# Patient Record
Sex: MALE | Race: WHITE | NOT HISPANIC OR LATINO | Employment: OTHER | URBAN - NONMETROPOLITAN AREA
[De-identification: names, ages, dates, MRNs, and addresses within clinical notes are randomized per-mention and may not be internally consistent; named-entity substitution may affect disease eponyms.]

---

## 2017-01-06 ENCOUNTER — TELEPHONE (OUTPATIENT)
Dept: UROLOGY | Facility: CLINIC | Age: 62
End: 2017-01-06

## 2017-01-06 NOTE — TELEPHONE ENCOUNTER
----- Message from Magdalene Sharma sent at 1/6/2017 11:40 AM CST -----  Contact: [phone]  PT WANTS RESULTS FROM PSA.    THANKS

## 2017-02-09 ENCOUNTER — OFFICE VISIT (OUTPATIENT)
Dept: OTOLARYNGOLOGY | Facility: CLINIC | Age: 62
End: 2017-02-09

## 2017-02-09 VITALS
BODY MASS INDEX: 21.47 KG/M2 | WEIGHT: 162 LBS | HEART RATE: 99 BPM | TEMPERATURE: 98.2 F | HEIGHT: 73 IN | DIASTOLIC BLOOD PRESSURE: 96 MMHG | SYSTOLIC BLOOD PRESSURE: 123 MMHG

## 2017-02-09 DIAGNOSIS — K21.9 LARYNGOPHARYNGEAL REFLUX: Primary | ICD-10-CM

## 2017-02-09 DIAGNOSIS — K11.7 XEROSTOMIA: ICD-10-CM

## 2017-02-09 DIAGNOSIS — C01 SQUAMOUS CELL CARCINOMA OF BASE OF TONGUE (HCC): ICD-10-CM

## 2017-02-09 PROCEDURE — 99213 OFFICE O/P EST LOW 20 MIN: CPT | Performed by: NURSE PRACTITIONER

## 2017-02-09 PROCEDURE — 31575 DIAGNOSTIC LARYNGOSCOPY: CPT | Performed by: NURSE PRACTITIONER

## 2017-02-09 RX ORDER — TADALAFIL 20 MG
TABLET ORAL
Refills: 2 | COMMUNITY
Start: 2016-12-05 | End: 2019-03-26

## 2017-02-09 RX ORDER — ALPRAZOLAM 2 MG/1
2 TABLET ORAL 3 TIMES DAILY
COMMUNITY
Start: 2017-02-08

## 2017-02-09 RX ORDER — SIMVASTATIN 20 MG
20 TABLET ORAL NIGHTLY
Refills: 11 | COMMUNITY
Start: 2017-01-09

## 2017-02-09 NOTE — PROGRESS NOTES
OPERATIVE NOTE:    PROCEDURE:   Flexible Fiberoptic Laryngoscopy    ANESTHESIA:  None    REASON FOR PROCEDURE:  Procedure was recommend for suspicious clinical behavior unresponsive to medical management, concern for return of malignancy.  Risks, benefits and alternatives were discussed.      DETAILS of OPERATION:  The patient was seated in the exam chair.  A flexible fiberoptic laryngoscopy was performed through the oral cavity.  The scope was introduced into the oral cavity and directed to the level of the glottis, examining the structures of the oropharynx, base of tongue, vallecula, supraglottic larynx, glottic larynx, and hypopharynx.      FINDINGS:  Mucosal surfaces:   The mucosal surfaces demonstrated normal mucosa surfaces without inflammation.    Base of tongue:  The base of tongue was found to have radiation changes no mass or lesion    Epiglottis:  The epiglottis was found to have no mass or lesion.    Aryepligottic fold:  The AE folds were found to have no mass or lesion.    False Vocal Fold:  The false cords were found to have no mass or lesion.    True Vocal Cord:  The true vocal cords were found to have no mass or lesion.    Arytenoid:   The arytenoids were found to have erythema    Hypopharynx:  The hypopharynx was found to have no mass or lesion.    The patient tolerated procedure well.

## 2017-02-09 NOTE — PROGRESS NOTES
YOB: 1955  Location: [facility]  Location Address: [address] [place] [address], [facility], [address] [place] [address]  Location Phone: [phone]    Chief Complaint   Patient presents with   • Mouth Lesions       History of Present Illness  Saul Alcantara Sr. is a 61 y.o. male. Saul Alcantara Sr. is here for follow up of ENT complaints. 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 Patient has had dry throat and mouth. The symptoms are not localized to a particular location.  The symptoms have been present for the last several months . He has been in speech therapy and is improving. On Nystatin at this time.               Study Result      RF SWALLOWING STUDY CINE OR VIDEO- 2016 9-31 AM CDT     REASON FOR EXAM- Dysphasia     COMPARISON- NONE       FLUOROSCOPY TIME- 1 minute     TECHNIQUE- In conjunction with speech pathology services, video  fluoroscopic swallow examination was performed with oral ingestion of  barium containing substances of various viscosities.       FINDINGS- Medium bolus sizes are well controlled with no spillage into  the oropharynx. No pooling is demonstrated. There is soft palate  elevation and coverage of the posterior nasopharynx with swallowing. No  stasis is noted within the valleculae or piriform sinuses. Penetration  is noted with thin consistency.       IMPRESSION-    1. Normal swallowing mechanism.    2. Penetration with thin consistency. No evidence of aspiration.       Please see speech pathologist's report for further details and  recommendations.                       Past Medical History   Diagnosis Date   • Allergic rhinitis    • Chronic rhinitis    • Chronic sinusitis    • Depression    • Deviated nasal septum    • GERD (gastroesophageal reflux disease)    • Hypertension    • Hypothyroidism    • Laryngopharyngeal reflux    • Lymphoma      Non-Hodgkins   • Sicca syndrome    • Sinusitis, acute    • Squamous  cell carcinoma      Base of Tongue   • Tobacco use disorder    • Tongue irritation    • Xerostomia        Past Surgical History   Procedure Laterality Date   • Cataract extraction     • Squamous cell carcinoma excision  09/10/2014     Tongue   • Hand surgery     • Knee surgery     • Other surgical history  09/10/2014     Microlaryngoscopy with Biopsy - Base of Tongue   • Tonsillectomy           Current Outpatient Prescriptions:   •  ALPRAZolam (XANAX) 2 MG tablet, , Disp: , Rfl:   •  CIALIS 20 MG tablet, TK 1 T PO EVERY OTHER DAY PRN., Disp: , Rfl: 2  •  cyclobenzaprine (FLEXERIL) 10 MG tablet, TK 1 T PO TID, Disp: , Rfl: 2  •  escitalopram (LEXAPRO) 20 MG tablet, Take 20 mg by mouth daily Indications: Depression , Disp: , Rfl:   •  glycopyrrolate (ROBINUL) 1 MG tablet, Take 1 mg by mouth 3 times daily, Disp: , Rfl:   •  HYDROcodone-acetaminophen (NORCO) 7.5-325 MG per tablet, TK 1 T PO QD PRN P, Disp: , Rfl: 0  •  levothyroxine (SYNTHROID, LEVOTHROID) 125 MCG tablet, Take 0.125 mg by mouth daily Indications: Underactive Thyroid , Disp: , Rfl:   •  nystatin (MYCOSTATIN) 324887 UNIT/ML suspension, TK 10 ML PO TID SIWSH AND SWALLOW FOR 7 DAYS, Disp: , Rfl: 2  •  ondansetron (ZOFRAN) 8 MG tablet, every 8 (eight) hours., Disp: , Rfl:   •  pantoprazole (PROTONIX) 40 MG EC tablet, Delayed Release (DR/EC) Take 1 tablet (40 mg) by oral route 2 times per day, Take 30 minutes prior to breakfast and evening meal, Disp: , Rfl:   •  senna-docusate (PERICOLACE) 8.6-50 MG per tablet, Take 1 tablet by mouth 2 times daily, Disp: , Rfl:   •  simvastatin (ZOCOR) 20 MG tablet, TK 1 T PO QD, Disp: , Rfl: 11    Review of patient's allergies indicates no known allergies.    Family History   Problem Relation Age of Onset   • Diabetes Sister    • Cancer Sister        Social History     Social History   • Marital status:      Spouse name: N/A   • Number of children: N/A   • Years of education: N/A     Occupational History   • Not on  file.     Social History Main Topics   • Smoking status: Former Smoker   • Smokeless tobacco: Not on file   • Alcohol use No   • Drug use: No   • Sexual activity: Not on file     Other Topics Concern   • Not on file     Social History Narrative       Review of Systems   Constitutional: Negative.    HENT:        SEE HPI   Eyes: Negative.    Respiratory: Negative.    Cardiovascular: Negative.    Gastrointestinal: Negative.    Endocrine: Negative.    Genitourinary: Negative.    Musculoskeletal: Negative.    Skin: Negative.    Allergic/Immunologic: Negative.    Neurological: Negative.    Hematological: Negative.    Psychiatric/Behavioral: Negative.    All other systems reviewed and are negative.      Vitals:    02/09/17 1417   BP: 123/96   Pulse: 99   Temp: 98.2 °F (36.8 °C)       Objective     Physical Exam  CONSTITUTIONAL: thin though well nourished, alert, oriented, in no acute distress     COMMUNICATION AND VOICE: able to communicate normally, normal voice quality    HEAD: normocephalic, no lesions, atraumatic, no tenderness, no masses     FACE: appearance normal, no lesions, no tenderness, no deformities, facial motion symmetric    SALIVARY GLANDS: parotid glands with no tenderness, no swelling, no masses, submandibular glands with normal size, nontender    EYES: ocular motility normal, eyelids normal, orbits normal, no proptosis, conjunctiva normal , pupils equal, round     EARS:  Hearing: response to conversational voice normal bilaterally   External Ears: auricles without lesions  Otoscopic: tympanic membrane appearance normal, no lesions, no perforation, normal mobility, no fluid    NOSE:  External Nose: structure normal, no tenderness on palpation, no nasal discharge, no lesions, no evidence of trauma, nostrils patent   Intranasal Exam: nasal mucosa normal, vestibule within normal limits, inferior turbinate normal, nasal septum midline     ORAL:  Lips: upper and lower lips without lesion   Teeth:  edentulous  Gums: gingivae healthy   Oral Mucosa: oral mucosa dry    Floor of Mouth: WNL  Tongue: dryness with mild yellow coating without obvious lesions or masses to visualization or palpation  Palate: soft and hard palates with normal mucosa and structure  Oropharynx: oropharyngeal mucosa normal dry    LARYNGEAL EXAM:  See SCOPE    NECK: neck appearance normal, no mass,  noted without erythema or tenderness    THYROID: no overt thyromegaly, no tenderness, nodules or mass present on palpation, position midline     LYMPH NODES: no lymphadenopathy    CHEST/RESPIRATORY: respiratory effort normal    CARDIOVASCULAR:  extremities without cyanosis or edema      NEUROLOGIC/PSYCHIATRIC: oriented to time, place and person, mood normal, affect appropriate, CN II-XII intact grossly    Assessment/Plan   Saul was seen today for mouth lesions.    Diagnoses and all orders for this visit:    Laryngopharyngeal reflux    Squamous cell carcinoma of base of tongue T1N0M0    Xerostomia      * Surgery not found *  No orders of the defined types were placed in this encounter.    Return in about 8 weeks (around 4/6/2017).       Patient Instructions   Call for problems or worsening symptoms    No evidence of recurrence of disease

## 2017-03-02 ENCOUNTER — OFFICE VISIT (OUTPATIENT)
Dept: OTOLARYNGOLOGY | Facility: CLINIC | Age: 62
End: 2017-03-02

## 2017-03-02 VITALS
TEMPERATURE: 98.3 F | HEIGHT: 73 IN | SYSTOLIC BLOOD PRESSURE: 140 MMHG | HEART RATE: 62 BPM | WEIGHT: 162 LBS | DIASTOLIC BLOOD PRESSURE: 82 MMHG | BODY MASS INDEX: 21.47 KG/M2

## 2017-03-02 DIAGNOSIS — J32.0 CHRONIC MAXILLARY SINUSITIS: ICD-10-CM

## 2017-03-02 DIAGNOSIS — K11.7 XEROSTOMIA: ICD-10-CM

## 2017-03-02 DIAGNOSIS — E05.90 HYPERTHYROIDISM: ICD-10-CM

## 2017-03-02 DIAGNOSIS — C02.9 PRIMARY SQUAMOUS CELL CARCINOMA OF TONGUE (HCC): ICD-10-CM

## 2017-03-02 DIAGNOSIS — Z92.3 H/O HEAD AND NECK RADIATION: ICD-10-CM

## 2017-03-02 DIAGNOSIS — R79.89 ABNORMAL THYROID STIMULATING HORMONE LEVEL: Primary | ICD-10-CM

## 2017-03-02 DIAGNOSIS — K14.9 TONGUE IRRITATION: ICD-10-CM

## 2017-03-02 DIAGNOSIS — M35.00 SICCA SYNDROME (HCC): ICD-10-CM

## 2017-03-02 DIAGNOSIS — K21.9 LARYNGOPHARYNGEAL REFLUX: ICD-10-CM

## 2017-03-02 PROBLEM — J32.9 CHRONIC SINUSITIS: Status: ACTIVE | Noted: 2017-03-02

## 2017-03-02 PROCEDURE — 99214 OFFICE O/P EST MOD 30 MIN: CPT | Performed by: PHYSICIAN ASSISTANT

## 2017-03-02 PROCEDURE — 31575 DIAGNOSTIC LARYNGOSCOPY: CPT | Performed by: PHYSICIAN ASSISTANT

## 2017-03-02 RX ORDER — PILOCARPINE HYDROCHLORIDE 5 MG/1
5 TABLET, FILM COATED ORAL 3 TIMES DAILY
Qty: 30 TABLET | Refills: 0 | Status: SHIPPED | OUTPATIENT
Start: 2017-03-02 | End: 2017-04-11

## 2017-03-02 RX ORDER — LEVOTHYROXINE SODIUM 0.07 MG/1
88 TABLET ORAL DAILY
COMMUNITY
End: 2017-12-19

## 2017-03-02 RX ORDER — ZOLPIDEM TARTRATE 10 MG/1
10 TABLET ORAL DAILY
COMMUNITY
Start: 2015-01-12 | End: 2018-02-15

## 2017-03-02 NOTE — PROGRESS NOTES
Patient Care Team:  Joe Stubbs MD as PCP - General (Internal Medicine)  Wayne Richmond MD as Consulting Physician (Otolaryngology)  Oscar Bazan MD as Consulting Physician (Urology)    Chief Complaint   Patient presents with   • Follow-up     been bothering him and throat is sore        Subjective     Saul Alcantara Sr. is a 61 y.o. male who presents for evaluation.  HPI Comments: Patient presents for follow-up evaluation of throat problems. The patient has a history of radiation therapy due to a base of tongue cancer on the left side. He reports that over the last several weeks his tongue has been irritated, his throat is sore, and his upper front jaw is numb and sore. He also reports maxillary tenderness. He is concerned that a cancer is coming back. He also reports he has never been able to wear his dentures due to the jaw surgery he had to remove his teeth. He has also had a hard time getting his thyroid regulated and has had multiple medication changes and was recently changed from 100mcg synthroid to 75mcg.       Review of Systems  Review of Systems   Constitutional: Positive for unexpected weight change. Negative for activity change, appetite change, chills, diaphoresis, fatigue and fever.   HENT: Positive for dental problem, mouth sores, sore throat, trouble swallowing and voice change. Negative for congestion, ear discharge, ear pain, facial swelling, hearing loss, nosebleeds, postnasal drip, rhinorrhea, sinus pressure, sneezing and tinnitus.    Eyes: Negative for pain, discharge, redness, itching and visual disturbance.   Respiratory: Negative for apnea, cough, choking, chest tightness, shortness of breath, wheezing and stridor.    Gastrointestinal: Negative for nausea and vomiting.   Endocrine: Negative for cold intolerance and heat intolerance.   Musculoskeletal: Negative for arthralgias, back pain, gait problem, neck pain and neck stiffness.   Skin: Negative for rash.    Allergic/Immunologic: Negative for environmental allergies and food allergies.   Neurological: Negative for dizziness, tremors, seizures, syncope, facial asymmetry, speech difficulty, weakness, light-headedness, numbness and headaches.   Hematological: Negative for adenopathy. Does not bruise/bleed easily.   Psychiatric/Behavioral: Negative for behavioral problems, sleep disturbance and suicidal ideas. The patient is not nervous/anxious and is not hyperactive.        History  Past Medical History   Diagnosis Date   • Allergic rhinitis    • Chronic rhinitis    • Chronic sinusitis    • Depression    • Deviated nasal septum    • GERD (gastroesophageal reflux disease)    • Hypertension    • Hypothyroidism    • Laryngopharyngeal reflux    • Lymphoma      Non-Hodgkins   • Sicca syndrome    • Sinusitis, acute    • Squamous cell carcinoma      Base of Tongue   • Tobacco use disorder    • Tongue irritation    • Xerostomia      Past Surgical History   Procedure Laterality Date   • Cataract extraction     • Squamous cell carcinoma excision  09/10/2014     Tongue   • Hand surgery     • Knee surgery     • Other surgical history  09/10/2014     Microlaryngoscopy with Biopsy - Base of Tongue   • Tonsillectomy       Family History   Problem Relation Age of Onset   • Diabetes Sister    • Cancer Sister      Social History   Substance Use Topics   • Smoking status: Former Smoker   • Smokeless tobacco: None   • Alcohol use No       Current Outpatient Prescriptions:   •  ALPRAZolam (XANAX) 2 MG tablet, , Disp: , Rfl:   •  CIALIS 20 MG tablet, TK 1 T PO EVERY OTHER DAY PRN., Disp: , Rfl: 2  •  cyclobenzaprine (FLEXERIL) 10 MG tablet, TK 1 T PO TID, Disp: , Rfl: 2  •  escitalopram (LEXAPRO) 20 MG tablet, Take 20 mg by mouth daily Indications: Depression , Disp: , Rfl:   •  HYDROcodone-acetaminophen (NORCO) 7.5-325 MG per tablet, TK 1 T PO QD PRN P, Disp: , Rfl: 0  •  levothyroxine (SYNTHROID, LEVOTHROID) 75 MCG tablet, Take 75 mcg by  mouth Daily., Disp: , Rfl:   •  nystatin (MYCOSTATIN) 961235 UNIT/ML suspension, TK 10 ML PO TID SIWSH AND SWALLOW FOR 7 DAYS, Disp: , Rfl: 2  •  ondansetron (ZOFRAN) 8 MG tablet, every 8 (eight) hours., Disp: , Rfl:   •  pantoprazole (PROTONIX) 40 MG EC tablet, Delayed Release (DR/EC) Take 1 tablet (40 mg) by oral route 2 times per day, Take 30 minutes prior to breakfast and evening meal, Disp: , Rfl:   •  senna-docusate (PERICOLACE) 8.6-50 MG per tablet, Take 1 tablet by mouth 2 times daily, Disp: , Rfl:   •  simvastatin (ZOCOR) 20 MG tablet, TK 1 T PO QD, Disp: , Rfl: 11  •  zolpidem (AMBIEN) 10 MG tablet, Take 10 mg by mouth Daily., Disp: , Rfl:   •  Miracle mouthwash oral suspension, Swish and spit 15 mL 3 (Three) Times a Day for 10 days. 1 tsp swish and spit three times a day, Disp: 473 mL, Rfl: 1  •  pilocarpine (SALAGEN) 5 MG tablet, Take 1 tablet by mouth 3 (Three) Times a Day., Disp: 30 tablet, Rfl: 0  Allergies:  Review of patient's allergies indicates no known allergies.    Objective     Vital Signs  Temp:  [98.3 °F (36.8 °C)] 98.3 °F (36.8 °C)  Heart Rate:  [62] 62  BP: (140)/(82) 140/82    Physical Exam:  Physical Exam   Constitutional: He is oriented to person, place, and time. He appears well-developed and well-nourished. No distress.   HENT:   Head: Normocephalic and atraumatic.   Right Ear: Hearing, tympanic membrane, external ear and ear canal normal. No lacerations. No drainage, swelling or tenderness. No foreign bodies. No mastoid tenderness. Tympanic membrane is not injected, not scarred, not perforated, not erythematous, not retracted and not bulging. Tympanic membrane mobility is normal. No middle ear effusion. No hemotympanum. No decreased hearing is noted.   Left Ear: Hearing, tympanic membrane, external ear and ear canal normal. No lacerations. No drainage, swelling or tenderness. No foreign bodies. No mastoid tenderness. Tympanic membrane is not injected, not scarred, not perforated, not  erythematous, not retracted and not bulging. Tympanic membrane mobility is normal.  No middle ear effusion. No hemotympanum. No decreased hearing is noted.   Nose: Nose normal. No mucosal edema, rhinorrhea or septal deviation.   Mouth/Throat: Uvula is midline and oropharynx is clear and moist. Mucous membranes are not pale, dry and not cyanotic. He does not have dentures. No oral lesions. No trismus in the jaw. Normal dentition. No uvula swelling. No oropharyngeal exudate, posterior oropharyngeal edema, posterior oropharyngeal erythema or tonsillar abscesses. Tonsils are 0 on the right. Tonsils are 0 on the left. No tonsillar exudate.       Eyes: Conjunctivae, EOM and lids are normal. Pupils are equal, round, and reactive to light. No scleral icterus. Right eye exhibits normal extraocular motion and no nystagmus. Left eye exhibits normal extraocular motion and no nystagmus. Right pupil is round and reactive. Left pupil is round and reactive. Pupils are equal.   Neck: Trachea normal, full passive range of motion without pain and phonation normal. No tracheal deviation present. No thyroid mass and no thyromegaly present.   Cardiovascular: Normal rate.    Pulmonary/Chest: Effort normal. No stridor.   Musculoskeletal: He exhibits no edema.   Lymphadenopathy:        Head (right side): No submental, no submandibular, no tonsillar, no preauricular and no posterior auricular adenopathy present.        Head (left side): No submental, no submandibular, no tonsillar, no preauricular and no posterior auricular adenopathy present.     He has no cervical adenopathy.        Right cervical: No superficial cervical and no deep cervical adenopathy present.       Left cervical: No superficial cervical and no deep cervical adenopathy present.   Neurological: He is alert and oriented to person, place, and time. No cranial nerve deficit. Gait normal.   Skin: Skin is warm and dry. No lesion and no rash noted. He is not diaphoretic. No  erythema. No pallor.   Psychiatric: He has a normal mood and affect. His behavior is normal. Judgment and thought content normal.   Vitals reviewed.    Procedure Note    Pre-operative Diagnosis: History of base of tongue carcinoma    Post-operative Diagnosis: same    Anesthesia: none    Endoscopy Type: Flexible Laryngoscopy    Indications for Procedure: Patient unable to cooperate - preventing mirror examination    Procedure Details:    The patient was placed in the sitting position.  After topical anesthesia and decongestion, the 4 mm laryngoscope was passed.  The nasal cavities, nasopharynx, oropharynx, hypopharynx, and larynx were all examined.  Vocal cords were examined during respiration and phonation.  The following findings were noted:    Findings:   Mucosal Surfaces:  The mucosal surfaces demonstrated inflammation, thickened mucus, and edema.   Nasopharynx:  The nasopharynx was found to have no lesion or mass.   Base of Tongue:  The base of tongue was found to have no mass or lesion.   Epiglottis:  The epiglottis was found to have no mass or lesion.   Aryepiglottic Folds:  The AE folds were found to have no mass or lesion.   False Vocal Cords:  The false vocal cords were found to have no mass or lesion.   True Vocal Cords:  The true vocal cords were found to have no mass or lesion.   Arytenoids:  The arytenoids were found to have no mass or lesion.   Hypopharynx:  The hypopharynx was found to have no mass or lesion.   Condition:  Stable.  Patient tolerated procedure well.    Complications:  None    Results Review:   I reviewed the patient's new clinical results.      Sinus CT looked clear with surgical changed noted on the upper jaw.    Assessment/Plan     Problems Addressed this Visit        Respiratory    Laryngopharyngeal reflux    Chronic sinusitis    Relevant Orders    CT Sinus Without Contrast       Other    Primary squamous cell carcinoma of tongue    Xerostomia    Relevant Medications    pilocarpine  (SALAGEN) 5 MG tablet    Miracle mouthwash oral suspension    Tongue irritation    Relevant Medications    pilocarpine (SALAGEN) 5 MG tablet    Miracle mouthwash oral suspension    Sicca syndrome    Relevant Medications    pilocarpine (SALAGEN) 5 MG tablet    Miracle mouthwash oral suspension    Abnormal thyroid stimulating hormone level - Primary    Relevant Medications    levothyroxine (SYNTHROID, LEVOTHROID) 75 MCG tablet    Other Relevant Orders    US Thyroid    Ambulatory Referral to Endocrinology    H/O head and neck radiation      Other Visit Diagnoses     Hyperthyroidism        Relevant Medications    levothyroxine (SYNTHROID, LEVOTHROID) 75 MCG tablet          My findings and recommendations were discussed and questions were answered. Continue care as directed, will try miracle mouthwash for mouth soreness. Will consult with Endocrinology due to difficulty keeping TSH level and weight loss. Will try salagen for mouth dryness.    Return for Keep existing follow-up with Dr. Richmond.    KRISTA Lino  03/03/17  8:59 AM

## 2017-03-03 PROBLEM — Z92.3 H/O HEAD AND NECK RADIATION: Status: ACTIVE | Noted: 2017-03-03

## 2017-03-15 ENCOUNTER — HOSPITAL ENCOUNTER (EMERGENCY)
Age: 62
Discharge: HOME | End: 2017-03-15
Payer: COMMERCIAL

## 2017-03-15 VITALS
BODY MASS INDEX: 21.47 KG/M2 | RESPIRATION RATE: 20 BRPM | HEIGHT: 73 IN | TEMPERATURE: 98.4 F | HEART RATE: 87 BPM | WEIGHT: 162 LBS | OXYGEN SATURATION: 98 % | DIASTOLIC BLOOD PRESSURE: 72 MMHG | SYSTOLIC BLOOD PRESSURE: 132 MMHG

## 2017-03-15 DIAGNOSIS — K05.219 GINGIVAL ABSCESS: Primary | ICD-10-CM

## 2017-03-15 PROCEDURE — 99282 EMERGENCY DEPT VISIT SF MDM: CPT

## 2017-03-15 PROCEDURE — 99282 EMERGENCY DEPT VISIT SF MDM: CPT | Performed by: NURSE PRACTITIONER

## 2017-03-15 RX ORDER — PENICILLIN V POTASSIUM 500 MG/1
500 TABLET ORAL 4 TIMES DAILY
Qty: 40 TABLET | Refills: 0 | Status: SHIPPED | OUTPATIENT
Start: 2017-03-15 | End: 2017-03-25

## 2017-03-15 ASSESSMENT — PAIN SCALES - GENERAL: PAINLEVEL_OUTOF10: 7

## 2017-03-15 ASSESSMENT — PAIN DESCRIPTION - DESCRIPTORS: DESCRIPTORS: THROBBING

## 2017-03-15 ASSESSMENT — PAIN DESCRIPTION - LOCATION: LOCATION: MOUTH

## 2017-03-17 ENCOUNTER — TELEPHONE (OUTPATIENT)
Dept: OTOLARYNGOLOGY | Facility: CLINIC | Age: 62
End: 2017-03-17

## 2017-03-23 ENCOUNTER — TELEPHONE (OUTPATIENT)
Dept: OTOLARYNGOLOGY | Facility: CLINIC | Age: 62
End: 2017-03-23

## 2017-03-23 NOTE — TELEPHONE ENCOUNTER
Pt has a smelly flegm as he coughs, so Sulaiman wants him to take an abx.  They have PCN on hand that is fairly new so they will start it. Ok per Sulaiman

## 2017-04-11 ENCOUNTER — OFFICE VISIT (OUTPATIENT)
Dept: OTOLARYNGOLOGY | Facility: CLINIC | Age: 62
End: 2017-04-11

## 2017-04-11 VITALS
DIASTOLIC BLOOD PRESSURE: 82 MMHG | HEART RATE: 80 BPM | HEIGHT: 73 IN | SYSTOLIC BLOOD PRESSURE: 120 MMHG | TEMPERATURE: 97.9 F | WEIGHT: 169 LBS | BODY MASS INDEX: 22.4 KG/M2

## 2017-04-11 DIAGNOSIS — K11.7 XEROSTOMIA: ICD-10-CM

## 2017-04-11 DIAGNOSIS — C02.9 PRIMARY SQUAMOUS CELL CARCINOMA OF TONGUE (HCC): ICD-10-CM

## 2017-04-11 DIAGNOSIS — M35.00 SICCA SYNDROME (HCC): ICD-10-CM

## 2017-04-11 DIAGNOSIS — Z92.3 H/O HEAD AND NECK RADIATION: ICD-10-CM

## 2017-04-11 DIAGNOSIS — K14.9 TONGUE IRRITATION: ICD-10-CM

## 2017-04-11 DIAGNOSIS — J32.9 CHRONIC SINUSITIS, UNSPECIFIED LOCATION: Primary | ICD-10-CM

## 2017-04-11 PROCEDURE — 99213 OFFICE O/P EST LOW 20 MIN: CPT | Performed by: OTOLARYNGOLOGY

## 2017-04-11 RX ORDER — CEVIMELINE HYDROCHLORIDE 30 MG/1
30 CAPSULE ORAL 3 TIMES DAILY
Qty: 90 CAPSULE | Refills: 3 | Status: SHIPPED | OUTPATIENT
Start: 2017-04-11 | End: 2018-02-15

## 2017-04-11 NOTE — PATIENT INSTRUCTIONS
Be aware of the signs and symptoms of recurrent head and neck cancer including neck mass, persistent or recurrent sore throat, ear pain, hemoptysis, weight loss and hoarseness. If any of these symptoms recur and or persist, call for an evaluation.     D/W patient increasing caloric intake and discussed cruciferous vegetables and protein shakes etc to maintain optimal nutrition.  The necessity of abstaining from tobacco products was also discussed particularly with respect to not smoking.    Continue F/U and/or treatment with Jaydon     Try Evoxac....

## 2017-04-11 NOTE — PROGRESS NOTES
YOB: 1955  Location: [facility]  Location Address: [address] [place] [address], [facility], [address] [place] [address]  Location Phone: [phone]    Chief Complaint   Patient presents with   • Follow-up     SCC tongue       History of Present Illness  Saul Alcantara Sr. is a 61 y.o. male who presents for follow-up evaluation of throat problems and oral problems. The patient has a history of radiation therapy due to a base of tongue cancer on the left side. He reports that over the last several weeks his tongue has been irritated, his throat is sore, and his upper front jaw is numb and sore. Today, he states that has improved but does have numbness of his gums. He also reports maxillary tenderness. He also reports he has never been able to wear his dentures due to the jaw surgery he had to remove his teeth. He has seen Dr. Donnelly who has placed him on Synthroid 88 mcg.      He complains of dryness.      CT exam of the sinuses is reviewed demonstrating left serge bullosa deformity but otherwise relatively normal                   Past Medical History:   Diagnosis Date   • Allergic rhinitis    • Chronic rhinitis    • Chronic sinusitis    • Depression    • Deviated nasal septum    • GERD (gastroesophageal reflux disease)    • H/O head and neck radiation 3/3/2017   • Hypertension    • Hypothyroidism    • Laryngopharyngeal reflux    • Lymphoma     Non-Hodgkins   • Primary squamous cell carcinoma of tongue 2016    T1N0M0 SCC S/P XRT/Chemo 2014   • Primary squamous cell carcinoma of tongue 2016    T1N0M0 SCC S/P XRT/Chemo 2014   • Sicca syndrome    • Sinusitis, acute    • Squamous cell carcinoma     Base of Tongue   • Tobacco use disorder    • Tongue irritation    • Xerostomia        Past Surgical History:   Procedure Laterality Date   • CATARACT EXTRACTION     • HAND SURGERY     • KNEE SURGERY     • OTHER SURGICAL HISTORY  09/10/2014    Microlaryngoscopy with Biopsy - Base of Tongue   • SQUAMOUS  CELL CARCINOMA EXCISION  09/10/2014    Tongue   • TONSILLECTOMY           Current Outpatient Prescriptions:   •  ALPRAZolam (XANAX) 2 MG tablet, , Disp: , Rfl:   •  CIALIS 20 MG tablet, TK 1 T PO EVERY OTHER DAY PRN., Disp: , Rfl: 2  •  cyclobenzaprine (FLEXERIL) 10 MG tablet, TK 1 T PO TID, Disp: , Rfl: 2  •  escitalopram (LEXAPRO) 20 MG tablet, Take 20 mg by mouth daily Indications: Depression , Disp: , Rfl:   •  HYDROcodone-acetaminophen (NORCO) 7.5-325 MG per tablet, TK 1 T PO QD PRN P, Disp: , Rfl: 0  •  levothyroxine (SYNTHROID, LEVOTHROID) 75 MCG tablet, Take 88 mcg by mouth Daily., Disp: , Rfl:   •  nystatin (MYCOSTATIN) 556212 UNIT/ML suspension, TK 10 ML PO TID SIWSH AND SWALLOW FOR 7 DAYS, Disp: , Rfl: 2  •  ondansetron (ZOFRAN) 8 MG tablet, every 8 (eight) hours., Disp: , Rfl:   •  pantoprazole (PROTONIX) 40 MG EC tablet, Delayed Release (DR/EC) Take 1 tablet (40 mg) by oral route 2 times per day, Take 30 minutes prior to breakfast and evening meal, Disp: , Rfl:   •  senna-docusate (PERICOLACE) 8.6-50 MG per tablet, Take 1 tablet by mouth 2 times daily, Disp: , Rfl:   •  simvastatin (ZOCOR) 20 MG tablet, TK 1 T PO QD, Disp: , Rfl: 11  •  zolpidem (AMBIEN) 10 MG tablet, Take 10 mg by mouth Daily., Disp: , Rfl:   •  cevimeline (EVOXAC) 30 MG capsule, Take 1 capsule by mouth 3 (Three) Times a Day., Disp: 90 capsule, Rfl: 3    Review of patient's allergies indicates no known allergies.    Family History   Problem Relation Age of Onset   • Diabetes Sister    • Cancer Sister        Social History     Social History   • Marital status:      Spouse name: N/A   • Number of children: N/A   • Years of education: N/A     Occupational History   • Not on file.     Social History Main Topics   • Smoking status: Former Smoker   • Smokeless tobacco: Not on file   • Alcohol use No   • Drug use: No   • Sexual activity: Not on file     Other Topics Concern   • Not on file     Social History Narrative       Review of  Systems   Constitutional: Negative.    HENT:        Numbness of gums, left maxillary tenderness   Eyes: Negative.    Respiratory: Negative.    Cardiovascular: Negative.    Gastrointestinal: Negative.    Endocrine: Negative.    Genitourinary: Negative.    Musculoskeletal: Negative.    Skin: Negative.    Allergic/Immunologic: Negative.    Neurological: Negative.    Hematological: Negative.    Psychiatric/Behavioral: Negative.        Vitals:    04/11/17 1317   BP: 120/82   Pulse: 80   Temp: 97.9 °F (36.6 °C)       Objective     Physical Exam  CONSTITUTIONAL: well nourished, alert, oriented, in no acute distress     COMMUNICATION AND VOICE: able to communicate normally, normal voice quality    HEAD: normocephalic, no lesions, atraumatic, no tenderness, no masses     FACE: appearance normal, no lesions, no tenderness, no deformities, facial motion symmetric    SALIVARY GLANDS: parotid glands with no tenderness, no swelling, no masses, submandibular glands with normal size, nontender    EYES: ocular motility normal, eyelids normal, orbits normal, no proptosis, conjunctiva normal , pupils equal, round     EARS:  Hearing: response to conversational voice normal bilaterally   External Ears: auricles without lesions  Otoscopic: tympanic membrane appearance normal, no lesions, no perforation, normal mobility, no fluid    NOSE:  External Nose: structure normal, no tenderness on palpation, no nasal discharge, no lesions, no evidence of trauma, nostrils patent   Intranasal Exam: nasal mucosa normal, vestibule within normal limits, inferior turbinate normal, nasal septum midline   Nasopharynx:     ORAL:  Lips: upper and lower lips without lesion   Teeth: Edentulous  Gums: gingivae healthy -normal in appearance  Oral Mucosa: oral mucosa normal, no mucosal lesions   Floor of Mouth: Warthin’s duct patent, mucosa normal  Tongue: lingual mucosa normal without lesions, normal tongue mobility   Palate: soft and hard palates with normal  mucosa and structure  Oropharynx: oropharyngeal mucosa normal  No Masses by visualization or palpation    HYPOPHARYNX:   LARYNX:     NECK: neck appearance normal, no mass,  noted without erythema or tenderness appreciated by palpation    THYROID: no overt thyromegaly, no tenderness, nodules or mass present on palpation, position midline     LYMPH NODES: no lymphadenopathy    CHEST/RESPIRATORY: respiratory effort normal, normal breath sounds     CARDIOVASCULAR: rate and rhythm normal, extremities without cyanosis or edema      NEUROLOGIC/PSYCHIATRIC: oriented to time, place and person, mood normal, affect appropriate, CN II-XII intact grossly    Assessment/Plan   Saul was seen today for follow-up.    Diagnoses and all orders for this visit:    Chronic sinusitis, unspecified location    H/O head and neck radiation    Sicca syndrome    Tongue irritation    Xerostomia    Primary squamous cell carcinoma of tongue    Other orders  -     cevimeline (EVOXAC) 30 MG capsule; Take 1 capsule by mouth 3 (Three) Times a Day.      * Surgery not found *  No orders of the defined types were placed in this encounter.    No Follow-up on file.       Patient Instructions   Be aware of the signs and symptoms of recurrent head and neck cancer including neck mass, persistent or recurrent sore throat, ear pain, hemoptysis, weight loss and hoarseness. If any of these symptoms recur and or persist, call for an evaluation.     D/W patient increasing caloric intake and discussed cruciferous vegetables and protein shakes etc to maintain optimal nutrition.  The necessity of abstaining from tobacco products was also discussed particularly with respect to not smoking.    Continue F/U and/or treatment with Jaydon     Try Evoxac....

## 2017-06-20 ENCOUNTER — OFFICE VISIT (OUTPATIENT)
Dept: OTOLARYNGOLOGY | Facility: CLINIC | Age: 62
End: 2017-06-20

## 2017-06-20 VITALS
TEMPERATURE: 97.9 F | BODY MASS INDEX: 22.66 KG/M2 | WEIGHT: 171 LBS | HEIGHT: 73 IN | DIASTOLIC BLOOD PRESSURE: 87 MMHG | HEART RATE: 90 BPM | SYSTOLIC BLOOD PRESSURE: 125 MMHG

## 2017-06-20 DIAGNOSIS — K11.7 XEROSTOMIA: ICD-10-CM

## 2017-06-20 DIAGNOSIS — Z92.3 H/O HEAD AND NECK RADIATION: ICD-10-CM

## 2017-06-20 DIAGNOSIS — M35.00 SICCA SYNDROME (HCC): ICD-10-CM

## 2017-06-20 DIAGNOSIS — C02.9 PRIMARY SQUAMOUS CELL CARCINOMA OF TONGUE (HCC): Primary | ICD-10-CM

## 2017-06-20 PROCEDURE — 99214 OFFICE O/P EST MOD 30 MIN: CPT | Performed by: PHYSICIAN ASSISTANT

## 2017-06-20 NOTE — PROGRESS NOTES
YOB: 1955  Location: [facility]  Location Address: [address] [place] [address], [facility], [address] [place] [address]  Location Phone: [phone]    Chief Complaint   Patient presents with   • Follow-up     SCC tongue       History of Present Illness  Saul Alcantara Sr. is a 61 y.o. male who presents for follow-up evaluation of throat problems and oral problems. The patient has a history of radiation therapy due to a base of tongue cancer on the left side. He reports he is doing well. He does complain of morning neck stiffness and numbness of the gums. Other wise he is doing well.   He complains of dryness.      Recent PET scan was normal.                   Past Medical History:   Diagnosis Date   • Allergic rhinitis    • Chronic rhinitis    • Chronic sinusitis    • Depression    • Deviated nasal septum    • GERD (gastroesophageal reflux disease)    • H/O head and neck radiation 3/3/2017   • Hypertension    • Hypothyroidism    • Laryngopharyngeal reflux    • Lymphoma     Non-Hodgkins   • Primary squamous cell carcinoma of tongue 2016    T1N0M0 SCC S/P XRT/Chemo 2014   • Primary squamous cell carcinoma of tongue 2016    T1N0M0 SCC S/P XRT/Chemo 2014   • Sicca syndrome    • Sinusitis, acute    • Squamous cell carcinoma     Base of Tongue   • Tobacco use disorder    • Tongue irritation    • Xerostomia        Past Surgical History:   Procedure Laterality Date   • CATARACT EXTRACTION     • HAND SURGERY     • KNEE SURGERY     • OTHER SURGICAL HISTORY  09/10/2014    Microlaryngoscopy with Biopsy - Base of Tongue   • SQUAMOUS CELL CARCINOMA EXCISION  09/10/2014    Tongue   • TONSILLECTOMY           Current Outpatient Prescriptions:   •  ALPRAZolam (XANAX) 2 MG tablet, , Disp: , Rfl:   •  cevimeline (EVOXAC) 30 MG capsule, Take 1 capsule by mouth 3 (Three) Times a Day., Disp: 90 capsule, Rfl: 3  •  CIALIS 20 MG tablet, TK 1 T PO EVERY OTHER DAY PRN., Disp: , Rfl: 2  •  cyclobenzaprine (FLEXERIL) 10 MG  tablet, TK 1 T PO TID, Disp: , Rfl: 2  •  escitalopram (LEXAPRO) 20 MG tablet, Take 20 mg by mouth daily Indications: Depression , Disp: , Rfl:   •  HYDROcodone-acetaminophen (NORCO) 7.5-325 MG per tablet, TK 1 T PO QD PRN P, Disp: , Rfl: 0  •  levothyroxine (SYNTHROID, LEVOTHROID) 75 MCG tablet, Take 88 mcg by mouth Daily., Disp: , Rfl:   •  nystatin (MYCOSTATIN) 657221 UNIT/ML suspension, TK 10 ML PO TID SIWSH AND SWALLOW FOR 7 DAYS, Disp: , Rfl: 2  •  ondansetron (ZOFRAN) 8 MG tablet, every 8 (eight) hours., Disp: , Rfl:   •  pantoprazole (PROTONIX) 40 MG EC tablet, Delayed Release (DR/EC) Take 1 tablet (40 mg) by oral route 2 times per day, Take 30 minutes prior to breakfast and evening meal, Disp: , Rfl:   •  senna-docusate (PERICOLACE) 8.6-50 MG per tablet, Take 1 tablet by mouth 2 times daily, Disp: , Rfl:   •  simvastatin (ZOCOR) 20 MG tablet, TK 1 T PO QD, Disp: , Rfl: 11  •  zolpidem (AMBIEN) 10 MG tablet, Take 10 mg by mouth Daily., Disp: , Rfl:     Review of patient's allergies indicates no known allergies.    Family History   Problem Relation Age of Onset   • Diabetes Sister    • Cancer Sister        Social History     Social History   • Marital status:      Spouse name: N/A   • Number of children: N/A   • Years of education: N/A     Occupational History   • Not on file.     Social History Main Topics   • Smoking status: Former Smoker   • Smokeless tobacco: Not on file   • Alcohol use No   • Drug use: No   • Sexual activity: Not on file     Other Topics Concern   • Not on file     Social History Narrative       Review of Systems   Constitutional: Negative.    HENT:        Numbness of gums, left maxillary tenderness   Eyes: Negative.    Respiratory: Negative.    Cardiovascular: Negative.    Gastrointestinal: Negative.    Endocrine: Negative.    Genitourinary: Negative.    Musculoskeletal: Negative.    Skin: Negative.    Allergic/Immunologic: Negative.    Neurological: Negative.    Hematological:  Negative.    Psychiatric/Behavioral: Negative.        Vitals:    06/20/17 1401   BP: 125/87   Pulse: 90   Temp: 97.9 °F (36.6 °C)       Objective     Physical Exam  CONSTITUTIONAL: well nourished, alert, oriented, in no acute distress     COMMUNICATION AND VOICE: able to communicate normally, normal voice quality    HEAD: normocephalic, no lesions, atraumatic, no tenderness, no masses     FACE: appearance normal, no lesions, no tenderness, no deformities, facial motion symmetric    SALIVARY GLANDS: parotid glands with no tenderness, no swelling, no masses, submandibular glands with normal size, nontender    EYES: ocular motility normal, eyelids normal, orbits normal, no proptosis, conjunctiva normal , pupils equal, round     EARS:  Hearing: response to conversational voice normal bilaterally   External Ears: auricles without lesions  Otoscopic: tympanic membrane appearance normal, no lesions, no perforation, normal mobility, no fluid    NOSE:  External Nose: structure normal, no tenderness on palpation, no nasal discharge, no lesions, no evidence of trauma, nostrils patent   Intranasal Exam: nasal mucosa normal, vestibule within normal limits, inferior turbinate normal, nasal septum midline   Nasopharynx:     ORAL:  Lips: upper and lower lips without lesion   Teeth: Edentulous  Gums: gingivae healthy -normal in appearance  Oral Mucosa: oral mucosa normal, no mucosal lesions   Floor of Mouth: Warthin’s duct patent, mucosa normal  Tongue: lingual mucosa normal without lesions, normal tongue mobility   Palate: soft and hard palates with normal mucosa and structure  Oropharynx: oropharyngeal mucosa normal  No Masses by visualization or palpation    HYPOPHARYNX:   LARYNX:  Mirror Exam:    Mucosal Surfaces:  The mucosal surfaces demonstrated inflammation, thickened mucus, and edema.   Nasopharynx:  The nasopharynx was found to have no lesion or mass.   Base of Tongue:  The base of tongue was found to have no mass or  lesion.   Epiglottis:  The epiglottis was found to have no mass or lesion.   Aryepiglottic Folds:  The AE folds were found to have no mass or lesion.   False Vocal Cords:  The false vocal cords were found to have no mass or lesion.   True Vocal Cords:  The true vocal cords were found to have no mass or lesion.   Arytenoids:  The arytenoids were found to have findings of erythema.   Hypopharynx:  The hypopharynx was found to have no mass or lesion.       NECK: neck appearance normal, no mass,  noted without erythema or tenderness appreciated by palpation    THYROID: no overt thyromegaly, no tenderness, nodules or mass present on palpation, position midline     LYMPH NODES: no lymphadenopathy    CHEST/RESPIRATORY: respiratory effort normal, normal breath sounds     CARDIOVASCULAR: rate and rhythm normal, extremities without cyanosis or edema      NEUROLOGIC/PSYCHIATRIC: oriented to time, place and person, mood normal, affect appropriate, CN II-XII intact grossly    Assessment/Plan   Saul was seen today for follow-up.    Diagnoses and all orders for this visit:    Primary squamous cell carcinoma of tongue    Xerostomia    H/O head and neck radiation    Sicca syndrome      * Surgery not found *  No orders of the defined types were placed in this encounter.    Return in about 6 months (around 12/20/2017) for Recheck throat.       There are no Patient Instructions on file for this visit.

## 2017-06-21 LAB — CORTISOL - AM: 14.5 UG/DL (ref 6.2–19.4)

## 2017-06-27 ENCOUNTER — RESULTS ENCOUNTER (OUTPATIENT)
Dept: UROLOGY | Facility: CLINIC | Age: 62
End: 2017-06-27

## 2017-06-27 DIAGNOSIS — R93.49 ABNORMAL URINARY TRACT, RADIOLOGICAL: ICD-10-CM

## 2017-06-29 ENCOUNTER — OFFICE VISIT (OUTPATIENT)
Dept: UROLOGY | Facility: CLINIC | Age: 62
End: 2017-06-29

## 2017-06-29 VITALS
TEMPERATURE: 97.1 F | HEIGHT: 73 IN | DIASTOLIC BLOOD PRESSURE: 92 MMHG | SYSTOLIC BLOOD PRESSURE: 138 MMHG | BODY MASS INDEX: 22.26 KG/M2 | WEIGHT: 168 LBS

## 2017-06-29 DIAGNOSIS — R93.49 ABNORMAL URINARY TRACT, RADIOLOGICAL: ICD-10-CM

## 2017-06-29 DIAGNOSIS — N48.6 PEYRONIE DISEASE: Primary | ICD-10-CM

## 2017-06-29 LAB
BILIRUB BLD-MCNC: NEGATIVE MG/DL
CLARITY, POC: CLEAR
COLOR UR: YELLOW
GLUCOSE UR STRIP-MCNC: NEGATIVE MG/DL
KETONES UR QL: NEGATIVE
LEUKOCYTE EST, POC: NEGATIVE
NITRITE UR-MCNC: NEGATIVE MG/ML
PH UR: 7 [PH] (ref 5–8)
PROT UR STRIP-MCNC: NEGATIVE MG/DL
RBC # UR STRIP: NEGATIVE /UL
SP GR UR: 1.01 (ref 1–1.03)
UROBILINOGEN UR QL: NORMAL

## 2017-06-29 PROCEDURE — 81003 URINALYSIS AUTO W/O SCOPE: CPT | Performed by: UROLOGY

## 2017-06-29 PROCEDURE — 99213 OFFICE O/P EST LOW 20 MIN: CPT | Performed by: UROLOGY

## 2017-06-29 NOTE — PROGRESS NOTES
Mr. Alcantara is 62 y.o. male    CHIEF COMPLAINT: I am here for peyronies disease. MY PSA is 1.5    HPI  Abnormality of male genitalia  He complains of an abnormality of the penis. This has been present for 2 year(s). This is a new problem to me. This was identified by penile curvature during erection. The course is described as increasing in deformity. Associated symptoms include decreasing quality of erections but it improves with Cialis.    He also has an abnormal radiographic finding of the prostate gland with a PET scan that showed increased activity in the prostate.    Lab Results   Component Value Date    PSA 2.060 12/29/2016         The following portions of the patient's history were reviewed and updated as appropriate: allergies, current medications, past family history, past medical history, past social history, past surgical history and problem list.    Review of Systems   Constitutional: Negative for chills and fever.   Gastrointestinal: Negative for abdominal pain, anal bleeding and blood in stool.   Genitourinary: Negative for flank pain and hematuria.         Current Outpatient Prescriptions:   •  ALPRAZolam (XANAX) 2 MG tablet, , Disp: , Rfl:   •  cevimeline (EVOXAC) 30 MG capsule, Take 1 capsule by mouth 3 (Three) Times a Day., Disp: 90 capsule, Rfl: 3  •  CIALIS 20 MG tablet, TK 1 T PO EVERY OTHER DAY PRN., Disp: , Rfl: 2  •  cyclobenzaprine (FLEXERIL) 10 MG tablet, TK 1 T PO TID, Disp: , Rfl: 2  •  escitalopram (LEXAPRO) 20 MG tablet, Take 20 mg by mouth daily Indications: Depression , Disp: , Rfl:   •  HYDROcodone-acetaminophen (NORCO) 7.5-325 MG per tablet, TK 1 T PO QD PRN P, Disp: , Rfl: 0  •  levothyroxine (SYNTHROID, LEVOTHROID) 75 MCG tablet, Take 88 mcg by mouth Daily., Disp: , Rfl:   •  nystatin (MYCOSTATIN) 570551 UNIT/ML suspension, TK 10 ML PO TID SIWSH AND SWALLOW FOR 7 DAYS, Disp: , Rfl: 2  •  ondansetron (ZOFRAN) 8 MG tablet, every 8 (eight) hours., Disp: , Rfl:   •  pantoprazole  "(PROTONIX) 40 MG EC tablet, Delayed Release (DR/EC) Take 1 tablet (40 mg) by oral route 2 times per day, Take 30 minutes prior to breakfast and evening meal, Disp: , Rfl:   •  senna-docusate (PERICOLACE) 8.6-50 MG per tablet, Take 1 tablet by mouth 2 times daily, Disp: , Rfl:   •  simvastatin (ZOCOR) 20 MG tablet, TK 1 T PO QD, Disp: , Rfl: 11  •  zolpidem (AMBIEN) 10 MG tablet, Take 10 mg by mouth Daily., Disp: , Rfl:     Past Medical History:   Diagnosis Date   • Allergic rhinitis    • Chronic rhinitis    • Chronic sinusitis    • Depression    • Deviated nasal septum    • GERD (gastroesophageal reflux disease)    • H/O head and neck radiation 3/3/2017   • Hypertension    • Hypothyroidism    • Laryngopharyngeal reflux    • Lymphoma     Non-Hodgkins   • Primary squamous cell carcinoma of tongue 9/27/2016    T1N0M0 SCC S/P XRT/Chemo 12/2014   • Primary squamous cell carcinoma of tongue 9/27/2016    T1N0M0 SCC S/P XRT/Chemo 12/2014   • Sicca syndrome    • Sinusitis, acute    • Squamous cell carcinoma     Base of Tongue   • Tobacco use disorder    • Tongue irritation    • Xerostomia        Past Surgical History:   Procedure Laterality Date   • CATARACT EXTRACTION     • HAND SURGERY     • KNEE SURGERY     • OTHER SURGICAL HISTORY  09/10/2014    Microlaryngoscopy with Biopsy - Base of Tongue   • SQUAMOUS CELL CARCINOMA EXCISION  09/10/2014    Tongue   • TONSILLECTOMY         Social History     Social History   • Marital status:      Spouse name: N/A   • Number of children: N/A   • Years of education: N/A     Social History Main Topics   • Smoking status: Former Smoker   • Smokeless tobacco: None   • Alcohol use No   • Drug use: No   • Sexual activity: Not Asked     Other Topics Concern   • None     Social History Narrative       Family History   Problem Relation Age of Onset   • Diabetes Sister    • Cancer Sister        /92  Temp 97.1 °F (36.2 °C)  Ht 73\" (185.4 cm)  Wt 168 lb (76.2 kg)  BMI 22.16 " kg/m2    Physical Exam  Constitutional: He is oriented to person, place, and time. He appears well-developed and well-nourished.   Pulmonary/Chest: Effort normal.   Abdominal: Soft. He exhibits no distension and no mass. There is no tenderness. There is no rebound and no guarding. No hernia.   Genitourinary: Penis normal. Rectal exam shows no mass, no tenderness and anal tone normal. Enlarged: for the age of the patient. Right testis shows no mass, no swelling and no tenderness. Left testis shows no mass, no swelling and no tenderness. No hypospadias. No discharge found.   Genitourinary Comments:  The urethral meatus normal in position without evidence of stricture. Epididymis without mass or tenderness. Vas Deferens is palpably normal.Anus and perineum without mass or tenderness. The prostate is approximately 25 ml. It is Symmetric, with a firm consistency. There are no nodules present. . The seminal vesicles are Palpably normal in size and without mass.   Neurological: He is alert and oriented to person, place, and time.   Vitals reviewed.        Results for orders placed or performed in visit on 06/29/17   POC Urinalysis Dipstick, Automated   Result Value Ref Range    Color Yellow Yellow, Straw, Dark Yellow, Barbie    Clarity, UA Clear Clear    Glucose, UA Negative Negative, 1000 mg/dL (3+) mg/dL    Bilirubin Negative Negative    Ketones, UA Negative Negative    Specific Gravity  1.010 1.005 - 1.030    Blood, UA Negative Negative    pH, Urine 7.0 5.0 - 8.0    Protein, POC Negative Negative mg/dL    Urobilinogen, UA Normal Normal    Leukocytes Negative Negative    Nitrite, UA Negative Negative       Imaging Results (last 7 days)     ** No results found for the last 168 hours. **          Assessment and Plan  Diagnoses and all orders for this visit:    Peyronie disease  -     POC Urinalysis Dipstick, Automated    Abnormal urinary tract, radiological    Other orders  -     SCANNED - LABS  #1. His Peyronie's disease  is stable. Still able to get erections on cialis.   #2. Abnormal Pet Scan of prostate is not considered significant with normal PSA and rectal exam  F/u prn          Oscar Bazan MD  06/29/17  8:16 AM      Cc:

## 2017-09-07 ENCOUNTER — TELEPHONE (OUTPATIENT)
Dept: OTOLARYNGOLOGY | Facility: CLINIC | Age: 62
End: 2017-09-07

## 2017-09-07 NOTE — TELEPHONE ENCOUNTER
Pt is having a severe burning in mouth. Is on Nystatin and has tried Micracle Mouthwash as well and none works.  Per Sulaiman to stop the mouth rinses and send in Lidocaine Viscous and for the patient to also try liquid Benedryl (swish and spit).  He does not have teeth and does not brush his tongue.

## 2017-12-19 ENCOUNTER — OFFICE VISIT (OUTPATIENT)
Dept: OTOLARYNGOLOGY | Facility: CLINIC | Age: 62
End: 2017-12-19

## 2017-12-19 VITALS
TEMPERATURE: 98.6 F | HEART RATE: 80 BPM | WEIGHT: 174.6 LBS | SYSTOLIC BLOOD PRESSURE: 140 MMHG | BODY MASS INDEX: 23.14 KG/M2 | HEIGHT: 73 IN | DIASTOLIC BLOOD PRESSURE: 80 MMHG

## 2017-12-19 DIAGNOSIS — Z92.3 H/O HEAD AND NECK RADIATION: Primary | ICD-10-CM

## 2017-12-19 DIAGNOSIS — C02.9 PRIMARY SQUAMOUS CELL CARCINOMA OF TONGUE (HCC): ICD-10-CM

## 2017-12-19 PROCEDURE — 99214 OFFICE O/P EST MOD 30 MIN: CPT | Performed by: OTOLARYNGOLOGY

## 2017-12-19 PROCEDURE — 31575 DIAGNOSTIC LARYNGOSCOPY: CPT | Performed by: OTOLARYNGOLOGY

## 2017-12-19 RX ORDER — LEVOTHYROXINE SODIUM 0.1 MG/1
100 TABLET ORAL DAILY
COMMUNITY
Start: 2017-12-18 | End: 2020-08-17

## 2017-12-19 NOTE — PROGRESS NOTES
YOB: 1955  Location: [facility]  Location Address: [address] [place] [address], [facility], [address] [place] [address]  Location Phone: [phone]    Chief Complaint   Patient presents with   • Follow-up     throat       History of Present Illness  Saul Alcantara Sr. is a 61 y.o. male who presents for follow-up evaluation of throat problems and oral problems. The patient has a history of radiation therapy due to a base of tongue cancer on the left side. He reports he has burning of the roof of mouth and and tongue, dry mouth, productive cough, hoarseness and throat and tongue pain. He is also having severe depression. Patient had DL and biopsy of base of tongue on 9/10/14 with positive pathology for SCCa.   Recent PET scan was normal.    He complains of difficulty swallowing but this is stable and he is keeping his weight up.  There has been no significant change.  He is concerned about drainage but is just completed a 1 month supply of loratadine given by Dr. Stubbs which I told her next things worse.                       Recent PET scan was normal.                                                      Past Medical History:   Diagnosis Date   • Allergic rhinitis    • Chronic rhinitis    • Chronic sinusitis    • Depression    • Deviated nasal septum    • GERD (gastroesophageal reflux disease)    • H/O head and neck radiation 3/3/2017   • Hypertension    • Hypothyroidism    • Laryngopharyngeal reflux    • Lymphoma     Non-Hodgkins   • Primary squamous cell carcinoma of tongue 2016    T1N0M0 SCC S/P XRT/Chemo 2014   • Primary squamous cell carcinoma of tongue 2016    T1N0M0 SCC S/P XRT/Chemo 2014   • Sicca syndrome    • Sinusitis, acute    • Squamous cell carcinoma     Base of Tongue   • Tobacco use disorder    • Tongue irritation    • Xerostomia        Past Surgical History:   Procedure Laterality Date   • CATARACT EXTRACTION     • HAND SURGERY     • KNEE SURGERY     • OTHER SURGICAL HISTORY   09/10/2014    Microlaryngoscopy with Biopsy - Base of Tongue   • SQUAMOUS CELL CARCINOMA EXCISION  09/10/2014    Tongue   • TONSILLECTOMY           Current Outpatient Prescriptions:   •  ALPRAZolam (XANAX) 2 MG tablet, , Disp: , Rfl:   •  cevimeline (EVOXAC) 30 MG capsule, Take 1 capsule by mouth 3 (Three) Times a Day., Disp: 90 capsule, Rfl: 3  •  CIALIS 20 MG tablet, TK 1 T PO EVERY OTHER DAY PRN., Disp: , Rfl: 2  •  cyclobenzaprine (FLEXERIL) 10 MG tablet, TK 1 T PO TID, Disp: , Rfl: 2  •  escitalopram (LEXAPRO) 20 MG tablet, Take 10 mg by mouth daily Indications: Depression, Disp: , Rfl:   •  HYDROcodone-acetaminophen (NORCO) 7.5-325 MG per tablet, TK 1 T PO QD PRN P, Disp: , Rfl: 0  •  levothyroxine (SYNTHROID, LEVOTHROID) 100 MCG tablet, , Disp: , Rfl:   •  nystatin (MYCOSTATIN) 536156 UNIT/ML suspension, TK 10 ML PO TID SIWSH AND SWALLOW FOR 7 DAYS, Disp: , Rfl: 2  •  ondansetron (ZOFRAN) 8 MG tablet, every 8 (eight) hours., Disp: , Rfl:   •  pantoprazole (PROTONIX) 40 MG EC tablet, Delayed Release (DR/EC) Take 1 tablet (40 mg) by oral route 2 times per day, Take 30 minutes prior to breakfast and evening meal, Disp: , Rfl:   •  senna-docusate (PERICOLACE) 8.6-50 MG per tablet, Take 1 tablet by mouth 2 times daily, Disp: , Rfl:   •  simvastatin (ZOCOR) 20 MG tablet, TK 1 T PO QD, Disp: , Rfl: 11  •  zolpidem (AMBIEN) 10 MG tablet, Take 10 mg by mouth Daily., Disp: , Rfl:     Review of patient's allergies indicates no known allergies.    Family History   Problem Relation Age of Onset   • Diabetes Sister    • Cancer Sister        Social History     Social History   • Marital status:      Spouse name: N/A   • Number of children: N/A   • Years of education: N/A     Occupational History   • Not on file.     Social History Main Topics   • Smoking status: Former Smoker   • Smokeless tobacco: Never Used   • Alcohol use No   • Drug use: No   • Sexual activity: Not on file     Other Topics Concern   • Not on file      Social History Narrative       Review of Systems   Constitutional: Negative.    HENT: Positive for mouth sores, sore throat, trouble swallowing and voice change.    Eyes: Negative.    Respiratory: Positive for cough.    Cardiovascular: Negative.    Gastrointestinal: Negative.    Endocrine: Negative.    Genitourinary: Negative.    Musculoskeletal: Negative.    Skin: Negative.    Allergic/Immunologic: Negative.    Neurological: Negative.    Hematological: Negative.    Psychiatric/Behavioral: Positive for behavioral problems and dysphoric mood.       Vitals:    12/19/17 1319   BP: 140/80   Pulse: 80   Temp: 98.6 °F (37 °C)       Objective     Physical Exam  CONSTITUTIONAL: well nourished, alert, oriented, in no acute distress     COMMUNICATION AND VOICE: able to communicate normally, normal voice quality    HEAD: normocephalic, no lesions, atraumatic, no tenderness, no masses     FACE: appearance normal, no lesions, no tenderness, no deformities, facial motion symmetric    SALIVARY GLANDS: parotid glands with no tenderness, no swelling, no masses, submandibular glands with normal size, nontender    EYES: ocular motility normal, eyelids normal, orbits normal, no proptosis, conjunctiva normal , pupils equal, round     EARS:  Hearing: response to conversational voice normal bilaterally   External Ears: auricles without lesions  Otoscopic: tympanic membrane appearance normal, no lesions, no perforation, normal mobility, no fluid    NOSE:  External Nose: structure normal, no tenderness on palpation, no nasal discharge, no lesions, no evidence of trauma, nostrils patent   Intranasal Exam: nasal mucosa normal, vestibule within normal limits, inferior turbinate normal, nasal septum midline   Nasopharynx:     ORAL:  Lips: upper and lower lips without lesion   Teeth: dentition within normal limits for age   Gums: gingivae healthy   Oral Mucosa: oral mucosa normal, no mucosal lesions   Floor of Mouth: Warthin’s duct patent,  mucosa normal  Tongue: lingual mucosa normal without lesions, normal tongue mobility   Palate: soft and hard palates with normal mucosa and structure  Oropharynx: oropharyngeal mucosa normal    HYPOPHARYNX:   LARYNX: See endo    NECK: neck appearance normal, no mass,  noted without erythema or tenderness    THYROID: no overt thyromegaly, no tenderness, nodules or mass present on palpation, position midline     LYMPH NODES: no lymphadenopathy    CHEST/RESPIRATORY: respiratory effort normal, normal breath sounds     CARDIOVASCULAR: rate and rhythm normal, extremities without cyanosis or edema      NEUROLOGIC/PSYCHIATRIC: oriented to time, place and person, mood normal, affect appropriate, CN II-XII intact grossly    Assessment/Plan     * Surgery not found *  No orders of the defined types were placed in this encounter.    Return in about 4 years (around 12/19/2021).       Patient Instructions   Recommendation for patient not to take any oral antihistamines was made    The discussion regarding optimal way to swallow    A degree of coming to place of acceptance regarding his present condition and situation along with the advantages of having been in all probability cured of his cancer was discussed    Follow-up in 4 months

## 2017-12-19 NOTE — PROGRESS NOTES
OPERATIVE NOTE:  Saul Alcantara Sr.    DATE OF PROCEDURE: 12/19/2017    PROCEDURE:   Flexible Fiberoptic Laryngoscopy    ANESTHESIA:  None    REASON FOR PROCEDURE:  Procedure was recommend for re-evaluation of a lesion previously documented  Risks, benefits and alternatives were discussed.      DETAILS of OPERATION:  The patient was seated in the exam chair.  A flexible fiberoptic laryngoscopy was performed through the oral cavity.  The scope was introduced into the oral cavity and directed to the level of the glottis, examining the structures of the oropharynx, base of tongue, vallecula, supraglottic larynx, glottic larynx, and hypopharynx.      FINDINGS:  Mucosal surfaces:   The mucosal surfaces demonstrated normal mucosa surfaces with mild inflammation    Base of tongue:  The base of tongue was found to have no mass or lesion.    Epiglottis:  The epiglottis was found to have no mass or lesion.    Aryepligottic fold:  The AE folds were found to have no mass or lesion.    False Vocal Fold:  The false cords were found to have no mass or lesion.    True Vocal Cord:  The true vocal cords were found to have no mass or lesion.  Both true vocal folds adduct and abduct normally.    Arytenoid:   The arytenoids were found to have no mass or lesion.    Hypopharynx:  The hypopharynx was found to have no mass or lesion.    The patient tolerated procedure well.        Masses were noted as well by visualization or palpation, only dry mucosa throughout was slightly thickened secretions

## 2018-01-11 ENCOUNTER — OFFICE VISIT (OUTPATIENT)
Dept: UROLOGY | Facility: CLINIC | Age: 63
End: 2018-01-11

## 2018-01-11 VITALS — HEIGHT: 73 IN | TEMPERATURE: 98.2 F | BODY MASS INDEX: 22 KG/M2 | WEIGHT: 166 LBS

## 2018-01-11 DIAGNOSIS — R39.12 POOR URINARY STREAM: Primary | ICD-10-CM

## 2018-01-11 PROCEDURE — 81003 URINALYSIS AUTO W/O SCOPE: CPT | Performed by: UROLOGY

## 2018-01-11 PROCEDURE — 51798 US URINE CAPACITY MEASURE: CPT | Performed by: UROLOGY

## 2018-01-11 PROCEDURE — 99214 OFFICE O/P EST MOD 30 MIN: CPT | Performed by: UROLOGY

## 2018-01-11 RX ORDER — TAMSULOSIN HYDROCHLORIDE 0.4 MG/1
2 CAPSULE ORAL NIGHTLY
Qty: 60 CAPSULE | Refills: 11 | Status: SHIPPED | OUTPATIENT
Start: 2018-01-11 | End: 2019-08-18 | Stop reason: SDUPTHER

## 2018-01-11 NOTE — PROGRESS NOTES
Mr. Alcantara is 62 y.o. male    CHIEF COMPLAINT: I am having trouble urinating.     HPI  Lower Urinary tract symptoms  Patient is here for lower urinary tract symptoms. The patient is bothered by slow stream, hesitancy, intermittency, but is without dysuria, hematuria.  Onset has been sudden.  The patient perceives the voided amount is Symptoms have been present for 4 months.  Severity is described as Moderate.  Course has been worsening. The patient perceives the amount voided is less than expected for the urge. Previous  history includes no significant issues.  Previous treatment includes none.      The following portions of the patient's history were reviewed and updated as appropriate: allergies, current medications, past family history, past medical history, past social history, past surgical history and problem list.    Review of Systems   Constitutional: Negative for chills and fever.   Gastrointestinal: Negative for abdominal pain, anal bleeding and blood in stool.   Genitourinary: Negative for flank pain and hematuria.         Current Outpatient Prescriptions:   •  ALPRAZolam (XANAX) 2 MG tablet, , Disp: , Rfl:   •  cevimeline (EVOXAC) 30 MG capsule, Take 1 capsule by mouth 3 (Three) Times a Day., Disp: 90 capsule, Rfl: 3  •  CIALIS 20 MG tablet, TK 1 T PO EVERY OTHER DAY PRN., Disp: , Rfl: 2  •  cyclobenzaprine (FLEXERIL) 10 MG tablet, TK 1 T PO TID, Disp: , Rfl: 2  •  escitalopram (LEXAPRO) 20 MG tablet, Take 10 mg by mouth daily Indications: Depression, Disp: , Rfl:   •  HYDROcodone-acetaminophen (NORCO) 7.5-325 MG per tablet, TK 1 T PO QD PRN P, Disp: , Rfl: 0  •  levothyroxine (SYNTHROID, LEVOTHROID) 100 MCG tablet, , Disp: , Rfl:   •  nystatin (MYCOSTATIN) 530732 UNIT/ML suspension, TK 10 ML PO TID SIWSH AND SWALLOW FOR 7 DAYS, Disp: , Rfl: 2  •  ondansetron (ZOFRAN) 8 MG tablet, every 8 (eight) hours., Disp: , Rfl:   •  pantoprazole (PROTONIX) 40 MG EC tablet, Delayed Release (DR/EC) Take 1  tablet (40 mg) by oral route 2 times per day, Take 30 minutes prior to breakfast and evening meal, Disp: , Rfl:   •  senna-docusate (PERICOLACE) 8.6-50 MG per tablet, Take 1 tablet by mouth 2 times daily, Disp: , Rfl:   •  simvastatin (ZOCOR) 20 MG tablet, TK 1 T PO QD, Disp: , Rfl: 11  •  tamsulosin (FLOMAX) 0.4 MG capsule 24 hr capsule, Take 2 capsules by mouth Every Night for 30 days., Disp: 60 capsule, Rfl: 11  •  zolpidem (AMBIEN) 10 MG tablet, Take 10 mg by mouth Daily., Disp: , Rfl:     Past Medical History:   Diagnosis Date   • Allergic rhinitis    • Chronic rhinitis    • Chronic sinusitis    • Depression    • Deviated nasal septum    • GERD (gastroesophageal reflux disease)    • H/O head and neck radiation 3/3/2017   • Hypertension    • Hypothyroidism    • Laryngopharyngeal reflux    • Lymphoma     Non-Hodgkins   • Primary squamous cell carcinoma of tongue 9/27/2016    T1N0M0 SCC S/P XRT/Chemo 12/2014   • Primary squamous cell carcinoma of tongue 9/27/2016    T1N0M0 SCC S/P XRT/Chemo 12/2014   • Sicca syndrome    • Sinusitis, acute    • Squamous cell carcinoma     Base of Tongue   • Tobacco use disorder    • Tongue irritation    • Xerostomia        Past Surgical History:   Procedure Laterality Date   • CATARACT EXTRACTION     • HAND SURGERY     • KNEE SURGERY     • OTHER SURGICAL HISTORY  09/10/2014    Microlaryngoscopy with Biopsy - Base of Tongue   • SQUAMOUS CELL CARCINOMA EXCISION  09/10/2014    Tongue   • TONSILLECTOMY         Social History     Social History   • Marital status:      Spouse name: N/A   • Number of children: N/A   • Years of education: N/A     Social History Main Topics   • Smoking status: Current Some Day Smoker   • Smokeless tobacco: Never Used   • Alcohol use No   • Drug use: No   • Sexual activity: Not Asked     Other Topics Concern   • None     Social History Narrative       Family History   Problem Relation Age of Onset   • Diabetes Sister    • Cancer Sister        Temp  "98.2 °F (36.8 °C)  Ht 185.4 cm (73\")  Wt 75.3 kg (166 lb)  BMI 21.9 kg/m2    Physical Exam  Alert and oriented ×3  Not agitated or distressed  No obvious deformities  No respiratory distress  Skin without pallor or diaphoresis  JACQUELIN: Benign feeling prostate without nodule approximately 30 mL in size.   Penis and testicles are normal       Results for orders placed or performed in visit on 01/11/18   POC Urinalysis Dipstick, Automated   Result Value Ref Range    Color Yellow Yellow, Straw, Dark Yellow, Barbie    Clarity, UA Clear Clear    Glucose, UA Negative Negative, 1000 mg/dL (3+) mg/dL    Bilirubin Negative Negative    Ketones, UA Negative Negative    Specific Gravity  1.015 1.005 - 1.030    Blood, UA Negative Negative    pH, Urine 7.0 5.0 - 8.0    Protein, POC Negative Negative mg/dL    Urobilinogen, UA Normal Normal    Leukocytes Negative Negative    Nitrite, UA Negative Negative       Imaging Results (last 7 days)     ** No results found for the last 168 hours. **        Bladder Scan interpretation  Estimation of residual urine via abdominal ultrasound  Residual Urine: 20 ml  Indication: hesitancy   Position: Supine  Examination: Incremental scanning of the suprapubic area using 3 MHz transducer using copious amounts of acoustic gel.   Findings: An anechoic area was demonstrated which represented the bladder, with measurement of residual urine as noted. I inspected this myself. In that the residual urine was stable or insignificant, no treatment will be necessary at this time.       Assessment and Plan  Diagnoses and all orders for this visit:    Poor urinary stream  -     POC Urinalysis Dipstick, Automated  -     tamsulosin (FLOMAX) 0.4 MG capsule 24 hr capsule; Take 2 capsules by mouth Every Night for 30 days.    He and I discussed BPH today including the pathophysiology, diagnosis, and management. The role of PSA in management of PSA is discussed.  The patient understands the purpose of a uroflow, post " void residual and the need for cystoscopy. We discussed the role of Alpha blockers, 5-Alpha redcuctase inhibitors, and anticholinergics. Minimally invasive techniques including TUNA, TUMT, Interstitial laser, and WIT are considered. TUIP, TURP, & Laser ablation are also explained as more invasive techniques. TURP is acknowledged to be the gold standard for surgical management. Behavioral, dietary, and herbal therapy are also discussed pointing out the limited available data for the latter. Based upon his symptomatology, we have elected to begin a trial of alpha blockade. The risks of orthostasis, central mediated dizziness, ejaculatory dysfunction, reflux, & rhinitis are discussed with the patient.       Oscar Bazan MD  01/11/18  2:34 PM      Cc: Joe Stubbs MD

## 2018-01-17 ENCOUNTER — TELEPHONE (OUTPATIENT)
Dept: UROLOGY | Facility: CLINIC | Age: 63
End: 2018-01-17

## 2018-01-17 NOTE — TELEPHONE ENCOUNTER
Spoke with Dr. Bazan and informed patient to take one tamsulosin a day. Patient voiced understanding.

## 2018-01-17 NOTE — TELEPHONE ENCOUNTER
----- Message from Leda Barth sent at 1/17/2018  9:24 AM CST -----  Patients wife called he was started on two flomax a day but they were told to take one a day by  and so they just want to double check. The chart says two but Id double  Check with him.  Also patient says his stream is weaker.

## 2018-01-26 ENCOUNTER — OFFICE VISIT (OUTPATIENT)
Dept: OTOLARYNGOLOGY | Facility: CLINIC | Age: 63
End: 2018-01-26

## 2018-01-26 VITALS
HEART RATE: 80 BPM | BODY MASS INDEX: 22.81 KG/M2 | TEMPERATURE: 98.3 F | WEIGHT: 172.13 LBS | DIASTOLIC BLOOD PRESSURE: 86 MMHG | SYSTOLIC BLOOD PRESSURE: 146 MMHG | HEIGHT: 73 IN

## 2018-01-26 DIAGNOSIS — C02.9 PRIMARY SQUAMOUS CELL CARCINOMA OF TONGUE (HCC): ICD-10-CM

## 2018-01-26 DIAGNOSIS — K11.7 XEROSTOMIA: Primary | ICD-10-CM

## 2018-01-26 DIAGNOSIS — Z92.3 H/O HEAD AND NECK RADIATION: ICD-10-CM

## 2018-01-26 DIAGNOSIS — K21.9 LARYNGOPHARYNGEAL REFLUX: ICD-10-CM

## 2018-01-26 DIAGNOSIS — M35.00 SICCA SYNDROME (HCC): ICD-10-CM

## 2018-01-26 DIAGNOSIS — K14.9 TONGUE IRRITATION: ICD-10-CM

## 2018-01-26 PROCEDURE — 31575 DIAGNOSTIC LARYNGOSCOPY: CPT | Performed by: NURSE PRACTITIONER

## 2018-01-26 PROCEDURE — 99214 OFFICE O/P EST MOD 30 MIN: CPT | Performed by: NURSE PRACTITIONER

## 2018-01-26 RX ORDER — QUETIAPINE FUMARATE 25 MG/1
TABLET, FILM COATED ORAL
Refills: 5 | COMMUNITY
Start: 2018-01-03 | End: 2018-02-15

## 2018-01-26 RX ORDER — TRIAMCINOLONE ACETONIDE 0.1 %
PASTE (GRAM) DENTAL
Qty: 5 G | Refills: 3 | Status: SHIPPED | OUTPATIENT
Start: 2018-01-26 | End: 2019-11-13

## 2018-01-26 RX ORDER — FLUTICASONE PROPIONATE 50 MCG
2 SPRAY, SUSPENSION (ML) NASAL DAILY
Qty: 16 G | Refills: 5 | Status: SHIPPED | OUTPATIENT
Start: 2018-01-26 | End: 2022-01-11

## 2018-01-26 RX ORDER — GLYCOPYRROLATE 1 MG/1
0.5 TABLET ORAL 2 TIMES DAILY
Refills: 3 | COMMUNITY
Start: 2018-01-15 | End: 2022-06-16

## 2018-01-26 NOTE — PROGRESS NOTES
OPERATIVE NOTE:    PROCEDURE:   Flexible Fiberoptic Laryngoscopy    ANESTHESIA:  None    REASON FOR PROCEDURE:  Procedure was recommend for suspicious clinical behavior unresponsive to medical management.  Risks, benefits and alternatives were discussed.      DETAILS of OPERATION:  The patient was seated in the exam chair.  A flexible fiberoptic laryngoscopy was performed through the oral cavity.  The scope was introduced into the oral cavity and directed to the level of the glottis, examining the structures of the oropharynx, base of tongue, vallecula, supraglottic larynx, glottic larynx, and hypopharynx.      FINDINGS:  Mucosal surfaces:   The mucosal surfaces demonstrated severe dryness and erythema s/p xrt    Base of tongue:  The base of tongue was found to have no mass or lesion. - soft to palpation    Epiglottis:  The epiglottis was found to have no mass or lesion.    Aryepligottic fold:  The AE folds were found to have no mass or lesion.    False Vocal Fold:  The false cords were found to have no mass or lesion.    True Vocal Cord:  The true vocal cords were found to have no mass or lesion.    Arytenoid:   The arytenoids were found to have no mass or lesions.    Hypopharynx:  The hypopharynx was found to have no mass or lesion.    The patient tolerated procedure well.

## 2018-01-26 NOTE — PROGRESS NOTES
YOB: 1955  Location: [facility]  Location Address: [address] [place] [address], [facility], [address] [place] [address]  Location Phone: [phone]    Chief Complaint   Patient presents with   • tongue lesion       History of Present Illness  Saul Alcantara Sr. is a 61 y.o. male who presents for follow-up evaluation of throat problems and oral problems. The patient has a history of radiation therapy due to a base of tongue cancer on the left side.  Reports severe tongue dryness, burning, pain to ventral tongue.  Patient had DL and biopsy of base of tongue on 9/10/14 with positive pathology for SCCa.  Recent PET scan was normal. Hehas some difficulty swallowing. He has been rx antibiotic for bronchitis - coughs up thick sputum.                       Recent PET scan was normal.                                                      Past Medical History:   Diagnosis Date   • Allergic rhinitis    • Chronic rhinitis    • Chronic sinusitis    • Depression    • Deviated nasal septum    • GERD (gastroesophageal reflux disease)    • H/O head and neck radiation 3/3/2017   • Hypertension    • Hypothyroidism    • Laryngopharyngeal reflux    • Lymphoma     Non-Hodgkins   • Primary squamous cell carcinoma of tongue 2016    T1N0M0 SCC S/P XRT/Chemo 2014   • Primary squamous cell carcinoma of tongue 2016    T1N0M0 SCC S/P XRT/Chemo 2014   • Sicca syndrome    • Sinusitis, acute    • Squamous cell carcinoma     Base of Tongue   • Tobacco use disorder    • Tongue irritation    • Xerostomia        Past Surgical History:   Procedure Laterality Date   • CATARACT EXTRACTION     • HAND SURGERY     • KNEE SURGERY     • OTHER SURGICAL HISTORY  09/10/2014    Microlaryngoscopy with Biopsy - Base of Tongue   • SQUAMOUS CELL CARCINOMA EXCISION  09/10/2014    Tongue   • TONSILLECTOMY           Current Outpatient Prescriptions:   •  ALPRAZolam (XANAX) 2 MG tablet, , Disp: , Rfl:   •  CIALIS 20 MG tablet, TK 1 T PO EVERY OTHER  DAY PRN., Disp: , Rfl: 2  •  glycopyrrolate (ROBINUL) 1 MG tablet, TK 1 T PO  TID, Disp: , Rfl: 3  •  HYDROcodone-acetaminophen (NORCO) 7.5-325 MG per tablet, TK 1 T PO QD PRN P, Disp: , Rfl: 0  •  levothyroxine (SYNTHROID, LEVOTHROID) 100 MCG tablet, , Disp: , Rfl:   •  nystatin (MYCOSTATIN) [id number] UNIT/ML suspension, TK 10 ML PO TID SIWSH AND SWALLOW FOR 7 DAYS, Disp: , Rfl: 2  •  ondansetron (ZOFRAN) 8 MG tablet, every 8 (eight) hours., Disp: , Rfl:   •  pantoprazole (PROTONIX) 40 MG EC tablet, Delayed Release (DR/EC) Take 1 tablet (40 mg) by oral route 2 times per day, Take 30 minutes prior to breakfast and evening meal, Disp: , Rfl:   •  QUEtiapine (SEROquel) 25 MG tablet, TK 1 T PO QHS. STOP LEXAPRO., Disp: , Rfl: 5  •  senna-docusate (PERICOLACE) 8.6-50 MG per tablet, Take 1 tablet by mouth 2 times daily, Disp: , Rfl:   •  simvastatin (ZOCOR) 20 MG tablet, TK 1 T PO QD, Disp: , Rfl: 11  •  tamsulosin (FLOMAX) 0.4 MG capsule 24 hr capsule, Take 2 capsules by mouth Every Night for 30 days., Disp: 60 capsule, Rfl: 11  •  cevimeline (EVOXAC) 30 MG capsule, Take 1 capsule by mouth 3 (Three) Times a Day., Disp: 90 capsule, Rfl: 3  •  cyclobenzaprine (FLEXERIL) 10 MG tablet, TK 1 T PO TID, Disp: , Rfl: 2  •  escitalopram (LEXAPRO) 20 MG tablet, Take 10 mg by mouth daily Indications: Depression, Disp: , Rfl:   •  fluticasone (FLONASE) 50 MCG/ACT nasal spray, 2 sprays into each nostril Daily., Disp: 16 g, Rfl: 5  •  triamcinolone (KENALOG) 0.1 % paste, Apply to tongue 1-2 times per day as needed for inflammation/pain, Disp: 5 g, Rfl: 3  •  zolpidem (AMBIEN) 10 MG tablet, Take 10 mg by mouth Daily., Disp: , Rfl:     Review of patient's allergies indicates no known allergies.    Family History   Problem Relation Age of Onset   • Diabetes Sister    • Cancer Sister        Social History     Social History   • Marital status:      Spouse name: N/A   • Number of children: N/A   • Years of education: N/A      Occupational History   • Not on file.     Social History Main Topics   • Smoking status: Current Some Day Smoker     Types: Cigarettes   • Smokeless tobacco: Never Used   • Alcohol use No   • Drug use: No   • Sexual activity: Not on file     Other Topics Concern   • Not on file     Social History Narrative       Review of Systems   Constitutional: Negative.    HENT: Positive for mouth sores, sore throat, trouble swallowing and voice change.    Eyes: Negative.    Respiratory: Positive for cough.    Cardiovascular: Negative.    Gastrointestinal: Negative.    Endocrine: Negative.    Genitourinary: Negative.    Musculoskeletal: Negative.    Skin: Negative.    Allergic/Immunologic: Negative.    Neurological: Negative.    Hematological: Negative.    Psychiatric/Behavioral: Positive for behavioral problems and dysphoric mood.       Vitals:    01/26/18 1055   BP: 146/86   Pulse: 80   Temp: 98.3 °F (36.8 °C)       Objective     Physical Exam  CONSTITUTIONAL: well nourished, alert, oriented, in no acute distress     COMMUNICATION AND VOICE: able to communicate normally, normal voice quality    HEAD: normocephalic, no lesions, atraumatic, no tenderness, no masses     FACE: appearance normal, no lesions, no tenderness, no deformities, facial motion symmetric    SALIVARY GLANDS: parotid glands with no tenderness, no swelling, no masses, submandibular glands with normal size, nontender    EYES: ocular motility normal, eyelids normal, orbits normal, no proptosis, conjunctiva normal , pupils equal, round     EARS:  Hearing: response to conversational voice normal bilaterally   External Ears: auricles without lesions  Otoscopic: tympanic membrane appearance normal, no lesions, no perforation, normal mobility, no fluid    NOSE:  External Nose: structure normal, no tenderness on palpation, no nasal discharge, no lesions, no evidence of trauma, nostrils patent   Intranasal Exam: nasal mucosa normal, vestibule within normal limits,  inferior turbinate normal, nasal septum midline       ORAL:  Lips: upper and lower lips without lesion   Teeth: dentition within normal limits for age   Gums: gingivae healthy   Oral Mucosa: oral mucosa normal, no mucosal lesions   Floor of Mouth: Warthin’s duct patent, mucosa normal  Tongue: severe tongue dryness, thickened with hairy tongue s/p xrt changes  Palate: soft and hard palates with normal mucosa and structure  Oropharynx: oropharyngeal mucosa normal    HYPOPHARYNX:   LARYNX: See scope    NECK: neck appearance normal, no mass,  noted without erythema or tenderness    THYROID: no overt thyromegaly, no tenderness, nodules or mass present on palpation, position midline     LYMPH NODES: no lymphadenopathy    CHEST/RESPIRATORY: respiratory effort normal, normal breath sounds     CARDIOVASCULAR: rate and rhythm normal, extremities without cyanosis or edema      NEUROLOGIC/PSYCHIATRIC: oriented to time, place and person, mood normal, affect appropriate, CN II-XII intact grossly    Assessment/Plan     * Surgery not found *  No orders of the defined types were placed in this encounter.    Return for Next scheduled follow up.       Patient Instructions   Call for problems or worsening symptoms    Oramoist tabs  Intranasal gel

## 2018-02-15 ENCOUNTER — OFFICE VISIT (OUTPATIENT)
Dept: OTOLARYNGOLOGY | Facility: CLINIC | Age: 63
End: 2018-02-15

## 2018-02-15 VITALS
SYSTOLIC BLOOD PRESSURE: 132 MMHG | DIASTOLIC BLOOD PRESSURE: 80 MMHG | TEMPERATURE: 98.7 F | BODY MASS INDEX: 22.53 KG/M2 | HEIGHT: 73 IN | WEIGHT: 170 LBS

## 2018-02-15 DIAGNOSIS — K21.9 LARYNGOPHARYNGEAL REFLUX: ICD-10-CM

## 2018-02-15 DIAGNOSIS — R79.89 ABNORMAL THYROID STIMULATING HORMONE LEVEL: ICD-10-CM

## 2018-02-15 DIAGNOSIS — K14.9 TONGUE IRRITATION: ICD-10-CM

## 2018-02-15 DIAGNOSIS — M35.00 SICCA SYNDROME (HCC): ICD-10-CM

## 2018-02-15 DIAGNOSIS — C02.9 PRIMARY SQUAMOUS CELL CARCINOMA OF TONGUE (HCC): Primary | ICD-10-CM

## 2018-02-15 DIAGNOSIS — Z92.3 H/O HEAD AND NECK RADIATION: ICD-10-CM

## 2018-02-15 DIAGNOSIS — K11.7 XEROSTOMIA: ICD-10-CM

## 2018-02-15 DIAGNOSIS — J32.9 CHRONIC SINUSITIS, UNSPECIFIED LOCATION: ICD-10-CM

## 2018-02-15 PROCEDURE — 99214 OFFICE O/P EST MOD 30 MIN: CPT | Performed by: PHYSICIAN ASSISTANT

## 2018-02-15 NOTE — PROGRESS NOTES
YOB: 1955  Location: [facility]  Location Address: [address] [place] [address], [facility], [address] [place] [address]  Location Phone: [phone]    Chief Complaint   Patient presents with   • Follow-up     SCC tongue       History of Present Illness  Saul Alcantara Sr. is a 61 y.o. male who presents for follow-up evaluation of throat problems and oral problems. The patient has a history of radiation therapy due to a base of tongue cancer on the left side that he finished in .  Reports severe tongue dryness, burning, pain to ventral tongue.  Patient had DL and biopsy of base of tongue on 9/10/14 with positive pathology for SCCa.  Recent PET scan was normal. He has some difficulty swallowing. He has been rx antibiotic for bronchitis - coughs up thick sputum.                       Recent PET scan was normal.                                                      Past Medical History:   Diagnosis Date   • Allergic rhinitis    • Chronic rhinitis    • Chronic sinusitis    • Depression    • Deviated nasal septum    • GERD (gastroesophageal reflux disease)    • H/O head and neck radiation 3/3/2017   • Hypertension    • Hypothyroidism    • Laryngopharyngeal reflux    • Lymphoma     Non-Hodgkins   • Primary squamous cell carcinoma of tongue 2016    T1N0M0 SCC S/P XRT/Chemo 2014   • Primary squamous cell carcinoma of tongue 2016    T1N0M0 SCC S/P XRT/Chemo 2014   • Sicca syndrome    • Sinusitis, acute    • Squamous cell carcinoma     Base of Tongue   • Tobacco use disorder    • Tongue irritation    • Xerostomia        Past Surgical History:   Procedure Laterality Date   • CATARACT EXTRACTION     • HAND SURGERY     • KNEE SURGERY     • OTHER SURGICAL HISTORY  09/10/2014    Microlaryngoscopy with Biopsy - Base of Tongue   • SQUAMOUS CELL CARCINOMA EXCISION  09/10/2014    Tongue   • TONSILLECTOMY           Current Outpatient Prescriptions:   •  ALPRAZolam (XANAX) 2 MG tablet, , Disp: , Rfl:   •  CIALIS  20 MG tablet, TK 1 T PO EVERY OTHER DAY PRN., Disp: , Rfl: 2  •  glycopyrrolate (ROBINUL) 1 MG tablet, TK 1 T PO  TID, Disp: , Rfl: 3  •  HYDROcodone-acetaminophen (NORCO) 7.5-325 MG per tablet, TK 1 T PO QD PRN P, Disp: , Rfl: 0  •  levothyroxine (SYNTHROID, LEVOTHROID) 100 MCG tablet, , Disp: , Rfl:   •  nystatin (MYCOSTATIN) [id number] UNIT/ML suspension, Take 10ml by mouth BID for 28 days, Disp: 480 mL, Rfl: 2  •  ondansetron (ZOFRAN) 8 MG tablet, every 8 (eight) hours., Disp: , Rfl:   •  pantoprazole (PROTONIX) 40 MG EC tablet, Delayed Release (DR/EC) Take 1 tablet (40 mg) by oral route 2 times per day, Take 30 minutes prior to breakfast and evening meal, Disp: , Rfl:   •  senna-docusate (PERICOLACE) 8.6-50 MG per tablet, Take 1 tablet by mouth 2 times daily, Disp: , Rfl:   •  simvastatin (ZOCOR) 20 MG tablet, TK 1 T PO QD, Disp: , Rfl: 11  •  triamcinolone (KENALOG) 0.1 % paste, Apply to tongue 1-2 times per day as needed for inflammation/pain, Disp: 5 g, Rfl: 3  •  Vortioxetine HBr (TRINTELLIX PO), Take  by mouth., Disp: , Rfl:   •  fluticasone (FLONASE) 50 MCG/ACT nasal spray, 2 sprays into each nostril Daily., Disp: 16 g, Rfl: 5    Review of patient's allergies indicates no known allergies.    Family History   Problem Relation Age of Onset   • Diabetes Sister    • Cancer Sister        Social History     Social History   • Marital status:      Spouse name: N/A   • Number of children: N/A   • Years of education: N/A     Occupational History   • Not on file.     Social History Main Topics   • Smoking status: Former Smoker     Types: Cigarettes   • Smokeless tobacco: Never Used   • Alcohol use No   • Drug use: No   • Sexual activity: Not on file     Other Topics Concern   • Not on file     Social History Narrative       Review of Systems   Constitutional: Negative.    HENT: Positive for mouth sores, sore throat, trouble swallowing and voice change.    Eyes: Negative.    Respiratory: Positive for cough.     Cardiovascular: Negative.    Gastrointestinal: Negative.    Endocrine: Negative.    Genitourinary: Negative.    Musculoskeletal: Negative.    Skin: Negative.    Allergic/Immunologic: Negative.    Neurological: Negative.    Hematological: Negative.    Psychiatric/Behavioral: Positive for behavioral problems and dysphoric mood.       Vitals:    02/15/18 1443   BP: 132/80   Temp: 98.7 °F (37.1 °C)       Objective     Physical Exam  CONSTITUTIONAL: well nourished, alert, oriented, in no acute distress     COMMUNICATION AND VOICE: able to communicate normally, normal voice quality    HEAD: normocephalic, no lesions, atraumatic, no tenderness, no masses     FACE: appearance normal, no lesions, no tenderness, no deformities, facial motion symmetric    SALIVARY GLANDS: parotid glands with no tenderness, no swelling, no masses, submandibular glands with normal size, nontender    EYES: ocular motility normal, eyelids normal, orbits normal, no proptosis, conjunctiva normal , pupils equal, round     EARS:  Hearing: response to conversational voice normal bilaterally   External Ears: auricles without lesions  Otoscopic: tympanic membrane appearance normal, no lesions, no perforation, normal mobility, no fluid    NOSE:  External Nose: structure normal, no tenderness on palpation, no nasal discharge, no lesions, no evidence of trauma, nostrils patent   Intranasal Exam: nasal mucosa normal, vestibule within normal limits, inferior turbinate normal, nasal septum midline     ORAL:  Lips: upper and lower lips without lesion   Teeth: dentition within normal limits for age   Gums: gingivae healthy   Oral Mucosa: oral mucosa dryness, no mucosal lesions   Floor of Mouth: Warthin’s duct patent, mucosa normal  Tongue: severe tongue dryness, thickened with hairy tongue s/p xrt changes  Palate: soft and hard palates with normal mucosa and structure  Oropharynx: oropharyngeal mucosa normal    NECK: neck appearance normal, no mass,  noted  without erythema or tenderness    THYROID: no overt thyromegaly, no tenderness, nodules or mass present on palpation, position midline     LYMPH NODES: no lymphadenopathy    CHEST/RESPIRATORY: respiratory effort normal, normal breath sounds     CARDIOVASCULAR: rate and rhythm normal, extremities without cyanosis or edema      NEUROLOGIC/PSYCHIATRIC: oriented to time, place and person, mood normal, affect appropriate, CN II-XII intact grossly    Assessment/Plan   Patient Active Problem List   Diagnosis   • Primary squamous cell carcinoma of tongue   • Laryngopharyngeal reflux   • Chronic sinusitis   • Xerostomia   • Tongue irritation   • Sicca syndrome   • Abnormal thyroid stimulating hormone level   • H/O head and neck radiation     * Surgery not found *  No orders of the defined types were placed in this encounter.    Return for keep existing follow-up.       Patient Instructions   Be aware of the signs and symptoms of head and neck cancer including neck mass, persistent sore throat, ear pain, hemoptysis, weight loss and hoarseness. If any of these symptoms occur, call for evaluation.

## 2018-02-26 DIAGNOSIS — R13.10 DYSPHAGIA, UNSPECIFIED TYPE: Primary | ICD-10-CM

## 2018-02-26 NOTE — PROGRESS NOTES
Patient is having increased dysphagia. Patient's last swallowing study was in 2016. We will proceed with swallowing study

## 2018-02-28 ENCOUNTER — APPOINTMENT (OUTPATIENT)
Dept: CT IMAGING | Age: 63
End: 2018-02-28
Payer: COMMERCIAL

## 2018-02-28 ENCOUNTER — HOSPITAL ENCOUNTER (EMERGENCY)
Age: 63
Discharge: HOME | End: 2018-02-28
Attending: EMERGENCY MEDICINE
Payer: COMMERCIAL

## 2018-02-28 ENCOUNTER — APPOINTMENT (OUTPATIENT)
Dept: GENERAL RADIOLOGY | Age: 63
End: 2018-02-28
Payer: COMMERCIAL

## 2018-02-28 VITALS
HEART RATE: 82 BPM | OXYGEN SATURATION: 97 % | RESPIRATION RATE: 18 BRPM | HEIGHT: 73 IN | WEIGHT: 162 LBS | TEMPERATURE: 97.9 F | DIASTOLIC BLOOD PRESSURE: 88 MMHG | BODY MASS INDEX: 21.47 KG/M2 | SYSTOLIC BLOOD PRESSURE: 144 MMHG

## 2018-02-28 DIAGNOSIS — R07.9 CHEST PAIN, UNSPECIFIED TYPE: Primary | ICD-10-CM

## 2018-02-28 DIAGNOSIS — T50.905A ADVERSE EFFECT OF DRUG, INITIAL ENCOUNTER: ICD-10-CM

## 2018-02-28 LAB
ALBUMIN SERPL-MCNC: 4.7 G/DL (ref 3.5–5.2)
ALP BLD-CCNC: 79 U/L (ref 40–130)
ALT SERPL-CCNC: 14 U/L (ref 5–41)
AMPHETAMINE SCREEN, URINE: NEGATIVE
ANION GAP SERPL CALCULATED.3IONS-SCNC: 12 MMOL/L (ref 7–19)
APTT: 29 SEC (ref 26–36.2)
AST SERPL-CCNC: 19 U/L (ref 5–40)
BARBITURATE SCREEN URINE: NEGATIVE
BASOPHILS ABSOLUTE: 0.1 K/UL (ref 0–0.2)
BASOPHILS RELATIVE PERCENT: 0.9 % (ref 0–1)
BENZODIAZEPINE SCREEN, URINE: POSITIVE
BILIRUB SERPL-MCNC: 0.8 MG/DL (ref 0.2–1.2)
BILIRUBIN URINE: NEGATIVE
BLOOD, URINE: NEGATIVE
BUN BLDV-MCNC: 13 MG/DL (ref 8–23)
CALCIUM SERPL-MCNC: 9.8 MG/DL (ref 8.8–10.2)
CANNABINOID SCREEN URINE: NEGATIVE
CHLORIDE BLD-SCNC: 100 MMOL/L (ref 98–111)
CLARITY: CLEAR
CO2: 30 MMOL/L (ref 22–29)
COCAINE METABOLITE SCREEN URINE: NEGATIVE
COLOR: YELLOW
CREAT SERPL-MCNC: 1.1 MG/DL (ref 0.5–1.2)
EKG P AXIS: 80 DEGREES
EKG P AXIS: 83 DEGREES
EKG P-R INTERVAL: 156 MS
EKG P-R INTERVAL: 164 MS
EKG Q-T INTERVAL: 358 MS
EKG Q-T INTERVAL: 406 MS
EKG QRS DURATION: 94 MS
EKG QRS DURATION: 98 MS
EKG QTC CALCULATION (BAZETT): 400 MS
EKG QTC CALCULATION (BAZETT): 418 MS
EKG T AXIS: 70 DEGREES
EKG T AXIS: 72 DEGREES
EOSINOPHILS ABSOLUTE: 0.5 K/UL (ref 0–0.6)
EOSINOPHILS RELATIVE PERCENT: 4.9 % (ref 0–5)
GFR NON-AFRICAN AMERICAN: >60
GLUCOSE BLD-MCNC: 99 MG/DL (ref 74–109)
GLUCOSE URINE: NEGATIVE MG/DL
HCT VFR BLD CALC: 41.6 % (ref 42–52)
HEMOGLOBIN: 13.9 G/DL (ref 14–18)
INR BLD: 1.07 (ref 0.88–1.18)
KETONES, URINE: NEGATIVE MG/DL
LEUKOCYTE ESTERASE, URINE: NEGATIVE
LIPASE: 23 U/L (ref 13–60)
LYMPHOCYTES ABSOLUTE: 3.3 K/UL (ref 1.1–4.5)
LYMPHOCYTES RELATIVE PERCENT: 35.1 % (ref 20–40)
Lab: ABNORMAL
MCH RBC QN AUTO: 31.9 PG (ref 27–31)
MCHC RBC AUTO-ENTMCNC: 33.4 G/DL (ref 33–37)
MCV RBC AUTO: 95.4 FL (ref 80–94)
MONOCYTES ABSOLUTE: 1 K/UL (ref 0–0.9)
MONOCYTES RELATIVE PERCENT: 10.8 % (ref 0–10)
NEUTROPHILS ABSOLUTE: 4.5 K/UL (ref 1.5–7.5)
NEUTROPHILS RELATIVE PERCENT: 48.1 % (ref 50–65)
NITRITE, URINE: NEGATIVE
OPIATE SCREEN URINE: NEGATIVE
PDW BLD-RTO: 12.6 % (ref 11.5–14.5)
PERFORMED ON: NORMAL
PH UA: 6
PLATELET # BLD: 274 K/UL (ref 130–400)
PMV BLD AUTO: 10 FL (ref 9.4–12.4)
POC TROPONIN I: 0 NG/ML (ref 0–0.08)
POC TROPONIN I: 0.01 NG/ML (ref 0–0.08)
POC TROPONIN I: 0.01 NG/ML (ref 0–0.08)
POTASSIUM SERPL-SCNC: 3.7 MMOL/L (ref 3.5–5)
PROTEIN UA: NEGATIVE MG/DL
PROTHROMBIN TIME: 13.8 SEC (ref 12–14.6)
RBC # BLD: 4.36 M/UL (ref 4.7–6.1)
SODIUM BLD-SCNC: 142 MMOL/L (ref 136–145)
SPECIFIC GRAVITY UA: 1
TOTAL PROTEIN: 7.2 G/DL (ref 6.6–8.7)
TSH SERPL DL<=0.05 MIU/L-ACNC: 4.58 UIU/ML (ref 0.27–4.2)
UROBILINOGEN, URINE: 0.2 E.U./DL
WBC # BLD: 9.4 K/UL (ref 4.8–10.8)

## 2018-02-28 PROCEDURE — 99284 EMERGENCY DEPT VISIT MOD MDM: CPT | Performed by: EMERGENCY MEDICINE

## 2018-02-28 PROCEDURE — 36415 COLL VENOUS BLD VENIPUNCTURE: CPT

## 2018-02-28 PROCEDURE — 84443 ASSAY THYROID STIM HORMONE: CPT

## 2018-02-28 PROCEDURE — 80053 COMPREHEN METABOLIC PANEL: CPT

## 2018-02-28 PROCEDURE — 84484 ASSAY OF TROPONIN QUANT: CPT

## 2018-02-28 PROCEDURE — 85610 PROTHROMBIN TIME: CPT

## 2018-02-28 PROCEDURE — 81003 URINALYSIS AUTO W/O SCOPE: CPT

## 2018-02-28 PROCEDURE — 6360000002 HC RX W HCPCS: Performed by: EMERGENCY MEDICINE

## 2018-02-28 PROCEDURE — 83690 ASSAY OF LIPASE: CPT

## 2018-02-28 PROCEDURE — 99285 EMERGENCY DEPT VISIT HI MDM: CPT

## 2018-02-28 PROCEDURE — 71045 X-RAY EXAM CHEST 1 VIEW: CPT

## 2018-02-28 PROCEDURE — 85730 THROMBOPLASTIN TIME PARTIAL: CPT

## 2018-02-28 PROCEDURE — 85025 COMPLETE CBC W/AUTO DIFF WBC: CPT

## 2018-02-28 PROCEDURE — 80307 DRUG TEST PRSMV CHEM ANLYZR: CPT

## 2018-02-28 PROCEDURE — 70450 CT HEAD/BRAIN W/O DYE: CPT

## 2018-02-28 PROCEDURE — 96374 THER/PROPH/DIAG INJ IV PUSH: CPT

## 2018-02-28 PROCEDURE — 93005 ELECTROCARDIOGRAM TRACING: CPT

## 2018-02-28 RX ORDER — ONDANSETRON 2 MG/ML
4 INJECTION INTRAMUSCULAR; INTRAVENOUS ONCE
Status: COMPLETED | OUTPATIENT
Start: 2018-02-28 | End: 2018-02-28

## 2018-02-28 RX ORDER — TAMSULOSIN HYDROCHLORIDE 0.4 MG/1
0.4 CAPSULE ORAL 2 TIMES DAILY
COMMUNITY
End: 2022-10-12 | Stop reason: ALTCHOICE

## 2018-02-28 RX ADMIN — ONDANSETRON 4 MG: 2 INJECTION, SOLUTION INTRAMUSCULAR; INTRAVENOUS at 07:09

## 2018-02-28 ASSESSMENT — PAIN DESCRIPTION - LOCATION: LOCATION: CHEST

## 2018-02-28 ASSESSMENT — PAIN DESCRIPTION - DESCRIPTORS: DESCRIPTORS: ACHING

## 2018-02-28 ASSESSMENT — PAIN SCALES - GENERAL: PAINLEVEL_OUTOF10: 6

## 2018-02-28 NOTE — ED NOTES
ASSESSMENT:    PT ALERT/ORIENTED X4. PUPILS EQUAL/REACTIVE    SKIN:  WARM/DRY PINK CAPILLARY REFILL < 2SECS    CARDIAC:  S1 S2 NOTED. PT C/O CHEST PAIN. PT STATES IT IS MIDSTERNAL AND ACHING PAIN. LUNGS: CLEAR UPPER AND LOWER LOBES, RESPIRATIONS EVEN/UNLABORED. PT C/O PRODUCTIVE COUGH WITH YELLOW SPUTUM. ABDOMEN: BOWEL SOUNDS NOTED UPPER AND LOWER QUADRANTS                     SOFT AND NONTENDER. PT C/O NAUSEA    EXTREMITIES:  BILATERAL DP AND PT AND NO EDEMA NOTED. PT HAS NOT BEEN TAKING SOME OF HIS MEDICATIONS THE PAST FEW DAYS AND PT'S WIFE IS CONCERNED THAT IS WHAT IS MAKING THE PT FEEL BAD. NO DISTRESS NOTED. SIDE RAILS UP AND CALL LIGHT IN REACH.      Bruce Patel RN  02/28/18 1982

## 2018-02-28 NOTE — ED PROVIDER NOTES
Weight:       Height:           MDM  Number of Diagnoses or Management Options  Adverse effect of drug, initial encounter:   Chest pain, unspecified type:   Diagnosis management comments: Pt with likely some minor hypertensive urgency associated with stopping his psych anxiety med. His wife gave him the med and his bp came back down after a couple hours. I did head ct neg and trop and ekgs. Nothing acute. I really think his symptoms are all associated with bp going up and some changes from stopping his trentellix cold turkey. His bp improved on his own. All symptoms gone. Stable for dc and discussed he needs to talk to his doctor about how to wean off that med. Amount and/or Complexity of Data Reviewed  Clinical lab tests: ordered and reviewed  Tests in the radiology section of CPT®: ordered and reviewed  Obtain history from someone other than the patient: yes  Independent visualization of images, tracings, or specimens: yes    Patient Progress  Patient progress: improved        CONSULTS:  None    PROCEDURES:  Unless otherwise noted below, none     Procedures    FINAL IMPRESSION      1. Chest pain, unspecified type    2.  Adverse effect of drug, initial encounter          DISPOSITION/PLAN   DISPOSITION Decision To Discharge 02/28/2018 10:37:15 AM      PATIENT REFERRED TO:  Sylvia Hassan, [address] [facility] [address] [facility] [phone]    Schedule an appointment as soon as possible for a visit         DISCHARGE MEDICATIONS:  Discharge Medication List as of 2/28/2018 10:39 AM             (Please note that portions of this note were completed with a voice recognition program.  Efforts were made to edit the dictations but occasionally words are mis-transcribed.)    Osbaldo Dutta MD (electronically signed)  Attending Emergency Physician         Osbaldo Dutta MD  03/03/18 [id number]

## 2018-03-03 ASSESSMENT — ENCOUNTER SYMPTOMS
SHORTNESS OF BREATH: 0
VOMITING: 0
COUGH: 0
ABDOMINAL PAIN: 0

## 2018-03-07 ENCOUNTER — HOSPITAL ENCOUNTER (OUTPATIENT)
Dept: GENERAL RADIOLOGY | Facility: HOSPITAL | Age: 63
Discharge: HOME | End: 2018-03-07
Admitting: PHYSICIAN ASSISTANT

## 2018-03-07 ENCOUNTER — TELEPHONE (OUTPATIENT)
Dept: OTOLARYNGOLOGY | Facility: CLINIC | Age: 63
End: 2018-03-07

## 2018-03-07 DIAGNOSIS — R13.14 PHARYNGOESOPHAGEAL DYSPHAGIA: Primary | ICD-10-CM

## 2018-03-07 DIAGNOSIS — R13.10 DYSPHAGIA, UNSPECIFIED TYPE: ICD-10-CM

## 2018-03-07 PROCEDURE — 92611 MOTION FLUOROSCOPY/SWALLOW: CPT | Performed by: SPEECH-LANGUAGE PATHOLOGIST

## 2018-03-07 PROCEDURE — G8996 SWALLOW CURRENT STATUS: HCPCS | Performed by: SPEECH-LANGUAGE PATHOLOGIST

## 2018-03-07 PROCEDURE — G8997 SWALLOW GOAL STATUS: HCPCS | Performed by: SPEECH-LANGUAGE PATHOLOGIST

## 2018-03-07 PROCEDURE — 74230 X-RAY XM SWLNG FUNCJ C+: CPT

## 2018-03-07 PROCEDURE — G8998 SWALLOW D/C STATUS: HCPCS | Performed by: SPEECH-LANGUAGE PATHOLOGIST

## 2018-03-07 RX ADMIN — BARIUM SULFATE 20 ML: 400 PASTE ORAL at 08:50

## 2018-03-07 RX ADMIN — BARIUM SULFATE 20 ML: 400 SUSPENSION ORAL at 08:50

## 2018-03-07 RX ADMIN — BARIUM SULFATE 20 ML: 0.81 POWDER, FOR SUSPENSION ORAL at 08:50

## 2018-03-07 NOTE — TELEPHONE ENCOUNTER
----- Message from KRISTA Lino sent at 3/7/2018  9:45 AM CST -----  Please call the patient regarding speech path report    3/8/18 Patient notified to avoid thin liquids.

## 2018-03-07 NOTE — MBS/VFSS/FEES
Outpatient Speech Language Pathology   Adult Swallow Dysphagia Study  [facility]     Patient Name: Saul Alcantara Sr.  : 1955  MRN: [id number]  Today's Date: 3/7/2018         Visit Date: 2018     SPEECH-LANGUAGE PATHOLOGY EVALUTION - VFSS  Subjective: The patient was seen on this date for a VFSS(Videofluoroscopic Swallowing Study).  Patient was alert and cooperative.    Significant history: History of head and neck cancer, base of tongue cancer with radiation treatment over 1 year ago. Had PEG tube during treatment. Speech post treatment. Now reports inability to swallow food. Eating pureed foods due to difficulty. Had aspiration pneumonia post radiation treatment.   Objective: Risks/benefits were reviewed with the patient and family, and consent was obtained. The study was completed with SLP and Radiologist present. The patient was seen in lateral view(s). Textures given included thin liquid, nectar thick liquid, honey thick liquid and puree consistency  Assessment: Difficulties were noted with thin liquid and puree consistency, characterized by flash penetration of thin liquids with deeper penetration to the vocal cords with chin tuck.  With trials via cup, deep penetration continued to occur.  Nectar thick liquids resulted in no penetration or aspiration.  Some moderate residue remained inconsistently in the pharyngeal area.  Poor bolus formation observed with honey thick with some premature spillage to the pyriform sinuses.  Pureed consistency resulted in residue in the valleculae which patient cleared back up in an attempt to re-swallow.  Liquids wash of nectar was given which helped clear out residue.  Poor laryngeal elevation and epiglottic inversion was observed with all consistencies.  This is most like a result of previous radiation treatment.  SLP Findings: Patient presents with moderate oropharyngeal dysphagia.   Comments: Results were discussed with patient and wife.  They were given  "information on nectar thick liquids.  Educated on how to thicken and foods that contain thin liquids.  Patient mostly drinks Ensure.  Encouraged to drink this cold as it will be thicker.  Educated in water protocol.  Discussed using gravy to help with pureed consistencies, otherwise, not much to do with thinning down purees.  I feel patient will benefit from OP Speech, more specifically Nerumomuscular Stimulation for swallow.  This is offered at [facility].  Patient states he received what I suspect to be VitalStim in the past, which is similar but targets different areas for swallow.  He seemed reluctant to do additional therapy, however agreed to think about it and discuss with referring MD.  Patient was also somewhat upset that he was not scheduled for esophagram.  He states he was to have a test that showed if he \"needed to be stretched\".  Explained to patient that a different test was required for esophageal stricture, and that his current complaints and what was seen on Modified Barium Swallow is consistent with radiation and I feel correct test was completed.  I do not feel any esophageal dilatation will help with current complaints.  Patient was given information packet on head and neck cancer and swallowing, including exercises to help with swallow.  Risk of aspiration was discussed.  Recommendations: Diet Textures: nectar thick liquid, puree consistency food. Medications should be taken crushed with puree. May have water and Ice between meals after oral care.  Recommended Strategies: Upright for PO, small bites and sips and double swallow with all. Oral care before breakfast, after all meals and PRN.  Other Recommended Evaluations: Referral to Outpatient Speech    Dysphagia therapy is recommended. Please have referring MD office call to set up OP services.  Clarice Meyer MS CCC-SLP 3/7/2018 1:46 PM      Patient Active Problem List   Diagnosis   • Primary squamous cell carcinoma of " tongue   • Laryngopharyngeal reflux   • Chronic sinusitis   • Xerostomia   • Tongue irritation   • Sicca syndrome   • Abnormal thyroid stimulating hormone level   • H/O head and neck radiation        Past Medical History:   Diagnosis Date   • Allergic rhinitis    • Chronic rhinitis    • Chronic sinusitis    • Depression    • Deviated nasal septum    • GERD (gastroesophageal reflux disease)    • H/O head and neck radiation 3/3/2017   • Hypertension    • Hypothyroidism    • Laryngopharyngeal reflux    • Lymphoma     Non-Hodgkins   • Primary squamous cell carcinoma of tongue 9/27/2016    T1N0M0 SCC S/P XRT/Chemo 12/2014   • Primary squamous cell carcinoma of tongue 9/27/2016    T1N0M0 SCC S/P XRT/Chemo 12/2014   • Sicca syndrome    • Sinusitis, acute    • Squamous cell carcinoma     Base of Tongue   • Tobacco use disorder    • Tongue irritation    • Xerostomia         Past Surgical History:   Procedure Laterality Date   • CATARACT EXTRACTION     • HAND SURGERY     • KNEE SURGERY     • OTHER SURGICAL HISTORY  09/10/2014    Microlaryngoscopy with Biopsy - Base of Tongue   • SQUAMOUS CELL CARCINOMA EXCISION  09/10/2014    Tongue   • TONSILLECTOMY           Visit Dx:     ICD-10-CM ICD-9-CM   1. Dysphagia, unspecified type R13.10 787.20                   Adult Dysphagia - 03/07/18 1300     Adult Dysphagia Background    Patient Description of Complaint History of head and neck cancer, base of tongue cancer with radiation treatment over 1 year ago.  Had PEG tube during treatment.  Speech post treatment.  Now reports inability to swallow food.  Eating pureed foods due to difficulty.  Had aspiration pneumonia post radiation treatment.    -KW    Symptoms Reported by Patient Difficulty swallowing solids;Food gets stuck  -KW    History Pertinent to Diagnosis see above  -KW    Current Diet (Solids) Puree  -KW    Diet Level (Liquids) Thin Liquid  -KW    Oral [facility]    Respiratory/Swallow Coordination Pattern WFL  -KW    Position  During Evaluation Upright  -KW    Anatomy/Physiology WNL Patient demonstrates anatomy and physiology that is WNL  -KW    Dentition Patient has own teeth  -KW    Oral Health Oral cavity is clean  -KW    Awareness/Control of Secretions Patient swallows saliva  -KW    Oral Phase Impaired Physiology Observed    Oral Phase --  -KW    VFSS Exam    Study Completed By SLP and Radiologist (comment)  -KW    Textures Presented Thin;Nectar;Honey;Pudding  -KW    Position During Study/Views Obtained Upright;Lateral View  -KW    Physiologic Impairments Noted on VFSS    Physiologic Impairments Noted on VFSS X  -KW    Oral Control/Manipulation Difficulty Decreased bolus formation with thin   -KW    Reduced Laryngeal Elevation all  -KW    Symptoms    Valleculae With these consistencies (comment)   all  -KW    Pyriform Sinuses With these consistencies (comment)   all  -KW    Compensatory Strategies X  -KW    Compensatory Strategies Used chin tuck was not successful  -KW    Results/Recs/Barriers/Education for Dysphagia    Factors Affecting Performance no difficulty participating in study  -KW    Learning Motivation moderate  -KW    Education/Learning Comments Discussed results of study and recommendations.  -KW    Clinical Impression: Swallowing Moderate:;oropharyngeal phase dysphagia  -KW    Recommendations for Diet/Nutirition full oral diet  -KW    Swallowing Precautions upright position for at least 30 minutes after meals;small sips and bites when eating;multiple swallows per bite or sip  -KW    Recommendations dysphagia therapy to address swallowing deficits  -KW      User Key  (r) = Recorded By, (t) = Taken By, (c) = Cosigned By    Initials Name Provider Type    CONNOR Meyer MS CCC-SLP Speech and Language Pathologist                            OP SLP Education       03/07/18 1323    Education    Barriers to Learning No barriers identified  -KW    Education Provided Described results of evaluation  -KW    Assessed Learning  needs;Learning motivation;Learning preferences;Learning readiness  -KW    Learning Motivation Moderate  -KW    Learning Method Explanation  -KW    Teaching Response Verbalized understanding  -KW    Education Comments see flowsheet data  -KW      User Key  (r) = Recorded By, (t) = Taken By, (c) = Cosigned By    Initials Name Effective Dates    CONNOR Clarice MARK MS SUSAN Meyer-SLP 08/02/16 -                     OP SLP Assessment/Plan - 03/07/18 1300     SLP Assessment    Clinical Impression: Swallowing Moderate:;oropharyngeal phase dysphagia  -      User Key  (r) = Recorded By, (t) = Taken By, (c) = Cosigned By    Initials Name Provider Type    CONNOR Clarice Meyer MS CCC-SLP Speech and Language Pathologist              SLP Outcome Measures (last 72 hours)      SLP Outcome Measures       03/07/18 1300          SLP Outcome Measures    Outcome Measure Used? Adult NOMS  -KW      FCM Scores    FCM Chosen Swallowing  -      Swallowing FCM Score 4  -KW        User Key  (r) = Recorded By, (t) = Taken By, (c) = Cosigned By    Initials Name Effective Dates    CONNOR Meyer MS CCC-TRAMAINE 08/02/16 -                Time Calculation:   SLP Start Time: 0815  SLP Stop Time: 1015  SLP Time Calculation (min): 120 min    Therapy Charges for Today     Code Description Service Date Service Provider Modifiers Qty    71043054199 HC ST SWALLOWING CURRENT STATUS 3/7/2018 Clarice Meyer MS CCC-SLP GN, CK 1    65844275467 HC ST SWALLOWING PROJECTED 3/7/2018 Clarice Meyer MS CCC-SLP GN, CK 1    26161299551 HC ST SWALLOWING DISCHARGE 3/7/2018 Clarice Meyer MS CCC-SLP GN, CK 1    48596479535 HC ST MOTION FLUORO EVAL SWALLOW 8 3/7/2018 Clarice Meyer MS CCC-SLP GN 1          SLP G-Codes  SLP NOMS Used?: Yes  Functional Limitations: Swallowing  Swallow Current Status (): At least 40 percent but less than 60 percent impaired, limited or restricted  Swallow Goal Status (): At least 40 percent but less than 60 percent impaired, limited or  restricted  Swallow Discharge Status (): At least 40 percent but less than 60 percent impaired, limited or restricted        Clarice Meyer MS CCC-SLP  3/7/2018

## 2018-06-26 NOTE — PROGRESS NOTES
Mr. Alcantara is 63 y.o. male    CHIEF COMPLAINT: I am here for poor urinary stream.     HPI  Here to f/u benign hyperplasia of prostate gland. First diagnosed about 3 years. Symptoms include mild nocturia and some decreased stream. He is better on tamsulosin.     Also has ED. Cialis helps. Cost is an issue.     Lastly he has Peyronie's disease. It is stable. Able to get erection with Cialis. Occasional pain.       The following portions of the patient's history were reviewed and updated as appropriate: allergies, current medications, past family history, past medical history, past social history, past surgical history and problem list.      Review of Systems   Constitutional: Negative for chills and fever.   Gastrointestinal: Negative for abdominal pain, anal bleeding and blood in stool.   Genitourinary: Negative for flank pain, frequency, hematuria and urgency.           Current Outpatient Prescriptions:   •  ALPRAZolam (XANAX) 2 MG tablet, , Disp: , Rfl:   •  CIALIS 20 MG tablet, TK 1 T PO EVERY OTHER DAY PRN., Disp: , Rfl: 2  •  levothyroxine (SYNTHROID, LEVOTHROID) 100 MCG tablet, , Disp: , Rfl:   •  nystatin (MYCOSTATIN) 438920 UNIT/ML suspension, Take 10ml by mouth BID for 28 days, Disp: 480 mL, Rfl: 2  •  ondansetron (ZOFRAN) 8 MG tablet, every 8 (eight) hours., Disp: , Rfl:   •  pantoprazole (PROTONIX) 40 MG EC tablet, Delayed Release (DR/EC) Take 1 tablet (40 mg) by oral route 2 times per day, Take 30 minutes prior to breakfast and evening meal, Disp: , Rfl:   •  senna-docusate (PERICOLACE) 8.6-50 MG per tablet, Take 1 tablet by mouth 2 times daily, Disp: , Rfl:   •  simvastatin (ZOCOR) 20 MG tablet, TK 1 T PO QD, Disp: , Rfl: 11  •  triamcinolone (KENALOG) 0.1 % paste, Apply to tongue 1-2 times per day as needed for inflammation/pain, Disp: 5 g, Rfl: 3  •  fluticasone (FLONASE) 50 MCG/ACT nasal spray, 2 sprays into each nostril Daily., Disp: 16 g, Rfl: 5  •  glycopyrrolate (ROBINUL) 1 MG tablet, TK 1 T  "PO  TID, Disp: , Rfl: 3  •  HYDROcodone-acetaminophen (NORCO) 7.5-325 MG per tablet, TK 1 T PO QD PRN P, Disp: , Rfl: 0  •  Vortioxetine HBr (TRINTELLIX PO), Take  by mouth., Disp: , Rfl:     Past Medical History:   Diagnosis Date   • Allergic rhinitis    • Chronic rhinitis    • Chronic sinusitis    • Depression    • Deviated nasal septum    • GERD (gastroesophageal reflux disease)    • H/O head and neck radiation 3/3/2017   • Hypertension    • Hypothyroidism    • Laryngopharyngeal reflux    • Lymphoma     Non-Hodgkins   • Primary squamous cell carcinoma of tongue 9/27/2016    T1N0M0 SCC S/P XRT/Chemo 12/2014   • Primary squamous cell carcinoma of tongue 9/27/2016    T1N0M0 SCC S/P XRT/Chemo 12/2014   • Sicca syndrome    • Sinusitis, acute    • Squamous cell carcinoma     Base of Tongue   • Tobacco use disorder    • Tongue irritation    • Xerostomia        Past Surgical History:   Procedure Laterality Date   • CATARACT EXTRACTION     • HAND SURGERY     • KNEE SURGERY     • OTHER SURGICAL HISTORY  09/10/2014    Microlaryngoscopy with Biopsy - Base of Tongue   • SQUAMOUS CELL CARCINOMA EXCISION  09/10/2014    Tongue   • TONSILLECTOMY         Social History     Social History   • Marital status:      Social History Main Topics   • Smoking status: Former Smoker     Types: Cigarettes   • Smokeless tobacco: Never Used   • Alcohol use No   • Drug use: No     Other Topics Concern   • Not on file       Family History   Problem Relation Age of Onset   • Diabetes Sister    • Cancer Sister          /64   Temp 98.1 °F (36.7 °C)   Ht 185.4 cm (73\")   Wt 69.9 kg (154 lb 3.2 oz)   BMI 20.34 kg/m²       Physical Exam  Alert and oriented ×3  Not agitated or distressed  No obvious deformities  No respiratory distress  Skin without pallor or diaphoresis      Data  Results for orders placed or performed in visit on 01/11/18   POC Urinalysis Dipstick, Automated   Result Value Ref Range    Color Yellow Yellow, Straw, Dark " Yellow, Barbie    Clarity, UA Clear Clear    Glucose, UA Negative Negative, 1000 mg/dL (3+) mg/dL    Bilirubin Negative Negative    Ketones, UA Negative Negative    Specific Gravity  1.015 1.005 - 1.030    Blood, UA Negative Negative    pH, Urine 7.0 5.0 - 8.0    Protein, POC Negative Negative mg/dL    Urobilinogen, UA Normal Normal    Leukocytes Negative Negative    Nitrite, UA Negative Negative         Imaging Results (last 7 days)     ** No results found for the last 168 hours. **        Bladder Scan interpretation  Estimation of residual urine via abdominal ultrasound  Residual Urine: 76 ml  Indication: poor urinary stream  Position: Supine  Examination: Incremental scanning of the suprapubic area using 3 MHz transducer using copious amounts of acoustic gel.   Findings: An anechoic area was demonstrated which represented the bladder, with measurement of residual urine as noted. I inspected this myself. In that the residual urine was stable or insignificant, no treatment will be necessary at this time.         Assessment and Plan  Diagnoses and all orders for this visit:    Poor urinary stream  -     Cancel: POC Urinalysis Dipstick, Multipro    Peyronie disease  -     Ambulatory Referral to Urology    Erectile dysfunction, unspecified erectile dysfunction type      Will continue tamsulosin for his BPH with obstruction  Peyronie's persists but no worse. Discussed Xiaflex and offered referral.   Continue Cialis for ED. Somewhat helpful but he has some venous leak due to the Peyronie's most likely.      Oscar Bazan MD  06/28/18  2:56 PM      Cc: Joe Stubbs MD

## 2018-06-28 ENCOUNTER — OFFICE VISIT (OUTPATIENT)
Dept: UROLOGY | Facility: CLINIC | Age: 63
End: 2018-06-28

## 2018-06-28 VITALS
BODY MASS INDEX: 20.44 KG/M2 | WEIGHT: 154.2 LBS | SYSTOLIC BLOOD PRESSURE: 112 MMHG | DIASTOLIC BLOOD PRESSURE: 64 MMHG | TEMPERATURE: 98.1 F | HEIGHT: 73 IN

## 2018-06-28 DIAGNOSIS — N48.6 PEYRONIE DISEASE: ICD-10-CM

## 2018-06-28 DIAGNOSIS — R39.12 POOR URINARY STREAM: Primary | ICD-10-CM

## 2018-06-28 DIAGNOSIS — N52.9 ERECTILE DYSFUNCTION, UNSPECIFIED ERECTILE DYSFUNCTION TYPE: ICD-10-CM

## 2018-06-28 PROCEDURE — 51798 US URINE CAPACITY MEASURE: CPT | Performed by: UROLOGY

## 2018-06-28 PROCEDURE — 99214 OFFICE O/P EST MOD 30 MIN: CPT | Performed by: UROLOGY

## 2018-06-28 NOTE — PATIENT INSTRUCTIONS

## 2018-08-16 ENCOUNTER — APPOINTMENT (OUTPATIENT)
Dept: CT IMAGING | Age: 63
End: 2018-08-16
Payer: COMMERCIAL

## 2018-08-16 ENCOUNTER — TELEPHONE (OUTPATIENT)
Dept: OTOLARYNGOLOGY | Facility: CLINIC | Age: 63
End: 2018-08-16

## 2018-08-16 ENCOUNTER — APPOINTMENT (OUTPATIENT)
Dept: GENERAL RADIOLOGY | Age: 63
End: 2018-08-16
Payer: COMMERCIAL

## 2018-08-16 ENCOUNTER — HOSPITAL ENCOUNTER (EMERGENCY)
Age: 63
Discharge: HOME | End: 2018-08-16
Attending: EMERGENCY MEDICINE
Payer: COMMERCIAL

## 2018-08-16 VITALS
TEMPERATURE: 98 F | DIASTOLIC BLOOD PRESSURE: 92 MMHG | BODY MASS INDEX: 19.75 KG/M2 | WEIGHT: 149 LBS | OXYGEN SATURATION: 94 % | SYSTOLIC BLOOD PRESSURE: 153 MMHG | RESPIRATION RATE: 18 BRPM | HEIGHT: 73 IN | HEART RATE: 90 BPM

## 2018-08-16 DIAGNOSIS — R10.12 ABDOMINAL PAIN, LEFT UPPER QUADRANT: ICD-10-CM

## 2018-08-16 DIAGNOSIS — R51.9 NONINTRACTABLE HEADACHE, UNSPECIFIED CHRONICITY PATTERN, UNSPECIFIED HEADACHE TYPE: ICD-10-CM

## 2018-08-16 DIAGNOSIS — R07.89 ATYPICAL CHEST PAIN: ICD-10-CM

## 2018-08-16 DIAGNOSIS — R03.0 ELEVATED BLOOD PRESSURE READING: Primary | ICD-10-CM

## 2018-08-16 DIAGNOSIS — R20.0 LEFT FACIAL NUMBNESS: ICD-10-CM

## 2018-08-16 DIAGNOSIS — J01.90 ACUTE SINUSITIS, RECURRENCE NOT SPECIFIED, UNSPECIFIED LOCATION: ICD-10-CM

## 2018-08-16 LAB
ALBUMIN SERPL-MCNC: 4.8 G/DL (ref 3.5–5.2)
ALP BLD-CCNC: 70 U/L (ref 40–130)
ALT SERPL-CCNC: 15 U/L (ref 5–41)
ANION GAP SERPL CALCULATED.3IONS-SCNC: 13 MMOL/L (ref 7–19)
AST SERPL-CCNC: 19 U/L (ref 5–40)
BILIRUB SERPL-MCNC: 0.8 MG/DL (ref 0.2–1.2)
BILIRUBIN URINE: NEGATIVE
BLOOD, URINE: NEGATIVE
BUN BLDV-MCNC: 16 MG/DL (ref 8–23)
CALCIUM SERPL-MCNC: 9.7 MG/DL (ref 8.8–10.2)
CHLORIDE BLD-SCNC: 96 MMOL/L (ref 98–111)
CLARITY: CLEAR
CO2: 29 MMOL/L (ref 22–29)
COLOR: YELLOW
CREAT SERPL-MCNC: 0.9 MG/DL (ref 0.5–1.2)
EKG P AXIS: 74 DEGREES
EKG P-R INTERVAL: 158 MS
EKG Q-T INTERVAL: 356 MS
EKG QRS DURATION: 88 MS
EKG QTC CALCULATION (BAZETT): 413 MS
EKG T AXIS: 59 DEGREES
GFR NON-AFRICAN AMERICAN: >60
GLUCOSE BLD-MCNC: 142 MG/DL (ref 74–109)
GLUCOSE URINE: NEGATIVE MG/DL
HCT VFR BLD CALC: 37.7 % (ref 42–52)
HEMOGLOBIN: 12.3 G/DL (ref 14–18)
KETONES, URINE: NEGATIVE MG/DL
LEUKOCYTE ESTERASE, URINE: NEGATIVE
LIPASE: 45 U/L (ref 13–60)
MCH RBC QN AUTO: 31.6 PG (ref 27–31)
MCHC RBC AUTO-ENTMCNC: 32.6 G/DL (ref 33–37)
MCV RBC AUTO: 96.9 FL (ref 80–94)
NITRITE, URINE: NEGATIVE
PDW BLD-RTO: 13.4 % (ref 11.5–14.5)
PH UA: 7
PLATELET # BLD: 347 K/UL (ref 130–400)
PMV BLD AUTO: 10.4 FL (ref 9.4–12.4)
POTASSIUM SERPL-SCNC: 3.9 MMOL/L (ref 3.5–5)
PROTEIN UA: NEGATIVE MG/DL
RBC # BLD: 3.89 M/UL (ref 4.7–6.1)
SODIUM BLD-SCNC: 138 MMOL/L (ref 136–145)
SPECIFIC GRAVITY UA: 1.01
TOTAL PROTEIN: 7.4 G/DL (ref 6.6–8.7)
TROPONIN: <0.01 NG/ML (ref 0–0.03)
URINE REFLEX TO CULTURE: NORMAL
UROBILINOGEN, URINE: 0.2 E.U./DL
WBC # BLD: 6.9 K/UL (ref 4.8–10.8)

## 2018-08-16 PROCEDURE — 80053 COMPREHEN METABOLIC PANEL: CPT

## 2018-08-16 PROCEDURE — 85027 COMPLETE CBC AUTOMATED: CPT

## 2018-08-16 PROCEDURE — 70450 CT HEAD/BRAIN W/O DYE: CPT

## 2018-08-16 PROCEDURE — 84484 ASSAY OF TROPONIN QUANT: CPT

## 2018-08-16 PROCEDURE — 99284 EMERGENCY DEPT VISIT MOD MDM: CPT | Performed by: EMERGENCY MEDICINE

## 2018-08-16 PROCEDURE — 71046 X-RAY EXAM CHEST 2 VIEWS: CPT

## 2018-08-16 PROCEDURE — 99284 EMERGENCY DEPT VISIT MOD MDM: CPT

## 2018-08-16 PROCEDURE — 81003 URINALYSIS AUTO W/O SCOPE: CPT

## 2018-08-16 PROCEDURE — 36415 COLL VENOUS BLD VENIPUNCTURE: CPT

## 2018-08-16 PROCEDURE — 83690 ASSAY OF LIPASE: CPT

## 2018-08-16 PROCEDURE — 93005 ELECTROCARDIOGRAM TRACING: CPT

## 2018-08-16 ASSESSMENT — ENCOUNTER SYMPTOMS
DIARRHEA: 0
EYE PAIN: 0
VOMITING: 0
SHORTNESS OF BREATH: 1
TROUBLE SWALLOWING: 0
ABDOMINAL PAIN: 1
COUGH: 1
SINUS PRESSURE: 1

## 2018-08-16 ASSESSMENT — PAIN SCALES - GENERAL: PAINLEVEL_OUTOF10: 8

## 2018-08-16 ASSESSMENT — PAIN DESCRIPTION - LOCATION: LOCATION: HEAD

## 2018-08-16 NOTE — ED PROVIDER NOTES
MEDICATIONS       Previous Medications    ALPRAZOLAM ER PO    Take 2 mg by mouth 3 times daily Indications: Depression with Anxiety     LEVOTHYROXINE SODIUM    Take 0.088 mg by mouth daily     ONDANSETRON (ZOFRAN) 8 MG TABLET    Take 8 mg by mouth every 8 hours as needed for Nausea or Vomiting    PANTOPRAZOLE SODIUM (PROTONIX PO)    Take 40 mg by mouth 2 times daily     SENNA-DOCUSATE (PERICOLACE) 8.6-50 MG PER TABLET    Take 1 tablet by mouth 2 times daily    SERTRALINE (ZOLOFT) 50 MG TABLET    Take 50 mg by mouth nightly    SIMVASTATIN PO    Take 20 mg by mouth daily     TAMSULOSIN (FLOMAX) 0.4 MG CAPSULE    Take 0.4 mg by mouth nightly        ALLERGIES     Patient has no known allergies. FAMILY HISTORY       Family History   Problem Relation Age of Onset    Cancer Sister     Stroke Sister     Stroke Brother           SOCIAL HISTORY       Social History     Social History    Marital status:      Spouse name: N/A    Number of children: N/A    Years of education: N/A     Social History Main Topics    Smoking status: Current Every Day Smoker     Packs/day: 0.50     Years: 38.00     Last attempt to quit: 11/24/2015    Smokeless tobacco: Never Used    Alcohol use No      Comment: quit last week    Drug use: No    Sexual activity: Not on file     Other Topics Concern    Not on file     Social History Narrative    No narrative on file       SCREENINGS             PHYSICAL EXAM    (up to 7 for level 4, 8 or more for level 5)     ED Triage Vitals [08/16/18 0526]   BP Temp Temp src Pulse Resp SpO2 Height Weight   (!) 158/97 98 °F (36.7 °C) -- 98 20 98 % 6' 1\" (1.854 m) 149 lb (67.6 kg)       Physical Exam   Constitutional: He is oriented to person, place, and time. He appears well-developed. No distress. HENT:   Head: Normocephalic and atraumatic. Right Ear: Hearing, external ear and ear canal normal. No drainage. No mastoid tenderness.  Tympanic membrane is not perforated, not erythematous and off and on for the past week. No abdominal pain at this time. Abdomen soft and nontender on exam so see no indication for CT at this time. Also had some fleeting chest discomfort. No chest discomfort today. Also said he had some numbness in left side of his face last night for a few minutes, but this resolved with Xanax. Had thorough discussion with patient and his wife. Offered admission for further evaluation and monitoring. I told him I cannot be certain that his chest discomfort was not cardiac in nature and cannot be certain that his facial numbness was not related to TIA. After thorough discussion about risks and benefits of admission versus discharge patient declined admission and wants to go home. He understands risk of adverse outcome related to going home rather than being admitted and is comfortable going home. Told patient to follow up with his PCP. Recommended sinus rinse. He has a history of squamous cell cancer of his tongue and said he cannot use sinus rinses per his ENT physicians instructions. I told him to contact his ENT, Dr. Sarahi Jaffe, to inquire about what medications he can take for the sinusitis. Told to the ER for any change or worsening symptoms or new concerns. CONSULTS:  None    PROCEDURES:  Unless otherwise noted below, none     Procedures    FINAL IMPRESSION      1. Elevated blood pressure reading    2. Nonintractable headache, unspecified chronicity pattern, unspecified headache type    3. Acute sinusitis, recurrence not specified, unspecified location    4. Resolved Atypical chest pain    5. Resolved Abdominal pain, left upper quadrant    6.  Left facial numbness          DISPOSITION/PLAN   DISPOSITION Decision To Discharge 08/16/2018 07:00:18 AM      PATIENT REFERRED TO:  [facility] EMERGENCY DEPT  Brett Cantu  [phone]    As needed, If symptoms worsen    Curtis Schneider MD  [address]  [phone]    Schedule an appointment as soon as possible for a visit         DISCHARGE MEDICATIONS:  New Prescriptions    No medications on file          (Please note that portions of this note were completed with a voice recognition program.  Efforts were made to edit the dictations but occasionally words are mis-transcribed.)    Zhen Green MD (electronically signed)  Attending Emergency Physician        Zhen Green MD  08/16/18 0558

## 2018-08-16 NOTE — TELEPHONE ENCOUNTER
Patient wife called with complaints of headache, facial pain, and elevated blood pressure.  No sinus drainage or fever.  Stated she took him to Located within [facility] this morning and they advised against antibiotics and steroids.  I let them know that was appropriate and advised them to use saline sinus rinses to help relive pressure.  Patient does not want to use antihistamines, and requested a steroid pack.

## 2018-08-23 ENCOUNTER — TELEPHONE (OUTPATIENT)
Dept: OTOLARYNGOLOGY | Facility: CLINIC | Age: 63
End: 2018-08-23

## 2018-08-23 RX ORDER — AMOXICILLIN AND CLAVULANATE POTASSIUM 875; 125 MG/1; MG/1
1 TABLET, FILM COATED ORAL EVERY 12 HOURS
Qty: 20 TABLET | Refills: 0 | Status: SHIPPED | OUTPATIENT
Start: 2018-08-23 | End: 2019-03-26

## 2018-08-23 NOTE — TELEPHONE ENCOUNTER
"Patient c/o sinus infection. States he has drainage, swelling of face, \"salty taste\" in mouth. Requesting antibiotic. States he has chronic dry mouth. Wants refill on Nystatin because he is afraid the antibiotic will irritate his mouth. Due to patient's history I advised that I would send the medication requested but that he should keep his follow up with Dr. Richmond on 09/06/2018  "

## 2018-09-05 NOTE — PROGRESS NOTES
YOB: 1955  Location: [facility]  Location Address: [address] [place] [address], [facility], [address] [place] [address]  Location Phone: [phone]    Chief Complaint   Patient presents with   • Follow-up     SCC of tongue, sinus problems       History of Present Illness  Saul Alcantara Sr. is a 61 y.o. male who presents for follow-up evaluation. Patient had DL and biopsy of base of tongue on 9/10/14 with positive pathology for SCCa. The patient has a history of radiation therapy due to a base of tongue cancer on the left side that he finished in . Patient has nasal congestion, nasal drainage, postnasal drainage, sore throat, cough, dysphagia, hoarseness, frontal, orbital and maxillary sinus pressure and sinus pressure. Patient also states his mouth is burning.   Despite having multiple complaints, the patient is doing well overall.  He denies significant increased dysphagia or dryness though he always complains these in general.                      Recent PET scan was normal.                                              Study Result     EXAMINATION: FL VIDEO SWALLOW W SPEECH- 3/7/2018 9:35 AM CST     HISTORY: Dysphagia; R13.10-Dysphagia, unspecified, radiation therapy.     REPORT:  The speech pathologist was present and supervised the  administration of multiple consistencies of barium to the patient  orally, during intermittent lateral fluoroscopy and digital video  capture. The patient is able to swallow all attempted consistencies  without evidence of aspiration, there is deep penetration with thin  barium with and without a chin tuck.     IMPRESSION:  No aspiration seen. There is deep penetration with thin  barium.. Please review the speech pathologist's report for more  information.     This report was finalized on 2018 09:36 by Dr. Kenyon Paige MD.     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  History of marginal zone lymphoma the spleen in 2004.  Relapse of lymphoma in 2012 treated to remission.  COMPARISON:  6/7/2017  TECHNIQUE: Positron emission tomography from the vertex to the upper thighs with unenhanced CT for attenuation correction and image fusion.  Pre-test glucose: 103 mg/dL.  F-18 FDG Dose: 11.8 mCi. Uptake duration: 61 minutes. DLP: 278. Reference liver average SUV: 2.3. Reported image numbers refer to the axial series.  SUV units in lean body mass.  PET-CT FINDINGS:  Head and Neck: No suspicious metabolic activity demonstrated.  Chest:  No suspicious metabolic activity.  Abdomen and Pelvis:  A single left inguinal node has enlarged measuring 11 mm with SUV max 3.8, image 284.  Skeletal Structures: No suspicious skeletal uptake.  FDG uptake in the soft tissues adjacent to the right greater trochanter is likely due to trochanteric bursitis.  ADDITIONAL CT FINDINGS:  Enlarged prostate gland.  Status post splenectomy.  Minimal calcified plaque in the aorta and pelvic arteries.  No aneurysm.  IMPRESSION:  1.  Interval enlargement and increased metabolic activity in a single left inguinal node.  This may be reactive and follow-up with physical exam, ultrasound or CT is recommended.    Electronically Signed By: Dr. Brendan Mcgee      Electronically Signed On: 6/7/2018 11:02:32 AM                      Past Medical History:   Diagnosis Date   • Allergic rhinitis    • Chronic rhinitis    • Chronic sinusitis    • Depression    • Deviated nasal septum    • GERD (gastroesophageal reflux disease)    • H/O head and neck radiation 3/3/2017   • Hypertension    • Hypothyroidism    • Laryngopharyngeal reflux    • Lymphoma (CMS/HCC)      Non-Hodgkins   • 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    T1N0M0 SCC S/P XRT/Chemo 12/2014   • 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    T1N0M0 SCC S/P XRT/Chemo 12/2014   • Sicca syndrome (CMS/HCC)    • Sinusitis, acute    • Squamous cell carcinoma     Base of Tongue   • Tobacco use disorder    • Tongue irritation    • Xerostomia        Past Surgical History:   Procedure Laterality Date   • CATARACT EXTRACTION     • HAND SURGERY     • KNEE SURGERY     • OTHER SURGICAL HISTORY  09/10/2014    Microlaryngoscopy with Biopsy - Base of Tongue   • SQUAMOUS CELL CARCINOMA EXCISION  09/10/2014    Tongue   • TONSILLECTOMY         Outpatient Prescriptions Marked as Taking for the 9/6/18 encounter (Office Visit) with Wayne Richmond MD   Medication Sig Dispense Refill   • ALPRAZolam (XANAX) 2 MG tablet      • CIALIS 20 MG tablet TK 1 T PO EVERY OTHER DAY PRN.  2   • fluticasone (FLONASE) 50 MCG/ACT nasal spray 2 sprays into each nostril Daily. 16 g 5   • glycopyrrolate (ROBINUL) 1 MG tablet TK 1 T PO  TID  3   • HYDROcodone-acetaminophen (NORCO) 7.5-325 MG per tablet TK 1 T PO QD PRN P  0   • levothyroxine (SYNTHROID, LEVOTHROID) 100 MCG tablet      • nystatin (MYCOSTATIN) 816196 UNIT/ML suspension Take 10ml by mouth BID for 28 days 480 mL 2   • ondansetron (ZOFRAN) 8 MG tablet every 8 (eight) hours.     • pantoprazole (PROTONIX) 40 MG EC tablet Delayed Release (DR/EC)  Take 1 tablet (40 mg) by oral route 2 times per day,  Take 30 minutes prior to breakfast and evening meal     • senna-docusate (PERICOLACE) 8.6-50 MG per tablet Take 1 tablet by mouth 2 times daily     • simvastatin (ZOCOR) 20 MG tablet TK 1 T PO QD  11   • triamcinolone (KENALOG) 0.1 % paste Apply to tongue 1-2 times per day as needed for inflammation/pain 5 g 3   • Vortioxetine HBr (TRINTELLIX PO) Take  by mouth.         Patient has no known allergies.    Family History   Problem Relation Age of Onset   •  Diabetes Sister    • Cancer Sister        Social History     Social History   • Marital status:      Spouse name: N/A   • Number of children: N/A   • Years of education: N/A     Occupational History   • Not on file.     Social History Main Topics   • Smoking status: Former Smoker     Types: Cigarettes   • Smokeless tobacco: Never Used   • Alcohol use No   • Drug use: No   • Sexual activity: Not on file     Other Topics Concern   • Not on file     Social History Narrative   • No narrative on file       Review of Systems   Constitutional: Negative.    HENT: Positive for congestion, postnasal drip, rhinorrhea, sinus pain, sinus pressure, sore throat, trouble swallowing and voice change.    Eyes: Negative.    Respiratory: Positive for cough.    Gastrointestinal: Negative.    Endocrine: Negative.    Genitourinary: Negative.    Musculoskeletal: Negative.    Skin: Negative.    Allergic/Immunologic: Negative.    Neurological: Negative.    Hematological: Negative.    Psychiatric/Behavioral: Negative.        Vitals:    09/06/18 1154   BP: 140/80   Pulse: 88   Temp: 97.2 °F (36.2 °C)       Body mass index is 21.24 kg/m².    Objective     Physical Exam  CONSTITUTIONAL: well nourished, alert, oriented, in no acute distress     COMMUNICATION AND VOICE: able to communicate normally, normal voice quality    HEAD: normocephalic, no lesions, atraumatic, no tenderness, no masses     FACE: appearance normal, no lesions, no tenderness, no deformities, facial motion symmetric    SALIVARY GLANDS: parotid glands with no tenderness, no swelling, no masses, submandibular glands with normal size, nontender    EYES: ocular motility normal, eyelids normal, orbits normal, no proptosis, conjunctiva normal , pupils equal, round     EARS:  Hearing: response to conversational voice normal bilaterally   External Ears: auricles without lesions  Otoscopic: tympanic membrane appearance normal, no lesions, no perforation, normal mobility, no  fluid    NOSE:  External Nose: structure normal, no tenderness on palpation, no nasal discharge, no lesions, no evidence of trauma, nostrils patent   Intranasal Exam: See scope   Nasopharynx:     ORAL:  Lips: upper and lower lips without lesion   Teeth: Upper dentures removed for exam  Gums: gingivae healthy   Oral Mucosa: oral mucosa normal, no mucosal lesions   Floor of Mouth: Warthin’s duct patent, mucosa normal  Tongue: lingual mucosa normal without lesions, normal tongue mobility without masses or lesions by visualization or palpation  Palate: soft and hard palates with normal mucosa and structure  Oropharynx: oropharyngeal mucosa normal except for dryness and again without masses or lesions by visualization or palpation    HYPOPHARYNX:   LARYNX:See scope     NECK: neck appearance normal, no mass,  noted without erythema or tenderness    THYROID: no overt thyromegaly, no tenderness, nodules or mass present on palpation, position midline     LYMPH NODES: no lymphadenopathy    CHEST/RESPIRATORY: respiratory effort normal, normal breath sounds     CARDIOVASCULAR: rate and rhythm normal, extremities without cyanosis or edema      NEUROLOGIC/PSYCHIATRIC: oriented to time, place and person, mood normal, affect appropriate, CN II-XII intact grossly    Assessment/Plan   Saul was seen today for follow-up.    Diagnoses and all orders for this visit:    Primary squamous cell carcinoma of tongue (CMS/HCC)    H/O head and neck radiation    Xerostomia      * Surgery not found *  No orders of the defined types were placed in this encounter.    Return in about 6 months (around 3/6/2019).       Patient Instructions     Be aware of the signs and symptoms of recurrent head and neck cancer including neck mass, persistent or recurrent sore throat, ear pain, hemoptysis, weight loss and hoarseness. If any of these symptoms recur and or persist, call for an evaluation.     D/W patient increasing caloric intake and discussed  cruciferous vegetables and protein shakes etc to maintain optimal nutrition.  The necessity of abstaining from tobacco products was also discussed particularly with respect to not smoking.    Continue F/U and/or treatment with Jaydon Cazares for nasal symptoms  CT of the head demonstrated only minimal ethmoid disease on the left.  His primary problem is nasal congestion at night  Continue reflux precautions and PPI  General reflux precautions were recommended        IF YOU SMOKE OR USE TOBACCO PLEASE READ THE FOLLOWING:    Why is smoking bad for me?  Smoking increases the risk of heart disease, lung disease, vascular disease, stroke, and cancer.     If you smoke, STOP!    If you would like more information on quitting smoking, please visit the [email or web address] website: [email or web address]   This link will provide additional resources including the QUIT line and the Beat the Pack support groups.     For more information:    Quit Now [place]  1-800-QUIT-NOW  https://[place].quitlogix.org/en-US/

## 2018-09-06 ENCOUNTER — OFFICE VISIT (OUTPATIENT)
Dept: OTOLARYNGOLOGY | Facility: CLINIC | Age: 63
End: 2018-09-06

## 2018-09-06 VITALS
HEIGHT: 73 IN | SYSTOLIC BLOOD PRESSURE: 140 MMHG | DIASTOLIC BLOOD PRESSURE: 80 MMHG | WEIGHT: 161 LBS | HEART RATE: 88 BPM | BODY MASS INDEX: 21.34 KG/M2 | TEMPERATURE: 97.2 F

## 2018-09-06 DIAGNOSIS — K11.7 XEROSTOMIA: ICD-10-CM

## 2018-09-06 DIAGNOSIS — Z92.3 H/O HEAD AND NECK RADIATION: ICD-10-CM

## 2018-09-06 DIAGNOSIS — C02.9 PRIMARY SQUAMOUS CELL CARCINOMA OF TONGUE (HCC): Primary | ICD-10-CM

## 2018-09-06 PROCEDURE — 99213 OFFICE O/P EST LOW 20 MIN: CPT | Performed by: OTOLARYNGOLOGY

## 2018-09-06 PROCEDURE — 31575 DIAGNOSTIC LARYNGOSCOPY: CPT | Performed by: OTOLARYNGOLOGY

## 2018-09-06 PROCEDURE — 31231 NASAL ENDOSCOPY DX: CPT | Performed by: OTOLARYNGOLOGY

## 2018-09-06 RX ORDER — AZELASTINE 1 MG/ML
2 SPRAY, METERED NASAL 2 TIMES DAILY
Qty: 30 ML | Refills: 1 | Status: SHIPPED | OUTPATIENT
Start: 2018-09-06 | End: 2018-10-06

## 2018-09-06 NOTE — PROGRESS NOTES
PROCEDURE NOTE    Saul Alcantara Sr.    DATE OF PROCEDURE: 09/06/2018    PROCEDURE:   Bilateral Diagnostic Rigid Nasal Endoscopy    PREPROCEDURE DIAGNOSIS:   Chronic nocturnal congestion    POSTPROCEDURE DIAGNOSIS:  SAME    ANESTHESIA:   None    PROCEDURE DESCRIPTION:    With the patient in the chair bilateral diagnostic rigid nasal endoscopy was performed with the Stortz 0° endoscope without difficulty.     An endoscope was passed along the left nasal floor to the nasopharynx.  It was then passed into the region of the middle meatus, middle turbinate, and the sphenoethmoid region. An identical procedure was performed on the right side.  The following findings were noted as stated below:    Findings: Mild inferior turbinate hypertrophy with pale appearing mucosa throughout.  No intranasal polyposis or purulence is appreciated.  The middle meati appear patent.  The choana was patent.    Condition:  Stable.  Patient tolerated procedure well.    Complications:  None  There was no significant bleeding.

## 2018-09-06 NOTE — PATIENT INSTRUCTIONS
Be aware of the signs and symptoms of recurrent head and neck cancer including neck mass, persistent or recurrent sore throat, ear pain, hemoptysis, weight loss and hoarseness. If any of these symptoms recur and or persist, call for an evaluation.     D/W patient increasing caloric intake and discussed cruciferous vegetables and protein shakes etc to maintain optimal nutrition.  The necessity of abstaining from tobacco products was also discussed particularly with respect to not smoking.    Continue F/U and/or treatment with Jaydon Cazares for nasal symptoms  CT of the head demonstrated only minimal ethmoid disease on the left.  His primary problem is nasal congestion at night  Continue reflux precautions and PPI  General reflux precautions were recommended        IF YOU SMOKE OR USE TOBACCO PLEASE READ THE FOLLOWING:    Why is smoking bad for me?  Smoking increases the risk of heart disease, lung disease, vascular disease, stroke, and cancer.     If you smoke, STOP!    If you would like more information on quitting smoking, please visit the [employer] website: [email or web address]   This link will provide additional resources including the QUIT line and the Beat the Pack support groups.     For more information:    Quit Now [place]  1-800-QUIT-NOW  https://[email or web address].[email or web address]/en-US/

## 2018-09-06 NOTE — PROGRESS NOTES
OPERATIVE NOTE:  Saul Alcantara Sr.    DATE OF PROCEDURE: 09/06/2018    PROCEDURE:   Flexible Fiberoptic Laryngoscopy    ANESTHESIA:  None    REASON FOR PROCEDURE:  Procedure was recommend for re-evaluation of a lesion previously documented  Risks, benefits and alternatives were discussed.      DETAILS of OPERATION:  The patient was seated in the exam chair.  A flexible fiberoptic laryngoscopy was performed through the oral cavity.  The scope was introduced into the oral cavity and directed to the level of the glottis, examining the structures of the oropharynx, base of tongue, vallecula, supraglottic larynx, glottic larynx, and hypopharynx.      FINDINGS:  Mucosal surfaces:   The mucosal surfaces demonstrated normal mucosa surfaces with mild inflammation    Base of tongue:  The base of tongue was found to have no mass or lesion.    Epiglottis:  The epiglottis was found to have no mass or lesion.    Aryepligottic fold:  The AE folds were found to have no mass or lesion.    False Vocal Fold:  The false cords were found to have no mass or lesion.    True Vocal Cord:  The true vocal cords were found to have no mass or lesion.  Both true vocal folds abducted and abducted normally    Arytenoid:   The arytenoids were found to have no mass or lesion.    Hypopharynx:  The hypopharynx was found to have no mass or lesion.    The patient tolerated procedure well.

## 2018-09-19 ENCOUNTER — TELEPHONE (OUTPATIENT)
Dept: UROLOGY | Facility: CLINIC | Age: 63
End: 2018-09-19

## 2018-09-19 NOTE — TELEPHONE ENCOUNTER
Called pt wife back and let her know that she would need to speak with dr simon that prescribed the pt the meds.

## 2018-09-19 NOTE — TELEPHONE ENCOUNTER
Mrs. Alcantara called because her  Saul is frequently going to the bathroom at night. Dr. Bazan prescribed him tamsulofin 0.4mg which she said helped him. He was referred to Dr. Stewart and he started him on meloxicam 15mg and L-arginine 50mg. She said that he is going to the bathroom even more now and she worries the medicine is counteracting with what Hortensia prescribed.

## 2019-03-25 NOTE — PROGRESS NOTES
YOB: 1955  Location: [facility]  Location Address: [address] [place] [address], [facility], [address] [place] [address]  Location Phone: [phone]    Chief Complaint   Patient presents with   • Follow-up     SCC tongue       History of Present Illness  Saul Alcantara Sr. is a 61 y.o. male who presents for follow-up evaluation. Patient had DL and biopsy of base of tongue on 9/10/14 with positive pathology for SCCa. The patient has a history of radiation therapy due to a base of tongue cancer on the left side that he finished in . Patient has nasal congestion, nasal drainage, postnasal drainage burning of tongue, cough, dysphagia, hoarseness, frontal, orbital and maxillary sinus pressure.   He has stopped the use of the nasal sprays we provided due to nasal dryness. He is interested in possible allergy testing and immunotherapy. Patient recently has had PET for inguinal lymphadenopathy. He has had FNA of the lymph nodes performed.    PET scan was reviewed but has no report attached                         Recent PET scan was normal.                                              Study Result      EXAMINATION: FL VIDEO SWALLOW W SPEECH- 3/7/2018 9:35 AM CST     HISTORY: Dysphagia; R13.10-Dysphagia, unspecified, radiation therapy.     REPORT:  The speech pathologist was present and supervised the  administration of multiple consistencies of barium to the patient  orally, during intermittent lateral fluoroscopy and digital video  capture. The patient is able to swallow all attempted consistencies  without evidence of aspiration, there is deep penetration with thin  barium with and without a chin tuck.     IMPRESSION:  No aspiration seen. There is deep penetration with thin  barium.. Please review the speech pathologist's report for more  information.     This report was finalized on 2018 09:36 by Dr. Kenyon Paige MD.      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  History of marginal zone lymphoma the spleen in 2004.  Relapse of lymphoma in 2012 treated to remission.  COMPARISON:  6/7/2017  TECHNIQUE: Positron emission tomography from the vertex to the upper thighs with unenhanced CT for attenuation correction and image fusion.  Pre-test glucose: 103 mg/dL.  F-18 FDG Dose: 11.8 mCi. Uptake duration: 61 minutes. DLP: 278. Reference liver average SUV: 2.3. Reported image numbers refer to the axial series.  SUV units in lean body mass.  PET-CT FINDINGS:  Head and Neck: No suspicious metabolic activity demonstrated.  Chest:  No suspicious metabolic activity.  Abdomen and Pelvis:  A single left inguinal node has enlarged measuring 11 mm with SUV max 3.8, image 284.  Skeletal Structures: No suspicious skeletal uptake.  FDG uptake in the soft tissues adjacent to the right greater trochanter is likely due to trochanteric bursitis.  ADDITIONAL CT FINDINGS:  Enlarged prostate gland.  Status post splenectomy.  Minimal calcified plaque in the aorta and pelvic arteries.  No aneurysm.  IMPRESSION:  1.  Interval enlargement and increased metabolic activity in a single left inguinal node.  This may be reactive and follow-up with physical exam, ultrasound or CT is recommended.     Electronically Signed By: Dr. Brendan Mcgee        Electronically Signed On: 6/7/2018 11:02:32 AM                             Past Medical History:   Diagnosis Date   • Allergic rhinitis    • Chronic rhinitis    • Chronic sinusitis    • Depression    • Deviated nasal septum    • GERD (gastroesophageal reflux disease)    • H/O head and neck radiation 3/3/2017   • Hypertension    •  Hypothyroidism    • Laryngopharyngeal reflux    • Lymphoma (CMS/HCC)     Non-Hodgkins   • 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    T1N0M0 SCC S/P XRT/Chemo 12/2014   • 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    T1N0M0 SCC S/P XRT/Chemo 12/2014   • Sicca syndrome (CMS/HCC)    • Sinusitis, acute    • Squamous cell carcinoma     Base of Tongue   • Tobacco use disorder    • Tongue irritation    • Xerostomia        Past Surgical History:   Procedure Laterality Date   • CATARACT EXTRACTION     • HAND SURGERY     • KNEE SURGERY     • OTHER SURGICAL HISTORY  09/10/2014    Microlaryngoscopy with Biopsy - Base of Tongue   • SQUAMOUS CELL CARCINOMA EXCISION  09/10/2014    Tongue   • TONSILLECTOMY         Outpatient Medications Marked as Taking for the 3/26/19 encounter (Office Visit) with Wayne Richmond MD   Medication Sig Dispense Refill   • ALPRAZolam (XANAX) 2 MG tablet      • amLODIPine (NORVASC) 10 MG tablet Take 10 mg by mouth Daily.     • cyclobenzaprine (FLEXERIL) 10 MG tablet TK 1 T PO TID as needed     • fluticasone (FLONASE) 50 MCG/ACT nasal spray 2 sprays into each nostril Daily. 16 g 5   • glycopyrrolate (ROBINUL) 1 MG tablet TK 1 T PO  TID  3   • HYDROcodone-acetaminophen (NORCO) 7.5-325 MG per tablet TK 1 T PO QD PRN P  0   • levothyroxine (SYNTHROID, LEVOTHROID) 100 MCG tablet      • meloxicam (MOBIC) 15 MG tablet Take 15 mg by mouth Daily.     • nystatin (MYCOSTATIN) 293475 UNIT/ML suspension Take 10ml by mouth BID for 28 days 480 mL 2   • ondansetron (ZOFRAN) 8 MG tablet every 8 (eight) hours.     • pantoprazole (PROTONIX) 40 MG EC tablet Delayed Release (DR/EC)  Take 1 tablet (40 mg) by oral route 2 times per day,  Take 30 minutes prior to breakfast and evening meal     • senna-docusate (PERICOLACE) 8.6-50 MG per tablet Take 1 tablet by mouth 2 times daily     • simvastatin (ZOCOR) 20 MG tablet TK 1 T PO QD  11   • tamsulosin (FLOMAX) 0.4 MG capsule 24 hr  capsule Take 0.4 mg by mouth Daily.     • triamcinolone (KENALOG) 0.1 % paste Apply to tongue 1-2 times per day as needed for inflammation/pain 5 g 3   • zolpidem (AMBIEN) 10 MG tablet Take 10 mg by mouth.         Morphine    Family History   Problem Relation Age of Onset   • Diabetes Sister    • Cancer Sister        Social History     Socioeconomic History   • Marital status:      Spouse name: Not on file   • Number of children: Not on file   • Years of education: Not on file   • Highest education level: Not on file   Tobacco Use   • Smoking status: Former Smoker     Types: Cigarettes   • Smokeless tobacco: Never Used   Substance and Sexual Activity   • Alcohol use: No   • Drug use: No       Review of Systems   Constitutional: Negative.    HENT: Positive for congestion, postnasal drip, rhinorrhea and sinus pressure.         Dry mouth, burning tongue   Eyes: Negative.    Respiratory: Positive for cough.    Cardiovascular: Negative.    Gastrointestinal: Negative.    Endocrine: Negative.    Genitourinary: Negative.    Musculoskeletal: Negative.    Skin: Negative.    Allergic/Immunologic: Negative.    Neurological: Negative.    Hematological: Negative.    Psychiatric/Behavioral: Negative.        Vitals:    03/26/19 1318   BP: 128/80   Temp: 98.3 °F (36.8 °C)       Body mass index is 22.69 kg/m².    Objective     Physical Exam  CONSTITUTIONAL: well nourished, alert, oriented, in no acute distress     COMMUNICATION AND VOICE: able to communicate normally, normal voice quality    HEAD: normocephalic, no lesions, atraumatic, no tenderness, no masses     FACE: appearance normal, no lesions, no tenderness, no deformities, facial motion symmetric    SALIVARY GLANDS: parotid glands with no tenderness, no swelling, no masses, submandibular glands with normal size, nontender    EYES: ocular motility normal, eyelids normal, orbits normal, no proptosis, conjunctiva normal , pupils equal, round     EARS:  Hearing: response  to conversational voice normal bilaterally   External Ears: auricles without lesions  Otoscopic: tympanic membrane appearance normal, no lesions, no perforation, normal mobility, no fluid    NOSE:  External Nose: structure normal, no tenderness on palpation, no nasal discharge, no lesions, no evidence of trauma, nostrils patent   Intranasal Exam: nasal mucosa mild inflammation, vestibule within normal limits, inferior turbinate moderate hypertrophy, nasal septum midline   Nasopharynx:     ORAL:  Lips: upper and lower lips without lesion   Teeth: Edentulous  Gums: gingivae healthy   Oral Mucosa: oral mucosa normal, no mucosal lesions   Floor of Mouth: Warthin’s duct patent, mucosa normal  Tongue: lingual mucosa normal without lesions, normal tongue mobility by bimanual palpation  Palate: soft and hard palates with normal mucosa and structure  Oropharynx: oropharyngeal mucosa normal    HYPOPHARYNX:   LARYNX: epiglottis and arytenoid cartilage within normal limits, vocal cord mucosa normal with normal mobility -no masses or lesions appreciated an interarytenoid bar associated with minimal erythema was noted    NECK: neck appearance normal, no mass,  noted without erythema or tenderness by palpation    THYROID: no overt thyromegaly, no tenderness, nodules or mass present on palpation, position midline     LYMPH NODES: no lymphadenopathy    CHEST/RESPIRATORY: respiratory effort normal, normal breath sounds     CARDIOVASCULAR: rate and rhythm normal, extremities without cyanosis or edema      NEUROLOGIC/PSYCHIATRIC: oriented to time, place and person, mood normal, affect appropriate, CN II-XII intact grossly    Assessment/Plan   Saul was seen today for follow-up.    Diagnoses and all orders for this visit:    Chronic maxillary sinusitis    Primary squamous cell carcinoma of tongue (CMS/HCC)    Xerostomia    Other orders  -     azelastine (ASTELIN) 0.1 % nasal spray; 2 sprays into the nostril(s) as directed by provider 2  (Two) Times a Day for 30 days. Use in each nostril as directed  -     fluticasone (FLONASE) 50 MCG/ACT nasal spray; 2 sprays into the nostril(s) as directed by provider Daily for 30 days. Administer 2 sprays in each nostril for each dose.      * Surgery not found *  No orders of the defined types were placed in this encounter.    Return in about 3 months (around 6/26/2019).       Patient Instructions   No evidence of disease  Call or return for problems  Intranasal steroid sprays  Increase nutrition terms of supplementation  Avoid dairy    Follow-up in 3 months  Consider septoplasty and conscious resection if no improvement in sinus symptoms are noted

## 2019-03-26 ENCOUNTER — OFFICE VISIT (OUTPATIENT)
Dept: OTOLARYNGOLOGY | Facility: CLINIC | Age: 64
End: 2019-03-26

## 2019-03-26 VITALS
WEIGHT: 172 LBS | BODY MASS INDEX: 22.8 KG/M2 | DIASTOLIC BLOOD PRESSURE: 80 MMHG | SYSTOLIC BLOOD PRESSURE: 128 MMHG | TEMPERATURE: 98.3 F | HEIGHT: 73 IN

## 2019-03-26 DIAGNOSIS — K11.7 XEROSTOMIA: ICD-10-CM

## 2019-03-26 DIAGNOSIS — J32.0 CHRONIC MAXILLARY SINUSITIS: Primary | ICD-10-CM

## 2019-03-26 DIAGNOSIS — C02.9 PRIMARY SQUAMOUS CELL CARCINOMA OF TONGUE (HCC): ICD-10-CM

## 2019-03-26 PROCEDURE — 99213 OFFICE O/P EST LOW 20 MIN: CPT | Performed by: OTOLARYNGOLOGY

## 2019-03-26 RX ORDER — AZELASTINE 1 MG/ML
2 SPRAY, METERED NASAL 2 TIMES DAILY
Qty: 30 ML | Refills: 6 | Status: SHIPPED | OUTPATIENT
Start: 2019-03-26 | End: 2019-04-25

## 2019-03-26 RX ORDER — TAMSULOSIN HYDROCHLORIDE 0.4 MG/1
0.4 CAPSULE ORAL DAILY
COMMUNITY

## 2019-03-26 RX ORDER — CYCLOBENZAPRINE HCL 10 MG
TABLET ORAL
COMMUNITY
Start: 2018-02-07 | End: 2021-01-20

## 2019-03-26 RX ORDER — ZOLPIDEM TARTRATE 10 MG/1
10 TABLET ORAL NIGHTLY PRN
Status: ON HOLD | COMMUNITY
End: 2022-06-24

## 2019-03-26 RX ORDER — MELOXICAM 15 MG/1
15 TABLET ORAL DAILY
COMMUNITY

## 2019-03-26 RX ORDER — FLUTICASONE PROPIONATE 50 MCG
2 SPRAY, SUSPENSION (ML) NASAL DAILY
Qty: 1 BOTTLE | Refills: 6 | Status: SHIPPED | OUTPATIENT
Start: 2019-03-26 | End: 2021-10-08

## 2019-03-26 RX ORDER — AMLODIPINE BESYLATE 10 MG/1
10 TABLET ORAL DAILY
COMMUNITY

## 2019-03-26 NOTE — PATIENT INSTRUCTIONS
No evidence of disease  Call or return for problems  Intranasal steroid sprays  Increase nutrition terms of supplementation  Avoid dairy    Follow-up in 3 months  Consider septoplasty and conscious resection if no improvement in sinus symptoms are noted

## 2019-04-17 ENCOUNTER — TELEPHONE (OUTPATIENT)
Dept: OTOLARYNGOLOGY | Facility: CLINIC | Age: 64
End: 2019-04-17

## 2019-04-17 NOTE — TELEPHONE ENCOUNTER
Patient calls and has salty taste in mouth. I have told patient azelastine can cause bitter or salty taste. He is having a lot of postnasal drainage. He will see if he can tolerate medication.

## 2019-05-02 ENCOUNTER — TELEPHONE (OUTPATIENT)
Dept: OTOLARYNGOLOGY | Facility: CLINIC | Age: 64
End: 2019-05-02

## 2019-05-02 DIAGNOSIS — R43.2 DYSGEUSIA: Primary | ICD-10-CM

## 2019-05-02 NOTE — TELEPHONE ENCOUNTER
Patient has called and continues to have salty taste in mouth. He has been off of fluticasone for 2 weeks. I have spoken with Dr. Richmond and we will proceed with MRI brain.

## 2019-05-04 ENCOUNTER — NURSE TRIAGE (OUTPATIENT)
Dept: CALL CENTER | Facility: HOSPITAL | Age: 64
End: 2019-05-04

## 2019-05-05 NOTE — TELEPHONE ENCOUNTER
"    Reason for Disposition  • Caller has URGENT medication question about med that PCP prescribed and triager unable to answer question    Additional Information  • Negative: Drug overdose and nurse unable to answer question  • Negative: Caller requesting information not related to medicine  • Negative: Caller requesting a prescription for Strep throat and has a positive culture result  • Negative: Rash while taking a medication or within 3 days of stopping it  • Negative: Immunization reaction suspected  • Negative: [1] Asthma and [2] having symptoms of asthma (cough, wheezing, etc)  • Negative: MORE THAN A DOUBLE DOSE of a prescription or over-the-counter (OTC) drug  • Negative: [1] DOUBLE DOSE (an extra dose or lesser amount) of over-the-counter (OTC) drug AND [2] any symptoms (e.g., dizziness, nausea, pain, sleepiness)  • Negative: [1] DOUBLE DOSE (an extra dose or lesser amount) of prescription drug AND [2] any symptoms (e.g., dizziness, nausea, pain, sleepiness)  • Negative: Took another person's prescription drug  • Negative: [1] DOUBLE DOSE (an extra dose or lesser amount) of prescription drug AND [2] NO symptoms (Exception: a double dose of antibiotics)  • Negative: Diabetes drug error or overdose (e.g., insulin or extra dose)  • Negative: [1] Request for URGENT new prescription or refill of \"essential\" medication (i.e., likelihood of harm to patient if not taken) AND [2] triager unable to fill per unit policy  • Negative: [1] Prescription not at pharmacy AND [2] was prescribed today by PCP  • Negative: Pharmacy calling with prescription questions and triager unable to answer question    Answer Assessment - Initial Assessment Questions  1. SYMPTOMS: \"Do you have any symptoms?\"      No   2. SEVERITY: If symptoms are present, ask \"Are they mild, moderate or severe?\"      His xanax script is due to be filled today. She had called the office and it was sent to the pharmacy and was put on hold until 5/13. He is " out of this and the pharmacy told them it is due to be filled today. They need MD or on call provider to call and release. Call to Dr. Stubbs who will take care of this. Patient was notified that Dr. Stubbs will take care of this.    Protocols used: MEDICATION QUESTION CALL-ADULT-

## 2019-05-16 ENCOUNTER — HOSPITAL ENCOUNTER (OUTPATIENT)
Dept: MRI IMAGING | Facility: HOSPITAL | Age: 64
Discharge: HOME | End: 2019-05-16
Admitting: OTOLARYNGOLOGY

## 2019-05-16 DIAGNOSIS — R43.2 DYSGEUSIA: ICD-10-CM

## 2019-05-16 LAB — CREAT BLDA-MCNC: 1.3 MG/DL (ref 0.6–1.3)

## 2019-05-16 PROCEDURE — A9577 INJ MULTIHANCE: HCPCS | Performed by: OTOLARYNGOLOGY

## 2019-05-16 PROCEDURE — 0 GADOBENATE DIMEGLUMINE 529 MG/ML SOLUTION: Performed by: OTOLARYNGOLOGY

## 2019-05-16 PROCEDURE — 82565 ASSAY OF CREATININE: CPT

## 2019-05-16 PROCEDURE — 70553 MRI BRAIN STEM W/O & W/DYE: CPT

## 2019-05-16 RX ADMIN — GADOBENATE DIMEGLUMINE 20 ML: 529 INJECTION, SOLUTION INTRAVENOUS at 12:02

## 2019-05-20 NOTE — PROGRESS NOTES
YOB: 1955  Location: [facility]  Location Address: [address] [place] [address], [facility], [address] [place] [address]  Location Phone: [phone]    Chief Complaint   Patient presents with   • Results     MRI       History of Present Illness   Saul Alcantara Sr. is a 61 y.o. male who presents for follow-up evaluation. Patient had DL and biopsy of base of tongue on 9/10/14 with positive pathology for SCCa. The patient has a history of radiation therapy due to a base of tongue cancer on the left side that he finished in . Patient has nasal congestion, nasal drainage, postnasal drainage burning of tongue, cough, dysphagia, hoarseness, frontal, orbital and maxillary sinus pressure.  Patient continues to have a salty taste, MRI was normal. Nothing seems to make this better or worse.                          Recent PET scan was normal.                                              Study Result      EXAMINATION: FL VIDEO SWALLOW W SPEECH- 3/7/2018 9:35 AM CST     HISTORY: Dysphagia; R13.10-Dysphagia, unspecified, radiation therapy.     REPORT:  The speech pathologist was present and supervised the  administration of multiple consistencies of barium to the patient  orally, during intermittent lateral fluoroscopy and digital video  capture. The patient is able to swallow all attempted consistencies  without evidence of aspiration, there is deep penetration with thin  barium with and without a chin tuck.     IMPRESSION:  No aspiration seen. There is deep penetration with thin  barium.. Please review the speech pathologist's report for more  information.     This report was finalized on 2018 09:36 by Dr. Kenyon Paige MD.      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  History of marginal zone lymphoma the spleen in 2004.  Relapse of lymphoma in 2012 treated to remission.  COMPARISON:  6/7/2017  TECHNIQUE: Positron emission tomography from the vertex to the upper thighs with unenhanced CT for attenuation correction and image fusion.  Pre-test glucose: 103 mg/dL.  F-18 FDG Dose: 11.8 mCi. Uptake duration: 61 minutes. DLP: 278. Reference liver average SUV: 2.3. Reported image numbers refer to the axial series.  SUV units in lean body mass.  PET-CT FINDINGS:  Head and Neck: No suspicious metabolic activity demonstrated.  Chest:  No suspicious metabolic activity.  Abdomen and Pelvis:  A single left inguinal node has enlarged measuring 11 mm with SUV max 3.8, image 284.  Skeletal Structures: No suspicious skeletal uptake.  FDG uptake in the soft tissues adjacent to the right greater trochanter is likely due to trochanteric bursitis.  ADDITIONAL CT FINDINGS:  Enlarged prostate gland.  Status post splenectomy.  Minimal calcified plaque in the aorta and pelvic arteries.  No aneurysm.  IMPRESSION:  1.  Interval enlargement and increased metabolic activity in a single left inguinal node.  This may be reactive and follow-up with physical exam, ultrasound or CT is recommended.     Electronically Signed By: Dr. Brendan Mcgee        Electronically Signed On: 6/7/2018 11:02:32 AM      Study Result     EXAM: MR BRAIN WITHOUT AND WITH IV CONTRAST 05/16/2019     COMPARISON: Change of taste.      HISTORY: 63 years-old Male. change of taste; R43.2-Parageusia     TECHNIQUE:   Routine pulse sequences were obtained of the brain before and after the  administration of IV contrast.      REPORT:   Mild generalized cerebral volume loss. Mild chronic microvascular  changes. There is no evidence of mass-effect or midline shift. No  reduced diffusivity is demonstrated. No  abnormally enhancing lesions are  identified.The brainstem, sella, pituitary, cerebellum are unremarkable.  The basal cisterns are preserved.      No acute osseous lesion. Intraorbital structures demonstrate prior  cataract surgery.     The flow voids are preserved. Dural sinuses are patent.       The visualized portions of the paranasal sinuses and mastoid air cells  are unremarkable.      IMPRESSION:  1.  No acute intracranial abnormalities.  2.  Mild chronic microvascular changes.  This report was finalized on 05/16/2019 15:09 by Dr. Zoey Velazquez MD.         Past Medical History:   Diagnosis Date   • Allergic rhinitis    • Chronic rhinitis    • Chronic sinusitis    • Depression    • Deviated nasal septum    • GERD (gastroesophageal reflux disease)    • H/O head and neck radiation 3/3/2017   • Hypertension    • Hypothyroidism    • Laryngopharyngeal reflux    • Lymphoma (CMS/HCC)     Non-Hodgkins   • 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    T1N0M0 SCC S/P XRT/Chemo 12/2014   • 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    T1N0M0 SCC S/P XRT/Chemo 12/2014   • Sicca syndrome (CMS/HCC)    • Sinusitis, acute    • Squamous cell carcinoma     Base of Tongue   • Tobacco use disorder    • Tongue irritation    • Xerostomia        Past Surgical History:   Procedure Laterality Date   • CATARACT EXTRACTION     • HAND SURGERY     • KNEE SURGERY     • OTHER SURGICAL HISTORY  09/10/2014    Microlaryngoscopy with Biopsy - Base of Tongue   • SQUAMOUS CELL CARCINOMA EXCISION  09/10/2014    Tongue   • TONSILLECTOMY         Outpatient Medications Marked as Taking for the 5/21/19 encounter (Office Visit) with Wayne Richmond MD   Medication Sig Dispense Refill   • ALPRAZolam (XANAX) 2 MG tablet      • amLODIPine (NORVASC) 10 MG tablet Take 10 mg by mouth Daily.     • cyclobenzaprine (FLEXERIL) 10 MG tablet TK 1 T PO TID as needed     • fluticasone (FLONASE) 50 MCG/ACT nasal spray 2  sprays into each nostril Daily. 16 g 5   • glycopyrrolate (ROBINUL) 1 MG tablet TK 1 T PO  TID  3   • HYDROcodone-acetaminophen (NORCO) 7.5-325 MG per tablet TK 1 T PO QD PRN P  0   • levothyroxine (SYNTHROID, LEVOTHROID) 100 MCG tablet      • meloxicam (MOBIC) 15 MG tablet Take 15 mg by mouth Daily.     • nystatin (MYCOSTATIN) 051595 UNIT/ML suspension Take 10ml by mouth BID for 28 days 480 mL 2   • ondansetron (ZOFRAN) 8 MG tablet every 8 (eight) hours.     • pantoprazole (PROTONIX) 40 MG EC tablet Delayed Release (DR/EC)  Take 1 tablet (40 mg) by oral route 2 times per day,  Take 30 minutes prior to breakfast and evening meal     • senna-docusate (PERICOLACE) 8.6-50 MG per tablet Take 1 tablet by mouth 2 times daily     • simvastatin (ZOCOR) 20 MG tablet TK 1 T PO QD  11   • tamsulosin (FLOMAX) 0.4 MG capsule 24 hr capsule Take 0.4 mg by mouth Daily.     • triamcinolone (KENALOG) 0.1 % paste Apply to tongue 1-2 times per day as needed for inflammation/pain 5 g 3   • Vortioxetine HBr (TRINTELLIX PO) Take  by mouth.     • zolpidem (AMBIEN) 10 MG tablet Take 10 mg by mouth.         Morphine    Family History   Problem Relation Age of Onset   • Diabetes Sister    • Cancer Sister        Social History     Socioeconomic History   • Marital status:      Spouse name: Not on file   • Number of children: Not on file   • Years of education: Not on file   • Highest education level: Not on file   Tobacco Use   • Smoking status: Former Smoker     Types: Cigarettes   • Smokeless tobacco: Never Used   Substance and Sexual Activity   • Alcohol use: No   • Drug use: No       Review of Systems   Constitutional: Negative for activity change, appetite change, chills, diaphoresis, fatigue, fever and unexpected weight change.   HENT: Negative for congestion, ear discharge, ear pain, facial swelling, hearing loss, mouth sores, nosebleeds, postnasal drip, rhinorrhea, sinus pressure, sneezing, sore throat, tinnitus, trouble  swallowing and voice change.         Abnormal taste  History of SCC of tongue   Eyes: Negative for pain, discharge, redness, itching and visual disturbance.   Respiratory: Negative for apnea, cough, choking, chest tightness, shortness of breath, wheezing and stridor.    Gastrointestinal: Negative for nausea and vomiting.   Endocrine: Negative for cold intolerance and heat intolerance.   Musculoskeletal: Negative for arthralgias, back pain, gait problem, neck pain and neck stiffness.   Skin: Negative for rash.   Allergic/Immunologic: Negative for environmental allergies and food allergies.   Neurological: Negative for dizziness, tremors, seizures, syncope, facial asymmetry, speech difficulty, weakness, light-headedness, numbness and headaches.   Hematological: Negative for adenopathy. Does not bruise/bleed easily.   Psychiatric/Behavioral: Negative for behavioral problems, sleep disturbance and suicidal ideas. The patient is not nervous/anxious and is not hyperactive.        Vitals:    05/21/19 1409   BP: 115/70   Pulse: 87   Temp: 99 °F (37.2 °C)   SpO2: 98%       Body mass index is 22.75 kg/m².    Objective     Physical Exam  CONSTITUTIONAL: well nourished, alert, oriented, in no acute distress     COMMUNICATION AND VOICE: able to communicate normally, normal voice quality    HEAD: normocephalic, no lesions, atraumatic, no tenderness, no masses     FACE: appearance normal, no lesions, no tenderness, no deformities, facial motion symmetric    SALIVARY GLANDS: parotid glands with no tenderness, no swelling, no masses, submandibular glands with normal size, nontender    EYES: ocular motility normal, eyelids normal, orbits normal, no proptosis, conjunctiva normal , pupils equal, round     EARS:  Hearing: response to conversational voice normal bilaterally   External Ears: auricles without lesions  Otoscopic: tympanic membrane appearance normal, no lesions, no perforation, normal mobility, no fluid    NOSE:  External  Nose: structure normal, no tenderness on palpation, no nasal discharge, no lesions, no evidence of trauma, nostrils patent   Intranasal Exam: nasal mucosa erythema and edema, vestibule within normal limits, inferior turbinate normal, nasal septum midline   Nasopharynx:     ORAL:  Lips: upper and lower lips without lesion   Teeth: dentition within normal limits for age   Gums: gingivae healthy   Oral Mucosa: oral mucosa normal, no mucosal lesions   Floor of Mouth: Warthin’s duct patent, mucosa normal  Tongue: lingual mucosa with erythema and dryness, normal tongue mobility   Palate: soft and hard palates with normal mucosa and structure  Oropharynx: oropharyngeal mucosa normal    NECK: neck appearance normal, no mass,  noted without erythema or tenderness    THYROID: no overt thyromegaly, no tenderness, nodules or mass present on palpation, position midline     LYMPH NODES: no lymphadenopathy    CHEST/RESPIRATORY: respiratory effort normal, normal breath sounds     CARDIOVASCULAR: rate and rhythm normal, extremities without cyanosis or edema      NEUROLOGIC/PSYCHIATRIC: oriented to time, place and person, mood normal, affect appropriate, CN II-XII intact grossly    Assessment/Plan   Saul was seen today for results.    Diagnoses and all orders for this visit:    Chronic maxillary sinusitis    Laryngopharyngeal reflux    Abnormal thyroid stimulating hormone level    H/O head and neck radiation    Primary squamous cell carcinoma of tongue (CMS/HCC)    Sicca syndrome (CMS/HCC)    Tongue irritation    Xerostomia    Other orders  -     olopatadine (PATANASE) 0.6 % solution nasal solution; 2 sprays by Each Nare route 2 (Two) Times a Day.      * Surgery not found *  No orders of the defined types were placed in this encounter.    Return in about 6 months (around 11/21/2019) for Recheck tongue.       Patient Instructions   Will recheck in 6 months. MRI was normal today. Feel that salty taste is a combination of tongue  irritation, postnasal drainage, and hx of radiation. Overall nothing to be concerned about.     Will try patanase and advised to stop scrubbing tongue to see if that helps.

## 2019-05-21 ENCOUNTER — OFFICE VISIT (OUTPATIENT)
Dept: OTOLARYNGOLOGY | Facility: CLINIC | Age: 64
End: 2019-05-21

## 2019-05-21 VITALS
TEMPERATURE: 99 F | HEIGHT: 73 IN | HEART RATE: 87 BPM | WEIGHT: 172.4 LBS | BODY MASS INDEX: 22.85 KG/M2 | DIASTOLIC BLOOD PRESSURE: 70 MMHG | OXYGEN SATURATION: 98 % | SYSTOLIC BLOOD PRESSURE: 115 MMHG

## 2019-05-21 DIAGNOSIS — J32.0 CHRONIC MAXILLARY SINUSITIS: Primary | ICD-10-CM

## 2019-05-21 DIAGNOSIS — K14.9 TONGUE IRRITATION: ICD-10-CM

## 2019-05-21 DIAGNOSIS — R79.89 ABNORMAL THYROID STIMULATING HORMONE LEVEL: ICD-10-CM

## 2019-05-21 DIAGNOSIS — K21.9 LARYNGOPHARYNGEAL REFLUX: ICD-10-CM

## 2019-05-21 DIAGNOSIS — C02.9 PRIMARY SQUAMOUS CELL CARCINOMA OF TONGUE (HCC): ICD-10-CM

## 2019-05-21 DIAGNOSIS — Z92.3 H/O HEAD AND NECK RADIATION: ICD-10-CM

## 2019-05-21 DIAGNOSIS — M35.00 SICCA SYNDROME (HCC): ICD-10-CM

## 2019-05-21 DIAGNOSIS — K11.7 XEROSTOMIA: ICD-10-CM

## 2019-05-21 PROCEDURE — 99214 OFFICE O/P EST MOD 30 MIN: CPT | Performed by: PHYSICIAN ASSISTANT

## 2019-05-21 RX ORDER — OLOPATADINE HYDROCHLORIDE 665 UG/1
2 SPRAY NASAL 2 TIMES DAILY
Qty: 1 BOTTLE | Refills: 6 | Status: SHIPPED | OUTPATIENT
Start: 2019-05-21 | End: 2019-11-13

## 2019-05-21 NOTE — PATIENT INSTRUCTIONS
Will recheck in 6 months. MRI was normal today. Feel that salty taste is a combination of tongue irritation, postnasal drainage, and hx of radiation. Overall nothing to be concerned about.     Will try patanase and advised to stop scrubbing tongue to see if that helps.

## 2019-07-11 ENCOUNTER — TELEPHONE (OUTPATIENT)
Dept: OTOLARYNGOLOGY | Facility: CLINIC | Age: 64
End: 2019-07-11

## 2019-07-11 DIAGNOSIS — C02.9 PRIMARY SQUAMOUS CELL CARCINOMA OF TONGUE (HCC): Primary | ICD-10-CM

## 2019-07-11 DIAGNOSIS — R49.0 HOARSENESS: ICD-10-CM

## 2019-07-11 DIAGNOSIS — J02.9 SORE THROAT: ICD-10-CM

## 2019-07-11 DIAGNOSIS — K14.9 TONGUE IRRITATION: ICD-10-CM

## 2019-07-11 NOTE — TELEPHONE ENCOUNTER
Patient calls with increasing symptoms of sore throat. I have spoken with Dr. Richmond. We will order CT soft tissue neck since he has not had one since 2014 to make sure no reoccurrence of disease.

## 2019-07-23 NOTE — PROGRESS NOTES
Mr. Alcantara is 64 y.o. male    CHIEF COMPLAINT: I am here for my yearly follow up for BPH and ED.    HPI  Here to f/u three chronic urologic issues that can be defined as:    benign hyperplasia of prostate gland. First diagnosed about 3 years. His prostate symptom score is 14 indicating moderate severity. Quality of life assessment is rated as 4=mostly dissatisfied. He is most bothered by Frequency, Nocturia and Straining on urination but is without hematuria or dysuria.      Erectile dysfunction is also problematic. Cialis helps him.      Lastly he has Peyronie's disease. He saw constantino Cruz Considering surgery but at this point doesn't want to have surgery.     The following portions of the patient's history were reviewed and updated as appropriate: allergies, current medications, past family history, past medical history, past social history, past surgical history and problem list.      Review of Systems   Constitutional: Negative for chills and fever.   Gastrointestinal: Negative for abdominal pain, anal bleeding and blood in stool.   Genitourinary: Negative for dysuria and hematuria.         Current Outpatient Medications:   •  ALPRAZolam (XANAX) 2 MG tablet, , Disp: , Rfl:   •  amLODIPine (NORVASC) 10 MG tablet, Take 10 mg by mouth Daily., Disp: , Rfl:   •  cyclobenzaprine (FLEXERIL) 10 MG tablet, TK 1 T PO TID as needed, Disp: , Rfl:   •  fluticasone (FLONASE) 50 MCG/ACT nasal spray, 2 sprays into each nostril Daily., Disp: 16 g, Rfl: 5  •  glycopyrrolate (ROBINUL) 1 MG tablet, TK 1 T PO  TID, Disp: , Rfl: 3  •  HYDROcodone-acetaminophen (NORCO) 7.5-325 MG per tablet, TK 1 T PO QD PRN P, Disp: , Rfl: 0  •  levothyroxine (SYNTHROID, LEVOTHROID) 100 MCG tablet, , Disp: , Rfl:   •  meloxicam (MOBIC) 15 MG tablet, Take 15 mg by mouth Daily., Disp: , Rfl:   •  nystatin (MYCOSTATIN) 817227 UNIT/ML suspension, Take 10ml by mouth BID for 28 days, Disp: 480 mL, Rfl: 2  •  olopatadine (PATANASE) 0.6 % solution nasal  solution, 2 sprays by Each Nare route 2 (Two) Times a Day., Disp: 1 bottle, Rfl: 6  •  ondansetron (ZOFRAN) 8 MG tablet, every 8 (eight) hours., Disp: , Rfl:   •  pantoprazole (PROTONIX) 40 MG EC tablet, Delayed Release (DR/EC) Take 1 tablet (40 mg) by oral route 2 times per day, Take 30 minutes prior to breakfast and evening meal, Disp: , Rfl:   •  senna-docusate (PERICOLACE) 8.6-50 MG per tablet, Take 1 tablet by mouth 2 times daily, Disp: , Rfl:   •  simvastatin (ZOCOR) 20 MG tablet, TK 1 T PO QD, Disp: , Rfl: 11  •  tamsulosin (FLOMAX) 0.4 MG capsule 24 hr capsule, Take 0.4 mg by mouth Daily., Disp: , Rfl:   •  triamcinolone (KENALOG) 0.1 % paste, Apply to tongue 1-2 times per day as needed for inflammation/pain, Disp: 5 g, Rfl: 3  •  Vortioxetine HBr (TRINTELLIX PO), Take  by mouth., Disp: , Rfl:   •  zolpidem (AMBIEN) 10 MG tablet, Take 10 mg by mouth., Disp: , Rfl:     Past Medical History:   Diagnosis Date   • Allergic rhinitis    • Chronic rhinitis    • Chronic sinusitis    • Depression    • Deviated nasal septum    • GERD (gastroesophageal reflux disease)    • H/O head and neck radiation 3/3/2017   • Hypertension    • Hypothyroidism    • Laryngopharyngeal reflux    • Lymphoma (CMS/HCC)     Non-Hodgkins   • 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    T1N0M0 SCC S/P XRT/Chemo 12/2014   • 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    T1N0M0 SCC S/P XRT/Chemo 12/2014   • Sicca syndrome (CMS/HCC)    • Sinusitis, acute    • Squamous cell carcinoma     Base of Tongue   • Tobacco use disorder    • Tongue irritation    • Xerostomia        Past Surgical History:   Procedure Laterality Date   • CATARACT EXTRACTION     • HAND SURGERY     • KNEE SURGERY     • OTHER SURGICAL HISTORY  09/10/2014    Microlaryngoscopy with Biopsy - Base of Tongue   • SQUAMOUS CELL CARCINOMA EXCISION  09/10/2014    Tongue   • TONSILLECTOMY         Social History     Socioeconomic History   • Marital  "status:      Spouse name: Not on file   • Number of children: Not on file   • Years of education: Not on file   • Highest education level: Not on file   Tobacco Use   • Smoking status: Former Smoker     Types: Cigarettes   • Smokeless tobacco: Never Used   Substance and Sexual Activity   • Alcohol use: No   • Drug use: No   • Sexual activity: Defer       Family History   Problem Relation Age of Onset   • Diabetes Sister    • Cancer Sister          Temp 98 °F (36.7 °C)   Ht 185.4 cm (73\")   Wt 78 kg (172 lb)   BMI 22.69 kg/m²       Physical Exam  Neuro: Alert and oriented ×3  Const: Not agitated or distressed; Vital signs are reviewed. No obvious deformities  Pulmonary: No respiratory distress  Skin: Without pallor or diaphoresis  GI: Abdomen is soft and nontender.  No significant distention.  No hernias noted.  : Penis still with plaques. Prostate 40 mL JACQUELIN      Data  Results for orders placed or performed in visit on 07/24/19   POC Urinalysis Dipstick, Multipro   Result Value Ref Range    Color Yellow Yellow, Straw, Dark Yellow, Barbie    Clarity, UA Clear Clear    Glucose, UA Negative Negative, 1000 mg/dL (3+) mg/dL    Bilirubin Negative Negative    Ketones, UA Negative Negative    Specific Gravity  1.005 1.005 - 1.030    Blood, UA Negative Negative    pH, Urine 6.5 5.0 - 8.0    Protein, POC Negative Negative mg/dL    Urobilinogen, UA Normal Normal    Nitrite, UA Negative Negative    Leukocytes Negative Negative         Imaging Results (last 7 days)     ** No results found for the last 168 hours. **      Patient's Body mass index is 22.69 kg/m². BMI is within normal parameters. No follow-up required..      International Prostate Symptom Score  The following is posted based on patient questionnaire answers:  0 - not at all    1-7 mild symptoms  1- Less than one time in five  8-19 moderate symptoms  2 -Less than half the time  20-35 severe symptoms  3 - About half the time  4 - More than half the time  5 " - Almost always     For following sections:  Incomplete Emptying: - How often have you had the sensation  of not emptying your bladder completely after you finished urinating?  0  Frequency: -How often have you had to urinate again less than   two hours after you finished urinating?      5  Intermittency: -How often have you found you stopped and started again  Several times when you urinate?       2  Urgency: -How often do you find it difficult to postpone urination?             0  Weak stream: - How often have you had a weak urinary stream?             0  Straining: - How often have you had to push or strain to begin  Urination?          4  Sleeping: -How many times did you most typically get up to urinate   From the time you went to bed at night until the time you got up in the   3  Morning          Total `  14    Quality of Life  How would you feel if you had to live with your urinary condition the way   2  It is now, no better, no worse for the rest of your life?    Where: 0=delighted; 1= pleased, 2= mostly satisfied, 3= mixed, 4 = mostly  Dissatisfied, 5= Unhappy, 6 = terrible      Assessment and Plan  Diagnoses and all orders for this visit:    BPH with obstruction/lower urinary tract symptoms    Erectile dysfunction, unspecified erectile dysfunction type  -     POC Urinalysis Dipstick, Multipro    Peyronie disease      - Tamsulosin does help his LUTS.   - Tadalafil works when he can afford it.   - Peyronie's is stable. Talked about Vitamin E.     F/U: 1 year.     (Please note that portions of this note were completed with a voice recognition program.)  Oscar Bazan MD  07/24/19  10:18 AM

## 2019-07-24 ENCOUNTER — OFFICE VISIT (OUTPATIENT)
Dept: UROLOGY | Facility: CLINIC | Age: 64
End: 2019-07-24

## 2019-07-24 VITALS — BODY MASS INDEX: 22.8 KG/M2 | WEIGHT: 172 LBS | TEMPERATURE: 98 F | HEIGHT: 73 IN

## 2019-07-24 DIAGNOSIS — N40.1 BPH WITH OBSTRUCTION/LOWER URINARY TRACT SYMPTOMS: Primary | ICD-10-CM

## 2019-07-24 DIAGNOSIS — N52.9 ERECTILE DYSFUNCTION, UNSPECIFIED ERECTILE DYSFUNCTION TYPE: ICD-10-CM

## 2019-07-24 DIAGNOSIS — N48.6 PEYRONIE DISEASE: ICD-10-CM

## 2019-07-24 DIAGNOSIS — N13.8 BPH WITH OBSTRUCTION/LOWER URINARY TRACT SYMPTOMS: Primary | ICD-10-CM

## 2019-07-24 LAB
BILIRUB BLD-MCNC: NEGATIVE MG/DL
CLARITY, POC: CLEAR
COLOR UR: YELLOW
GLUCOSE UR STRIP-MCNC: NEGATIVE MG/DL
KETONES UR QL: NEGATIVE
LEUKOCYTE EST, POC: NEGATIVE
NITRITE UR-MCNC: NEGATIVE MG/ML
PH UR: 6.5 [PH] (ref 5–8)
PROT UR STRIP-MCNC: NEGATIVE MG/DL
RBC # UR STRIP: NEGATIVE /UL
SP GR UR: 1 (ref 1–1.03)
UROBILINOGEN UR QL: NORMAL

## 2019-07-24 PROCEDURE — 81003 URINALYSIS AUTO W/O SCOPE: CPT | Performed by: UROLOGY

## 2019-07-24 PROCEDURE — 99214 OFFICE O/P EST MOD 30 MIN: CPT | Performed by: UROLOGY

## 2019-08-18 DIAGNOSIS — R39.12 POOR URINARY STREAM: ICD-10-CM

## 2019-08-19 RX ORDER — TAMSULOSIN HYDROCHLORIDE 0.4 MG/1
CAPSULE ORAL
Qty: 60 CAPSULE | Refills: 0 | Status: SHIPPED | OUTPATIENT
Start: 2019-08-19 | End: 2019-11-13

## 2019-08-26 NOTE — PROGRESS NOTES
YOB: 1955  Location: [facility]  Location Address: [address] [place] [address], [facility], [address] [place] [address]  Location Phone: [phone]    Chief Complaint   Patient presents with   • Follow-up       History of Present Illness  Saul Alcantara Sr. is a 61 y.o. male who presents for follow-up evaluation. Patient had DL and biopsy of base of tongue on 9/10/14 with positive pathology for SCCa. The cancer was staged T1M0N0. The patient has a history of radiation therapy due to a base of tongue cancer on the left side that he finished in 2014. Patient has nasal congestion, nasal drainage, postnasal drainage burning of tongue, cough, dysphagia, hoarseness, frontal, orbital and maxillary sinus pressure and reports some a bone from his jaw rubbing on his tongue that is being treated by Dr. West.  Patient continues to have a salty taste, MRI was normal. Nothing seems to make this better or worse. He had a full body CT scan to include a CT scan of the soft tissue of the neck that was normal.    He has some dysphagia which his wife says is progressing, but he is still holding his weight steady and able to swallow the food she fixes.                          Recent PET scan was normal.                                              Study Result      EXAMINATION: FL VIDEO SWALLOW W SPEECH- 3/7/2018 9:35 AM CST     HISTORY: Dysphagia; R13.10-Dysphagia, unspecified, radiation therapy.     REPORT:  The speech pathologist was present and supervised the  administration of multiple consistencies of barium to the patient  orally, during intermittent lateral fluoroscopy and digital video  capture. The patient is able to swallow all attempted consistencies  without evidence of aspiration, there is deep penetration with thin  barium with and without a chin tuck.     IMPRESSION:  No aspiration seen. There is deep penetration with thin  barium.. Please review the speech pathologist's report for more  information.     This  report was finalized on 03/07/2018 09:36 by Dr. Kenyon Paige MD.      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  History of marginal zone lymphoma the spleen in 2004.  Relapse of lymphoma in 2012 treated to remission.  COMPARISON:  6/7/2017  TECHNIQUE: Positron emission tomography from the vertex to the upper thighs with unenhanced CT for attenuation correction and image fusion.  Pre-test glucose: 103 mg/dL.  F-18 FDG Dose: 11.8 mCi. Uptake duration: 61 minutes. DLP: 278. Reference liver average SUV: 2.3. Reported image numbers refer to the axial series.  SUV units in lean body mass.  PET-CT FINDINGS:  Head and Neck: No suspicious metabolic activity demonstrated.  Chest:  No suspicious metabolic activity.  Abdomen and Pelvis:  A single left inguinal node has enlarged measuring 11 mm with SUV max 3.8, image 284.  Skeletal Structures: No suspicious skeletal uptake.  FDG uptake in the soft tissues adjacent to the right greater trochanter is likely due to trochanteric bursitis.  ADDITIONAL CT FINDINGS:  Enlarged prostate gland.  Status post splenectomy.  Minimal calcified plaque in the aorta and pelvic arteries.  No aneurysm.  IMPRESSION:  1.  Interval enlargement and increased metabolic activity in a single left inguinal node.  This may be reactive and follow-up with physical exam, ultrasound or CT is recommended.     Electronically Signed By: Dr. Brendan Mcgee        Electronically Signed On: 6/7/2018 11:02:32 AM      Study Result      EXAM: MR BRAIN WITHOUT AND WITH IV CONTRAST 05/16/2019     COMPARISON: Change of taste.      HISTORY: 63  years-old Male. change of taste; R43.2-Parageusia     TECHNIQUE:   Routine pulse sequences were obtained of the brain before and after the  administration of IV contrast.      REPORT:   Mild generalized cerebral volume loss. Mild chronic microvascular  changes. There is no evidence of mass-effect or midline shift. No  reduced diffusivity is demonstrated. No abnormally enhancing lesions are  identified.The brainstem, sella, pituitary, cerebellum are unremarkable.  The basal cisterns are preserved.      No acute osseous lesion. Intraorbital structures demonstrate prior  cataract surgery.     The flow voids are preserved. Dural sinuses are patent.       The visualized portions of the paranasal sinuses and mastoid air cells  are unremarkable.      IMPRESSION:  1.  No acute intracranial abnormalities.  2.  Mild chronic microvascular changes.  This report was finalized on 05/16/2019 15:09 by Dr. Zoey Velazquez MD.                   Past Medical History:   Diagnosis Date   • Allergic rhinitis    • Chronic rhinitis    • Chronic sinusitis    • Depression    • Deviated nasal septum    • GERD (gastroesophageal reflux disease)    • H/O head and neck radiation 3/3/2017   • Hypertension    • Hypothyroidism    • Laryngopharyngeal reflux    • Lymphoma (CMS/HCC)     Non-Hodgkins   • 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    T1N0M0 SCC S/P XRT/Chemo 12/2014   • 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    T1N0M0 SCC S/P XRT/Chemo 12/2014   • Sicca syndrome (CMS/HCC)    • Sinusitis, acute    • Squamous cell carcinoma     Base of Tongue   • Tobacco use disorder    • Tongue irritation    • Xerostomia        Past Surgical History:   Procedure Laterality Date   • CATARACT EXTRACTION     • HAND SURGERY     • KNEE SURGERY     • OTHER SURGICAL HISTORY  09/10/2014    Microlaryngoscopy with Biopsy - Base of Tongue   • SQUAMOUS CELL CARCINOMA EXCISION  09/10/2014    Tongue   • TONSILLECTOMY          Outpatient Medications Marked as Taking for the 8/27/19 encounter (Office Visit) with Wayne Richmond MD   Medication Sig Dispense Refill   • ALPRAZolam (XANAX) 2 MG tablet      • amLODIPine (NORVASC) 10 MG tablet Take 10 mg by mouth Daily.     • cyclobenzaprine (FLEXERIL) 10 MG tablet TK 1 T PO TID as needed     • fluticasone (FLONASE) 50 MCG/ACT nasal spray 2 sprays into each nostril Daily. 16 g 5   • glycopyrrolate (ROBINUL) 1 MG tablet TK 1 T PO  TID  3   • HYDROcodone-acetaminophen (NORCO) 7.5-325 MG per tablet TK 1 T PO QD PRN P  0   • levothyroxine (SYNTHROID, LEVOTHROID) 100 MCG tablet      • meloxicam (MOBIC) 15 MG tablet Take 15 mg by mouth Daily.     • nystatin (MYCOSTATIN) 875450 UNIT/ML suspension Take 10ml by mouth BID for 28 days 480 mL 2   • olopatadine (PATANASE) 0.6 % solution nasal solution 2 sprays by Each Nare route 2 (Two) Times a Day. 1 bottle 6   • ondansetron (ZOFRAN) 8 MG tablet every 8 (eight) hours.     • pantoprazole (PROTONIX) 40 MG EC tablet Delayed Release (DR/EC)  Take 1 tablet (40 mg) by oral route 2 times per day,  Take 30 minutes prior to breakfast and evening meal     • senna-docusate (PERICOLACE) 8.6-50 MG per tablet Take 1 tablet by mouth 2 times daily     • simvastatin (ZOCOR) 20 MG tablet TK 1 T PO QD  11   • tamsulosin (FLOMAX) 0.4 MG capsule 24 hr capsule Take 0.4 mg by mouth Daily.     • tamsulosin (FLOMAX) 0.4 MG capsule 24 hr capsule TAKE 2 CAPSULES BY MOUTH EVERY NIGHT 60 capsule 0   • triamcinolone (KENALOG) 0.1 % paste Apply to tongue 1-2 times per day as needed for inflammation/pain 5 g 3   • Vortioxetine HBr (TRINTELLIX PO) Take  by mouth.     • zolpidem (AMBIEN) 10 MG tablet Take 10 mg by mouth.         Morphine    Family History   Problem Relation Age of Onset   • Diabetes Sister    • Cancer Sister        Social History     Socioeconomic History   • Marital status:      Spouse name: Not on file   • Number of children: Not on file   • Years of  education: Not on file   • Highest education level: Not on file   Tobacco Use   • Smoking status: Former Smoker     Types: Cigarettes   • Smokeless tobacco: Never Used   Substance and Sexual Activity   • Alcohol use: No   • Drug use: No   • Sexual activity: Defer       Review of Systems   Constitutional: Negative for activity change, appetite change, chills, diaphoresis, fatigue, fever and unexpected weight change.   HENT: Positive for mouth sores (dry mouth). Negative for congestion, ear discharge, ear pain, facial swelling, hearing loss, nosebleeds, postnasal drip, rhinorrhea, sinus pressure, sneezing, sore throat, tinnitus, trouble swallowing and voice change.         H/o SCC of tongue   Eyes: Negative for pain, discharge, redness, itching and visual disturbance.   Respiratory: Negative for apnea, cough, choking, chest tightness, shortness of breath, wheezing and stridor.    Gastrointestinal: Negative for nausea and vomiting.   Endocrine: Negative for cold intolerance and heat intolerance.   Musculoskeletal: Negative for arthralgias, back pain, gait problem, neck pain and neck stiffness.   Skin: Negative for rash.   Allergic/Immunologic: Negative for environmental allergies and food allergies.   Neurological: Negative for dizziness, tremors, seizures, syncope, facial asymmetry, speech difficulty, weakness, light-headedness, numbness and headaches.   Hematological: Negative for adenopathy. Does not bruise/bleed easily.   Psychiatric/Behavioral: Negative for behavioral problems, sleep disturbance and suicidal ideas. The patient is not nervous/anxious and is not hyperactive.        Vitals:    08/27/19 1314   BP: 130/79   Pulse: 107   Temp: 98.4 °F (36.9 °C)       Body mass index is 22.69 kg/m².    Objective     Physical Exam  CONSTITUTIONAL: Thin, well nourished, alert, oriented, in no acute distress     COMMUNICATION AND VOICE: able to communicate normally, normal voice quality    HEAD: normocephalic, no lesions,  atraumatic, no tenderness, no masses     FACE: appearance normal, no lesions, no tenderness, no deformities, facial motion symmetric    SALIVARY GLANDS: parotid glands with no tenderness, no swelling, no masses, submandibular glands with normal size, nontender    EYES: ocular motility normal, eyelids normal, orbits normal, no proptosis, conjunctiva normal , pupils equal, round     EARS:  Hearing: response to conversational voice normal bilaterally   External Ears: auricles without lesions  Otoscopic: tympanic membrane appearance normal, no lesions, no perforation, normal mobility, no fluid    NOSE:  External Nose: structure normal, no tenderness on palpation, no nasal discharge, no lesions, no evidence of trauma, nostrils patent   Intranasal Exam: nasal mucosa normal, vestibule within normal limits, inferior turbinate normal, nasal septum midline     ORAL:  Lips: upper and lower lips without lesion   Teeth:   Gums: gingivae healthy   Oral Mucosa: oral mucosa normal, no mucosal lesions-moderately dry mucosa  Floor of Mouth: Warthin’s duct patent, mucosa normal  Tongue: lingual mucosa erythema and dryness, normal tongue mobility   Palate: soft and hard palates with normal mucosa and structure  Oropharynx: oropharyngeal mucosa normal-no masses or lesions by visualization and palpation-area with minimal exposed bone noted in the left inner table of the posterior body of the mandible    NECK: neck appearance normal, no mass,  noted without erythema or tenderness    IDL: Masses or lesions are noted by visualization of the endolaryngeal structures and base of tongue.    THYROID: no overt thyromegaly, no tenderness, nodules or mass present on palpation, position midline     LYMPH NODES: no lymphadenopathy    CHEST/RESPIRATORY: respiratory effort normal,     CARDIOVASCULAR: extremities without cyanosis or edema      NEUROLOGIC/PSYCHIATRIC: oriented to time, place and person, mood normal, affect appropriate, CN II-XII intact  grossly    Assessment/Plan   Saul was seen today for follow-up.    Diagnoses and all orders for this visit:    Primary squamous cell carcinoma of tongue (CMS/HCC)    Sicca syndrome (CMS/HCC)    Xerostomia    H/O head and neck radiation    Laryngopharyngeal reflux      * Surgery not found *  No orders of the defined types were placed in this encounter.    Return in about 6 months (around 2/27/2020) for Recheck throat.       Patient Instructions   Be aware of the signs and symptoms of recurrent head and neck cancer including neck mass, persistent or recurrent sore throat, ear pain, hemoptysis, weight loss and hoarseness. If any of these symptoms recur and or persist, call for an evaluation.     D/W patient increasing caloric intake and discussed cruciferous vegetables and protein shakes etc to maintain optimal nutrition.  The necessity of abstaining from tobacco products was also discussed particularly with respect to not smoking.    Continue F/U and/or treatment with Jaydon     Continue follow-up with Dr. West    Call or return for [id number] in 6 months    Discontinue dairy and substitute with almond milk and all of oil for caloric increase

## 2019-08-27 ENCOUNTER — OFFICE VISIT (OUTPATIENT)
Dept: OTOLARYNGOLOGY | Facility: CLINIC | Age: 64
End: 2019-08-27

## 2019-08-27 VITALS
SYSTOLIC BLOOD PRESSURE: 130 MMHG | WEIGHT: 172 LBS | HEART RATE: 107 BPM | BODY MASS INDEX: 22.8 KG/M2 | DIASTOLIC BLOOD PRESSURE: 79 MMHG | TEMPERATURE: 98.4 F | HEIGHT: 73 IN

## 2019-08-27 DIAGNOSIS — Z92.3 H/O HEAD AND NECK RADIATION: ICD-10-CM

## 2019-08-27 DIAGNOSIS — K11.7 XEROSTOMIA: ICD-10-CM

## 2019-08-27 DIAGNOSIS — C02.9 PRIMARY SQUAMOUS CELL CARCINOMA OF TONGUE (HCC): Primary | ICD-10-CM

## 2019-08-27 DIAGNOSIS — K21.9 LARYNGOPHARYNGEAL REFLUX: ICD-10-CM

## 2019-08-27 DIAGNOSIS — M35.00 SICCA SYNDROME (HCC): ICD-10-CM

## 2019-08-27 PROCEDURE — 99213 OFFICE O/P EST LOW 20 MIN: CPT | Performed by: PHYSICIAN ASSISTANT

## 2019-08-27 NOTE — PATIENT INSTRUCTIONS
Be aware of the signs and symptoms of recurrent head and neck cancer including neck mass, persistent or recurrent sore throat, ear pain, hemoptysis, weight loss and hoarseness. If any of these symptoms recur and or persist, call for an evaluation.     D/W patient increasing caloric intake and discussed cruciferous vegetables and protein shakes etc to maintain optimal nutrition.  The necessity of abstaining from tobacco products was also discussed particularly with respect to not smoking.    Continue F/U and/or treatment with Jaydon     Continue follow-up with Dr. West    Call or return for [id number] in 6 months    Discontinue dairy and substitute with almond milk and all of oil for caloric increase

## 2019-09-16 ENCOUNTER — OFFICE VISIT (OUTPATIENT)
Dept: OTOLARYNGOLOGY | Facility: CLINIC | Age: 64
End: 2019-09-16

## 2019-09-16 VITALS
DIASTOLIC BLOOD PRESSURE: 61 MMHG | HEART RATE: 108 BPM | TEMPERATURE: 97.7 F | SYSTOLIC BLOOD PRESSURE: 121 MMHG | BODY MASS INDEX: 22.26 KG/M2 | HEIGHT: 73 IN | WEIGHT: 168 LBS

## 2019-09-16 DIAGNOSIS — K14.9 TONGUE IRRITATION: ICD-10-CM

## 2019-09-16 DIAGNOSIS — C02.9 PRIMARY SQUAMOUS CELL CARCINOMA OF TONGUE (HCC): Primary | ICD-10-CM

## 2019-09-16 DIAGNOSIS — J01.00 ACUTE MAXILLARY SINUSITIS, RECURRENCE NOT SPECIFIED: ICD-10-CM

## 2019-09-16 DIAGNOSIS — J32.0 CHRONIC MAXILLARY SINUSITIS: ICD-10-CM

## 2019-09-16 DIAGNOSIS — Z92.3 H/O HEAD AND NECK RADIATION: ICD-10-CM

## 2019-09-16 DIAGNOSIS — M35.00 SICCA SYNDROME (HCC): ICD-10-CM

## 2019-09-16 DIAGNOSIS — J30.9 ALLERGIC RHINITIS, UNSPECIFIED SEASONALITY, UNSPECIFIED TRIGGER: ICD-10-CM

## 2019-09-16 DIAGNOSIS — R05.8 PRODUCTIVE COUGH: ICD-10-CM

## 2019-09-16 DIAGNOSIS — R06.2 WHEEZING: ICD-10-CM

## 2019-09-16 DIAGNOSIS — K11.7 XEROSTOMIA: ICD-10-CM

## 2019-09-16 DIAGNOSIS — K21.9 LARYNGOPHARYNGEAL REFLUX: ICD-10-CM

## 2019-09-16 PROCEDURE — 99214 OFFICE O/P EST MOD 30 MIN: CPT | Performed by: PHYSICIAN ASSISTANT

## 2019-09-16 RX ORDER — CEFUROXIME AXETIL 500 MG/1
500 TABLET ORAL 2 TIMES DAILY
Qty: 40 TABLET | Refills: 0 | Status: SHIPPED | OUTPATIENT
Start: 2019-09-16 | End: 2019-10-06

## 2019-09-16 RX ORDER — ALBUTEROL SULFATE 2.5 MG/3ML
2.5 SOLUTION RESPIRATORY (INHALATION) EVERY 4 HOURS PRN
Qty: 50 ML | Refills: 1 | Status: SHIPPED | OUTPATIENT
Start: 2019-09-16 | End: 2022-07-26 | Stop reason: SDUPTHER

## 2019-09-16 NOTE — PATIENT INSTRUCTIONS
Will start extended round of Ceftin and advised patient to use nystatin if thrush develops. Use warm compresses and motrin for left neck pain. Will start nebulizer. Recheck in 3 weeks with Dr. Richmond.

## 2019-09-16 NOTE — PROGRESS NOTES
YOB: 1955  Location: [facility]  Location Address: [address] [place] [address], [facility], [address] [place] [address]  Location Phone: [phone]    Chief Complaint   Patient presents with   • Follow-up     sinus       History of Present Illness  Saul Alcantara Sr. is a 61 y.o. male who presents for follow-up evaluation. Patient had DL and biopsy of base of tongue on 9/10/14 with positive pathology for SCCa. The cancer was staged T1M0N0. The patient has a history of radiation therapy due to a base of tongue cancer on the left side that he finished in 2014. Patient has nasal congestion, nasal drainage, postnasal drainage burning of tongue, cough, dysphagia, hoarseness, frontal, orbital and maxillary sinus pressure.  Patient continues to have a salty taste, MRI was normal. Nothing seems to make this better or worse. He had a full body CT scan to include a CT scan of the soft tissue of the neck that was normal.    Thick mucous and productive cough have been present for the last three weeks. He just finished a 7 day course of Levaquin and a prednisone taper without improvement.                               Recent PET scan was normal.                                              Study Result      EXAMINATION: FL VIDEO SWALLOW W SPEECH- 3/7/2018 9:35 AM CST     HISTORY: Dysphagia; R13.10-Dysphagia, unspecified, radiation therapy.     REPORT:  The speech pathologist was present and supervised the  administration of multiple consistencies of barium to the patient  orally, during intermittent lateral fluoroscopy and digital video  capture. The patient is able to swallow all attempted consistencies  without evidence of aspiration, there is deep penetration with thin  barium with and without a chin tuck.     IMPRESSION:  No aspiration seen. There is deep penetration with thin  barium.. Please review the speech pathologist's report for more  information.     This report was finalized on 2018 09:36 by Dr. Prescott  MD Royal.      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  History of marginal zone lymphoma the spleen in 2004.  Relapse of lymphoma in 2012 treated to remission.  COMPARISON:  6/7/2017  TECHNIQUE: Positron emission tomography from the vertex to the upper thighs with unenhanced CT for attenuation correction and image fusion.  Pre-test glucose: 103 mg/dL.  F-18 FDG Dose: 11.8 mCi. Uptake duration: 61 minutes. DLP: 278. Reference liver average SUV: 2.3. Reported image numbers refer to the axial series.  SUV units in lean body mass.  PET-CT FINDINGS:  Head and Neck: No suspicious metabolic activity demonstrated.  Chest:  No suspicious metabolic activity.  Abdomen and Pelvis:  A single left inguinal node has enlarged measuring 11 mm with SUV max 3.8, image 284.  Skeletal Structures: No suspicious skeletal uptake.  FDG uptake in the soft tissues adjacent to the right greater trochanter is likely due to trochanteric bursitis.  ADDITIONAL CT FINDINGS:  Enlarged prostate gland.  Status post splenectomy.  Minimal calcified plaque in the aorta and pelvic arteries.  No aneurysm.  IMPRESSION:  1.  Interval enlargement and increased metabolic activity in a single left inguinal node.  This may be reactive and follow-up with physical exam, ultrasound or CT is recommended.     Electronically Signed By: Dr. Brendan Mcgee        Electronically Signed On: 6/7/2018 11:02:32 AM      Study Result      EXAM: MR BRAIN WITHOUT AND WITH IV CONTRAST 05/16/2019     COMPARISON: Change of taste.      HISTORY: 63 years-old Male. change of taste;  R43.2-Parageusia     TECHNIQUE:   Routine pulse sequences were obtained of the brain before and after the  administration of IV contrast.      REPORT:   Mild generalized cerebral volume loss. Mild chronic microvascular  changes. There is no evidence of mass-effect or midline shift. No  reduced diffusivity is demonstrated. No abnormally enhancing lesions are  identified.The brainstem, sella, pituitary, cerebellum are unremarkable.  The basal cisterns are preserved.      No acute osseous lesion. Intraorbital structures demonstrate prior  cataract surgery.     The flow voids are preserved. Dural sinuses are patent.       The visualized portions of the paranasal sinuses and mastoid air cells  are unremarkable.      IMPRESSION:  1.  No acute intracranial abnormalities.  2.  Mild chronic microvascular changes.  This report was finalized on 05/16/2019 15:09 by Dr. Zoey Velazquez MD.                   Past Medical History:   Diagnosis Date   • Allergic rhinitis    • Chronic rhinitis    • Chronic sinusitis    • Depression    • Deviated nasal septum    • GERD (gastroesophageal reflux disease)    • H/O head and neck radiation 3/3/2017   • Hypertension    • Hypothyroidism    • Laryngopharyngeal reflux    • Lymphoma (CMS/HCC)     Non-Hodgkins   • 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    T1N0M0 SCC S/P XRT/Chemo 12/2014   • 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    T1N0M0 SCC S/P XRT/Chemo 12/2014   • Sicca syndrome (CMS/HCC)    • Sinusitis, acute    • Squamous cell carcinoma     Base of Tongue   • Tobacco use disorder    • Tongue irritation    • Xerostomia        Past Surgical History:   Procedure Laterality Date   • CATARACT EXTRACTION     • HAND SURGERY     • KNEE SURGERY     • OTHER SURGICAL HISTORY  09/10/2014    Microlaryngoscopy with Biopsy - Base of Tongue   • SQUAMOUS CELL CARCINOMA EXCISION  09/10/2014    Tongue   • TONSILLECTOMY         Outpatient Medications Marked as  Taking for the 9/16/19 encounter (Office Visit) with Sulaiman Becerra PA   Medication Sig Dispense Refill   • ALPRAZolam (XANAX) 2 MG tablet      • amLODIPine (NORVASC) 10 MG tablet Take 10 mg by mouth Daily.     • cyclobenzaprine (FLEXERIL) 10 MG tablet TK 1 T PO TID as needed     • fluticasone (FLONASE) 50 MCG/ACT nasal spray 2 sprays into each nostril Daily. 16 g 5   • glycopyrrolate (ROBINUL) 1 MG tablet TK 1 T PO  TID  3   • HYDROcodone-acetaminophen (NORCO) 7.5-325 MG per tablet TK 1 T PO QD PRN P  0   • levothyroxine (SYNTHROID, LEVOTHROID) 100 MCG tablet      • meloxicam (MOBIC) 15 MG tablet Take 15 mg by mouth Daily.     • nystatin (MYCOSTATIN) 570694 UNIT/ML suspension Take 10ml by mouth BID for 28 days 480 mL 2   • olopatadine (PATANASE) 0.6 % solution nasal solution 2 sprays by Each Nare route 2 (Two) Times a Day. 1 bottle 6   • ondansetron (ZOFRAN) 8 MG tablet every 8 (eight) hours.     • pantoprazole (PROTONIX) 40 MG EC tablet Delayed Release (DR/EC)  Take 1 tablet (40 mg) by oral route 2 times per day,  Take 30 minutes prior to breakfast and evening meal     • senna-docusate (PERICOLACE) 8.6-50 MG per tablet Take 1 tablet by mouth 2 times daily     • simvastatin (ZOCOR) 20 MG tablet TK 1 T PO QD  11   • tamsulosin (FLOMAX) 0.4 MG capsule 24 hr capsule Take 0.4 mg by mouth Daily.     • tamsulosin (FLOMAX) 0.4 MG capsule 24 hr capsule TAKE 2 CAPSULES BY MOUTH EVERY NIGHT 60 capsule 0   • triamcinolone (KENALOG) 0.1 % paste Apply to tongue 1-2 times per day as needed for inflammation/pain 5 g 3   • Vortioxetine HBr (TRINTELLIX PO) Take  by mouth.     • zolpidem (AMBIEN) 10 MG tablet Take 10 mg by mouth.         Patient has no known allergies.    Family History   Problem Relation Age of Onset   • Diabetes Sister    • Cancer Sister        Social History     Socioeconomic History   • Marital status:      Spouse name: Not on file   • Number of children: Not on file   • Years of education: Not on  file   • Highest education level: Not on file   Tobacco Use   • Smoking status: Former Smoker     Types: Cigarettes   • Smokeless tobacco: Never Used   Substance and Sexual Activity   • Alcohol use: No   • Drug use: No   • Sexual activity: Defer       Review of Systems   Constitutional: Positive for unexpected weight change. Negative for activity change, appetite change, chills, diaphoresis, fatigue and fever.   HENT: Positive for mouth sores (dry mouth), postnasal drip, sinus pressure and sore throat. Negative for congestion, ear discharge, ear pain, facial swelling, hearing loss, nosebleeds, rhinorrhea, sneezing, tinnitus, trouble swallowing and voice change.         H/o SCC of tongue   Eyes: Negative for pain, discharge, redness, itching and visual disturbance.   Respiratory: Positive for cough and wheezing. Negative for apnea, choking, chest tightness, shortness of breath and stridor.    Gastrointestinal: Negative for nausea and vomiting.   Endocrine: Negative for cold intolerance and heat intolerance.   Musculoskeletal: Positive for neck pain. Negative for arthralgias, back pain, gait problem and neck stiffness.   Skin: Negative for rash.   Allergic/Immunologic: Negative for environmental allergies and food allergies.   Neurological: Negative for dizziness, tremors, seizures, syncope, facial asymmetry, speech difficulty, weakness, light-headedness, numbness and headaches.   Hematological: Negative for adenopathy. Does not bruise/bleed easily.   Psychiatric/Behavioral: Negative for behavioral problems, sleep disturbance and suicidal ideas. The patient is not nervous/anxious and is not hyperactive.        Vitals:    09/16/19 1326   BP: 121/61   Pulse: 108   Temp: 97.7 °F (36.5 °C)       Body mass index is 22.16 kg/m².    Objective     Physical Exam  CONSTITUTIONAL: Thin, well nourished, alert, oriented, in no acute distress     COMMUNICATION AND VOICE: able to communicate normally, normal voice quality    HEAD:  normocephalic, no lesions, atraumatic, no tenderness, no masses     FACE: appearance normal, no lesions, no tenderness, no deformities, facial motion symmetric    SALIVARY GLANDS: parotid glands with no tenderness, no swelling, no masses, submandibular glands with normal size, nontender    EYES: ocular motility normal, eyelids normal, orbits normal, no proptosis, conjunctiva normal , pupils equal, round     EARS:  Hearing: response to conversational voice normal bilaterally   External Ears: auricles without lesions  Otoscopic: tympanic membrane appearance normal, no lesions, no perforation, normal mobility, no fluid    NOSE:  External Nose: structure normal, no tenderness on palpation, no nasal discharge, no lesions, no evidence of trauma, nostrils patent   Intranasal Exam: nasal mucosa normal, vestibule within normal limits, inferior turbinate normal, nasal septum midline     ORAL:  Lips: upper and lower lips without lesion   Teeth:   Gums: gingivae healthy   Oral Mucosa: oral mucosa normal, no mucosal lesions-moderately dry mucosa  Floor of Mouth: Warthin’s duct patent, mucosa normal  Tongue: lingual mucosa erythema and dryness, normal tongue mobility   Palate: soft and hard palates with normal mucosa and structure  Oropharynx: oropharyngeal mucosa normal-no masses or lesions by visualization and palpation-area with minimal exposed bone noted in the left inner table of the posterior body of the mandible    NECK: neck appearance normal, no mass,  Left sided neck tenderness to palpation    THYROID: no overt thyromegaly, no tenderness, nodules or mass present on palpation, position midline     LYMPH NODES: no lymphadenopathy    CHEST/RESPIRATORY: respiratory effort normal,     CARDIOVASCULAR: extremities without cyanosis or edema      NEUROLOGIC/PSYCHIATRIC: oriented to time, place and person, mood normal, affect appropriate, CN II-XII intact grossly    ENT Procedure Note    Anesthesia: none    Endoscopy Type: mirror  exam    Indications for Procedure: h/o of SCC of base of tongue with 3 weeks of a productive cough and sore throat    Procedure Details:    The patient was placed in an upright position.  The mirror was used to visulaize the hypopharyn and larynx.  The nasal cavities, nasopharynx, oropharynx, hypopharynx, and larynx were all examined.  Vocal cords were examined during respiration and phonation.  The following findings were noted:    Findings:   LARYNGOSCOPY FINDINGS :    Normal Nasopharynx, Adenoids, Eustachian tubes, BOT, lingual tonsils, no tongue prolapse, OP walls intact, HP normal and intact, Epiglottis normal, Supraglottis normal, FVC and TVCs normal with no lesions, Subglottis clear, there is moderate mucosal erythema and moderate amount of thick green mucous pulling in the vallecula and laryngeal area.    Condition:  Stable.  Patient tolerated procedure well.    Complications:  None      Assessment/Plan   Saul was seen today for follow-up.    Diagnoses and all orders for this visit:    Primary squamous cell carcinoma of tongue (CMS/HCC)    H/O head and neck radiation    Sicca syndrome (CMS/HCC)    Xerostomia    Tongue irritation    Chronic maxillary sinusitis    Laryngopharyngeal reflux    Allergic rhinitis, unspecified seasonality, unspecified trigger    Acute maxillary sinusitis, recurrence not specified  -     cefuroxime (CEFTIN) 500 MG tablet; Take 1 tablet by mouth 2 (Two) Times a Day for 20 days.    Wheezing  -     cefuroxime (CEFTIN) 500 MG tablet; Take 1 tablet by mouth 2 (Two) Times a Day for 20 days.  -     albuterol (PROVENTIL) (2.5 MG/3ML) 0.083% nebulizer solution; Take 2.5 mg by nebulization Every 4 (Four) Hours As Needed for Wheezing.    Productive cough  -     cefuroxime (CEFTIN) 500 MG tablet; Take 1 tablet by mouth 2 (Two) Times a Day for 20 days.  -     albuterol (PROVENTIL) (2.5 MG/3ML) 0.083% nebulizer solution; Take 2.5 mg by nebulization Every 4 (Four) Hours As Needed for  Wheezing.      * Surgery not found *  No orders of the defined types were placed in this encounter.    Return in about 3 weeks (around 10/7/2019) for Recheck sinus with Dr. Richmond.       Patient Instructions   Will start extended round of Ceftin and advised patient to use nystatin if thrush develops. Use warm compresses and motrin for left neck pain. Will start nebulizer. Recheck in 3 weeks with Dr. Richmond.

## 2019-09-19 ENCOUNTER — TELEPHONE (OUTPATIENT)
Dept: OTOLARYNGOLOGY | Facility: CLINIC | Age: 64
End: 2019-09-19

## 2019-09-19 RX ORDER — CLINDAMYCIN HYDROCHLORIDE 300 MG/1
300 CAPSULE ORAL 3 TIMES DAILY
Qty: 60 CAPSULE | Refills: 0 | Status: SHIPPED | OUTPATIENT
Start: 2019-09-19 | End: 2019-10-09

## 2019-09-19 RX ORDER — CLINDAMYCIN HYDROCHLORIDE 300 MG/1
300 CAPSULE ORAL 3 TIMES DAILY
Qty: 60 CAPSULE | Refills: 0 | Status: SHIPPED | OUTPATIENT
Start: 2019-09-19 | End: 2019-09-19 | Stop reason: SDUPTHER

## 2019-09-19 NOTE — TELEPHONE ENCOUNTER
----- Message from KRISTA Lino sent at 9/19/2019  1:18 PM CDT -----  Sent in clindamycin   ----- Message -----  From: Lisa Michelle RN  Sent: 9/19/2019  11:53 AM  To: KRISTA Lino

## 2019-09-19 NOTE — TELEPHONE ENCOUNTER
Pt's wife called stating that the antibiotic that was sent in can not be crushed and they are requesting for a different one to be sent that can be crushed.   Also stated that he is not getting any better and he is spitting up and his ears are still hurting.

## 2019-09-23 ENCOUNTER — TELEPHONE (OUTPATIENT)
Dept: OTOLARYNGOLOGY | Facility: CLINIC | Age: 64
End: 2019-09-23

## 2019-10-30 NOTE — PROGRESS NOTES
YOB: 1955  Location: [facility]  Location Address: [address] [place] [address], [facility], [address] [place] [address]  Location Phone: [phone]    Chief Complaint   Patient presents with   • Follow-up       History of Present Illness  Saul Alcantara Sr. is a 61 y.o. male who presents for follow-up evaluation. Patient had DL and biopsy of base of tongue on 9/10/14 with positive pathology for SCCa. The cancer was staged T1M0N0. The patient has a history of radiation therapy due to a base of tongue cancer on the left side that he finished in 2014. Patient has complaints of dysphagia and is inquiring whether or not he needs his esophagus stretched. His last endoscopy was in  by Dr. Charles. Patient has cough and soreness of his left outer ear. He states his sinus complaints are the same. He denies hoarseness and sore throat. Patient's last MRI was clear.                          Recent PET scan was normal.                                              Study Result      EXAMINATION: FL VIDEO SWALLOW W SPEECH- 3/7/2018 9:35 AM CST     HISTORY: Dysphagia; R13.10-Dysphagia, unspecified, radiation therapy.     REPORT:  The speech pathologist was present and supervised the  administration of multiple consistencies of barium to the patient  orally, during intermittent lateral fluoroscopy and digital video  capture. The patient is able to swallow all attempted consistencies  without evidence of aspiration, there is deep penetration with thin  barium with and without a chin tuck.     IMPRESSION:  No aspiration seen. There is deep penetration with thin  barium.. Please review the speech pathologist's report for more  information.     This report was finalized on 2018 09:36 by Dr. Kenyon Paige MD.      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  History of marginal zone lymphoma the spleen in 2004.  Relapse of lymphoma in 2012 treated to remission.  COMPARISON:  6/7/2017  TECHNIQUE: Positron emission tomography from the vertex to the upper thighs with unenhanced CT for attenuation correction and image fusion.  Pre-test glucose: 103 mg/dL.  F-18 FDG Dose: 11.8 mCi. Uptake duration: 61 minutes. DLP: 278. Reference liver average SUV: 2.3. Reported image numbers refer to the axial series.  SUV units in lean body mass.  PET-CT FINDINGS:  Head and Neck: No suspicious metabolic activity demonstrated.  Chest:  No suspicious metabolic activity.  Abdomen and Pelvis:  A single left inguinal node has enlarged measuring 11 mm with SUV max 3.8, image 284.  Skeletal Structures: No suspicious skeletal uptake.  FDG uptake in the soft tissues adjacent to the right greater trochanter is likely due to trochanteric bursitis.  ADDITIONAL CT FINDINGS:  Enlarged prostate gland.  Status post splenectomy.  Minimal calcified plaque in the aorta and pelvic arteries.  No aneurysm.  IMPRESSION:  1.  Interval enlargement and increased metabolic activity in a single left inguinal node.  This may be reactive and follow-up with physical exam, ultrasound or CT is recommended.     Electronically Signed By: Dr. Brendan Mcgee        Electronically Signed On: 6/7/2018 11:02:32 AM      Study Result      EXAM: MR BRAIN WITHOUT AND WITH IV CONTRAST 05/16/2019     COMPARISON: Change of taste.      HISTORY: 63 years-old Male. change of taste; R43.2-[place]age[place]ia     TECHNIQUE:   Routine pulse sequences were obtained of the brain before and after the  administration of IV contrast.      REPORT:   Mild generalized cerebral volume loss. Mild chronic microvascular  changes. There is no  evidence of mass-effect or midline shift. No  reduced diffusivity is demonstrated. No abnormally enhancing lesions are  identified.The brainstem, sella, pituitary, cerebellum are unremarkable.  The basal cisterns are preserved.      No acute osseous lesion. Intraorbital structures demonstrate prior  cataract surgery.     The flow voids are preserved. Dural sinuses are patent.       The visualized portions of the paranasal sinuses and mastoid air cells  are unremarkable.      IMPRESSION:  1.  No acute intracranial abnormalities.  2.  Mild chronic microvascular changes.  This report was finalized on 05/16/2019 15:09 by Dr. Zoey Velazquez MD.                                   Past Medical History:   Diagnosis Date   • Allergic rhinitis    • Chronic rhinitis    • Chronic sinusitis    • Depression    • Deviated nasal septum    • GERD (gastroesophageal reflux disease)    • H/O head and neck radiation 3/3/2017   • Hypertension    • Hypothyroidism    • Laryngopharyngeal reflux    • Lymphoma (CMS/HCC)     Non-Hodgkins   • 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    T1N0M0 SCC S/P XRT/Chemo 12/2014   • 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    T1N0M0 SCC S/P XRT/Chemo 12/2014   • Sicca syndrome (CMS/HCC)    • Sinusitis, acute    • Squamous cell carcinoma     Base of Tongue   • Tobacco use disorder    • Tongue irritation    • Xerostomia        Past Surgical History:   Procedure Laterality Date   • CATARACT EXTRACTION     • HAND SURGERY     • KNEE SURGERY     • OTHER SURGICAL HISTORY  09/10/2014    Microlaryngoscopy with Biopsy - Base of Tongue   • SQUAMOUS CELL CARCINOMA EXCISION  09/10/2014    Tongue   • TONSILLECTOMY         No outpatient medications have been marked as taking for the 10/31/19 encounter (Office Visit) with Wayne Richmond MD.       Patient has no known allergies.    Family History   Problem Relation Age of Onset   • Diabetes Sister    • Cancer Sister         Social History     Socioeconomic History   • Marital status:      Spouse name: Not on file   • Number of children: Not on file   • Years of education: Not on file   • Highest education level: Not on file   Tobacco Use   • Smoking status: Former Smoker     Types: Cigarettes   • Smokeless tobacco: Never Used   Substance and Sexual Activity   • Alcohol use: No   • Drug use: No   • Sexual activity: Defer       Review of Systems   Constitutional: Negative.    HENT: Positive for trouble swallowing.         Left outer ear soreness   Eyes: Negative.    Respiratory: Positive for cough.    Cardiovascular: Negative.    Gastrointestinal: Negative.    Endocrine: Negative.    Genitourinary: Negative.    Musculoskeletal: Negative.    Skin: Negative.    Allergic/Immunologic: Negative.    Neurological: Negative.    Hematological: Negative.    Psychiatric/Behavioral: Negative.        Vitals:    10/31/19 1106   BP: 150/85   Pulse: 76   Temp: 97 °F (36.1 °C)       Body mass index is 22.75 kg/m².    Objective     Physical Exam  CONSTITUTIONAL: well nourished, alert, oriented, in no acute distress     COMMUNICATION AND VOICE: able to communicate normally, normal voice quality    HEAD: normocephalic, no lesions, atraumatic, no tenderness, no masses     FACE: appearance normal, no lesions, no tenderness, no deformities, facial motion symmetric    SALIVARY GLANDS: parotid glands with no tenderness, no swelling, no masses, submandibular glands with normal size, nontender    EYES: ocular motility normal, eyelids normal, orbits normal, no proptosis, conjunctiva normal , pupils equal, round     EARS:  Hearing: response to conversational voice normal bilaterally   External Ears: auricles without lesions  Otoscopic: tympanic membrane appearance normal, no lesions, no perforation, normal mobility, no fluid    NOSE:  External Nose: structure normal, no tenderness on palpation, no nasal discharge, no lesions, no evidence of trauma,  nostrils patent   Intranasal Exam: nasal mucosa normal, vestibule within normal limits, inferior turbinate normal, nasal septum midline     ORAL:  Lips: upper and lower lips without lesion   Teeth: dentition within normal limits for age   Gums: gingivae healthy   Oral Mucosa: oral mucosa normal, no mucosal lesions   Floor of Mouth: Warthin’s duct patent, mucosa normal  Tongue: lingual mucosa normal without lesions, BUT GENERALIZED DRYNESS AND IRRITATION, normal tongue mobility   Palate: soft and hard palates with normal mucosa and structure  Oropharynx: oropharyngeal mucosa normal    HYPOPHARYNX:   LARYNX: epiglottis and arytenoid cartilage within normal limits, vocal cord mucosa normal with normal mobility, GENERALIZED DRYNESS OF MUCOSA NOTED     NECK: neck appearance normal, no mass,  noted without erythema or tenderness    THYROID: no overt thyromegaly, no tenderness, nodules or mass present on palpation, position midline     LYMPH NODES: no lymphadenopathy    CHEST/RESPIRATORY: respiratory effort normal, normal breath sounds     CARDIOVASCULAR: rate and rhythm normal, extremities without cyanosis or edema      NEUROLOGIC/PSYCHIATRIC: oriented to time, place and person, mood normal, affect appropriate, CN II-XII intact grossly    Assessment/Plan   Saul was seen today for follow-up.    Diagnoses and all orders for this visit:    Pharyngoesophageal dysphagia  -     Case Request; Standing  -     Case Request    Allergic rhinitis, unspecified seasonality, unspecified trigger    Chronic maxillary sinusitis    Laryngopharyngeal reflux    H/O head and neck radiation    Sicca syndrome (CMS/HCC)    Tongue irritation    Xerostomia    Other orders  -     Follow Anesthesia Guidelines / Standing Orders; Future  -     Obtain Informed Consent  -     Follow Anesthesia Guidelines / Standing Orders; Standing  -     NPO Diet; Standing  -     Provide Patient With Instructions on NPO Status; Future  -     Verify NPO Status;  Standing  -     Obtain Informed Consent; Standing  -     SCD (Sequential Compression Device) - To Be Placed on Patient in Pre-Op; Standing  -     Patient to Void Prior to Transfer to OR; Standing      DIRECT ESOPHAGOSCOPY WITH DILATION (N/A)  Orders Placed This Encounter   Procedures   • Follow Anesthesia Guidelines / Standing Orders     Standing Status:   Future   • Obtain Informed Consent     Order Specific Question:   Informed Consent Given For     Answer:   DIRECT ESOPHAGOSCOPY WITH DILATION   • Provide Patient With Instructions on NPO Status     Standing Status:   Future     Return for Follow-up post-operatively as directed.       Patient Instructions   Will schedule esophageal dilation and follow-up post-operatively.

## 2019-10-31 ENCOUNTER — OFFICE VISIT (OUTPATIENT)
Dept: OTOLARYNGOLOGY | Facility: CLINIC | Age: 64
End: 2019-10-31

## 2019-10-31 VITALS
DIASTOLIC BLOOD PRESSURE: 85 MMHG | TEMPERATURE: 97 F | SYSTOLIC BLOOD PRESSURE: 150 MMHG | HEIGHT: 73 IN | HEART RATE: 76 BPM | BODY MASS INDEX: 22.85 KG/M2 | WEIGHT: 172.4 LBS

## 2019-10-31 DIAGNOSIS — M35.00 SICCA SYNDROME (HCC): ICD-10-CM

## 2019-10-31 DIAGNOSIS — K11.7 XEROSTOMIA: ICD-10-CM

## 2019-10-31 DIAGNOSIS — K14.9 TONGUE IRRITATION: ICD-10-CM

## 2019-10-31 DIAGNOSIS — J32.0 CHRONIC MAXILLARY SINUSITIS: ICD-10-CM

## 2019-10-31 DIAGNOSIS — J30.9 ALLERGIC RHINITIS, UNSPECIFIED SEASONALITY, UNSPECIFIED TRIGGER: ICD-10-CM

## 2019-10-31 DIAGNOSIS — Z92.3 H/O HEAD AND NECK RADIATION: ICD-10-CM

## 2019-10-31 DIAGNOSIS — K21.9 LARYNGOPHARYNGEAL REFLUX: ICD-10-CM

## 2019-10-31 DIAGNOSIS — R13.14 PHARYNGOESOPHAGEAL DYSPHAGIA: Primary | ICD-10-CM

## 2019-10-31 PROCEDURE — 99214 OFFICE O/P EST MOD 30 MIN: CPT | Performed by: PHYSICIAN ASSISTANT

## 2019-11-13 ENCOUNTER — APPOINTMENT (OUTPATIENT)
Dept: PREADMISSION TESTING | Facility: HOSPITAL | Age: 64
End: 2019-11-13

## 2019-11-13 VITALS
DIASTOLIC BLOOD PRESSURE: 80 MMHG | WEIGHT: 171.74 LBS | HEART RATE: 89 BPM | RESPIRATION RATE: 16 BRPM | SYSTOLIC BLOOD PRESSURE: 132 MMHG | OXYGEN SATURATION: 98 % | BODY MASS INDEX: 22.76 KG/M2 | HEIGHT: 73 IN

## 2019-11-13 LAB
ANION GAP SERPL CALCULATED.3IONS-SCNC: 9 MMOL/L (ref 5–15)
BUN BLD-MCNC: 15 MG/DL (ref 8–23)
BUN/CREAT SERPL: 11.7 (ref 7–25)
CALCIUM SPEC-SCNC: 10.4 MG/DL (ref 8.6–10.5)
CHLORIDE SERPL-SCNC: 98 MMOL/L (ref 98–107)
CO2 SERPL-SCNC: 31 MMOL/L (ref 22–29)
CREAT BLD-MCNC: 1.28 MG/DL (ref 0.76–1.27)
DEPRECATED RDW RBC AUTO: 45.4 FL (ref 37–54)
ERYTHROCYTE [DISTWIDTH] IN BLOOD BY AUTOMATED COUNT: 13.1 % (ref 12.3–15.4)
GFR SERPL CREATININE-BSD FRML MDRD: 57 ML/MIN/1.73
GLUCOSE BLD-MCNC: 79 MG/DL (ref 65–99)
HCT VFR BLD AUTO: 41.4 % (ref 37.5–51)
HGB BLD-MCNC: 13.9 G/DL (ref 13–17.7)
MCH RBC QN AUTO: 31.7 PG (ref 26.6–33)
MCHC RBC AUTO-ENTMCNC: 33.6 G/DL (ref 31.5–35.7)
MCV RBC AUTO: 94.3 FL (ref 79–97)
PLATELET # BLD AUTO: 295 10*3/MM3 (ref 140–450)
PMV BLD AUTO: 11.5 FL (ref 6–12)
POTASSIUM BLD-SCNC: 4.5 MMOL/L (ref 3.5–5.2)
RBC # BLD AUTO: 4.39 10*6/MM3 (ref 4.14–5.8)
SODIUM BLD-SCNC: 138 MMOL/L (ref 136–145)
WBC NRBC COR # BLD: 7.24 10*3/MM3 (ref 3.4–10.8)

## 2019-11-13 PROCEDURE — 93005 ELECTROCARDIOGRAM TRACING: CPT

## 2019-11-13 PROCEDURE — 80048 BASIC METABOLIC PNL TOTAL CA: CPT | Performed by: OTOLARYNGOLOGY

## 2019-11-13 PROCEDURE — 85027 COMPLETE CBC AUTOMATED: CPT | Performed by: OTOLARYNGOLOGY

## 2019-11-13 PROCEDURE — 36415 COLL VENOUS BLD VENIPUNCTURE: CPT

## 2019-11-13 PROCEDURE — 93010 ELECTROCARDIOGRAM REPORT: CPT | Performed by: INTERNAL MEDICINE

## 2019-11-13 RX ORDER — LIDOCAINE HYDROCHLORIDE 20 MG/ML
5 SOLUTION OROPHARYNGEAL AS NEEDED
COMMUNITY

## 2019-11-13 NOTE — DISCHARGE INSTRUCTIONS
DAY OF SURGERY INSTRUCTIONS        YOUR SURGEON: ***Dr. Wayne Richmond     PROCEDURE: ***Direct Esophagoscopy with Dilation     DATE OF SURGERY: ***November 20, 2019     ARRIVAL TIME: AS DIRECTED BY OFFICE    YOU MAY TAKE THE FOLLOWING MEDICATION(S) THE MORNING OF SURGERY WITH A SIP OF WATER: ***Norco and Xanax     ALL OTHER HOME MEDICATIONS CHECK WITH YOUR DOCTOR              MANAGING PAIN AFTER SURGERY    We know you are probably wondering what your pain will be like after surgery.  Following surgery it is unrealistic to expect you will not have pain.   Pain is how our bodies let us know that something is wrong or cautions us to be careful.  That said, our goal is to make your pain tolerable.    Methods we may use to treat your pain include (oral or IV medications, PCAs, epidurals, nerve blocks, etc.)   While some procedures require IV pain medications for a short time after surgery, transitioning to pain medications by mouth allows for better management of pain.   Your nurse will encourage you to take oral pain medications whenever possible.  IV medications work almost immediately, but only last a short while.  Taking medications by mouth allows for a more constant level of medication in your blood stream for a longer period of time.      Once your pain is out of control it is harder to get back under control.  It is important you are aware when your next dose of pain medication is due.  If you are admitted, your nurse may write the time of your next dose on the white board in your room to help you remember.      We are interested in your pain and encourage you to inform us about aggravating factors during your visit.   Many times a simple repositioning every few hours can make a big difference.    If your physician says it is okay, do not let your pain prevent you from getting out of bed. Be sure to call your nurse for assistance prior to getting up so you do not fall.      Before surgery, please decide your  tolerable pain goal.  These faces help describe the pain ratings we use on a 0-10 scale.   Be prepared to tell us your goal and whether or not you take pain or anxiety medications at home.      BEFORE YOU COME TO THE HOSPITAL  (Pre-op instructions)  • Do not eat, drink, smoke or chew gum after midnight the night before surgery.  This also includes no mints.  • Morning of surgery take only the medicines you have been instructed with a sip of water unless otherwise instructed  by your physician.  • Do not shave, wear makeup or dark nail polish.  • Remove all jewelry including rings.  • Leave anything you consider valuable at home.  • Leave your suitcase in the car until after your surgery.  • Bring the following with you if applicable:  o Picture ID and insurance, Medicare or Medicaid cards  o Co-pay/deductible required by insurance (cash, check, credit card)  o Copy of advance directive, living will or power-of- documents if not brought to PAT  o CPAP or BIPAP mask and tubing  o Relaxation aids ( book, magazine), etc.  o Hearing aids                                 ON THE DAY OF SURGERY  · On the day of surgery check in at registration located at the main entrance of the hospital.   ? You will be registered and given a beeper with instructions where to wait in the main lobby.  ? When your beeper lights up and vibrates a member of the Outpatient Surgery staff will meet you at the double doors under the stair steps and escort you to your preoperative room.   · You may have cloth compression devices placed on your legs. These help to prevent blood clots and reduce swelling in your legs.  · An IV may be inserted into one of your veins.  · In the operating room, you may be given one or more of the following:  ? A medicine to help you relax (sedative).  ? A medicine to numb the area (local anesthetic).  ? A medicine to make you fall asleep (general anesthetic).  ? A medicine that is injected into an area of your  "body to numb everything below the injection site (regional anesthetic).  · Your surgical site will be marked or identified.  · You may be given an antibiotic through your IV to help prevent infection.  Contact a health care provider if you:  · Develop a fever of more than 100.4°F (38°C) or other feelings of illness during the 48 hours before your surgery.  · Have symptoms that get worse.  Have questions or concerns about your surgery    General Anesthesia/Surgery, Adult  General anesthesia is the use of medicines to make a person \"go to sleep\" (unconscious) for a medical procedure. General anesthesia must be used for certain procedures, and is often recommended for procedures that:  · Last a long time.  · Require you to be still or in an unusual position.  · Are major and can cause blood loss.  The medicines used for general anesthesia are called general anesthetics. As well as making you unconscious for a certain amount of time, these medicines:  · Prevent pain.  · Control your blood pressure.  · Relax your muscles.  Tell a health care provider about:  · Any allergies you have.  · All medicines you are taking, including vitamins, herbs, eye drops, creams, and over-the-counter medicines.  · Any problems you or family members have had with anesthetic medicines.  · Types of anesthetics you have had in the past.  · Any blood disorders you have.  · Any surgeries you have had.  · Any medical conditions you have.  · Any recent upper respiratory, chest, or ear infections.  · Any history of:  ? Heart or lung conditions, such as heart failure, sleep apnea, asthma, or chronic obstructive pulmonary disease (COPD).  ?  service.  ? Depression or anxiety.  · Any tobacco or drug use, including marijuana or alcohol use.  · Whether you are pregnant or may be pregnant.  What are the risks?  Generally, this is a safe procedure. However, problems may occur, including:  · Allergic reaction.  · Lung and heart " problems.  · Inhaling food or liquid from the stomach into the lungs (aspiration).  · Nerve injury.  · Air in the bloodstream, which can lead to stroke.  · Extreme agitation or confusion (delirium) when you wake up from the anesthetic.  · Waking up during your procedure and being unable to move. This is rare.  These problems are more likely to develop if you are having a major surgery or if you have an advanced or serious medical condition. You can prevent some of these complications by answering all of your health care provider's questions thoroughly and by following all instructions before your procedure.  General anesthesia can cause side effects, including:  · Nausea or vomiting.  · A sore throat from the breathing tube.  · Hoarseness.  · Wheezing or coughing.  · Shaking chills.  · Tiredness.  · Body aches.  · Anxiety.  · Sleepiness or drowsiness.  · Confusion or agitation.  RISKS AND COMPLICATIONS OF SURGERY  Your health care provider will discuss possible risks and complications with you before surgery. Common risks and complications include:    · Problems due to the use of anesthetics.  · Blood loss and replacement (does not apply to minor surgical procedures).  · Temporary increase in pain due to surgery.  · Uncorrected pain or problems that the surgery was meant to correct.  · Infection.  · New damage.    What happens before the procedure?    Medicines  Ask your health care provider about:  · Changing or stopping your regular medicines. This is especially important if you are taking diabetes medicines or blood thinners.  · Taking medicines such as aspirin and ibuprofen. These medicines can thin your blood. Do not take these medicines unless your health care provider tells you to take them.  · Taking over-the-counter medicines, vitamins, herbs, and supplements. Do not take these during the week before your procedure unless your health care provider approves them.  General instructions  · Starting 3-6 weeks  before the procedure, do not use any products that contain nicotine or tobacco, such as cigarettes and e-cigarettes. If you need help quitting, ask your health care provider.  · If you brush your teeth on the morning of the procedure, make sure to spit out all of the toothpaste.  · Tell your health care provider if you become ill or develop a cold, cough, or fever.  · If instructed by your health care provider, bring your sleep apnea device with you on the day of your surgery (if applicable).  · Ask your health care provider if you will be going home the same day, the following day, or after a longer hospital stay.  ? Plan to have someone take you home from the hospital or clinic.  ? Plan to have a responsible adult care for you for at least 24 hours after you leave the hospital or clinic. This is important.  What happens during the procedure?  · You will be given anesthetics through both of the following:  ? A mask placed over your nose and mouth.  ? An IV in one of your veins.  · You may receive a medicine to help you relax (sedative).  · After you are unconscious, a breathing tube may be inserted down your throat to help you breathe. This will be removed before you wake up.  · An anesthesia specialist will stay with you throughout your procedure. He or she will:  ? Keep you comfortable and safe by continuing to give you medicines and adjusting the amount of medicine that you get.  ? Monitor your blood pressure, pulse, and oxygen levels to make sure that the anesthetics do not cause any problems.  The procedure may vary among health care providers and hospitals.  What happens after the procedure?  · Your blood pressure, temperature, heart rate, breathing rate, and blood oxygen level will be monitored until the medicines you were given have worn off.  · You will wake up in a recovery area. You may wake up slowly.  · If you feel anxious or agitated, you may be given medicine to help you calm down.  · If you will be  going home the same day, your health care provider may check to make sure you can walk, drink, and urinate.  · Your health care provider will treat any pain or side effects you have before you go home.  · Do not drive for 24 hours if you were given a sedative.  Summary  · General anesthesia is used to keep you still and prevent pain during a procedure.  · It is important to tell your healthcare provider about your medical history and any surgeries you have had, and previous experience with anesthesia.  · Follow your healthcare provider’s instructions about when to stop eating, drinking, or taking certain medicines before your procedure.  · Plan to have someone take you home from the hospital or clinic.  This information is not intended to replace advice given to you by your health care provider. Make sure you discuss any questions you have with your health care provider.  Document Released: 03/26/2009 Document Revised: 08/03/2018 Document Reviewed: 08/03/2018  [employer] Interactive Patient Education © 2019 [employer]      Fall Prevention in Hospitals, Adult  As a hospital patient, your condition and the treatments you receive can increase your risk for falls. Some additional risk factors for falls in a hospital include:  · Being in an unfamiliar environment.  · Being on bed rest.  · Your surgery.  · Taking certain medicines.  · Your tubing requirements, such as intravenous (IV) therapy or catheters.  It is important that you learn how to decrease fall risks while at the hospital. Below are important tips that can help prevent falls.  SAFETY TIPS FOR PREVENTING FALLS  Talk about your risk of falling.  · Ask your health care provider why you are at risk for falling. Is it your medicine, illness, tubing placement, or something else?  · Make a plan with your health care provider to keep you safe from falls.  · Ask your health care provider or pharmacist about side effects of your medicines. Some medicines can make you  dizzy or affect your coordination.  Ask for help.  · Ask for help before getting out of bed. You may need to press your call button.  · Ask for assistance in getting safely to the toilet.  · Ask for a walker or cane to be put at your bedside. Ask that most of the side rails on your bed be placed up before your health care provider leaves the room.  · Ask family or friends to sit with you.  · Ask for things that are out of your reach, such as your glasses, hearing aids, telephone, bedside table, or call button.  Follow these tips to avoid falling:  · Stay lying or seated, rather than standing, while waiting for help.  · Wear rubber-soled slippers or shoes whenever you walk in the hospital.  · Avoid quick, sudden movements.  ¨ Change positions slowly.  ¨ Sit on the side of your bed before standing.  ¨ Stand up slowly and wait before you start to walk.  · Let your health care provider know if there is a spill on the floor.  · Pay careful attention to the medical equipment, electrical cords, and tubes around you.  · When you need help, use your call button by your bed or in the bathroom. Wait for one of your health care providers to help you.  · If you feel dizzy or unsure of your footing, return to bed and wait for assistance.  · Avoid being distracted by the TV, telephone, or another person in your room.  · Do not lean or support yourself on rolling objects, such as IV poles or bedside tables.     This information is not intended to replace advice given to you by your health care provider. Make sure you discuss any questions you have with your health care provider.     Document Released: 12/15/2001 Document Revised: 01/08/2016 Document Reviewed: 08/25/2013  [employer] Interactive Patient Education ©2016 [employer]       Surgical Site Infections FAQs  What is a Surgical Site Infection (SSI)?  A surgical site infection is an infection that occurs after surgery in the part of the body where the surgery took place. Most  patients who have surgery do not develop an infection. However, infections develop in about 1 to 3 out of every 100 patients who have surgery.  Some of the common symptoms of a surgical site infection are:  · Redness and pain around the area where you had surgery  · Drainage of cloudy fluid from your surgical wound  · Fever  Can SSIs be treated?  Yes. Most surgical site infections can be treated with antibiotics. The antibiotic given to you depends on the bacteria (germs) causing the infection. Sometimes patients with SSIs also need another surgery to treat the infection.  What are some of the things that hospitals are doing to prevent SSIs?  To prevent SSIs, doctors, nurses, and other healthcare providers:  · Clean their hands and arms up to their elbows with an antiseptic agent just before the surgery.  · Clean their hands with soap and water or an alcohol-based hand rub before and after caring for each patient.  · May remove some of your hair immediately before your surgery using electric clippers if the hair is in the same area where the procedure will occur. They should not shave you with a razor.  · Wear special hair covers, masks, gowns, and gloves during surgery to keep the surgery area clean.  · Give you antibiotics before your surgery starts. In most cases, you should get antibiotics within 60 minutes before the surgery starts and the antibiotics should be stopped within 24 hours after surgery.  · Clean the skin at the site of your surgery with a special soap that kills germs.  What can I do to help prevent SSIs?  Before your surgery:  · Tell your doctor about other medical problems you may have. Health problems such as allergies, diabetes, and obesity could affect your surgery and your treatment.  · Quit smoking. Patients who smoke get more infections. Talk to your doctor about how you can quit before your surgery.  · Do not shave near where you will have surgery. Shaving with a razor can irritate your  skin and make it easier to develop an infection.  At the time of your surgery:  · Speak up if someone tries to shave you with a razor before surgery. Ask why you need to be shaved and talk with your surgeon if you have any concerns.  · Ask if you will get antibiotics before surgery.  After your surgery:  · Make sure that your healthcare providers clean their hands before examining you, either with soap and water or an alcohol-based hand rub.  · If you do not see your providers clean their hands, please ask them to do so.  · Family and friends who visit you should not touch the surgical wound or dressings.  · Family and friends should clean their hands with soap and water or an alcohol-based hand rub before and after visiting you. If you do not see them clean their hands, ask them to clean their hands.  What do I need to do when I go home from the hospital?  · Before you go home, your doctor or nurse should explain everything you need to know about taking care of your wound. Make sure you understand how to care for your wound before you leave the hospital.  · Always clean your hands before and after caring for your wound.  · Before you go home, make sure you know who to contact if you have questions or problems after you get home.  · If you have any symptoms of an infection, such as redness and pain at the surgery site, drainage, or fever, call your doctor immediately.  If you have additional questions, please ask your doctor or nurse.  Developed and co-sponsored by The Society for Healthcare Epidemiology of [place] (SHEA); Infectious Diseases Society of [place] (IDSA); American Hospital Association; Association for Professionals in Infection Control and Epidemiology (APIC); Centers for Disease Control and Prevention (CDC); and The Joint Commission.     This information is not intended to replace advice given to you by your health care provider. Make sure you discuss any questions you have with your health care  provider.     Document Released: 12/23/2014 Document Revised: 01/08/2016 Document Reviewed: 03/02/2016  [employer] Interactive Patient Education ©2016 [employer]     PATIENT/FAMILY/RESPONSIBLE PARTY VERBALIZES UNDERSTANDING OF ABOVE EDUCATION.  COPY OF PAIN SCALE GIVEN AND REVIEWED WITH VERBALIZED UNDERSTANDING.

## 2019-11-20 ENCOUNTER — HOSPITAL ENCOUNTER (OUTPATIENT)
Facility: HOSPITAL | Age: 64
Setting detail: HOSPITAL OUTPATIENT SURGERY
Discharge: HOME | End: 2019-11-20
Attending: OTOLARYNGOLOGY | Admitting: OTOLARYNGOLOGY

## 2019-11-20 ENCOUNTER — ANESTHESIA (OUTPATIENT)
Dept: PERIOP | Facility: HOSPITAL | Age: 64
End: 2019-11-20

## 2019-11-20 ENCOUNTER — ANESTHESIA EVENT (OUTPATIENT)
Dept: PERIOP | Facility: HOSPITAL | Age: 64
End: 2019-11-20

## 2019-11-20 VITALS
RESPIRATION RATE: 18 BRPM | OXYGEN SATURATION: 98 % | TEMPERATURE: 98.4 F | DIASTOLIC BLOOD PRESSURE: 81 MMHG | HEART RATE: 65 BPM | SYSTOLIC BLOOD PRESSURE: 137 MMHG

## 2019-11-20 PROCEDURE — 31525 DX LARYNGOSCOPY EXCL NB: CPT | Performed by: OTOLARYNGOLOGY

## 2019-11-20 PROCEDURE — 43450 DILATE ESOPHAGUS 1/MULT PASS: CPT | Performed by: OTOLARYNGOLOGY

## 2019-11-20 PROCEDURE — 25010000002 FENTANYL CITRATE (PF) 100 MCG/2ML SOLUTION: Performed by: NURSE ANESTHETIST, CERTIFIED REGISTERED

## 2019-11-20 PROCEDURE — 25010000002 SUCCINYLCHOLINE PER 20 MG: Performed by: NURSE ANESTHETIST, CERTIFIED REGISTERED

## 2019-11-20 PROCEDURE — 25010000002 PROPOFOL 10 MG/ML EMULSION: Performed by: NURSE ANESTHETIST, CERTIFIED REGISTERED

## 2019-11-20 RX ORDER — LABETALOL HYDROCHLORIDE 5 MG/ML
5 INJECTION, SOLUTION INTRAVENOUS
Status: DISCONTINUED | OUTPATIENT
Start: 2019-11-20 | End: 2019-11-20 | Stop reason: HOSPADM

## 2019-11-20 RX ORDER — ONDANSETRON 4 MG/1
4 TABLET, FILM COATED ORAL ONCE AS NEEDED
Status: DISCONTINUED | OUTPATIENT
Start: 2019-11-20 | End: 2019-11-20 | Stop reason: HOSPADM

## 2019-11-20 RX ORDER — IBUPROFEN 600 MG/1
600 TABLET ORAL ONCE AS NEEDED
Status: DISCONTINUED | OUTPATIENT
Start: 2019-11-20 | End: 2019-11-20 | Stop reason: HOSPADM

## 2019-11-20 RX ORDER — MIDAZOLAM HYDROCHLORIDE 1 MG/ML
2 INJECTION INTRAMUSCULAR; INTRAVENOUS
Status: DISCONTINUED | OUTPATIENT
Start: 2019-11-20 | End: 2019-11-20 | Stop reason: HOSPADM

## 2019-11-20 RX ORDER — SODIUM CHLORIDE 0.9 % (FLUSH) 0.9 %
3-10 SYRINGE (ML) INJECTION AS NEEDED
Status: DISCONTINUED | OUTPATIENT
Start: 2019-11-20 | End: 2019-11-20 | Stop reason: HOSPADM

## 2019-11-20 RX ORDER — ONDANSETRON 2 MG/ML
4 INJECTION INTRAMUSCULAR; INTRAVENOUS ONCE AS NEEDED
Status: DISCONTINUED | OUTPATIENT
Start: 2019-11-20 | End: 2019-11-20 | Stop reason: HOSPADM

## 2019-11-20 RX ORDER — HYDROCODONE BITARTRATE AND ACETAMINOPHEN 5; 325 MG/1; MG/1
1 TABLET ORAL ONCE AS NEEDED
Status: DISCONTINUED | OUTPATIENT
Start: 2019-11-20 | End: 2019-11-20 | Stop reason: HOSPADM

## 2019-11-20 RX ORDER — OXYCODONE AND ACETAMINOPHEN 10; 325 MG/1; MG/1
1 TABLET ORAL ONCE AS NEEDED
Status: DISCONTINUED | OUTPATIENT
Start: 2019-11-20 | End: 2019-11-20 | Stop reason: HOSPADM

## 2019-11-20 RX ORDER — SODIUM CHLORIDE, SODIUM LACTATE, POTASSIUM CHLORIDE, CALCIUM CHLORIDE 600; 310; 30; 20 MG/100ML; MG/100ML; MG/100ML; MG/100ML
100 INJECTION, SOLUTION INTRAVENOUS CONTINUOUS
Status: DISCONTINUED | OUTPATIENT
Start: 2019-11-20 | End: 2019-11-20 | Stop reason: HOSPADM

## 2019-11-20 RX ORDER — ROCURONIUM BROMIDE 10 MG/ML
INJECTION, SOLUTION INTRAVENOUS AS NEEDED
Status: DISCONTINUED | OUTPATIENT
Start: 2019-11-20 | End: 2019-11-20 | Stop reason: SURG

## 2019-11-20 RX ORDER — IPRATROPIUM BROMIDE AND ALBUTEROL SULFATE 2.5; .5 MG/3ML; MG/3ML
3 SOLUTION RESPIRATORY (INHALATION) ONCE AS NEEDED
Status: DISCONTINUED | OUTPATIENT
Start: 2019-11-20 | End: 2019-11-20 | Stop reason: HOSPADM

## 2019-11-20 RX ORDER — LIDOCAINE HYDROCHLORIDE 20 MG/ML
INJECTION, SOLUTION INFILTRATION; PERINEURAL AS NEEDED
Status: DISCONTINUED | OUTPATIENT
Start: 2019-11-20 | End: 2019-11-20 | Stop reason: SURG

## 2019-11-20 RX ORDER — SUCCINYLCHOLINE CHLORIDE 20 MG/ML
INJECTION INTRAMUSCULAR; INTRAVENOUS AS NEEDED
Status: DISCONTINUED | OUTPATIENT
Start: 2019-11-20 | End: 2019-11-20 | Stop reason: SURG

## 2019-11-20 RX ORDER — OXYCODONE AND ACETAMINOPHEN 7.5; 325 MG/1; MG/1
2 TABLET ORAL EVERY 4 HOURS PRN
Status: DISCONTINUED | OUTPATIENT
Start: 2019-11-20 | End: 2019-11-20 | Stop reason: HOSPADM

## 2019-11-20 RX ORDER — MIDAZOLAM HYDROCHLORIDE 1 MG/ML
1 INJECTION INTRAMUSCULAR; INTRAVENOUS
Status: DISCONTINUED | OUTPATIENT
Start: 2019-11-20 | End: 2019-11-20 | Stop reason: HOSPADM

## 2019-11-20 RX ORDER — METOCLOPRAMIDE HYDROCHLORIDE 5 MG/ML
5 INJECTION INTRAMUSCULAR; INTRAVENOUS
Status: DISCONTINUED | OUTPATIENT
Start: 2019-11-20 | End: 2019-11-20 | Stop reason: HOSPADM

## 2019-11-20 RX ORDER — FENTANYL CITRATE 50 UG/ML
25 INJECTION, SOLUTION INTRAMUSCULAR; INTRAVENOUS AS NEEDED
Status: DISCONTINUED | OUTPATIENT
Start: 2019-11-20 | End: 2019-11-20 | Stop reason: HOSPADM

## 2019-11-20 RX ORDER — FENTANYL CITRATE 50 UG/ML
INJECTION, SOLUTION INTRAMUSCULAR; INTRAVENOUS AS NEEDED
Status: DISCONTINUED | OUTPATIENT
Start: 2019-11-20 | End: 2019-11-20 | Stop reason: SURG

## 2019-11-20 RX ORDER — PROPOFOL 10 MG/ML
VIAL (ML) INTRAVENOUS AS NEEDED
Status: DISCONTINUED | OUTPATIENT
Start: 2019-11-20 | End: 2019-11-20 | Stop reason: SURG

## 2019-11-20 RX ORDER — MAGNESIUM HYDROXIDE 1200 MG/15ML
LIQUID ORAL AS NEEDED
Status: DISCONTINUED | OUTPATIENT
Start: 2019-11-20 | End: 2019-11-20 | Stop reason: HOSPADM

## 2019-11-20 RX ORDER — SODIUM CHLORIDE 0.9 % (FLUSH) 0.9 %
3 SYRINGE (ML) INJECTION EVERY 12 HOURS SCHEDULED
Status: DISCONTINUED | OUTPATIENT
Start: 2019-11-20 | End: 2019-11-20 | Stop reason: HOSPADM

## 2019-11-20 RX ORDER — SODIUM CHLORIDE 0.9 % (FLUSH) 0.9 %
3 SYRINGE (ML) INJECTION AS NEEDED
Status: DISCONTINUED | OUTPATIENT
Start: 2019-11-20 | End: 2019-11-20 | Stop reason: HOSPADM

## 2019-11-20 RX ORDER — NALOXONE HCL 0.4 MG/ML
0.4 VIAL (ML) INJECTION AS NEEDED
Status: DISCONTINUED | OUTPATIENT
Start: 2019-11-20 | End: 2019-11-20 | Stop reason: HOSPADM

## 2019-11-20 RX ORDER — SODIUM CHLORIDE, SODIUM LACTATE, POTASSIUM CHLORIDE, CALCIUM CHLORIDE 600; 310; 30; 20 MG/100ML; MG/100ML; MG/100ML; MG/100ML
1000 INJECTION, SOLUTION INTRAVENOUS CONTINUOUS
Status: DISCONTINUED | OUTPATIENT
Start: 2019-11-20 | End: 2019-11-20 | Stop reason: HOSPADM

## 2019-11-20 RX ADMIN — PROPOFOL 150 MG: 10 INJECTION, EMULSION INTRAVENOUS at 11:17

## 2019-11-20 RX ADMIN — FENTANYL CITRATE 50 MCG: 50 INJECTION, SOLUTION INTRAMUSCULAR; INTRAVENOUS at 11:22

## 2019-11-20 RX ADMIN — SUCCINYLCHOLINE CHLORIDE 100 MG: 20 INJECTION, SOLUTION INTRAMUSCULAR; INTRAVENOUS at 11:17

## 2019-11-20 RX ADMIN — ROCURONIUM BROMIDE 10 MG: 10 INJECTION INTRAVENOUS at 11:16

## 2019-11-20 RX ADMIN — SODIUM CHLORIDE, POTASSIUM CHLORIDE, SODIUM LACTATE AND CALCIUM CHLORIDE 1000 ML: 600; 310; 30; 20 INJECTION, SOLUTION INTRAVENOUS at 09:08

## 2019-11-20 RX ADMIN — FENTANYL CITRATE 50 MCG: 50 INJECTION, SOLUTION INTRAMUSCULAR; INTRAVENOUS at 11:15

## 2019-11-20 RX ADMIN — LIDOCAINE HYDROCHLORIDE 50 MG: 20 INJECTION, SOLUTION INFILTRATION; PERINEURAL at 11:17

## 2019-11-20 NOTE — ANESTHESIA PROCEDURE NOTES
Airway  Date/Time: 11/20/2019 11:18 AM  Airway not difficult    General Information and Staff    Patient location during procedure: OR  CRNA: Gary Wiley CRNA    Indications and Patient Condition  Indications for airway management: airway protection    Preoxygenated: yes  Mask difficulty assessment: 0 - not attempted    Final Airway Details  Final airway type: endotracheal airway      Successful airway: ETT  Cuffed: yes   Successful intubation technique: direct laryngoscopy  Facilitating devices/methods: intubating stylet  Endotracheal tube insertion site: oral  Blade: Dada  Blade size: 4  ETT size (mm): 7.0  Cormack-Lehane Classification: grade IIa - partial view of glottis  Placement verified by: capnometry   Cuff volume (mL): 6  Measured from: lips  ETT/EBT  to lips (cm): 23  Number of attempts at approach: 1  Assessment: lips, teeth, and gum same as pre-op and atraumatic intubation

## 2019-11-20 NOTE — OP NOTE
OPERATIVE NOTE  11/20/2019    NAME: Saul Alcantara Sr.    YOB: 1955  MRN: [id number]    PRE-OPERATIVE DIAGNOSIS:    Pharyngoesophageal dysphagia [R13.14]  Upper esophageal stricture  History of radiation therapy    POST-OPERATIVE DIAGNOSIS:   Post-Op Diagnosis Codes:     * Pharyngoesophageal dysphagia [R13.14]  Upper esophageal stricture  History of radiation therapy    PROCEDURE PERFORMED:   Direct laryngoscopy with esophageal Johnson dilation    SURGEON:   Wayne Richmond MD    ASSISTANT(S):   None    ANESTHESIA:   General Anesthesia via Endotracheal Tube    INDICATIONS: The patient is a 64 y.o. male with Pharyngoesophageal dysphagia [R13.14]    PROCEDURE:  The patient was brought to the operating room, given General Anesthesia via Endotracheal Tube, and prepped and draped in the usual manner.     Direct laryngoscopy was performed and no masses lesions ulcerations or other abnormalities noted throughout the upper aerodigestive tract examination.  Mucosa was relatively dry and thick mucus secretions were noted.  Johnson dilatation was accomplished beginning with a 24 [place] citizen and no significant resistance was noted.  Dilators were increased by 2 [place] citizen sizes up to 54 without difficulty with the exception that a 36 [place] citizen and a 46 [place] citizen was not utilized.  No significant bleeding was encountered and following dilatation with a 54 [place] citizen Johnson dilator the procedure was terminated.     The patient tolerated the procedure well without complications and was transported to the postanesthesia care unit in stable condition.    SPECIMENS:  None    COMPLICATIONS: NONE    ESTIMATED BLOOD LOSS:  Minimal    Wayne Richmond MD  11/20/2019

## 2019-11-20 NOTE — ANESTHESIA PREPROCEDURE EVALUATION
Anesthesia Evaluation     Patient summary reviewed   no history of anesthetic complications:  NPO Solid Status: > 8 hours  NPO Liquid Status: > 8 hours           Airway   Mallampati: I  TM distance: >3 FB  Neck ROM: full  Dental    (+) edentulous and upper dentures    Pulmonary - normal exam    breath sounds clear to auscultation  (+) a smoker (quit 5 yrs ago) Former,   (-) asthma, recent URI, sleep apnea  Cardiovascular - normal exam  Exercise tolerance: good (4-7 METS)    ECG reviewed  Rhythm: regular  Rate: normal    (+) hypertension,   (-) pacemaker, past MI, angina, cardiac stents      Neuro/Psych  (+) psychiatric history Anxiety and Depression,     (-) seizures, TIA, CVA  GI/Hepatic/Renal/Endo    (+)  GERD,  thyroid problem hypothyroidism  (-) liver disease, no renal disease, diabetes    ROS Comment: Dysphagia    Musculoskeletal     Abdominal    Substance History      OB/GYN          Other      history of cancer (Tongue cancer, radiation 2014) remission                  Anesthesia Plan    ASA 3     general     intravenous induction     Anesthetic plan, all risks, benefits, and alternatives have been provided, discussed and informed consent has been obtained with: patient.

## 2019-11-20 NOTE — DISCHARGE INSTRUCTIONS

## 2019-12-02 ENCOUNTER — OFFICE VISIT (OUTPATIENT)
Dept: OTOLARYNGOLOGY | Facility: CLINIC | Age: 64
End: 2019-12-02

## 2019-12-02 VITALS
HEIGHT: 73 IN | RESPIRATION RATE: 16 BRPM | WEIGHT: 170.8 LBS | SYSTOLIC BLOOD PRESSURE: 129 MMHG | TEMPERATURE: 97.6 F | DIASTOLIC BLOOD PRESSURE: 90 MMHG | HEART RATE: 82 BPM | BODY MASS INDEX: 22.64 KG/M2

## 2019-12-02 DIAGNOSIS — R13.14 PHARYNGOESOPHAGEAL DYSPHAGIA: ICD-10-CM

## 2019-12-02 DIAGNOSIS — J30.9 ALLERGIC RHINITIS, UNSPECIFIED SEASONALITY, UNSPECIFIED TRIGGER: Primary | ICD-10-CM

## 2019-12-02 DIAGNOSIS — J32.0 CHRONIC MAXILLARY SINUSITIS: ICD-10-CM

## 2019-12-02 DIAGNOSIS — K11.7 XEROSTOMIA: ICD-10-CM

## 2019-12-02 DIAGNOSIS — K21.9 LARYNGOPHARYNGEAL REFLUX: ICD-10-CM

## 2019-12-02 DIAGNOSIS — H61.032 PERICHONDRITIS AND CHONDRITIS OF PINNA, LEFT: ICD-10-CM

## 2019-12-02 DIAGNOSIS — C02.9 PRIMARY SQUAMOUS CELL CARCINOMA OF TONGUE (HCC): ICD-10-CM

## 2019-12-02 DIAGNOSIS — H61.002 PERICHONDRITIS AND CHONDRITIS OF PINNA, LEFT: ICD-10-CM

## 2019-12-02 DIAGNOSIS — Z92.3 H/O HEAD AND NECK RADIATION: ICD-10-CM

## 2019-12-02 DIAGNOSIS — M35.00 SICCA SYNDROME (HCC): ICD-10-CM

## 2019-12-02 PROCEDURE — 99214 OFFICE O/P EST MOD 30 MIN: CPT | Performed by: PHYSICIAN ASSISTANT

## 2019-12-02 RX ORDER — TRIAMCINOLONE ACETONIDE 5 MG/G
OINTMENT TOPICAL 2 TIMES DAILY
Qty: 15 G | Refills: 3 | Status: SHIPPED | OUTPATIENT
Start: 2019-12-02 | End: 2023-03-02

## 2019-12-02 NOTE — PATIENT INSTRUCTIONS
Will start steroid ointment for the ear and recheck ear and swallowing in 6 weeks. Continue treatment as directed.

## 2020-01-15 NOTE — PROGRESS NOTES
YOB: 1955  Location: [facility]  Location Address: [address] [place] [address], [facility], [address] [place] [address]  Location Phone: [phone]    Chief Complaint   Patient presents with   • Follow-up   • Earache     left ear pain, tingling       History of Present Illness  Saul Alcantara Sr. is a 61 y.o. male who presents for follow-up evaluation. Patient had DL and biopsy of base of tongue on 9/10/14 with positive pathology for SCCa. The cancer was staged T1M0N0. The patient has a history of radiation therapy due to a base of tongue cancer on the left side that he finished in 2014. The patient is status post Direct laryngoscopy with esophageal Johnson dilation on 19 by Dr. Richmond. The patient reports left outer ear pain and salty taste in mouth.  This is moderate and has been present for the last several months. Patient states nothing seems to make it better or worse. Patient's last MRI was clear. 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 He states he is stable otherwise today.     He is complaining of some recurrence of his dysphagia which was initially improved after direct laryngoscopy with Johnson dilatation in November.  He said it improved for approximately 4 to 6 weeks and then gradually started to worsen again.  The ear discomfort he is having is on the same side as his osteoradionecrosis.  He also complains of a dry mouth and dysgeusia related to saltiness.    He admits to smoking 1 to 2 cigarettes in the morning occasionally.  He states his wife is a chain smoker.                          Recent PET scan was normal.                                              Study Result      EXAMINATION: FL VIDEO SWALLOW W SPEECH- 3/7/2018 9:35 AM CST     HISTORY: Dysphagia; R13.10-Dysphagia, unspecified, radiation therapy.     REPORT:  The speech pathologist was present and supervised the  administration of multiple  consistencies of barium to the patient  orally, during intermittent lateral fluoroscopy and digital video  capture. The patient is able to swallow all attempted consistencies  without evidence of aspiration, there is deep penetration with thin  barium with and without a chin tuck.     IMPRESSION:  No aspiration seen. There is deep penetration with thin  barium.. Please review the speech pathologist's report for more  information.     This report was finalized on 03/07/2018 09:36 by Dr. Kenyon Paige MD.      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  History of marginal zone lymphoma the spleen in 2004.  Relapse of lymphoma in 2012 treated to remission.  COMPARISON:  6/7/2017  TECHNIQUE: Positron emission tomography from the vertex to the upper thighs with unenhanced CT for attenuation correction and image fusion.  Pre-test glucose: 103 mg/dL.  F-18 FDG Dose: 11.8 mCi. Uptake duration: 61 minutes. DLP: 278. Reference liver average SUV: 2.3. Reported image numbers refer to the axial series.  SUV units in lean body mass.  PET-CT FINDINGS:  Head and Neck: No suspicious metabolic activity demonstrated.  Chest:  No suspicious metabolic activity.  Abdomen and Pelvis:  A single left inguinal node has enlarged measuring 11 mm with SUV max 3.8, image 284.  Skeletal Structures: No suspicious skeletal uptake.  FDG uptake in the soft tissues adjacent to the right greater trochanter is likely due to trochanteric bursitis.  ADDITIONAL CT FINDINGS:  Enlarged prostate gland.  Status post splenectomy.  Minimal calcified plaque in the aorta and pelvic arteries.   No aneurysm.  IMPRESSION:  1.  Interval enlargement and increased metabolic activity in a single left inguinal node.  This may be reactive and follow-up with physical exam, ultrasound or CT is recommended.     Electronically Signed By: Dr. Brendan Mcgee        Electronically Signed On: 6/7/2018 11:02:32 AM      Study Result      EXAM: MR BRAIN WITHOUT AND WITH IV CONTRAST 05/16/2019     COMPARISON: Change of taste.      HISTORY: 63 years-old Male. change of taste; R43.2-Parageusia     TECHNIQUE:   Routine pulse sequences were obtained of the brain before and after the  administration of IV contrast.      REPORT:   Mild generalized cerebral volume loss. Mild chronic microvascular  changes. There is no evidence of mass-effect or midline shift. No  reduced diffusivity is demonstrated. No abnormally enhancing lesions are  identified.The brainstem, sella, pituitary, cerebellum are unremarkable.  The basal cisterns are preserved.      No acute osseous lesion. Intraorbital structures demonstrate prior  cataract surgery.     The flow voids are preserved. Dural sinuses are patent.       The visualized portions of the paranasal sinuses and mastoid air cells  are unremarkable.      IMPRESSION:  1.  No acute intracranial abnormalities.  2.  Mild chronic microvascular changes.  This report was finalized on 05/16/2019 15:09 by Dr. Zoey Velazquez MD.                   Past Medical History:   Diagnosis Date   • Allergic rhinitis    • Anxiety    • Chronic rhinitis    • Chronic sinusitis    • Depression    • Deviated nasal septum    • Enlarged prostate    • GERD (gastroesophageal reflux disease)    • H/O head and neck radiation 3/3/2017   • History of transfusion    • Hypertension    • Hypothyroidism    • Laryngopharyngeal reflux    • Lymphoma (CMS/HCC)     Non-Hodgkins   • MRSA infection    • Panic attacks    • Peyronie disease    • Pneumonia    • 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    T1N0M0 SCC S/P  XRT/Chemo 12/2014   • 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    T1N0M0 SCC S/P XRT/Chemo 12/2014   • Sicca syndrome (CMS/HCC)    • Sinusitis, acute    • Squamous cell carcinoma     Base of Tongue   • Tobacco use disorder    • Tongue irritation    • Xerostomia        Past Surgical History:   Procedure Laterality Date   • CATARACT EXTRACTION Bilateral    • ESOPHAGOSCOPY N/A 11/20/2019    Procedure: Direct laryngoscopy with esophageal Johnson dilation;  Surgeon: Wayne Richmond MD;  Location: [facility];  Service: ENT   • HAND SURGERY      metal plate    • KNEE SURGERY     • OTHER SURGICAL HISTORY  09/10/2014    Microlaryngoscopy with Biopsy - Base of Tongue   • SPLENECTOMY     • SQUAMOUS CELL CARCINOMA EXCISION  09/10/2014    Tongue   • TONSILLECTOMY         Outpatient Medications Marked as Taking for the 1/16/20 encounter (Office Visit) with Wayne Richmond MD   Medication Sig Dispense Refill   • albuterol (PROVENTIL) (2.5 MG/3ML) 0.083% nebulizer solution Take 2.5 mg by nebulization Every 4 (Four) Hours As Needed for Wheezing. 50 mL 1   • ALPRAZolam (XANAX) 2 MG tablet Take 2 mg by mouth 3 (Three) Times a Day.     • amLODIPine (NORVASC) 10 MG tablet Take 20 mg by mouth Daily.     • cyclobenzaprine (FLEXERIL) 10 MG tablet TK 1 T PO TID as needed     • fluticasone (FLONASE) 50 MCG/ACT nasal spray 2 sprays into each nostril Daily. (Patient taking differently: 2 sprays into the nostril(s) as directed by provider Daily As Needed.) 16 g 5   • glycopyrrolate (ROBINUL) 1 MG tablet TK 1 T PO  TID  3   • HYDROcodone-acetaminophen (NORCO) 7.5-325 MG per tablet TK 1 T PO QD PRN P  0   • levothyroxine (SYNTHROID, LEVOTHROID) 100 MCG tablet Take 100 mcg by mouth Daily.     • Lidocaine Viscous HCl (XYLOCAINE) 2 % solution Take 5 mL by mouth As Needed for Mild Pain .     • meloxicam (MOBIC) 15 MG tablet Take 15 mg by mouth Daily.     • nystatin (MYCOSTATIN) 634574 UNIT/ML suspension Take 10ml by  mouth BID for 28 days (Patient taking differently: Take 200,000 Units by mouth 4 (Four) Times a Day As Needed. Take 10ml by mouth BID for 28 days) 480 mL 2   • ondansetron (ZOFRAN) 8 MG tablet Take 8 mg by mouth Every 8 (Eight) Hours As Needed for Nausea.     • pantoprazole (PROTONIX) 40 MG EC tablet Delayed Release (DR/EC)  Take 1 tablet (40 mg) by oral route 2 times per day,  Take 30 minutes prior to breakfast and evening meal     • senna-docusate (PERICOLACE) 8.6-50 MG per tablet Take 1 tablet by mouth 2 times daily     • sertraline (ZOLOFT) 50 MG tablet Take 50 mg by mouth Daily.     • simvastatin (ZOCOR) 20 MG tablet TK 1 T PO QD  11   • tamsulosin (FLOMAX) 0.4 MG capsule 24 hr capsule Take 0.4 mg by mouth Daily.     • triamcinolone (KENALOG) 0.5 % ointment Apply  topically to the appropriate area as directed 2 (Two) Times a Day. Apply to affected ear 15 g 3   • zolpidem (AMBIEN) 10 MG tablet Take 10 mg by mouth At Night As Needed for Sleep.         Patient has no known allergies.    Family History   Problem Relation Age of Onset   • Diabetes Sister    • Cancer Sister        Social History     Socioeconomic History   • Marital status:      Spouse name: Not on file   • Number of children: Not on file   • Years of education: Not on file   • Highest education level: Not on file   Tobacco Use   • Smoking status: Former Smoker     Types: Cigarettes     Last attempt to quit: 2014     Years since quittin.0   • Smokeless tobacco: Never Used   Substance and Sexual Activity   • Alcohol use: No   • Drug use: No   • Sexual activity: Defer       Review of Systems   Constitutional: Negative.    HENT: Positive for trouble swallowing.         Salty taste in mouth, outer ear pain   Eyes: Negative.    Respiratory: Negative.    Cardiovascular: Negative.    Gastrointestinal: Negative.    Endocrine: Negative.    Genitourinary: Negative.    Musculoskeletal: Negative.    Skin: Negative.    Allergic/Immunologic: Negative.     Neurological: Negative.    Hematological: Negative.    Psychiatric/Behavioral: Negative.        Vitals:    01/16/20 0906   BP: 145/84   Pulse: 88   Temp: 97.2 °F (36.2 °C)       Body mass index is 22.4 kg/m².    Objective     Physical Exam  CONSTITUTIONAL: well nourished, alert, oriented, in no acute distress     COMMUNICATION AND VOICE: able to communicate normally, normal voice quality    HEAD: normocephalic, no lesions, atraumatic, no tenderness, no masses     FACE: appearance normal, no lesions, no tenderness, no deformities, facial motion symmetric    SALIVARY GLANDS: parotid glands with no tenderness, no swelling, no masses, submandibular glands with normal size, nontender    EYES: ocular motility normal, eyelids normal, orbits normal, no proptosis, conjunctiva normal , pupils equal, round     EARS:  Hearing: response to conversational voice normal bilaterally   External Ears: auricles without lesions  Otoscopic: tympanic membrane appearance normal, no lesions, no perforation, normal mobility, no fluid    NOSE:  External Nose: structure normal, no tenderness on palpation, no nasal discharge, no lesions, no evidence of trauma, nostrils patent   Intranasal Exam: nasal mucosa normal, vestibule within normal limits, inferior turbinate normal, nasal septum midline   Nasopharynx:     ORAL:  Lips: upper and lower lips without lesion   Teeth: Edentulous without exposed bone presently  Gums: gingivae healthy   Oral Mucosa: oral mucosa normal, no mucosal lesions   Floor of Mouth: Warthin’s duct patent, mucosa normal  Tongue: lingual mucosa normal without lesions, normal tongue mobility   Palate: soft and hard palates with normal mucosa and structure  Oropharynx: oropharyngeal mucosa normal-no masses or lesions appreciated by visualization or palpation including bimanual palpation at the base of the tongue    HYPOPHARYNX:   LARYNX: epiglottis and arytenoid cartilage within normal limits, vocal cord mucosa normal with  normal mobility -no masses or lesions appreciated    NECK: neck appearance normal, no mass,  noted without erythema or tenderness    THYROID: no overt thyromegaly, no tenderness, nodules or mass present on palpation, position midline     LYMPH NODES: no lymphadenopathy    CHEST/RESPIRATORY: respiratory effort normal, normal breath sounds     CARDIOVASCULAR: rate and rhythm normal, extremities without cyanosis or edema      NEUROLOGIC/PSYCHIATRIC: oriented to time, place and person, mood normal, affect appropriate, CN II-XII intact grossly    Assessment/Plan   Saul was seen today for follow-up and earache.    Diagnoses and all orders for this visit:    Primary squamous cell carcinoma of tongue (CMS/HCC)    H/O head and neck radiation    Sicca syndrome (CMS/HCC)    Pharyngoesophageal dysphagia    Tobacco use disorder      * Surgery not found *  No orders of the defined types were placed in this encounter.    Return for postop.       Patient Instructions   I advised the patient of the risks in continuing to use tobacco and recommended complete cessation, The inherent risks including the risk of disability, development of a malignancy and/or death was discussed.  The patient indicated understanding.    Risk benefits and options regarding rec laryngoscopy with Johnson dilatation was discussed at length with the patient he was agreeable to proceeding  .  Be aware of the signs and symptoms of recurrent head and neck cancer including neck mass, persistent or recurrent sore throat, ear pain, hemoptysis, weight loss and hoarseness. If any of these symptoms recur and or persist, call for an evaluation.     D/W patient increasing caloric intake and discussed cruciferous vegetables and protein shakes etc to maintain optimal nutrition.  The necessity of abstaining from tobacco products was also discussed particularly with respect to not smoking.    Continue F/U and/or treatment with [facility]   10-year follow-up with Dr. West for  ORN

## 2020-01-16 ENCOUNTER — OFFICE VISIT (OUTPATIENT)
Dept: OTOLARYNGOLOGY | Facility: CLINIC | Age: 65
End: 2020-01-16

## 2020-01-16 VITALS
WEIGHT: 169.8 LBS | HEART RATE: 88 BPM | SYSTOLIC BLOOD PRESSURE: 145 MMHG | BODY MASS INDEX: 22.5 KG/M2 | DIASTOLIC BLOOD PRESSURE: 84 MMHG | HEIGHT: 73 IN | TEMPERATURE: 97.2 F

## 2020-01-16 DIAGNOSIS — C02.9 PRIMARY SQUAMOUS CELL CARCINOMA OF TONGUE (HCC): Primary | ICD-10-CM

## 2020-01-16 DIAGNOSIS — M35.00 SICCA SYNDROME (HCC): ICD-10-CM

## 2020-01-16 DIAGNOSIS — R13.14 PHARYNGOESOPHAGEAL DYSPHAGIA: ICD-10-CM

## 2020-01-16 DIAGNOSIS — Z92.3 H/O HEAD AND NECK RADIATION: ICD-10-CM

## 2020-01-16 DIAGNOSIS — F17.200 TOBACCO USE DISORDER: ICD-10-CM

## 2020-01-16 PROCEDURE — 99214 OFFICE O/P EST MOD 30 MIN: CPT | Performed by: OTOLARYNGOLOGY

## 2020-01-16 RX ORDER — CEVIMELINE HYDROCHLORIDE 30 MG/1
30 CAPSULE ORAL 3 TIMES DAILY
Qty: 30 CAPSULE | Refills: 0 | Status: SHIPPED | OUTPATIENT
Start: 2020-01-16 | End: 2020-06-19

## 2020-01-16 NOTE — PATIENT INSTRUCTIONS
I advised the patient of the risks in continuing to use tobacco and recommended complete cessation, The inherent risks including the risk of disability, development of a malignancy and/or death was discussed.  The patient indicated understanding.    Risk benefits and options regarding rec laryngoscopy with Johnson dilatation was discussed at length with the patient he was agreeable to proceeding  .  Be aware of the signs and symptoms of recurrent head and neck cancer including neck mass, persistent or recurrent sore throat, ear pain, hemoptysis, weight loss and hoarseness. If any of these symptoms recur and or persist, call for an evaluation.     D/W patient increasing caloric intake and discussed cruciferous vegetables and protein shakes etc to maintain optimal nutrition.  The necessity of abstaining from tobacco products was also discussed particularly with respect to not smoking.    Continue F/U and/or treatment with [facility]   10-year follow-up with Dr. West for ORN

## 2020-01-17 ENCOUNTER — TELEPHONE (OUTPATIENT)
Dept: OTOLARYNGOLOGY | Facility: CLINIC | Age: 65
End: 2020-01-17

## 2020-01-17 NOTE — TELEPHONE ENCOUNTER
PT's wife worried about whether having the sx would mess up PT having a CT scan, per Basilio Nurse, PT's wife called and was told that it would not.

## 2020-01-24 ENCOUNTER — APPOINTMENT (OUTPATIENT)
Dept: PREADMISSION TESTING | Facility: HOSPITAL | Age: 65
End: 2020-01-24

## 2020-01-28 ENCOUNTER — APPOINTMENT (OUTPATIENT)
Dept: PREADMISSION TESTING | Facility: HOSPITAL | Age: 65
End: 2020-01-28

## 2020-03-06 ENCOUNTER — APPOINTMENT (OUTPATIENT)
Dept: PREADMISSION TESTING | Facility: HOSPITAL | Age: 65
End: 2020-03-06

## 2020-06-09 ENCOUNTER — PREP FOR SURGERY (OUTPATIENT)
Dept: OTHER | Facility: HOSPITAL | Age: 65
End: 2020-06-09

## 2020-06-09 DIAGNOSIS — R13.14 PHARYNGOESOPHAGEAL DYSPHAGIA: Primary | ICD-10-CM

## 2020-06-09 DIAGNOSIS — K21.9 LARYNGOPHARYNGEAL REFLUX: ICD-10-CM

## 2020-06-19 ENCOUNTER — APPOINTMENT (OUTPATIENT)
Dept: PREADMISSION TESTING | Facility: HOSPITAL | Age: 65
End: 2020-06-19

## 2020-06-19 ENCOUNTER — TRANSCRIBE ORDERS (OUTPATIENT)
Dept: ADMINISTRATIVE | Facility: HOSPITAL | Age: 65
End: 2020-06-19

## 2020-06-19 ENCOUNTER — HOSPITAL ENCOUNTER (OUTPATIENT)
Dept: GENERAL RADIOLOGY | Facility: HOSPITAL | Age: 65
Discharge: HOME | End: 2020-06-19
Admitting: OTOLARYNGOLOGY

## 2020-06-19 VITALS
HEART RATE: 99 BPM | RESPIRATION RATE: 16 BRPM | WEIGHT: 164.02 LBS | HEIGHT: 73 IN | BODY MASS INDEX: 21.74 KG/M2 | OXYGEN SATURATION: 98 % | DIASTOLIC BLOOD PRESSURE: 72 MMHG | SYSTOLIC BLOOD PRESSURE: 123 MMHG

## 2020-06-19 DIAGNOSIS — Z92.3 H/O HEAD AND NECK RADIATION: ICD-10-CM

## 2020-06-19 DIAGNOSIS — F17.200 TOBACCO USE DISORDER: ICD-10-CM

## 2020-06-19 DIAGNOSIS — R13.14 PHARYNGOESOPHAGEAL DYSPHAGIA: ICD-10-CM

## 2020-06-19 DIAGNOSIS — K21.9 LARYNGOPHARYNGEAL REFLUX: ICD-10-CM

## 2020-06-19 DIAGNOSIS — C02.9 PRIMARY SQUAMOUS CELL CARCINOMA OF TONGUE (HCC): ICD-10-CM

## 2020-06-19 DIAGNOSIS — Z01.818 PREOP TESTING: Primary | ICD-10-CM

## 2020-06-19 DIAGNOSIS — M35.00 SICCA SYNDROME (HCC): ICD-10-CM

## 2020-06-19 LAB
ALBUMIN SERPL-MCNC: 4.4 G/DL (ref 3.5–5.2)
ALBUMIN/GLOB SERPL: 1.8 G/DL
ALP SERPL-CCNC: 70 U/L (ref 39–117)
ALT SERPL W P-5'-P-CCNC: 18 U/L (ref 1–41)
ANION GAP SERPL CALCULATED.3IONS-SCNC: 11 MMOL/L (ref 5–15)
AST SERPL-CCNC: 23 U/L (ref 1–40)
BASOPHILS # BLD AUTO: 0.06 10*3/MM3 (ref 0–0.2)
BASOPHILS NFR BLD AUTO: 0.9 % (ref 0–1.5)
BILIRUB SERPL-MCNC: 0.6 MG/DL (ref 0.2–1.2)
BUN BLD-MCNC: 13 MG/DL (ref 8–23)
BUN/CREAT SERPL: 10.8 (ref 7–25)
CALCIUM SPEC-SCNC: 9.8 MG/DL (ref 8.6–10.5)
CHLORIDE SERPL-SCNC: 105 MMOL/L (ref 98–107)
CO2 SERPL-SCNC: 26 MMOL/L (ref 22–29)
CREAT BLD-MCNC: 1.2 MG/DL (ref 0.76–1.27)
DEPRECATED RDW RBC AUTO: 45.4 FL (ref 37–54)
EOSINOPHIL # BLD AUTO: 0.13 10*3/MM3 (ref 0–0.4)
EOSINOPHIL NFR BLD AUTO: 1.9 % (ref 0.3–6.2)
ERYTHROCYTE [DISTWIDTH] IN BLOOD BY AUTOMATED COUNT: 13.3 % (ref 12.3–15.4)
GFR SERPL CREATININE-BSD FRML MDRD: 61 ML/MIN/1.73
GLOBULIN UR ELPH-MCNC: 2.4 GM/DL
GLUCOSE BLD-MCNC: 99 MG/DL (ref 65–99)
HCT VFR BLD AUTO: 37.1 % (ref 37.5–51)
HGB BLD-MCNC: 12.5 G/DL (ref 13–17.7)
IMM GRANULOCYTES # BLD AUTO: 0.02 10*3/MM3 (ref 0–0.05)
IMM GRANULOCYTES NFR BLD AUTO: 0.3 % (ref 0–0.5)
LYMPHOCYTES # BLD AUTO: 0.99 10*3/MM3 (ref 0.7–3.1)
LYMPHOCYTES NFR BLD AUTO: 14.2 % (ref 19.6–45.3)
MCH RBC QN AUTO: 31.4 PG (ref 26.6–33)
MCHC RBC AUTO-ENTMCNC: 33.7 G/DL (ref 31.5–35.7)
MCV RBC AUTO: 93.2 FL (ref 79–97)
MONOCYTES # BLD AUTO: 0.63 10*3/MM3 (ref 0.1–0.9)
MONOCYTES NFR BLD AUTO: 9 % (ref 5–12)
NEUTROPHILS # BLD AUTO: 5.14 10*3/MM3 (ref 1.7–7)
NEUTROPHILS NFR BLD AUTO: 73.7 % (ref 42.7–76)
NRBC BLD AUTO-RTO: 0 /100 WBC (ref 0–0.2)
PLATELET # BLD AUTO: 274 10*3/MM3 (ref 140–450)
PMV BLD AUTO: 11.3 FL (ref 6–12)
POTASSIUM BLD-SCNC: 4.5 MMOL/L (ref 3.5–5.2)
PROT SERPL-MCNC: 6.8 G/DL (ref 6–8.5)
RBC # BLD AUTO: 3.98 10*6/MM3 (ref 4.14–5.8)
SODIUM BLD-SCNC: 142 MMOL/L (ref 136–145)
WBC NRBC COR # BLD: 6.97 10*3/MM3 (ref 3.4–10.8)

## 2020-06-19 PROCEDURE — 93010 ELECTROCARDIOGRAM REPORT: CPT | Performed by: INTERNAL MEDICINE

## 2020-06-19 PROCEDURE — 85025 COMPLETE CBC W/AUTO DIFF WBC: CPT | Performed by: PHYSICIAN ASSISTANT

## 2020-06-19 PROCEDURE — 71046 X-RAY EXAM CHEST 2 VIEWS: CPT

## 2020-06-19 PROCEDURE — 93005 ELECTROCARDIOGRAM TRACING: CPT

## 2020-06-19 PROCEDURE — 36415 COLL VENOUS BLD VENIPUNCTURE: CPT

## 2020-06-19 PROCEDURE — 80053 COMPREHEN METABOLIC PANEL: CPT | Performed by: PHYSICIAN ASSISTANT

## 2020-06-19 NOTE — DISCHARGE INSTRUCTIONS
DAY OF SURGERY INSTRUCTIONS        YOUR SURGEON: *** Dr. Richmond    PROCEDURE: ***Laryngoscopy With Microscope With Dilatation    DATE OF SURGERY: *** June 26, 2020    ARRIVAL TIME: AS DIRECTED BY OFFICE    YOU MAY TAKE THE FOLLOWING MEDICATION(S) THE MORNING OF SURGERY WITH A SIP OF WATER: *** norco and/or xanax if needed with a sip of water    ALL OTHER HOME MEDICATIONS CHECK WITH YOUR DOCTOR    DO NOT TAKE ANY ERECTILE DYSFUNCTION MEDICATIONS (EX:  CIALIS, VIAGRA) 24 HOURS PRIOR TO SURGERY              MANAGING PAIN AFTER SURGERY    We know you are probably wondering what your pain will be like after surgery.  Following surgery it is unrealistic to expect you will not have pain.   Pain is how our bodies let us know that something is wrong or cautions us to be careful.  That said, our goal is to make your pain tolerable.    Methods we may use to treat your pain include (oral or IV medications, PCAs, epidurals, nerve blocks, etc.)   While some procedures require IV pain medications for a short time after surgery, transitioning to pain medications by mouth allows for better management of pain.   Your nurse will encourage you to take oral pain medications whenever possible.  IV medications work almost immediately, but only last a short while.  Taking medications by mouth allows for a more constant level of medication in your blood stream for a longer period of time.      Once your pain is out of control it is harder to get back under control.  It is important you are aware when your next dose of pain medication is due.  If you are admitted, your nurse may write the time of your next dose on the white board in your room to help you remember.      We are interested in your pain and encourage you to inform us about aggravating factors during your visit.   Many times a simple repositioning every few hours can make a big difference.    If your physician says it is okay, do not let your pain prevent you from getting out of  bed. Be sure to call your nurse for assistance prior to getting up so you do not fall.      Before surgery, please decide your tolerable pain goal.  These faces help describe the pain ratings we use on a 0-10 scale.   Be prepared to tell us your goal and whether or not you take pain or anxiety medications at home.      BEFORE YOU COME TO THE HOSPITAL  (Pre-op instructions)  • Do not eat, drink, smoke or chew gum after midnight the night before surgery.  This also includes no mints.  • Morning of surgery take only the medicines you have been instructed with a sip of water unless otherwise instructed  by your physician.  • Do not shave, wear makeup or dark nail polish.  • Remove all jewelry including rings.  • Leave anything you consider valuable at home.  • Leave your suitcase in the car until after your surgery.  • Bring the following with you if applicable:  o Picture ID and insurance, Medicare or Medicaid cards  o Co-pay/deductible required by insurance (cash, check, credit card)  o Copy of advance directive, living will or power-of- documents if not brought to PAT  o CPAP or BIPAP mask and tubing  o Relaxation aids ( book, magazine), etc.  o Hearing aids                                 ON THE DAY OF SURGERY  · On the day of surgery check in at registration located at the main entrance of the hospital.   ? You will be registered and given a beeper with instructions where to wait in the main lobby.  ? When your beeper lights up and vibrates a member of the Outpatient Surgery staff will meet you at the double doors under the stair steps and escort you to your preoperative room.   · You may have cloth compression devices placed on your legs. These help to prevent blood clots and reduce swelling in your legs.  · An IV may be inserted into one of your veins.  · In the operating room, you may be given one or more of the following:  ? A medicine to help you relax (sedative).  ? A medicine to numb the area (local  "anesthetic).  ? A medicine to make you fall asleep (general anesthetic).  ? A medicine that is injected into an area of your body to numb everything below the injection site (regional anesthetic).  · Your surgical site will be marked or identified.  · You may be given an antibiotic through your IV to help prevent infection.  Contact a health care provider if you:  · Develop a fever of more than 100.4°F (38°C) or other feelings of illness during the 48 hours before your surgery.  · Have symptoms that get worse.  Have questions or concerns about your surgery    General Anesthesia/Surgery, Adult  General anesthesia is the use of medicines to make a person \"go to sleep\" (unconscious) for a medical procedure. General anesthesia must be used for certain procedures, and is often recommended for procedures that:  · Last a long time.  · Require you to be still or in an unusual position.  · Are major and can cause blood loss.  The medicines used for general anesthesia are called general anesthetics. As well as making you unconscious for a certain amount of time, these medicines:  · Prevent pain.  · Control your blood pressure.  · Relax your muscles.  Tell a health care provider about:  · Any allergies you have.  · All medicines you are taking, including vitamins, herbs, eye drops, creams, and over-the-counter medicines.  · Any problems you or family members have had with anesthetic medicines.  · Types of anesthetics you have had in the past.  · Any blood disorders you have.  · Any surgeries you have had.  · Any medical conditions you have.  · Any recent upper respiratory, chest, or ear infections.  · Any history of:  ? Heart or lung conditions, such as heart failure, sleep apnea, asthma, or chronic obstructive pulmonary disease (COPD).  ?  service.  ? Depression or anxiety.  · Any tobacco or drug use, including marijuana or alcohol use.  · Whether you are pregnant or may be pregnant.  What are the risks?  Generally, " this is a safe procedure. However, problems may occur, including:  · Allergic reaction.  · Lung and heart problems.  · Inhaling food or liquid from the stomach into the lungs (aspiration).  · Nerve injury.  · Air in the bloodstream, which can lead to stroke.  · Extreme agitation or confusion (delirium) when you wake up from the anesthetic.  · Waking up during your procedure and being unable to move. This is rare.  These problems are more likely to develop if you are having a major surgery or if you have an advanced or serious medical condition. You can prevent some of these complications by answering all of your health care provider's questions thoroughly and by following all instructions before your procedure.  General anesthesia can cause side effects, including:  · Nausea or vomiting.  · A sore throat from the breathing tube.  · Hoarseness.  · Wheezing or coughing.  · Shaking chills.  · Tiredness.  · Body aches.  · Anxiety.  · Sleepiness or drowsiness.  · Confusion or agitation.  RISKS AND COMPLICATIONS OF SURGERY  Your health care provider will discuss possible risks and complications with you before surgery. Common risks and complications include:    · Problems due to the use of anesthetics.  · Blood loss and replacement (does not apply to minor surgical procedures).  · Temporary increase in pain due to surgery.  · Uncorrected pain or problems that the surgery was meant to correct.  · Infection.  · New damage.    What happens before the procedure?    Medicines  Ask your health care provider about:  · Changing or stopping your regular medicines. This is especially important if you are taking diabetes medicines or blood thinners.  · Taking medicines such as aspirin and ibuprofen. These medicines can thin your blood. Do not take these medicines unless your health care provider tells you to take them.  · Taking over-the-counter medicines, vitamins, herbs, and supplements. Do not take these during the week before  your procedure unless your health care provider approves them.  General instructions  · Starting 3-6 weeks before the procedure, do not use any products that contain nicotine or tobacco, such as cigarettes and e-cigarettes. If you need help quitting, ask your health care provider.  · If you brush your teeth on the morning of the procedure, make sure to spit out all of the toothpaste.  · Tell your health care provider if you become ill or develop a cold, cough, or fever.  · If instructed by your health care provider, bring your sleep apnea device with you on the day of your surgery (if applicable).  · Ask your health care provider if you will be going home the same day, the following day, or after a longer hospital stay.  ? Plan to have someone take you home from the hospital or clinic.  ? Plan to have a responsible adult care for you for at least 24 hours after you leave the hospital or clinic. This is important.  What happens during the procedure?  · You will be given anesthetics through both of the following:  ? A mask placed over your nose and mouth.  ? An IV in one of your veins.  · You may receive a medicine to help you relax (sedative).  · After you are unconscious, a breathing tube may be inserted down your throat to help you breathe. This will be removed before you wake up.  · An anesthesia specialist will stay with you throughout your procedure. He or she will:  ? Keep you comfortable and safe by continuing to give you medicines and adjusting the amount of medicine that you get.  ? Monitor your blood pressure, pulse, and oxygen levels to make sure that the anesthetics do not cause any problems.  The procedure may vary among health care providers and hospitals.  What happens after the procedure?  · Your blood pressure, temperature, heart rate, breathing rate, and blood oxygen level will be monitored until the medicines you were given have worn off.  · You will wake up in a recovery area. You may wake up  slowly.  · If you feel anxious or agitated, you may be given medicine to help you calm down.  · If you will be going home the same day, your health care provider may check to make sure you can walk, drink, and urinate.  · Your health care provider will treat any pain or side effects you have before you go home.  · Do not drive for 24 hours if you were given a sedative.  Summary  · General anesthesia is used to keep you still and prevent pain during a procedure.  · It is important to tell your healthcare provider about your medical history and any surgeries you have had, and previous experience with anesthesia.  · Follow your healthcare provider’s instructions about when to stop eating, drinking, or taking certain medicines before your procedure.  · Plan to have someone take you home from the hospital or clinic.  This information is not intended to replace advice given to you by your health care provider. Make sure you discuss any questions you have with your health care provider.  Document Released: 03/26/2009 Document Revised: 08/03/2018 Document Reviewed: 08/03/2018  [employer] Interactive Patient Education © 2019 [employer]      Fall Prevention in Hospitals, Adult  As a hospital patient, your condition and the treatments you receive can increase your risk for falls. Some additional risk factors for falls in a hospital include:  · Being in an unfamiliar environment.  · Being on bed rest.  · Your surgery.  · Taking certain medicines.  · Your tubing requirements, such as intravenous (IV) therapy or catheters.  It is important that you learn how to decrease fall risks while at the hospital. Below are important tips that can help prevent falls.  SAFETY TIPS FOR PREVENTING FALLS  Talk about your risk of falling.  · Ask your health care provider why you are at risk for falling. Is it your medicine, illness, tubing placement, or something else?  · Make a plan with your health care provider to keep you safe from  falls.  · Ask your health care provider or pharmacist about side effects of your medicines. Some medicines can make you dizzy or affect your coordination.  Ask for help.  · Ask for help before getting out of bed. You may need to press your call button.  · Ask for assistance in getting safely to the toilet.  · Ask for a walker or cane to be put at your bedside. Ask that most of the side rails on your bed be placed up before your health care provider leaves the room.  · Ask family or friends to sit with you.  · Ask for things that are out of your reach, such as your glasses, hearing aids, telephone, bedside table, or call button.  Follow these tips to avoid falling:  · Stay lying or seated, rather than standing, while waiting for help.  · Wear rubber-soled slippers or shoes whenever you walk in the hospital.  · Avoid quick, sudden movements.  ¨ Change positions slowly.  ¨ Sit on the side of your bed before standing.  ¨ Stand up slowly and wait before you start to walk.  · Let your health care provider know if there is a spill on the floor.  · Pay careful attention to the medical equipment, electrical cords, and tubes around you.  · When you need help, use your call button by your bed or in the bathroom. Wait for one of your health care providers to help you.  · If you feel dizzy or unsure of your footing, return to bed and wait for assistance.  · Avoid being distracted by the TV, telephone, or another person in your room.  · Do not lean or support yourself on rolling objects, such as IV poles or bedside tables.     This information is not intended to replace advice given to you by your health care provider. Make sure you discuss any questions you have with your health care provider.     Document Released: 12/15/2001 Document Revised: 01/08/2016 Document Reviewed: 08/25/2013  [employer] Interactive Patient Education ©2016 [employer]            PATIENT/FAMILY/RESPONSIBLE PARTY VERBALIZES UNDERSTANDING OF ABOVE  EDUCATION.  COPY OF PAIN SCALE GIVEN AND REVIEWED WITH VERBALIZED UNDERSTANDING.

## 2020-06-23 ENCOUNTER — LAB (OUTPATIENT)
Dept: LAB | Facility: HOSPITAL | Age: 65
End: 2020-06-23

## 2020-06-23 PROCEDURE — U0003 INFECTIOUS AGENT DETECTION BY NUCLEIC ACID (DNA OR RNA); SEVERE ACUTE RESPIRATORY SYNDROME CORONAVIRUS 2 (SARS-COV-2) (CORONAVIRUS DISEASE [COVID-19]), AMPLIFIED PROBE TECHNIQUE, MAKING USE OF HIGH THROUGHPUT TECHNOLOGIES AS DESCRIBED BY CMS-2020-01-R: HCPCS | Performed by: OTOLARYNGOLOGY

## 2020-06-24 LAB
COVID LABCORP PRIORITY: NORMAL
SARS-COV-2 RNA RESP QL NAA+PROBE: NOT DETECTED

## 2020-06-26 ENCOUNTER — HOSPITAL ENCOUNTER (OUTPATIENT)
Facility: HOSPITAL | Age: 65
Setting detail: HOSPITAL OUTPATIENT SURGERY
Discharge: HOME | End: 2020-06-26
Attending: OTOLARYNGOLOGY | Admitting: OTOLARYNGOLOGY

## 2020-06-26 ENCOUNTER — ANESTHESIA (OUTPATIENT)
Dept: PERIOP | Facility: HOSPITAL | Age: 65
End: 2020-06-26

## 2020-06-26 ENCOUNTER — ANESTHESIA EVENT (OUTPATIENT)
Dept: PERIOP | Facility: HOSPITAL | Age: 65
End: 2020-06-26

## 2020-06-26 VITALS
HEART RATE: 71 BPM | RESPIRATION RATE: 16 BRPM | TEMPERATURE: 98.4 F | DIASTOLIC BLOOD PRESSURE: 85 MMHG | SYSTOLIC BLOOD PRESSURE: 135 MMHG | OXYGEN SATURATION: 94 %

## 2020-06-26 DIAGNOSIS — C02.9 PRIMARY SQUAMOUS CELL CARCINOMA OF TONGUE (HCC): Primary | ICD-10-CM

## 2020-06-26 DIAGNOSIS — K21.9 LARYNGOPHARYNGEAL REFLUX: ICD-10-CM

## 2020-06-26 DIAGNOSIS — R13.14 PHARYNGOESOPHAGEAL DYSPHAGIA: ICD-10-CM

## 2020-06-26 PROCEDURE — 43191 ESOPHAGOSCOPY RIGID TRNSO DX: CPT | Performed by: OTOLARYNGOLOGY

## 2020-06-26 PROCEDURE — 43450 DILATE ESOPHAGUS 1/MULT PASS: CPT | Performed by: OTOLARYNGOLOGY

## 2020-06-26 PROCEDURE — 25010000002 ONDANSETRON PER 1 MG: Performed by: NURSE ANESTHETIST, CERTIFIED REGISTERED

## 2020-06-26 PROCEDURE — 25010000002 FENTANYL CITRATE (PF) 100 MCG/2ML SOLUTION: Performed by: NURSE ANESTHETIST, CERTIFIED REGISTERED

## 2020-06-26 PROCEDURE — 25010000002 SUCCINYLCHOLINE PER 20 MG: Performed by: NURSE ANESTHETIST, CERTIFIED REGISTERED

## 2020-06-26 PROCEDURE — 25010000002 DEXAMETHASONE PER 1 MG: Performed by: NURSE ANESTHETIST, CERTIFIED REGISTERED

## 2020-06-26 PROCEDURE — 25010000002 MIDAZOLAM PER 1 MG: Performed by: ANESTHESIOLOGY

## 2020-06-26 PROCEDURE — 25010000002 PROPOFOL 10 MG/ML EMULSION: Performed by: NURSE ANESTHETIST, CERTIFIED REGISTERED

## 2020-06-26 RX ORDER — DEXAMETHASONE SODIUM PHOSPHATE 4 MG/ML
INJECTION, SOLUTION INTRA-ARTICULAR; INTRALESIONAL; INTRAMUSCULAR; INTRAVENOUS; SOFT TISSUE AS NEEDED
Status: DISCONTINUED | OUTPATIENT
Start: 2020-06-26 | End: 2020-06-26 | Stop reason: SURG

## 2020-06-26 RX ORDER — MIDAZOLAM HYDROCHLORIDE 1 MG/ML
1 INJECTION INTRAMUSCULAR; INTRAVENOUS
Status: COMPLETED | OUTPATIENT
Start: 2020-06-26 | End: 2020-06-26

## 2020-06-26 RX ORDER — FENTANYL CITRATE 50 UG/ML
INJECTION, SOLUTION INTRAMUSCULAR; INTRAVENOUS AS NEEDED
Status: DISCONTINUED | OUTPATIENT
Start: 2020-06-26 | End: 2020-06-26 | Stop reason: SURG

## 2020-06-26 RX ORDER — IBUPROFEN 600 MG/1
600 TABLET ORAL ONCE AS NEEDED
Status: DISCONTINUED | OUTPATIENT
Start: 2020-06-26 | End: 2020-06-26 | Stop reason: HOSPADM

## 2020-06-26 RX ORDER — ONDANSETRON 4 MG/1
4 TABLET, FILM COATED ORAL ONCE AS NEEDED
Status: DISCONTINUED | OUTPATIENT
Start: 2020-06-26 | End: 2020-06-26 | Stop reason: HOSPADM

## 2020-06-26 RX ORDER — SODIUM CHLORIDE 0.9 % (FLUSH) 0.9 %
3 SYRINGE (ML) INJECTION EVERY 12 HOURS SCHEDULED
Status: DISCONTINUED | OUTPATIENT
Start: 2020-06-26 | End: 2020-06-26 | Stop reason: HOSPADM

## 2020-06-26 RX ORDER — LIDOCAINE HYDROCHLORIDE 10 MG/ML
0.5 INJECTION, SOLUTION EPIDURAL; INFILTRATION; INTRACAUDAL; PERINEURAL ONCE AS NEEDED
Status: DISCONTINUED | OUTPATIENT
Start: 2020-06-26 | End: 2020-06-26 | Stop reason: HOSPADM

## 2020-06-26 RX ORDER — SODIUM CHLORIDE, SODIUM LACTATE, POTASSIUM CHLORIDE, CALCIUM CHLORIDE 600; 310; 30; 20 MG/100ML; MG/100ML; MG/100ML; MG/100ML
1000 INJECTION, SOLUTION INTRAVENOUS CONTINUOUS
Status: DISCONTINUED | OUTPATIENT
Start: 2020-06-26 | End: 2020-06-26 | Stop reason: HOSPADM

## 2020-06-26 RX ORDER — HYDROCODONE BITARTRATE AND ACETAMINOPHEN 5; 325 MG/1; MG/1
1 TABLET ORAL ONCE AS NEEDED
Status: DISCONTINUED | OUTPATIENT
Start: 2020-06-26 | End: 2020-06-26 | Stop reason: HOSPADM

## 2020-06-26 RX ORDER — ACETAMINOPHEN 500 MG
1000 TABLET ORAL ONCE
Status: DISCONTINUED | OUTPATIENT
Start: 2020-06-26 | End: 2020-06-26

## 2020-06-26 RX ORDER — FLUMAZENIL 0.1 MG/ML
0.2 INJECTION INTRAVENOUS AS NEEDED
Status: DISCONTINUED | OUTPATIENT
Start: 2020-06-26 | End: 2020-06-26 | Stop reason: HOSPADM

## 2020-06-26 RX ORDER — LIDOCAINE HYDROCHLORIDE 10 MG/ML
0.5 INJECTION, SOLUTION EPIDURAL; INFILTRATION; INTRACAUDAL; PERINEURAL ONCE AS NEEDED
Status: DISCONTINUED | OUTPATIENT
Start: 2020-06-26 | End: 2020-06-26

## 2020-06-26 RX ORDER — PROPOFOL 10 MG/ML
VIAL (ML) INTRAVENOUS AS NEEDED
Status: DISCONTINUED | OUTPATIENT
Start: 2020-06-26 | End: 2020-06-26 | Stop reason: SURG

## 2020-06-26 RX ORDER — ONDANSETRON 2 MG/ML
4 INJECTION INTRAMUSCULAR; INTRAVENOUS ONCE AS NEEDED
Status: DISCONTINUED | OUTPATIENT
Start: 2020-06-26 | End: 2020-06-26 | Stop reason: HOSPADM

## 2020-06-26 RX ORDER — LABETALOL HYDROCHLORIDE 5 MG/ML
5 INJECTION, SOLUTION INTRAVENOUS
Status: DISCONTINUED | OUTPATIENT
Start: 2020-06-26 | End: 2020-06-26 | Stop reason: HOSPADM

## 2020-06-26 RX ORDER — HYDROCODONE BITARTRATE AND ACETAMINOPHEN 5; 325 MG/1; MG/1
1 TABLET ORAL EVERY 4 HOURS PRN
Qty: 8 TABLET | Refills: 0 | Status: SHIPPED | OUTPATIENT
Start: 2020-06-26 | End: 2020-08-17

## 2020-06-26 RX ORDER — ROCURONIUM BROMIDE 10 MG/ML
INJECTION, SOLUTION INTRAVENOUS AS NEEDED
Status: DISCONTINUED | OUTPATIENT
Start: 2020-06-26 | End: 2020-06-26 | Stop reason: SURG

## 2020-06-26 RX ORDER — ONDANSETRON 2 MG/ML
INJECTION INTRAMUSCULAR; INTRAVENOUS AS NEEDED
Status: DISCONTINUED | OUTPATIENT
Start: 2020-06-26 | End: 2020-06-26 | Stop reason: SURG

## 2020-06-26 RX ORDER — SODIUM CHLORIDE 0.9 % (FLUSH) 0.9 %
3 SYRINGE (ML) INJECTION AS NEEDED
Status: DISCONTINUED | OUTPATIENT
Start: 2020-06-26 | End: 2020-06-26 | Stop reason: HOSPADM

## 2020-06-26 RX ORDER — SUCCINYLCHOLINE CHLORIDE 20 MG/ML
INJECTION INTRAMUSCULAR; INTRAVENOUS AS NEEDED
Status: DISCONTINUED | OUTPATIENT
Start: 2020-06-26 | End: 2020-06-26 | Stop reason: SURG

## 2020-06-26 RX ORDER — NALOXONE HCL 0.4 MG/ML
0.4 VIAL (ML) INJECTION AS NEEDED
Status: DISCONTINUED | OUTPATIENT
Start: 2020-06-26 | End: 2020-06-26 | Stop reason: HOSPADM

## 2020-06-26 RX ORDER — OXYCODONE AND ACETAMINOPHEN 7.5; 325 MG/1; MG/1
2 TABLET ORAL EVERY 4 HOURS PRN
Status: DISCONTINUED | OUTPATIENT
Start: 2020-06-26 | End: 2020-06-26 | Stop reason: HOSPADM

## 2020-06-26 RX ORDER — SODIUM CHLORIDE 0.9 % (FLUSH) 0.9 %
3-10 SYRINGE (ML) INJECTION AS NEEDED
Status: DISCONTINUED | OUTPATIENT
Start: 2020-06-26 | End: 2020-06-26 | Stop reason: HOSPADM

## 2020-06-26 RX ORDER — OXYCODONE AND ACETAMINOPHEN 10; 325 MG/1; MG/1
1 TABLET ORAL ONCE AS NEEDED
Status: DISCONTINUED | OUTPATIENT
Start: 2020-06-26 | End: 2020-06-26 | Stop reason: HOSPADM

## 2020-06-26 RX ORDER — MAGNESIUM HYDROXIDE 1200 MG/15ML
LIQUID ORAL AS NEEDED
Status: DISCONTINUED | OUTPATIENT
Start: 2020-06-26 | End: 2020-06-26 | Stop reason: HOSPADM

## 2020-06-26 RX ORDER — FENTANYL CITRATE 50 UG/ML
25 INJECTION, SOLUTION INTRAMUSCULAR; INTRAVENOUS
Status: DISCONTINUED | OUTPATIENT
Start: 2020-06-26 | End: 2020-06-26 | Stop reason: HOSPADM

## 2020-06-26 RX ORDER — SODIUM CHLORIDE, SODIUM LACTATE, POTASSIUM CHLORIDE, CALCIUM CHLORIDE 600; 310; 30; 20 MG/100ML; MG/100ML; MG/100ML; MG/100ML
100 INJECTION, SOLUTION INTRAVENOUS CONTINUOUS
Status: DISCONTINUED | OUTPATIENT
Start: 2020-06-26 | End: 2020-06-26 | Stop reason: HOSPADM

## 2020-06-26 RX ADMIN — PROPOFOL 100 MG: 10 INJECTION, EMULSION INTRAVENOUS at 09:58

## 2020-06-26 RX ADMIN — OXYCODONE HYDROCHLORIDE AND ACETAMINOPHEN 2 TABLET: 7.5; 325 TABLET ORAL at 10:56

## 2020-06-26 RX ADMIN — PROPOFOL 50 MG: 10 INJECTION, EMULSION INTRAVENOUS at 09:30

## 2020-06-26 RX ADMIN — MIDAZOLAM HYDROCHLORIDE 1 MG: 1 INJECTION, SOLUTION INTRAMUSCULAR; INTRAVENOUS at 08:23

## 2020-06-26 RX ADMIN — ONDANSETRON HYDROCHLORIDE 4 MG: 2 SOLUTION INTRAMUSCULAR; INTRAVENOUS at 09:46

## 2020-06-26 RX ADMIN — LIDOCAINE HYDROCHLORIDE 100 MG: 20 INJECTION, SOLUTION INTRAVENOUS at 09:30

## 2020-06-26 RX ADMIN — FENTANYL CITRATE 100 MCG: 50 INJECTION, SOLUTION INTRAMUSCULAR; INTRAVENOUS at 09:30

## 2020-06-26 RX ADMIN — PROPOFOL 100 MG: 10 INJECTION, EMULSION INTRAVENOUS at 09:31

## 2020-06-26 RX ADMIN — DEXAMETHASONE SODIUM PHOSPHATE 4 MG: 4 INJECTION, SOLUTION INTRAMUSCULAR; INTRAVENOUS at 09:46

## 2020-06-26 RX ADMIN — ROCURONIUM BROMIDE 5 MG: 10 INJECTION INTRAVENOUS at 09:30

## 2020-06-26 RX ADMIN — MIDAZOLAM HYDROCHLORIDE: 1 INJECTION, SOLUTION INTRAMUSCULAR; INTRAVENOUS at 08:35

## 2020-06-26 RX ADMIN — SUCCINYLCHOLINE CHLORIDE 80 MG: 20 INJECTION, SOLUTION INTRAMUSCULAR; INTRAVENOUS at 09:30

## 2020-06-26 RX ADMIN — SODIUM CHLORIDE, POTASSIUM CHLORIDE, SODIUM LACTATE AND CALCIUM CHLORIDE 100 ML/HR: 600; 310; 30; 20 INJECTION, SOLUTION INTRAVENOUS at 11:11

## 2020-06-26 RX ADMIN — SODIUM CHLORIDE, POTASSIUM CHLORIDE, SODIUM LACTATE AND CALCIUM CHLORIDE 1000 ML: 600; 310; 30; 20 INJECTION, SOLUTION INTRAVENOUS at 07:12

## 2020-06-26 NOTE — ANESTHESIA POSTPROCEDURE EVALUATION
Patient: Saul Alcantara Sr.    Procedure Summary     Date:  06/26/20 Room / Location:   [facility] OR 02 /  [facility]    Anesthesia Start:  0926 Anesthesia Stop:  1025    Procedure:  Rigid esophagoscopy Johnson esophageal dilatation (N/A ) Diagnosis:       Pharyngoesophageal dysphagia      Laryngopharyngeal reflux      (Pharyngoesophageal dysphagia [R13.14])      (Laryngopharyngeal reflux [K21.9])    Surgeon:  Wayne Richmond MD Provider:  Tae Colbert CRNA    Anesthesia Type:  general ASA Status:  3          Anesthesia Type: general    Vitals  Vitals Value Taken Time   /82 6/26/2020 11:06 AM   Temp 98.4 °F (36.9 °C) 6/26/2020 11:00 AM   Pulse 68 6/26/2020 11:08 AM   Resp 11 6/26/2020 11:00 AM   SpO2 97 % 6/26/2020 11:08 AM   Vitals shown include unvalidated device data.        Post Anesthesia Care and Evaluation    Patient location during evaluation: PACU  Patient participation: complete - patient participated  Level of consciousness: awake and alert  Pain management: adequate  Airway patency: patent  Anesthetic complications: No anesthetic complications    Cardiovascular status: acceptable  Respiratory status: acceptable  Hydration status: acceptable    Comments: Blood pressure 141/87, pulse 74, temperature 98.4 °F (36.9 °C), temperature source Temporal, resp. rate 16, SpO2 94 %.    Pt discharged from PACU based on jeff score >8

## 2020-06-26 NOTE — ANESTHESIA PREPROCEDURE EVALUATION
Anesthesia Evaluation     Patient summary reviewed   no history of anesthetic complications:  NPO Solid Status: > 8 hours  NPO Liquid Status: > 8 hours           Airway   Mallampati: I  TM distance: >3 FB  Neck ROM: full  Dental    (+) edentulous and upper dentures    Pulmonary - normal exam    breath sounds clear to auscultation  (+) a smoker (quit 5 yrs ago) Former, shortness of breath,   (-) asthma, recent URI, sleep apnea  Cardiovascular - normal exam  Exercise tolerance: good (4-7 METS)    ECG reviewed  Rhythm: regular  Rate: normal    (+) hypertension,   (-) pacemaker, past MI, angina, cardiac stents      Neuro/Psych  (+) psychiatric history Anxiety and Depression,     (-) seizures, TIA, CVA  GI/Hepatic/Renal/Endo    (+)  GERD,  thyroid problem hypothyroidism  (-) liver disease, no renal disease, diabetes    ROS Comment: Dysphagia, presents for esophageal dilation    Musculoskeletal     Abdominal    Substance History      OB/GYN          Other      history of cancer (Tongue cancer, radiation 2014) remission        Phys Exam Other: Previously gr IIa view with MAC 4                Anesthesia Plan    ASA 3     general     intravenous induction     Anesthetic plan, all risks, benefits, and alternatives have been provided, discussed and informed consent has been obtained with: patient.

## 2020-06-26 NOTE — ANESTHESIA PROCEDURE NOTES
Airway  Date/Time: 6/26/2020 9:33 AM  Airway not difficult    General Information and Staff    Patient location during procedure: OR  CRNA: Tae Colbert CRNA    Indications and Patient Condition  Indications for airway management: airway protection    Preoxygenated: yes  Mask difficulty assessment: 0 - not attempted    Final Airway Details  Final airway type: endotracheal airway      Successful airway: ETT  Cuffed: yes   Successful intubation technique: direct laryngoscopy  Blade: Zamora  Blade size: 2  ETT size (mm): 7.5  Cormack-Lehane Classification: grade I - full view of glottis  Placement verified by: chest auscultation and capnometry   Cuff volume (mL): 8  Measured from: gums  ETT/EBT to gums (cm): 22  Number of attempts at approach: 1  Assessment: lips, teeth, and gum same as pre-op and atraumatic intubation

## 2020-07-02 ENCOUNTER — NURSE TRIAGE (OUTPATIENT)
Dept: CALL CENTER | Facility: HOSPITAL | Age: 65
End: 2020-07-02

## 2020-07-03 NOTE — TELEPHONE ENCOUNTER
"Message sent to Dr. Stubbs.    Reason for Disposition  • Caller requesting a CONTROLLED substance prescription refill (e.g., narcotics, ADHD medicines)    Additional Information  • Negative: Drug overdose and triager unable to answer question  • Negative: Caller requesting information unrelated to medicine  • Negative: Caller requesting a prescription for Strep throat and has a positive culture result  • Negative: Rash while taking a medication or within 3 days of stopping it  • Negative: Immunization reaction suspected  • Negative: [1] Asthma and [2] having symptoms of asthma (cough, wheezing, etc.)  • Negative: [1] Influenza symptoms AND [2] anti-viral med prescription request, such as Tamiflu  • Negative: [1] Symptom of illness (e.g., headache, abdominal pain, earache, vomiting) AND [2] more than mild  • Negative: MORE THAN A DOUBLE DOSE of a prescription or over-the-counter (OTC) drug  • Negative: [1] DOUBLE DOSE (an extra dose or lesser amount) of over-the-counter (OTC) drug AND [2] any symptoms (e.g., dizziness, nausea, pain, sleepiness)  • Negative: [1] DOUBLE DOSE (an extra dose or lesser amount) of prescription drug AND [2] any symptoms (e.g., dizziness, nausea, pain, sleepiness)  • Negative: Took another person's prescription drug  • Negative: [1] DOUBLE DOSE (an extra dose or lesser amount) of prescription drug AND [2] NO symptoms (Exception: a double dose of antibiotics)  • Negative: Diabetes drug error or overdose (e.g., took wrong type of insulin or took extra dose)  • Negative: [1] Request for URGENT new prescription or refill of \"essential\" medication (i.e., likelihood of harm to patient if not taken) AND [2] triager unable to fill per unit policy  • Negative: [1] Prescription not at pharmacy AND [2] was prescribed by PCP recently  • Negative: [1] Pharmacy calling with prescription questions AND [2] triager unable to answer question  • Negative: [1] Caller has URGENT medication question about med " "that PCP or specialist prescribed AND [2] triager unable to answer question  • Negative: [1] Caller has NON-URGENT medication question about med that PCP prescribed AND [2] triager unable to answer question  • Negative: [1] Caller requesting a NON-URGENT new prescription or refill AND [2] triager unable to refill per unit policy  • Negative: [1] Caller has medication question about med not prescribed by PCP AND [2] triager unable to answer question (e.g., compatibility with other med, storage)  • Negative: Caller wants to use a complementary or alternative medicine  • Negative: [1] Prescription prescribed recently is not at pharmacy AND [2] triager has access to patient's EMR AND [3] prescription is recorded in the EMR  • Negative: [1] DOUBLE DOSE (an extra dose or lesser amount) of over-the-counter (OTC) drug AND [2] NO symptoms    Answer Assessment - Initial Assessment Questions  1.   NAME of MEDICATION: \"What medicine are you calling about?\"      xanax  2.   QUESTION: \"What is your question?\"      Missing prescription at [facility]  3.   PRESCRIBING HCP: \"Who prescribed it?\" Reason: if prescribed by specialist, call should be referred to that group.      Dr. Stubbs  4. SYMPTOMS: \"Do you have any symptoms?\"      Due to be filled July 5th  5. SEVERITY: If symptoms are present, ask \"Are they mild, moderate or severe?\"      na  6.  PREGNANCY:  \"Is there any chance that you are pregnant?\" \"When was your last menstrual period?\"      na    Protocols used: MEDICATION QUESTION CALL-ADULT-      "

## 2020-07-29 ENCOUNTER — TRANSCRIBE ORDERS (OUTPATIENT)
Dept: ADMINISTRATIVE | Facility: HOSPITAL | Age: 65
End: 2020-07-29

## 2020-07-29 DIAGNOSIS — Z01.818 PRE-OP TESTING: Primary | ICD-10-CM

## 2020-08-03 ENCOUNTER — LAB (OUTPATIENT)
Dept: LAB | Facility: HOSPITAL | Age: 65
End: 2020-08-03

## 2020-08-03 PROCEDURE — C9803 HOPD COVID-19 SPEC COLLECT: HCPCS | Performed by: OTOLARYNGOLOGY

## 2020-08-03 PROCEDURE — U0003 INFECTIOUS AGENT DETECTION BY NUCLEIC ACID (DNA OR RNA); SEVERE ACUTE RESPIRATORY SYNDROME CORONAVIRUS 2 (SARS-COV-2) (CORONAVIRUS DISEASE [COVID-19]), AMPLIFIED PROBE TECHNIQUE, MAKING USE OF HIGH THROUGHPUT TECHNOLOGIES AS DESCRIBED BY CMS-2020-01-R: HCPCS | Performed by: OTOLARYNGOLOGY

## 2020-08-04 LAB
COVID LABCORP PRIORITY: NORMAL
SARS-COV-2 RNA RESP QL NAA+PROBE: NOT DETECTED

## 2020-08-05 NOTE — H&P (VIEW-ONLY)
YOB: 1955  Location: [facility]  Location Address: [address] [place] [address], [facility], [address] [place] [address]  Location Phone: [phone]    Chief Complaint   Patient presents with   • Follow-up       History of Present Illness  Saul Alcantara Sr. is a 65 y.o. male.  Saul Alcantara Sr. is status post Rigid esophagoscopy and Johnson esophageal dilatation on 20. Patient had DL and biopsy of base of tongue on 9/10/14 with positive pathology for SCCa. The cancer was staged T1M0N0. The patient has a history of radiation therapy due to a base of tongue cancer on the left side that he finished in 2014.The patient reports improved swallowing for a few days, but symptoms returned shortly after dilatation. The patient continues to have burning mouth and dysphagia with dry mouth. He has tried multiple treatments to help with dryness and burning without much relief. He reports the most relief with viscous lidocaine. He also had a sinus infection the end of  that was treated with antibiotics and steroids with improved symptoms. He reports that his dentures aren't fitting well and states he has an appointment with his dentist for this. The patient continues to smoke cigarettes and reports smoking 1-2 cigarettes a day.    His wife states that he improved for several days significantly and that presently he is still able to eat puréed food well.  Prior to dilatation he was unable to even swallow puréed food.    I have personally reviewed the information imported into the chart during this visit.      I have personally reviewed the review of systems.          Past Medical History:   Diagnosis Date   • Allergic rhinitis    • Anxiety    • Chronic rhinitis    • Chronic sinusitis    • Depression    • Deviated nasal septum    • Enlarged prostate    • GERD (gastroesophageal reflux disease)    • H/O head and neck radiation 3/3/2017   • History of transfusion    • Hypertension    • Hypothyroidism    •  Laryngopharyngeal reflux    • Lymphoma (CMS/HCC)     Non-Hodgkins   • MRSA infection    • Panic attacks    • Peyronie disease    • Pneumonia    • Primary squamous cell carcinoma of tongue (CMS/[facility]) 9/27/2016    T1N0M0 SCC S/P XRT/Chemo 12/2014   • 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    T1N0M0 SCC S/P XRT/Chemo 12/2014   • Sicca syndrome (CMS/[facility])    • Sinusitis, acute    • Squamous cell carcinoma     Base of Tongue   • Tobacco use disorder    • Tongue irritation    • Xerostomia        Past Surgical History:   Procedure Laterality Date   • CATARACT EXTRACTION Bilateral    • ESOPHAGOSCOPY N/A 11/20/2019    Procedure: Direct laryngoscopy with esophageal Johnson dilation;  Surgeon: Wayne Richmond MD;  Location:  [facility] OR;  Service: ENT   • HAND SURGERY      metal plate    • KNEE SURGERY     • LARYNGOSCOPY N/A 6/26/2020    Procedure: Rigid esophagoscopy Johnson esophageal dilatation;  Surgeon: Wayne Richmond MD;  Location:  [facility] OR;  Service: ENT;  Laterality: N/A;   • OTHER SURGICAL HISTORY  09/10/2014    Microlaryngoscopy with Biopsy - Base of Tongue   • SPLENECTOMY     • SQUAMOUS CELL CARCINOMA EXCISION  09/10/2014    Tongue   • TONSILLECTOMY         Outpatient Medications Marked as Taking for the 8/6/20 encounter (Office Visit) with Wayne Richmond MD   Medication Sig Dispense Refill   • albuterol (PROVENTIL) (2.5 MG/3ML) 0.083% nebulizer solution Take 2.5 mg by nebulization Every 4 (Four) Hours As Needed for Wheezing. 50 mL 1   • ALPRAZolam (XANAX) 2 MG tablet Take 2 mg by mouth 3 (Three) Times a Day.     • amLODIPine (NORVASC) 10 MG tablet Take 20 mg by mouth Daily.     • azelastine (ASTELIN) 0.1 % nasal spray      • cyclobenzaprine (FLEXERIL) 10 MG tablet TK 1 T PO TID as needed     • fluticasone (FLONASE) 50 MCG/ACT nasal spray 2 sprays into each nostril Daily. (Patient taking differently: 2 sprays into the nostril(s) as directed by provider Daily As Needed.) 16 g 5   •  glycopyrrolate (ROBINUL) 1 MG tablet 0.5 mg 2 (Two) Times a Day.  3   • HYDROcodone-acetaminophen (NORCO) 5-325 MG per tablet Take 1 tablet by mouth Every 4 (Four) Hours As Needed for Moderate Pain  or Severe Pain  (Pain) for up to 8 doses. 8 tablet 0   • HYDROcodone-acetaminophen (NORCO) 7.5-325 MG per tablet TK 1 T PO QD PRN P  0   • levothyroxine (SYNTHROID, LEVOTHROID) 100 MCG tablet Take 100 mcg by mouth Daily.     • levothyroxine (SYNTHROID, LEVOTHROID) 75 MCG tablet Take 75 mcg by mouth Daily.     • Lidocaine Viscous HCl (XYLOCAINE) 2 % solution Take 5 mL by mouth As Needed for Mild Pain .     • meloxicam (MOBIC) 15 MG tablet Take 15 mg by mouth Daily.     • nystatin (MYCOSTATIN) 344127 UNIT/ML suspension Take 10ml by mouth BID for 28 days (Patient taking differently: Take 200,000 Units by mouth 4 (Four) Times a Day As Needed. Take 10ml by mouth BID for 28 days) 480 mL 2   • ondansetron (ZOFRAN) 8 MG tablet Take 8 mg by mouth Every 8 (Eight) Hours As Needed for Nausea.     • pantoprazole (PROTONIX) 40 MG EC tablet Delayed Release (DR/EC)  Take 1 tablet (40 mg) by oral route 2 times per day,  Take 30 minutes prior to breakfast and evening meal     • senna-docusate (PERICOLACE) 8.6-50 MG per tablet Take 1 tablet by mouth 2 times daily     • sertraline (ZOLOFT) 50 MG tablet Take 50 mg by mouth Daily.     • simvastatin (ZOCOR) 20 MG tablet TK 1 T PO QD  11   • tamsulosin (FLOMAX) 0.4 MG capsule 24 hr capsule Take 0.4 mg by mouth Daily.     • triamcinolone (KENALOG) 0.5 % ointment Apply  topically to the appropriate area as directed 2 (Two) Times a Day. Apply to affected ear 15 g 3   • zolpidem (AMBIEN) 10 MG tablet Take 10 mg by mouth At Night As Needed for Sleep.         Patient has no known allergies.    Family History   Problem Relation Age of Onset   • Diabetes Sister    • Cancer Sister        Social History     Socioeconomic History   • Marital status:      Spouse name: Not on file   • Number of  children: Not on file   • Years of education: Not on file   • Highest education level: Not on file   Tobacco Use   • Smoking status: Current Some Day Smoker     Packs/day: 0.25     Types: Cigarettes   • Smokeless tobacco: Never Used   • Tobacco comment: pack last couple of weeks   Substance and Sexual Activity   • Alcohol use: No   • Drug use: No   • Sexual activity: Defer       Review of Systems   Constitutional: Negative for activity change, appetite change, chills, diaphoresis, fatigue, fever and unexpected weight change.   HENT: Positive for trouble swallowing. Negative for congestion, ear discharge, ear pain, facial swelling, hearing loss, mouth sores, nosebleeds, postnasal drip, rhinorrhea, sinus pressure, sneezing, sore throat, tinnitus and voice change.         Burning mouth, dry mouth   Eyes: Negative for pain, discharge, redness, itching and visual disturbance.   Respiratory: Negative for apnea, cough, choking, chest tightness, shortness of breath, wheezing and stridor.    Gastrointestinal: Negative for nausea and vomiting.   Endocrine: Negative for cold intolerance and heat intolerance.   Musculoskeletal: Negative for arthralgias, back pain, gait problem, neck pain and neck stiffness.   Skin: Negative for rash.   Allergic/Immunologic: Positive for environmental allergies. Negative for food allergies.   Neurological: Negative for dizziness, tremors, seizures, syncope, facial asymmetry, speech difficulty, weakness, light-headedness, numbness and headaches.   Hematological: Negative for adenopathy. Does not bruise/bleed easily.   Psychiatric/Behavioral: Negative for behavioral problems, sleep disturbance and suicidal ideas. The patient is not nervous/anxious and is not hyperactive.        Vitals:    08/06/20 1029   BP: 145/78   Pulse: 90   Temp: 97.7 °F (36.5 °C)       Body mass index is 21.8 kg/m².    Objective     Physical Exam  Physical Exam  CONSTITUTIONAL: well nourished, alert, oriented, in no acute  distress      COMMUNICATION AND VOICE: able to communicate normally, normal voice quality     HEAD: normocephalic, no lesions, atraumatic, no tenderness, no masses      FACE: appearance normal, no lesions, no tenderness, no deformities, facial motion symmetric     SALIVARY GLANDS: parotid glands with no tenderness, no swelling, no masses, submandibular glands with normal size, nontender     EYES: ocular motility normal, eyelids normal, orbits normal, no proptosis, conjunctiva normal , pupils equal, round      EARS:  Hearing: response to conversational voice normal bilaterally   External Ears: auricles without lesions  Otoscopic: tympanic membrane appearance normal, no lesions, no perforation, normal mobility, no fluid     NOSE:  External Nose: structure normal, no tenderness on palpation, no nasal discharge, no lesions, no evidence of trauma, nostrils patent   Intranasal Exam: nasal mucosa normal, vestibule within normal limits, inferior turbinate normal, nasal septum midline     ORAL:  Lips: upper and lower lips without lesion   Teeth: dentures in place  Gums: gingivae healthy   Oral Mucosa: oral mucosa dryness, no mucosal lesions   Floor of Mouth: Warthin’s duct patent, mucosa normal  Tongue: lingual mucosa dryness with erythema, normal tongue mobility   Palate: soft and hard palates with normal mucosa and structure  Oropharynx: oropharyngeal mucosa erythema    NECK: neck appearance normal, no mass,  noted without erythema or tenderness     THYROID: no overt thyromegaly, no tenderness, nodules or mass present on palpation, position midline      LYMPH NODES: no lymphadenopathy     CHEST/RESPIRATORY: respiratory effort normaL     CARDIOVASCULAR: extremities without cyanosis or edema       NEUROLOGIC/PSYCHIATRIC: oriented to time, place and person, mood normal, affect appropriate, CN II-XII intact grossly    Assessment/Plan   Problems Addressed this Visit        Respiratory    Laryngopharyngeal reflux    Chronic  sinusitis    Relevant Medications    azelastine (ASTELIN) 0.1 % nasal spray    Allergic rhinitis    Relevant Medications    azelastine (ASTELIN) 0.1 % nasal spray       Digestive    Pharyngoesophageal dysphagia    Relevant Orders    Case Request (Completed)    Comprehensive Metabolic Panel    CBC and Differential    ECG 12 Lead    XR Chest 2 View       Endocrine    Hypothyroidism    Relevant Medications    levothyroxine (SYNTHROID, LEVOTHROID) 75 MCG tablet       Other    Primary squamous cell carcinoma of tongue (CMS/HCC) - Primary    Xerostomia    Tongue irritation    Sicca syndrome (CMS/HCC)    H/O head and neck radiation    Tobacco use disorder        MICRODIRECT LARYNGOSCOPY with esophageal dilatation (N/A)  Orders Placed This Encounter   Procedures   • XR Chest 2 View     Standing Status:   Future     Standing Expiration Date:   8/6/2021     Order Specific Question:   Reason for Exam:     Answer:   hypertension   • Comprehensive Metabolic Panel     Standing Status:   Future     Standing Expiration Date:   8/6/2021   • Follow Anesthesia Guidelines / Standing Orders     Standing Status:   Future   • Obtain Informed Consent     Order Specific Question:   Informed Consent Given For     Answer:   MICRODIRECT LARYNGOSCOPY with esophageal dilatation   • Provide Patient With Instructions on NPO Status   • ECG 12 Lead     Standing Status:   Future     Standing Expiration Date:   8/6/2021     Order Specific Question:   Reason for Exam:     Answer:   hypertension   • CBC and Differential     Standing Status:   Future     Standing Expiration Date:   8/6/2021     Order Specific Question:   Manual Differential     Answer:   No     Return for Follow-up post-operatively as directed.       Patient Instructions   Will set up for direct laryngoscopy with dilatation of esophagus     I advised the patient of the risks in continuing to use tobacco and recommended complete cessation, The inherent risks including the risk of  disability, development of a malignancy and/or death was discussed.  The patient indicated understanding.    For more information:  [employer]uit Now [place]  1-800-QUIT-NOW  https://[place].quitlogix.org/en-US/

## 2020-08-05 NOTE — PROGRESS NOTES
YOB: 1955  Location: [facility]  Location Address: [address] [place] [address], [facility] [address], [address] [place] [address]  Location Phone: [phone]    Chief Complaint   Patient presents with   • Follow-up       History of Present Illness  Saul Alcantara Sr. is a 65 y.o. male.  Saul Alcantara Sr. is status post Rigid esophagoscopy and Johnson esophageal dilatation on 20. Patient had DL and biopsy of base of tongue on 9/10/14 with positive pathology for SCCa. The cancer was staged T1M0N0. The patient has a history of radiation therapy due to a base of tongue cancer on the left side that he finished in 2014.The patient reports improved swallowing for a few days, but symptoms returned shortly after dilatation. The patient continues to have burning mouth and dysphagia with dry mouth. He has tried multiple treatments to help with dryness and burning without much relief. He reports the most relief with viscous lidocaine. He also had a sinus infection the end of  that was treated with antibiotics and steroids with improved symptoms. He reports that his dentures aren't fitting well and states he has an appointment with his dentist for this. The patient continues to smoke cigarettes and reports smoking 1-2 cigarettes a day.    His wife states that he improved for several days significantly and that presently he is still able to eat puréed food well.  Prior to dilatation he was unable to even swallow puréed food.    I have personally reviewed the information imported into the chart during this visit.      I have personally reviewed the review of systems.          Past Medical History:   Diagnosis Date   • Allergic rhinitis    • Anxiety    • Chronic rhinitis    • Chronic sinusitis    • Depression    • Deviated nasal septum    • Enlarged prostate    • GERD (gastroesophageal reflux disease)    • H/O head and neck radiation 3/3/2017   • History of transfusion    • Hypertension    • Hypothyroidism    •  Laryngopharyngeal reflux    • Lymphoma (CMS/HCC)     Non-Hodgkins   • MRSA infection    • Panic attacks    • Peyronie disease    • Pneumonia    • Primary squamous cell carcinoma of tongue (CMS/[facility]) 9/27/2016    T1N0M0 SCC S/P XRT/Chemo 12/2014   • 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    T1N0M0 SCC S/P XRT/Chemo 12/2014   • Sicca syndrome (CMS/[facility])    • Sinusitis, acute    • Squamous cell carcinoma     Base of Tongue   • Tobacco use disorder    • Tongue irritation    • Xerostomia        Past Surgical History:   Procedure Laterality Date   • CATARACT EXTRACTION Bilateral    • ESOPHAGOSCOPY N/A 11/20/2019    Procedure: Direct laryngoscopy with esophageal Johnson dilation;  Surgeon: Wayne Richmond MD;  Location:  [facility];  Service: ENT   • HAND SURGERY      metal plate    • KNEE SURGERY     • LARYNGOSCOPY N/A 6/26/2020    Procedure: Rigid esophagoscopy Johnson esophageal dilatation;  Surgeon: Wayne Richmond MD;  Location:  [facility];  Service: ENT;  Laterality: N/A;   • OTHER SURGICAL HISTORY  09/10/2014    Microlaryngoscopy with Biopsy - Base of Tongue   • SPLENECTOMY     • SQUAMOUS CELL CARCINOMA EXCISION  09/10/2014    Tongue   • TONSILLECTOMY         Outpatient Medications Marked as Taking for the 8/6/20 encounter (Office Visit) with Wayne Richmond MD   Medication Sig Dispense Refill   • albuterol (PROVENTIL) (2.5 MG/3ML) 0.083% nebulizer solution Take 2.5 mg by nebulization Every 4 (Four) Hours As Needed for Wheezing. 50 mL 1   • ALPRAZolam (XANAX) 2 MG tablet Take 2 mg by mouth 3 (Three) Times a Day.     • amLODIPine (NORVASC) 10 MG tablet Take 20 mg by mouth Daily.     • azelastine (ASTELIN) 0.1 % nasal spray      • cyclobenzaprine (FLEXERIL) 10 MG tablet TK 1 T PO TID as needed     • fluticasone (FLONASE) 50 MCG/ACT nasal spray 2 sprays into each nostril Daily. (Patient taking differently: 2 sprays into the nostril(s) as directed by provider Daily As Needed.) 16 g 5   •  glycopyrrolate (ROBINUL) 1 MG tablet 0.5 mg 2 (Two) Times a Day.  3   • HYDROcodone-acetaminophen (NORCO) 5-325 MG per tablet Take 1 tablet by mouth Every 4 (Four) Hours As Needed for Moderate Pain  or Severe Pain  (Pain) for up to 8 doses. 8 tablet 0   • HYDROcodone-acetaminophen (NORCO) 7.5-325 MG per tablet TK 1 T PO QD PRN P  0   • levothyroxine (SYNTHROID, LEVOTHROID) 100 MCG tablet Take 100 mcg by mouth Daily.     • levothyroxine (SYNTHROID, LEVOTHROID) 75 MCG tablet Take 75 mcg by mouth Daily.     • Lidocaine Viscous HCl (XYLOCAINE) 2 % solution Take 5 mL by mouth As Needed for Mild Pain .     • meloxicam (MOBIC) 15 MG tablet Take 15 mg by mouth Daily.     • nystatin (MYCOSTATIN) 929412 UNIT/ML suspension Take 10ml by mouth BID for 28 days (Patient taking differently: Take 200,000 Units by mouth 4 (Four) Times a Day As Needed. Take 10ml by mouth BID for 28 days) 480 mL 2   • ondansetron (ZOFRAN) 8 MG tablet Take 8 mg by mouth Every 8 (Eight) Hours As Needed for Nausea.     • pantoprazole (PROTONIX) 40 MG EC tablet Delayed Release (DR/EC)  Take 1 tablet (40 mg) by oral route 2 times per day,  Take 30 minutes prior to breakfast and evening meal     • senna-docusate (PERICOLACE) 8.6-50 MG per tablet Take 1 tablet by mouth 2 times daily     • sertraline (ZOLOFT) 50 MG tablet Take 50 mg by mouth Daily.     • simvastatin (ZOCOR) 20 MG tablet TK 1 T PO QD  11   • tamsulosin (FLOMAX) 0.4 MG capsule 24 hr capsule Take 0.4 mg by mouth Daily.     • triamcinolone (KENALOG) 0.5 % ointment Apply  topically to the appropriate area as directed 2 (Two) Times a Day. Apply to affected ear 15 g 3   • zolpidem (AMBIEN) 10 MG tablet Take 10 mg by mouth At Night As Needed for Sleep.         Patient has no known allergies.    Family History   Problem Relation Age of Onset   • Diabetes Sister    • Cancer Sister        Social History     Socioeconomic History   • Marital status:      Spouse name: Not on file   • Number of  children: Not on file   • Years of education: Not on file   • Highest education level: Not on file   Tobacco Use   • Smoking status: Current Some Day Smoker     Packs/day: 0.25     Types: Cigarettes   • Smokeless tobacco: Never Used   • Tobacco comment: pack last couple of weeks   Substance and Sexual Activity   • Alcohol use: No   • Drug use: No   • Sexual activity: Defer       Review of Systems   Constitutional: Negative for activity change, appetite change, chills, diaphoresis, fatigue, fever and unexpected weight change.   HENT: Positive for trouble swallowing. Negative for congestion, ear discharge, ear pain, facial swelling, hearing loss, mouth sores, nosebleeds, postnasal drip, rhinorrhea, sinus pressure, sneezing, sore throat, tinnitus and voice change.         Burning mouth, dry mouth   Eyes: Negative for pain, discharge, redness, itching and visual disturbance.   Respiratory: Negative for apnea, cough, choking, chest tightness, shortness of breath, wheezing and stridor.    Gastrointestinal: Negative for nausea and vomiting.   Endocrine: Negative for cold intolerance and heat intolerance.   Musculoskeletal: Negative for arthralgias, back pain, gait problem, neck pain and neck stiffness.   Skin: Negative for rash.   Allergic/Immunologic: Positive for environmental allergies. Negative for food allergies.   Neurological: Negative for dizziness, tremors, seizures, syncope, facial asymmetry, speech difficulty, weakness, light-headedness, numbness and headaches.   Hematological: Negative for adenopathy. Does not bruise/bleed easily.   Psychiatric/Behavioral: Negative for behavioral problems, sleep disturbance and suicidal ideas. The patient is not nervous/anxious and is not hyperactive.        Vitals:    08/06/20 1029   BP: 145/78   Pulse: 90   Temp: 97.7 °F (36.5 °C)       Body mass index is 21.8 kg/m².    Objective     Physical Exam  Physical Exam  CONSTITUTIONAL: well nourished, alert, oriented, in no acute  distress      COMMUNICATION AND VOICE: able to communicate normally, normal voice quality     HEAD: normocephalic, no lesions, atraumatic, no tenderness, no masses      FACE: appearance normal, no lesions, no tenderness, no deformities, facial motion symmetric     SALIVARY GLANDS: parotid glands with no tenderness, no swelling, no masses, submandibular glands with normal size, nontender     EYES: ocular motility normal, eyelids normal, orbits normal, no proptosis, conjunctiva normal , pupils equal, round      EARS:  Hearing: response to conversational voice normal bilaterally   External Ears: auricles without lesions  Otoscopic: tympanic membrane appearance normal, no lesions, no perforation, normal mobility, no fluid     NOSE:  External Nose: structure normal, no tenderness on palpation, no nasal discharge, no lesions, no evidence of trauma, nostrils patent   Intranasal Exam: nasal mucosa normal, vestibule within normal limits, inferior turbinate normal, nasal septum midline     ORAL:  Lips: upper and lower lips without lesion   Teeth: dentures in place  Gums: gingivae healthy   Oral Mucosa: oral mucosa dryness, no mucosal lesions   Floor of Mouth: Warthin’s duct patent, mucosa normal  Tongue: lingual mucosa dryness with erythema, normal tongue mobility   Palate: soft and hard palates with normal mucosa and structure  Oropharynx: oropharyngeal mucosa erythema    NECK: neck appearance normal, no mass,  noted without erythema or tenderness     THYROID: no overt thyromegaly, no tenderness, nodules or mass present on palpation, position midline      LYMPH NODES: no lymphadenopathy     CHEST/RESPIRATORY: respiratory effort normaL     CARDIOVASCULAR: extremities without cyanosis or edema       NEUROLOGIC/PSYCHIATRIC: oriented to time, place and person, mood normal, affect appropriate, CN II-XII intact grossly    Assessment/Plan   Problems Addressed this Visit        Respiratory    Laryngopharyngeal reflux    Chronic  sinusitis    Relevant Medications    azelastine (ASTELIN) 0.1 % nasal spray    Allergic rhinitis    Relevant Medications    azelastine (ASTELIN) 0.1 % nasal spray       Digestive    Pharyngoesophageal dysphagia    Relevant Orders    Case Request (Completed)    Comprehensive Metabolic Panel    CBC and Differential    ECG 12 Lead    XR Chest 2 View       Endocrine    Hypothyroidism    Relevant Medications    levothyroxine (SYNTHROID, LEVOTHROID) 75 MCG tablet       Other    Primary squamous cell carcinoma of tongue (CMS/HCC) - Primary    Xerostomia    Tongue irritation    Sicca syndrome (CMS/HCC)    H/O head and neck radiation    Tobacco use disorder        MICRODIRECT LARYNGOSCOPY with esophageal dilatation (N/A)  Orders Placed This Encounter   Procedures   • XR Chest 2 View     Standing Status:   Future     Standing Expiration Date:   8/6/2021     Order Specific Question:   Reason for Exam:     Answer:   hypertension   • Comprehensive Metabolic Panel     Standing Status:   Future     Standing Expiration Date:   8/6/2021   • Follow Anesthesia Guidelines / Standing Orders     Standing Status:   Future   • Obtain Informed Consent     Order Specific Question:   Informed Consent Given For     Answer:   MICRODIRECT LARYNGOSCOPY with esophageal dilatation   • Provide Patient With Instructions on NPO Status   • ECG 12 Lead     Standing Status:   Future     Standing Expiration Date:   8/6/2021     Order Specific Question:   Reason for Exam:     Answer:   hypertension   • CBC and Differential     Standing Status:   Future     Standing Expiration Date:   8/6/2021     Order Specific Question:   Manual Differential     Answer:   No     Return for Follow-up post-operatively as directed.       Patient Instructions   Will set up for direct laryngoscopy with dilatation of esophagus     I advised the patient of the risks in continuing to use tobacco and recommended complete cessation, The inherent risks including the risk of  disability, development of a malignancy and/or death was discussed.  The patient indicated understanding.    For more information:  [employer]uit Now [place]  1-800-QUIT-NOW  https://[place].quitlogix.org/en-US/

## 2020-08-06 ENCOUNTER — OFFICE VISIT (OUTPATIENT)
Dept: OTOLARYNGOLOGY | Facility: CLINIC | Age: 65
End: 2020-08-06

## 2020-08-06 VITALS
HEART RATE: 90 BPM | SYSTOLIC BLOOD PRESSURE: 145 MMHG | TEMPERATURE: 97.7 F | BODY MASS INDEX: 21.89 KG/M2 | WEIGHT: 165.2 LBS | DIASTOLIC BLOOD PRESSURE: 78 MMHG | HEIGHT: 73 IN

## 2020-08-06 DIAGNOSIS — C02.9 PRIMARY SQUAMOUS CELL CARCINOMA OF TONGUE (HCC): Primary | ICD-10-CM

## 2020-08-06 DIAGNOSIS — F17.200 TOBACCO USE DISORDER: ICD-10-CM

## 2020-08-06 DIAGNOSIS — K14.9 TONGUE IRRITATION: ICD-10-CM

## 2020-08-06 DIAGNOSIS — K11.7 XEROSTOMIA: ICD-10-CM

## 2020-08-06 DIAGNOSIS — M35.00 SICCA SYNDROME (HCC): ICD-10-CM

## 2020-08-06 DIAGNOSIS — E03.9 ACQUIRED HYPOTHYROIDISM: ICD-10-CM

## 2020-08-06 DIAGNOSIS — Z92.3 H/O HEAD AND NECK RADIATION: ICD-10-CM

## 2020-08-06 DIAGNOSIS — R13.14 PHARYNGOESOPHAGEAL DYSPHAGIA: ICD-10-CM

## 2020-08-06 DIAGNOSIS — J30.9 ALLERGIC RHINITIS, UNSPECIFIED SEASONALITY, UNSPECIFIED TRIGGER: ICD-10-CM

## 2020-08-06 DIAGNOSIS — J32.0 CHRONIC MAXILLARY SINUSITIS: ICD-10-CM

## 2020-08-06 DIAGNOSIS — K21.9 LARYNGOPHARYNGEAL REFLUX: ICD-10-CM

## 2020-08-06 PROCEDURE — 99214 OFFICE O/P EST MOD 30 MIN: CPT | Performed by: PHYSICIAN ASSISTANT

## 2020-08-06 RX ORDER — AZELASTINE 1 MG/ML
2 SPRAY, METERED NASAL DAILY PRN
COMMUNITY
Start: 2020-05-01 | End: 2022-01-11

## 2020-08-06 RX ORDER — LEVOTHYROXINE SODIUM 0.07 MG/1
75 TABLET ORAL DAILY
COMMUNITY
Start: 2020-07-04 | End: 2021-01-05

## 2020-08-07 ENCOUNTER — TRANSCRIBE ORDERS (OUTPATIENT)
Dept: ADMINISTRATIVE | Facility: HOSPITAL | Age: 65
End: 2020-08-07

## 2020-08-07 DIAGNOSIS — Z01.818 PRE-OP TESTING: Primary | ICD-10-CM

## 2020-08-17 ENCOUNTER — HOSPITAL ENCOUNTER (OUTPATIENT)
Dept: GENERAL RADIOLOGY | Facility: HOSPITAL | Age: 65
Discharge: HOME | End: 2020-08-17
Admitting: PHYSICIAN ASSISTANT

## 2020-08-17 ENCOUNTER — APPOINTMENT (OUTPATIENT)
Dept: PREADMISSION TESTING | Facility: HOSPITAL | Age: 65
End: 2020-08-17

## 2020-08-17 VITALS
DIASTOLIC BLOOD PRESSURE: 65 MMHG | OXYGEN SATURATION: 96 % | HEIGHT: 73 IN | BODY MASS INDEX: 22 KG/M2 | SYSTOLIC BLOOD PRESSURE: 116 MMHG | HEART RATE: 87 BPM | WEIGHT: 166.01 LBS | RESPIRATION RATE: 18 BRPM

## 2020-08-17 DIAGNOSIS — R13.14 PHARYNGOESOPHAGEAL DYSPHAGIA: ICD-10-CM

## 2020-08-17 LAB
ALBUMIN SERPL-MCNC: 4.4 G/DL (ref 3.5–5.2)
ALBUMIN/GLOB SERPL: 1.8 G/DL
ALP SERPL-CCNC: 79 U/L (ref 39–117)
ALT SERPL W P-5'-P-CCNC: 20 U/L (ref 1–41)
ANION GAP SERPL CALCULATED.3IONS-SCNC: 11 MMOL/L (ref 5–15)
AST SERPL-CCNC: 24 U/L (ref 1–40)
BASOPHILS # BLD AUTO: 0.08 10*3/MM3 (ref 0–0.2)
BASOPHILS NFR BLD AUTO: 1.2 % (ref 0–1.5)
BILIRUB SERPL-MCNC: 0.5 MG/DL (ref 0–1.2)
BUN SERPL-MCNC: 15 MG/DL (ref 8–23)
BUN/CREAT SERPL: 11.6 (ref 7–25)
CALCIUM SPEC-SCNC: 9.6 MG/DL (ref 8.6–10.5)
CHLORIDE SERPL-SCNC: 102 MMOL/L (ref 98–107)
CO2 SERPL-SCNC: 27 MMOL/L (ref 22–29)
CREAT SERPL-MCNC: 1.29 MG/DL (ref 0.76–1.27)
DEPRECATED RDW RBC AUTO: 44.9 FL (ref 37–54)
EOSINOPHIL # BLD AUTO: 0.43 10*3/MM3 (ref 0–0.4)
EOSINOPHIL NFR BLD AUTO: 6.4 % (ref 0.3–6.2)
ERYTHROCYTE [DISTWIDTH] IN BLOOD BY AUTOMATED COUNT: 13.2 % (ref 12.3–15.4)
GFR SERPL CREATININE-BSD FRML MDRD: 56 ML/MIN/1.73
GLOBULIN UR ELPH-MCNC: 2.4 GM/DL
GLUCOSE SERPL-MCNC: 92 MG/DL (ref 65–99)
HCT VFR BLD AUTO: 38.4 % (ref 37.5–51)
HGB BLD-MCNC: 13 G/DL (ref 13–17.7)
IMM GRANULOCYTES # BLD AUTO: 0.03 10*3/MM3 (ref 0–0.05)
IMM GRANULOCYTES NFR BLD AUTO: 0.4 % (ref 0–0.5)
LYMPHOCYTES # BLD AUTO: 1.64 10*3/MM3 (ref 0.7–3.1)
LYMPHOCYTES NFR BLD AUTO: 24.6 % (ref 19.6–45.3)
MCH RBC QN AUTO: 31.3 PG (ref 26.6–33)
MCHC RBC AUTO-ENTMCNC: 33.9 G/DL (ref 31.5–35.7)
MCV RBC AUTO: 92.5 FL (ref 79–97)
MONOCYTES # BLD AUTO: 0.54 10*3/MM3 (ref 0.1–0.9)
MONOCYTES NFR BLD AUTO: 8.1 % (ref 5–12)
NEUTROPHILS NFR BLD AUTO: 3.95 10*3/MM3 (ref 1.7–7)
NEUTROPHILS NFR BLD AUTO: 59.3 % (ref 42.7–76)
NRBC BLD AUTO-RTO: 0 /100 WBC (ref 0–0.2)
PLATELET # BLD AUTO: 269 10*3/MM3 (ref 140–450)
PMV BLD AUTO: 11.9 FL (ref 6–12)
POTASSIUM SERPL-SCNC: 4.4 MMOL/L (ref 3.5–5.2)
PROT SERPL-MCNC: 6.8 G/DL (ref 6–8.5)
RBC # BLD AUTO: 4.15 10*6/MM3 (ref 4.14–5.8)
SODIUM SERPL-SCNC: 140 MMOL/L (ref 136–145)
WBC # BLD AUTO: 6.67 10*3/MM3 (ref 3.4–10.8)

## 2020-08-17 PROCEDURE — 93005 ELECTROCARDIOGRAM TRACING: CPT

## 2020-08-17 PROCEDURE — 85025 COMPLETE CBC W/AUTO DIFF WBC: CPT | Performed by: PHYSICIAN ASSISTANT

## 2020-08-17 PROCEDURE — 80053 COMPREHEN METABOLIC PANEL: CPT | Performed by: PHYSICIAN ASSISTANT

## 2020-08-17 PROCEDURE — 36415 COLL VENOUS BLD VENIPUNCTURE: CPT

## 2020-08-17 PROCEDURE — 71046 X-RAY EXAM CHEST 2 VIEWS: CPT

## 2020-08-17 PROCEDURE — 93010 ELECTROCARDIOGRAM REPORT: CPT | Performed by: INTERNAL MEDICINE

## 2020-08-17 NOTE — DISCHARGE INSTRUCTIONS
DAY OF SURGERY INSTRUCTIONS        YOUR SURGEON: dr llamas    PROCEDURE: ***Microdirect Laryngoscopy With Esophageal Dilatation - N/A    DATE OF SURGERY: ***8/24/2020    ARRIVAL TIME: AS DIRECTED BY OFFICE    YOU MAY TAKE THE FOLLOWING MEDICATION(S) THE MORNING OF SURGERY WITH A SIP OF WATER: ***alprazolam , pain pill    ALL OTHER HOME MEDICATIONS CHECK WITH YOUR DOCTOR    DO NOT TAKE ANY ERECTILE DYSFUNCTION MEDICATIONS (EX:  CIALIS, VIAGRA) 24 HOURS PRIOR TO SURGERY              MANAGING PAIN AFTER SURGERY    We know you are probably wondering what your pain will be like after surgery.  Following surgery it is unrealistic to expect you will not have pain.   Pain is how our bodies let us know that something is wrong or cautions us to be careful.  That said, our goal is to make your pain tolerable.    Methods we may use to treat your pain include (oral or IV medications, PCAs, epidurals, nerve blocks, etc.)   While some procedures require IV pain medications for a short time after surgery, transitioning to pain medications by mouth allows for better management of pain.   Your nurse will encourage you to take oral pain medications whenever possible.  IV medications work almost immediately, but only last a short while.  Taking medications by mouth allows for a more constant level of medication in your blood stream for a longer period of time.      Once your pain is out of control it is harder to get back under control.  It is important you are aware when your next dose of pain medication is due.  If you are admitted, your nurse may write the time of your next dose on the white board in your room to help you remember.      We are interested in your pain and encourage you to inform us about aggravating factors during your visit.   Many times a simple repositioning every few hours can make a big difference.    If your physician says it is okay, do not let your pain prevent you from getting out of bed. Be sure to call your  nurse for assistance prior to getting up so you do not fall.      Before surgery, please decide your tolerable pain goal.  These faces help describe the pain ratings we use on a 0-10 scale.   Be prepared to tell us your goal and whether or not you take pain or anxiety medications at home.      BEFORE YOU COME TO THE HOSPITAL  (Pre-op instructions)  • Do not eat, drink, smoke or chew gum after midnight the night before surgery.  This also includes no mints.  • Morning of surgery take only the medicines you have been instructed with a sip of water unless otherwise instructed  by your physician.  • Do not shave, wear makeup or dark nail polish.  • Remove all jewelry including rings.  • Leave anything you consider valuable at home.  • Leave your suitcase in the car until after your surgery.  • Bring the following with you if applicable:  o Picture ID and insurance, Medicare or Medicaid cards  o Co-pay/deductible required by insurance (cash, check, credit card)  o Copy of advance directive, living will or power-of- documents if not brought to PAT  o CPAP or BIPAP mask and tubing  o Relaxation aids ( book, magazine), etc.  o Hearing aids                                 ON THE DAY OF SURGERY  · On the day of surgery check in at registration located at the main entrance of the hospital.   ? You will be registered and given a beeper with instructions where to wait in the main lobby.  ? When your beeper lights up and vibrates a member of the Outpatient Surgery staff will meet you at the double doors under the stair steps and escort you to your preoperative room.   · You may have cloth compression devices placed on your legs. These help to prevent blood clots and reduce swelling in your legs.  · An IV may be inserted into one of your veins.  · In the operating room, you may be given one or more of the following:  ? A medicine to help you relax (sedative).  ? A medicine to numb the area (local anesthetic).  ? A medicine  "to make you fall asleep (general anesthetic).  ? A medicine that is injected into an area of your body to numb everything below the injection site (regional anesthetic).  · Your surgical site will be marked or identified.  · You may be given an antibiotic through your IV to help prevent infection.  Contact a health care provider if you:  · Develop a fever of more than 100.4°F (38°C) or other feelings of illness during the 48 hours before your surgery.  · Have symptoms that get worse.  Have questions or concerns about your surgery    General Anesthesia/Surgery, Adult  General anesthesia is the use of medicines to make a person \"go to sleep\" (unconscious) for a medical procedure. General anesthesia must be used for certain procedures, and is often recommended for procedures that:  · Last a long time.  · Require you to be still or in an unusual position.  · Are major and can cause blood loss.  The medicines used for general anesthesia are called general anesthetics. As well as making you unconscious for a certain amount of time, these medicines:  · Prevent pain.  · Control your blood pressure.  · Relax your muscles.  Tell a health care provider about:  · Any allergies you have.  · All medicines you are taking, including vitamins, herbs, eye drops, creams, and over-the-counter medicines.  · Any problems you or family members have had with anesthetic medicines.  · Types of anesthetics you have had in the past.  · Any blood disorders you have.  · Any surgeries you have had.  · Any medical conditions you have.  · Any recent upper respiratory, chest, or ear infections.  · Any history of:  ? Heart or lung conditions, such as heart failure, sleep apnea, asthma, or chronic obstructive pulmonary disease (COPD).  ?  service.  ? Depression or anxiety.  · Any tobacco or drug use, including marijuana or alcohol use.  · Whether you are pregnant or may be pregnant.  What are the risks?  Generally, this is a safe procedure. " However, problems may occur, including:  · Allergic reaction.  · Lung and heart problems.  · Inhaling food or liquid from the stomach into the lungs (aspiration).  · Nerve injury.  · Air in the bloodstream, which can lead to stroke.  · Extreme agitation or confusion (delirium) when you wake up from the anesthetic.  · Waking up during your procedure and being unable to move. This is rare.  These problems are more likely to develop if you are having a major surgery or if you have an advanced or serious medical condition. You can prevent some of these complications by answering all of your health care provider's questions thoroughly and by following all instructions before your procedure.  General anesthesia can cause side effects, including:  · Nausea or vomiting.  · A sore throat from the breathing tube.  · Hoarseness.  · Wheezing or coughing.  · Shaking chills.  · Tiredness.  · Body aches.  · Anxiety.  · Sleepiness or drowsiness.  · Confusion or agitation.  RISKS AND COMPLICATIONS OF SURGERY  Your health care provider will discuss possible risks and complications with you before surgery. Common risks and complications include:    · Problems due to the use of anesthetics.  · Blood loss and replacement (does not apply to minor surgical procedures).  · Temporary increase in pain due to surgery.  · Uncorrected pain or problems that the surgery was meant to correct.  · Infection.  · New damage.    What happens before the procedure?    Medicines  Ask your health care provider about:  · Changing or stopping your regular medicines. This is especially important if you are taking diabetes medicines or blood thinners.  · Taking medicines such as aspirin and ibuprofen. These medicines can thin your blood. Do not take these medicines unless your health care provider tells you to take them.  · Taking over-the-counter medicines, vitamins, herbs, and supplements. Do not take these during the week before your procedure unless your  health care provider approves them.  General instructions  · Starting 3-6 weeks before the procedure, do not use any products that contain nicotine or tobacco, such as cigarettes and e-cigarettes. If you need help quitting, ask your health care provider.  · If you brush your teeth on the morning of the procedure, make sure to spit out all of the toothpaste.  · Tell your health care provider if you become ill or develop a cold, cough, or fever.  · If instructed by your health care provider, bring your sleep apnea device with you on the day of your surgery (if applicable).  · Ask your health care provider if you will be going home the same day, the following day, or after a longer hospital stay.  ? Plan to have someone take you home from the hospital or clinic.  ? Plan to have a responsible adult care for you for at least 24 hours after you leave the hospital or clinic. This is important.  What happens during the procedure?  · You will be given anesthetics through both of the following:  ? A mask placed over your nose and mouth.  ? An IV in one of your veins.  · You may receive a medicine to help you relax (sedative).  · After you are unconscious, a breathing tube may be inserted down your throat to help you breathe. This will be removed before you wake up.  · An anesthesia specialist will stay with you throughout your procedure. He or she will:  ? Keep you comfortable and safe by continuing to give you medicines and adjusting the amount of medicine that you get.  ? Monitor your blood pressure, pulse, and oxygen levels to make sure that the anesthetics do not cause any problems.  The procedure may vary among health care providers and hospitals.  What happens after the procedure?  · Your blood pressure, temperature, heart rate, breathing rate, and blood oxygen level will be monitored until the medicines you were given have worn off.  · You will wake up in a recovery area. You may wake up slowly.  · If you feel anxious  or agitated, you may be given medicine to help you calm down.  · If you will be going home the same day, your health care provider may check to make sure you can walk, drink, and urinate.  · Your health care provider will treat any pain or side effects you have before you go home.  · Do not drive for 24 hours if you were given a sedative.  Summary  · General anesthesia is used to keep you still and prevent pain during a procedure.  · It is important to tell your healthcare provider about your medical history and any surgeries you have had, and previous experience with anesthesia.  · Follow your healthcare provider’s instructions about when to stop eating, drinking, or taking certain medicines before your procedure.  · Plan to have someone take you home from the hospital or clinic.  This information is not intended to replace advice given to you by your health care provider. Make sure you discuss any questions you have with your health care provider.  Document Released: 03/26/2009 Document Revised: 08/03/2018 Document Reviewed: 08/03/2018  [employer] Patient Education © 2019 [employer]      Fall Prevention in Hospitals, Adult  As a hospital patient, your condition and the treatments you receive can increase your risk for falls. Some additional risk factors for falls in a hospital include:  · Being in an unfamiliar environment.  · Being on bed rest.  · Your surgery.  · Taking certain medicines.  · Your tubing requirements, such as intravenous (IV) therapy or catheters.  It is important that you learn how to decrease fall risks while at the hospital. Below are important tips that can help prevent falls.  SAFETY TIPS FOR PREVENTING FALLS  Talk about your risk of falling.  · Ask your health care provider why you are at risk for falling. Is it your medicine, illness, tubing placement, or something else?  · Make a plan with your health care provider to keep you safe from falls.  · Ask your health care provider  or pharmacist about side effects of your medicines. Some medicines can make you dizzy or affect your coordination.  Ask for help.  · Ask for help before getting out of bed. You may need to press your call button.  · Ask for assistance in getting safely to the toilet.  · Ask for a walker or cane to be put at your bedside. Ask that most of the side rails on your bed be placed up before your health care provider leaves the room.  · Ask family or friends to sit with you.  · Ask for things that are out of your reach, such as your glasses, hearing aids, telephone, bedside table, or call button.  Follow these tips to avoid falling:  · Stay lying or seated, rather than standing, while waiting for help.  · Wear rubber-soled slippers or shoes whenever you walk in the hospital.  · Avoid quick, sudden movements.  ¨ Change positions slowly.  ¨ Sit on the side of your bed before standing.  ¨ Stand up slowly and wait before you start to walk.  · Let your health care provider know if there is a spill on the floor.  · Pay careful attention to the medical equipment, electrical cords, and tubes around you.  · When you need help, use your call button by your bed or in the bathroom. Wait for one of your health care providers to help you.  · If you feel dizzy or unsure of your footing, return to bed and wait for assistance.  · Avoid being distracted by the TV, telephone, or another person in your room.  · Do not lean or support yourself on rolling objects, such as IV poles or bedside tables.     This information is not intended to replace advice given to you by your health care provider. Make sure you discuss any questions you have with your health care provider.     Document Released: 12/15/2001 Document Revised: 01/08/2016 Document Reviewed: 08/25/2013  [employer] Patient Education ©2016 [employer]            PATIENT/FAMILY/RESPONSIBLE PARTY VERBALIZES UNDERSTANDING OF ABOVE EDUCATION.  COPY OF PAIN SCALE GIVEN AND  REVIEWED WITH VERBALIZED UNDERSTANDING.

## 2020-08-21 ENCOUNTER — LAB (OUTPATIENT)
Dept: LAB | Facility: HOSPITAL | Age: 65
End: 2020-08-21

## 2020-08-21 PROCEDURE — C9803 HOPD COVID-19 SPEC COLLECT: HCPCS | Performed by: OTOLARYNGOLOGY

## 2020-08-21 PROCEDURE — U0003 INFECTIOUS AGENT DETECTION BY NUCLEIC ACID (DNA OR RNA); SEVERE ACUTE RESPIRATORY SYNDROME CORONAVIRUS 2 (SARS-COV-2) (CORONAVIRUS DISEASE [COVID-19]), AMPLIFIED PROBE TECHNIQUE, MAKING USE OF HIGH THROUGHPUT TECHNOLOGIES AS DESCRIBED BY CMS-2020-01-R: HCPCS | Performed by: OTOLARYNGOLOGY

## 2020-08-22 LAB
COVID LABCORP PRIORITY: NORMAL
SARS-COV-2 RNA RESP QL NAA+PROBE: NOT DETECTED

## 2020-08-24 ENCOUNTER — ANESTHESIA (OUTPATIENT)
Dept: PERIOP | Facility: HOSPITAL | Age: 65
End: 2020-08-24

## 2020-08-24 ENCOUNTER — HOSPITAL ENCOUNTER (OUTPATIENT)
Facility: HOSPITAL | Age: 65
Setting detail: HOSPITAL OUTPATIENT SURGERY
Discharge: HOME | End: 2020-08-24
Attending: OTOLARYNGOLOGY | Admitting: OTOLARYNGOLOGY

## 2020-08-24 ENCOUNTER — ANESTHESIA EVENT (OUTPATIENT)
Dept: PERIOP | Facility: HOSPITAL | Age: 65
End: 2020-08-24

## 2020-08-24 VITALS
RESPIRATION RATE: 14 BRPM | HEART RATE: 59 BPM | SYSTOLIC BLOOD PRESSURE: 131 MMHG | TEMPERATURE: 97.5 F | OXYGEN SATURATION: 98 % | DIASTOLIC BLOOD PRESSURE: 91 MMHG

## 2020-08-24 DIAGNOSIS — K21.9 LARYNGOPHARYNGEAL REFLUX: ICD-10-CM

## 2020-08-24 DIAGNOSIS — R13.14 PHARYNGOESOPHAGEAL DYSPHAGIA: Primary | ICD-10-CM

## 2020-08-24 DIAGNOSIS — C02.9 PRIMARY SQUAMOUS CELL CARCINOMA OF TONGUE (HCC): ICD-10-CM

## 2020-08-24 PROCEDURE — 25010000002 FENTANYL CITRATE (PF) 100 MCG/2ML SOLUTION: Performed by: NURSE ANESTHETIST, CERTIFIED REGISTERED

## 2020-08-24 PROCEDURE — 25010000002 MIDAZOLAM PER 1 MG: Performed by: ANESTHESIOLOGY

## 2020-08-24 PROCEDURE — 25010000002 ONDANSETRON PER 1 MG: Performed by: NURSE ANESTHETIST, CERTIFIED REGISTERED

## 2020-08-24 PROCEDURE — 25010000002 DEXAMETHASONE PER 1 MG: Performed by: ANESTHESIOLOGY

## 2020-08-24 PROCEDURE — 25010000002 DEXAMETHASONE PER 1 MG: Performed by: NURSE ANESTHETIST, CERTIFIED REGISTERED

## 2020-08-24 PROCEDURE — 25010000002 ONDANSETRON PER 1 MG: Performed by: ANESTHESIOLOGY

## 2020-08-24 PROCEDURE — 25010000002 PROPOFOL 10 MG/ML EMULSION: Performed by: NURSE ANESTHETIST, CERTIFIED REGISTERED

## 2020-08-24 PROCEDURE — 43450 DILATE ESOPHAGUS 1/MULT PASS: CPT | Performed by: OTOLARYNGOLOGY

## 2020-08-24 PROCEDURE — 31525 DX LARYNGOSCOPY EXCL NB: CPT | Performed by: OTOLARYNGOLOGY

## 2020-08-24 RX ORDER — OXYCODONE AND ACETAMINOPHEN 10; 325 MG/1; MG/1
1 TABLET ORAL ONCE AS NEEDED
Status: COMPLETED | OUTPATIENT
Start: 2020-08-24 | End: 2020-08-24

## 2020-08-24 RX ORDER — PROPOFOL 10 MG/ML
VIAL (ML) INTRAVENOUS AS NEEDED
Status: DISCONTINUED | OUTPATIENT
Start: 2020-08-24 | End: 2020-08-24 | Stop reason: SURG

## 2020-08-24 RX ORDER — MAGNESIUM HYDROXIDE 1200 MG/15ML
LIQUID ORAL AS NEEDED
Status: DISCONTINUED | OUTPATIENT
Start: 2020-08-24 | End: 2020-08-24 | Stop reason: HOSPADM

## 2020-08-24 RX ORDER — ROCURONIUM BROMIDE 10 MG/ML
INJECTION, SOLUTION INTRAVENOUS AS NEEDED
Status: DISCONTINUED | OUTPATIENT
Start: 2020-08-24 | End: 2020-08-24 | Stop reason: SURG

## 2020-08-24 RX ORDER — LABETALOL HYDROCHLORIDE 5 MG/ML
5 INJECTION, SOLUTION INTRAVENOUS
Status: DISCONTINUED | OUTPATIENT
Start: 2020-08-24 | End: 2020-08-24 | Stop reason: HOSPADM

## 2020-08-24 RX ORDER — IBUPROFEN 600 MG/1
600 TABLET ORAL ONCE AS NEEDED
Status: DISCONTINUED | OUTPATIENT
Start: 2020-08-24 | End: 2020-08-24 | Stop reason: HOSPADM

## 2020-08-24 RX ORDER — DEXAMETHASONE SODIUM PHOSPHATE 4 MG/ML
INJECTION, SOLUTION INTRA-ARTICULAR; INTRALESIONAL; INTRAMUSCULAR; INTRAVENOUS; SOFT TISSUE AS NEEDED
Status: DISCONTINUED | OUTPATIENT
Start: 2020-08-24 | End: 2020-08-24 | Stop reason: SURG

## 2020-08-24 RX ORDER — FENTANYL CITRATE 50 UG/ML
25 INJECTION, SOLUTION INTRAMUSCULAR; INTRAVENOUS
Status: DISCONTINUED | OUTPATIENT
Start: 2020-08-24 | End: 2020-08-24 | Stop reason: HOSPADM

## 2020-08-24 RX ORDER — FLUMAZENIL 0.1 MG/ML
0.2 INJECTION INTRAVENOUS AS NEEDED
Status: DISCONTINUED | OUTPATIENT
Start: 2020-08-24 | End: 2020-08-24 | Stop reason: HOSPADM

## 2020-08-24 RX ORDER — OXYCODONE AND ACETAMINOPHEN 7.5; 325 MG/1; MG/1
2 TABLET ORAL EVERY 4 HOURS PRN
Status: DISCONTINUED | OUTPATIENT
Start: 2020-08-24 | End: 2020-08-24 | Stop reason: HOSPADM

## 2020-08-24 RX ORDER — ONDANSETRON 2 MG/ML
4 INJECTION INTRAMUSCULAR; INTRAVENOUS ONCE AS NEEDED
Status: COMPLETED | OUTPATIENT
Start: 2020-08-24 | End: 2020-08-24

## 2020-08-24 RX ORDER — MIDAZOLAM HYDROCHLORIDE 1 MG/ML
2 INJECTION INTRAMUSCULAR; INTRAVENOUS
Status: DISCONTINUED | OUTPATIENT
Start: 2020-08-24 | End: 2020-08-24 | Stop reason: HOSPADM

## 2020-08-24 RX ORDER — LIDOCAINE HYDROCHLORIDE 10 MG/ML
0.5 INJECTION, SOLUTION EPIDURAL; INFILTRATION; INTRACAUDAL; PERINEURAL ONCE AS NEEDED
Status: DISCONTINUED | OUTPATIENT
Start: 2020-08-24 | End: 2020-08-24 | Stop reason: HOSPADM

## 2020-08-24 RX ORDER — SODIUM CHLORIDE, SODIUM LACTATE, POTASSIUM CHLORIDE, CALCIUM CHLORIDE 600; 310; 30; 20 MG/100ML; MG/100ML; MG/100ML; MG/100ML
100 INJECTION, SOLUTION INTRAVENOUS CONTINUOUS
Status: DISCONTINUED | OUTPATIENT
Start: 2020-08-24 | End: 2020-08-24 | Stop reason: HOSPADM

## 2020-08-24 RX ORDER — SODIUM CHLORIDE, SODIUM LACTATE, POTASSIUM CHLORIDE, CALCIUM CHLORIDE 600; 310; 30; 20 MG/100ML; MG/100ML; MG/100ML; MG/100ML
1000 INJECTION, SOLUTION INTRAVENOUS CONTINUOUS
Status: DISCONTINUED | OUTPATIENT
Start: 2020-08-24 | End: 2020-08-24 | Stop reason: HOSPADM

## 2020-08-24 RX ORDER — HYDROCODONE BITARTRATE AND ACETAMINOPHEN 5; 325 MG/1; MG/1
1 TABLET ORAL ONCE AS NEEDED
Status: DISCONTINUED | OUTPATIENT
Start: 2020-08-24 | End: 2020-08-24 | Stop reason: HOSPADM

## 2020-08-24 RX ORDER — ONDANSETRON 2 MG/ML
INJECTION INTRAMUSCULAR; INTRAVENOUS AS NEEDED
Status: DISCONTINUED | OUTPATIENT
Start: 2020-08-24 | End: 2020-08-24 | Stop reason: SURG

## 2020-08-24 RX ORDER — SODIUM CHLORIDE 0.9 % (FLUSH) 0.9 %
3 SYRINGE (ML) INJECTION AS NEEDED
Status: DISCONTINUED | OUTPATIENT
Start: 2020-08-24 | End: 2020-08-24 | Stop reason: HOSPADM

## 2020-08-24 RX ORDER — NEOSTIGMINE METHYLSULFATE 5 MG/5 ML
SYRINGE (ML) INTRAVENOUS AS NEEDED
Status: DISCONTINUED | OUTPATIENT
Start: 2020-08-24 | End: 2020-08-24 | Stop reason: SURG

## 2020-08-24 RX ORDER — SODIUM CHLORIDE 0.9 % (FLUSH) 0.9 %
3-10 SYRINGE (ML) INJECTION AS NEEDED
Status: DISCONTINUED | OUTPATIENT
Start: 2020-08-24 | End: 2020-08-24 | Stop reason: HOSPADM

## 2020-08-24 RX ORDER — ONDANSETRON 4 MG/1
4 TABLET, FILM COATED ORAL ONCE AS NEEDED
Status: DISCONTINUED | OUTPATIENT
Start: 2020-08-24 | End: 2020-08-24 | Stop reason: HOSPADM

## 2020-08-24 RX ORDER — DEXAMETHASONE SODIUM PHOSPHATE 4 MG/ML
4 INJECTION, SOLUTION INTRA-ARTICULAR; INTRALESIONAL; INTRAMUSCULAR; INTRAVENOUS; SOFT TISSUE ONCE AS NEEDED
Status: COMPLETED | OUTPATIENT
Start: 2020-08-24 | End: 2020-08-24

## 2020-08-24 RX ORDER — FENTANYL CITRATE 50 UG/ML
INJECTION, SOLUTION INTRAMUSCULAR; INTRAVENOUS AS NEEDED
Status: DISCONTINUED | OUTPATIENT
Start: 2020-08-24 | End: 2020-08-24 | Stop reason: SURG

## 2020-08-24 RX ORDER — NALOXONE HCL 0.4 MG/ML
0.4 VIAL (ML) INJECTION AS NEEDED
Status: DISCONTINUED | OUTPATIENT
Start: 2020-08-24 | End: 2020-08-24 | Stop reason: HOSPADM

## 2020-08-24 RX ORDER — HYDROCODONE BITARTRATE AND ACETAMINOPHEN 5; 325 MG/1; MG/1
1 TABLET ORAL EVERY 4 HOURS PRN
Qty: 8 TABLET | Refills: 0 | Status: SHIPPED | OUTPATIENT
Start: 2020-08-24 | End: 2021-01-05

## 2020-08-24 RX ORDER — SODIUM CHLORIDE 0.9 % (FLUSH) 0.9 %
3 SYRINGE (ML) INJECTION EVERY 12 HOURS SCHEDULED
Status: DISCONTINUED | OUTPATIENT
Start: 2020-08-24 | End: 2020-08-24 | Stop reason: HOSPADM

## 2020-08-24 RX ADMIN — GLYCOPYRROLATE 0.3 MG: 0.2 INJECTION, SOLUTION INTRAMUSCULAR; INTRAVENOUS at 11:04

## 2020-08-24 RX ADMIN — MIDAZOLAM HYDROCHLORIDE 2 MG: 2 INJECTION, SOLUTION INTRAMUSCULAR; INTRAVENOUS at 10:34

## 2020-08-24 RX ADMIN — Medication 3 MG: at 11:05

## 2020-08-24 RX ADMIN — DEXAMETHASONE SODIUM PHOSPHATE 4 MG: 4 INJECTION, SOLUTION INTRAMUSCULAR; INTRAVENOUS at 10:34

## 2020-08-24 RX ADMIN — DEXAMETHASONE SODIUM PHOSPHATE 4 MG: 4 INJECTION, SOLUTION INTRAMUSCULAR; INTRAVENOUS at 10:51

## 2020-08-24 RX ADMIN — OXYCODONE HYDROCHLORIDE AND ACETAMINOPHEN 1 TABLET: 10; 325 TABLET ORAL at 11:32

## 2020-08-24 RX ADMIN — SODIUM CHLORIDE, POTASSIUM CHLORIDE, SODIUM LACTATE AND CALCIUM CHLORIDE 1000 ML: 600; 310; 30; 20 INJECTION, SOLUTION INTRAVENOUS at 08:25

## 2020-08-24 RX ADMIN — ROCURONIUM BROMIDE 15 MG: 10 INJECTION INTRAVENOUS at 10:42

## 2020-08-24 RX ADMIN — PROPOFOL 100 MG: 10 INJECTION, EMULSION INTRAVENOUS at 10:42

## 2020-08-24 RX ADMIN — FENTANYL CITRATE 100 MCG: 50 INJECTION, SOLUTION INTRAMUSCULAR; INTRAVENOUS at 10:41

## 2020-08-24 RX ADMIN — ONDANSETRON HYDROCHLORIDE 4 MG: 2 SOLUTION INTRAMUSCULAR; INTRAVENOUS at 11:38

## 2020-08-24 RX ADMIN — PROPOFOL 50 MG: 10 INJECTION, EMULSION INTRAVENOUS at 10:41

## 2020-08-24 RX ADMIN — ONDANSETRON HYDROCHLORIDE 4 MG: 2 SOLUTION INTRAMUSCULAR; INTRAVENOUS at 10:51

## 2020-08-24 NOTE — ANESTHESIA PROCEDURE NOTES
Airway  Date/Time: 8/24/2020 10:43 AM  Airway not difficult    General Information and Staff    Patient location during procedure: OR  CRNA: Tae Colbert CRNA    Indications and Patient Condition  Indications for airway management: airway protection    Preoxygenated: yes  Mask difficulty assessment: 0 - not attempted    Final Airway Details  Final airway type: endotracheal airway      Successful airway: ETT  Cuffed: yes   Successful intubation technique: direct laryngoscopy  Blade: [place]  Blade size: 2  ETT size (mm): 7.0  Cormack-Lehane Classification: grade I - full view of glottis  Placement verified by: chest auscultation and capnometry   Cuff volume (mL): 8  Measured from: gums  ETT/EBT to gums (cm): 23  Number of attempts at approach: 1  Assessment: lips, teeth, and gum same as pre-op and atraumatic intubation

## 2020-08-24 NOTE — OP NOTE
OPERATIVE NOTE  8/24/2020    NAME: Saul Alcantara Sr.    YOB: 1955  MRN: [id number]    PRE-OPERATIVE DIAGNOSIS:    Pharyngoesophageal dysphagia [R13.14]    POST-OPERATIVE DIAGNOSIS:   Post-Op Diagnosis Codes:     * Pharyngoesophageal dysphagia [R13.14]    PROCEDURE PERFORMED:   Direct laryngoscopy, esophagoscopy with dilatation    SURGEON:   Wayne Richmond MD    ASSISTANT(S):   None    ANESTHESIA:   General Anesthesia via Endotracheal Tube    INDICATIONS: The patient is a 65 y.o. male with Pharyngoesophageal dysphagia [R13.14]    PROCEDURE:  The patient was brought to the operating room, given General Anesthesia via Endotracheal Tube, and prepped and draped in the usual manner.     Direct laryngoscopy was performed and no masses lesions, ulcerations or other abnormalities were noted throughout the upper aerodigestive tract examination.  Rigid esophagoscopy was attempted but unable to be passed through the cricopharyngeus.  Johnson dilatation was subsequently performed with lubricated Johnson dilators beginning with 28 Citizen of the [place] which was passed without difficulty.  Subsequently a 34 Citizen [place] was passed with minimal resistance and minimal blood streaking after removal.  Subsequently a 46 Citizen [place] and a 40 Citizen of [place] Johnson dilator was passed with minimal resistance and then a 52 Citizen [place] and subsequently a 56 Citizen [place] was passed with minimal resistance.  Patient tolerated the procedure well and following the passing of the 56 Citizen [place] Johnson dilator the procedure was terminated.     The patient was transported upon extubation to the postanesthesia care unit in stable condition.    SPECIMENS:  None    COMPLICATIONS: NONE    ESTIMATED BLOOD LOSS:  Minimal    Wayne Richmond MD  8/24/2020

## 2020-08-24 NOTE — ANESTHESIA PREPROCEDURE EVALUATION
Anesthesia Evaluation     Patient summary reviewed   no history of anesthetic complications:  NPO Solid Status: > 8 hours  NPO Liquid Status: > 8 hours           Airway   Mallampati: I  TM distance: >3 FB  Neck ROM: full  Dental    (+) edentulous    Pulmonary     breath sounds clear to auscultation  (+) a smoker Current Abstained day of surgery, COPD, wheezes,   (-) asthma, recent URI, sleep apnea  Cardiovascular - normal exam  Exercise tolerance: good (4-7 METS)    ECG reviewed  Rhythm: regular  Rate: normal    (+) hypertension,   (-) pacemaker, past MI, angina, cardiac stents, CABG      Neuro/Psych  (-) seizures, TIA, CVA  GI/Hepatic/Renal/Endo    (+)  GERD,  thyroid problem hypothyroidism  (-) liver disease, no renal disease, diabetes    Musculoskeletal     Abdominal    Substance History      OB/GYN          Other      history of cancer (SCC of tongue in past)      Other Comment: Xerostomia                  Anesthesia Plan    ASA 3     general     intravenous induction     Anesthetic plan, all risks, benefits, and alternatives have been provided, discussed and informed consent has been obtained with: patient.

## 2020-08-24 NOTE — DISCHARGE INSTRUCTIONS

## 2020-08-24 NOTE — ANESTHESIA POSTPROCEDURE EVALUATION
Patient: Saul Alcantara Sr.    Procedure Summary     Date:  08/24/20 Room / Location:   [facility] OR 02 /  [facility]    Anesthesia Start:  1039 Anesthesia Stop:      Procedure:  MICRODIRECT LARYNGOSCOPY with esophageal dilatation (N/A ) Diagnosis:       Pharyngoesophageal dysphagia      (Pharyngoesophageal dysphagia [R13.14])    Surgeon:  Wayne Richmond MD Provider:  Tae Colbert CRNA    Anesthesia Type:  general ASA Status:  3          Anesthesia Type: general    Vitals  Vitals Value Taken Time   /85 8/24/2020 11:38 AM   Temp 97.5 °F (36.4 °C) 8/24/2020 11:34 AM   Pulse 62 8/24/2020 11:48 AM   Resp 14 8/24/2020 11:24 AM   SpO2 98 % 8/24/2020 11:48 AM   Vitals shown include unvalidated device data.        Post Anesthesia Care and Evaluation    Patient location during evaluation: PACU  Patient participation: complete - patient participated  Level of consciousness: awake and alert  Pain management: satisfactory to patient  Airway patency: patent  Anesthetic complications: No anesthetic complications    Cardiovascular status: acceptable  Respiratory status: acceptable  Hydration status: acceptable    Comments: Blood pressure 110/75, pulse 76, temperature 97.5 °F (36.4 °C), temperature source Temporal, resp. rate 14, SpO2 99 %.    Pt discharged from PACU based on jeff score >8  No anesthesia care post op

## 2020-09-02 NOTE — PROGRESS NOTES
Mr. Alcantara is 65 y.o. male    CHIEF COMPLAINT: 1 year follow up for BPH with LUTS    HPI  Two chronic urologic issues to be evaluated and managed today can be summarized as follows:     -Benign prostate hyperplasia with lower urinary tract symptoms: He continues to take tamsulosin 0.4 mg daily.  This is successfully improved his lower urinary tract symptoms considerably.  He has been without urinary tract infection or hematuria.  Content with his symptoms.  -Erectile dysfunction: He is had difficulty with erections for almost 10 years.  This problem is worsening.  He says it was sudden onset.  Severity is that he is able to accomplish an erection but he does not maintain it very well losing rigidity.  He has Peyronie's disease.  He has been seen by Dr. Stewart in [place].  This time he does not consider him a surgical candidate.  Patient is responding to tadalafil but he has not been able to afford this.  He has been using the 20 mg dose but the last time he filled the prescription he was paying almost $25 a tablet.    The following portions of the patient's history were reviewed and updated as appropriate: allergies, current medications, past family history, past medical history, past social history, past surgical history and problem list.      Review of Systems   Constitutional: Negative for chills and fever.   Gastrointestinal: Negative for abdominal pain, anal bleeding and blood in stool.   Genitourinary: Negative for dysuria, frequency, hematuria and urgency.         Current Outpatient Medications:   •  albuterol (PROVENTIL) (2.5 MG/3ML) 0.083% nebulizer solution, Take 2.5 mg by nebulization Every 4 (Four) Hours As Needed for Wheezing., Disp: 50 mL, Rfl: 1  •  ALPRAZolam (XANAX) 2 MG tablet, Take 2 mg by mouth 3 (Three) Times a Day., Disp: , Rfl:   •  amLODIPine (NORVASC) 10 MG tablet, Take 20 mg by mouth Daily., Disp: , Rfl:   •  azelastine (ASTELIN) 0.1 % nasal spray, , Disp: , Rfl:   •  cyclobenzaprine  (FLEXERIL) 10 MG tablet, TK 1 T PO TID as needed, Disp: , Rfl:   •  fluticasone (FLONASE) 50 MCG/ACT nasal spray, 2 sprays into each nostril Daily. (Patient taking differently: 2 sprays into the nostril(s) as directed by provider Daily As Needed.), Disp: 16 g, Rfl: 5  •  glycopyrrolate (ROBINUL) 1 MG tablet, 0.5 mg 2 (Two) Times a Day., Disp: , Rfl: 3  •  HYDROcodone-acetaminophen (NORCO) 5-325 MG per tablet, Take 1 tablet by mouth Every 4 (Four) Hours As Needed for Moderate Pain  or Severe Pain  (Pain) for up to 8 doses., Disp: 8 tablet, Rfl: 0  •  levothyroxine (SYNTHROID, LEVOTHROID) 75 MCG tablet, Take 75 mcg by mouth Daily., Disp: , Rfl:   •  Lidocaine Viscous HCl (XYLOCAINE) 2 % solution, Take 5 mL by mouth As Needed for Mild Pain ., Disp: , Rfl:   •  meloxicam (MOBIC) 15 MG tablet, Take 15 mg by mouth Daily., Disp: , Rfl:   •  nystatin (MYCOSTATIN) 831800 UNIT/ML suspension, Take 10ml by mouth BID for 28 days (Patient taking differently: Take 200,000 Units by mouth 4 (Four) Times a Day As Needed. Take 10ml by mouth BID for 28 days), Disp: 480 mL, Rfl: 2  •  ondansetron (ZOFRAN) 8 MG tablet, Take 8 mg by mouth Every 8 (Eight) Hours As Needed for Nausea., Disp: , Rfl:   •  pantoprazole (PROTONIX) 40 MG EC tablet, Delayed Release (DR/EC) Take 1 tablet (40 mg) by oral route 2 times per day, Take 30 minutes prior to breakfast and evening meal, Disp: , Rfl:   •  senna-docusate (PERICOLACE) 8.6-50 MG per tablet, Take 1 tablet by mouth 2 times daily, Disp: , Rfl:   •  sertraline (ZOLOFT) 50 MG tablet, Take 50 mg by mouth Daily., Disp: , Rfl:   •  simvastatin (ZOCOR) 20 MG tablet, TK 1 T PO QD, Disp: , Rfl: 11  •  tamsulosin (FLOMAX) 0.4 MG capsule 24 hr capsule, Take 0.4 mg by mouth Daily., Disp: , Rfl:   •  triamcinolone (KENALOG) 0.5 % ointment, Apply  topically to the appropriate area as directed 2 (Two) Times a Day. Apply to affected ear, Disp: 15 g, Rfl: 3  •  HYDROcodone-acetaminophen (NORCO) 7.5-325 MG per  tablet, TK 1 T PO QD PRN P, Disp: , Rfl: 0  •  tadalafil (CIALIS) 5 MG tablet, Take 1 tablet by mouth Daily for 360 days., Disp: 30 tablet, Rfl: 11  •  zolpidem (AMBIEN) 10 MG tablet, Take 10 mg by mouth At Night As Needed for Sleep., Disp: , Rfl:     Past Medical History:   Diagnosis Date   • Allergic rhinitis    • Anxiety    • Chronic rhinitis    • Chronic sinusitis    • Depression    • Deviated nasal septum    • Enlarged prostate    • GERD (gastroesophageal reflux disease)    • H/O head and neck radiation 3/3/2017   • History of transfusion    • Hypertension    • Hypothyroidism    • Laryngopharyngeal reflux    • Lymphoma (CMS/HCC)     Non-Hodgkins   • MRSA infection    • Panic attacks    • Peyronie disease    • Pneumonia    • 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    T1N0M0 SCC S/P XRT/Chemo 12/2014   • 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    T1N0M0 SCC S/P XRT/Chemo 12/2014   • Sicca syndrome (CMS/HCC)    • Sinusitis, acute    • Squamous cell carcinoma     Base of Tongue   • Tobacco use disorder    • Tongue irritation    • Xerostomia        Past Surgical History:   Procedure Laterality Date   • CATARACT EXTRACTION Bilateral    • ESOPHAGOSCOPY N/A 11/20/2019    Procedure: Direct laryngoscopy with esophageal Johnson dilation;  Surgeon: Wayne Richmond MD;  Location: [facility] OR;  Service: ENT   • HAND SURGERY      metal plate    • KNEE SURGERY     • LARYNGOSCOPY N/A 6/26/2020    Procedure: Rigid esophagoscopy Johnson esophageal dilatation;  Surgeon: Wayne Richmond MD;  Location: [facility] OR;  Service: ENT;  Laterality: N/A;   • LARYNGOSCOPY N/A 8/24/2020    Procedure: MICRODIRECT LARYNGOSCOPY with esophageal dilatation;  Surgeon: Wayne Richmond MD;  Location: [facility] OR;  Service: ENT;  Laterality: N/A;   • OTHER SURGICAL HISTORY  09/10/2014    Microlaryngoscopy with Biopsy - Base of Tongue   • SPLENECTOMY     • SQUAMOUS CELL CARCINOMA EXCISION   "09/10/2014    Tongue   • TONSILLECTOMY         Social History     Socioeconomic History   • Marital status:      Spouse name: Not on file   • Number of children: Not on file   • Years of education: Not on file   • Highest education level: Not on file   Tobacco Use   • Smoking status: Current Some Day Smoker     Packs/day: 0.25     Types: Cigarettes   • Smokeless tobacco: Never Used   • Tobacco comment: pack last couple of weeks   Substance and Sexual Activity   • Alcohol use: No   • Drug use: No   • Sexual activity: Defer       Family History   Problem Relation Age of Onset   • Diabetes Sister    • Cancer Sister          Temp 97.5 °F (36.4 °C)   Ht 185.4 cm (73\")   Wt 74.8 kg (165 lb)   BMI 21.77 kg/m²       Physical Exam  Constitutional: Patient is without distress or deformity.  Vital signs are reviewed as above.    Neuro: No confusion; No disorientation; Alert and oriented  Pulmonary: No respiratory distress.   Skin: No pallor or diaphoresis        Data  Results for orders placed or performed in visit on 09/03/20   POC Urinalysis Dipstick, Multipro   Result Value Ref Range    Color Yellow Yellow, Straw, Dark Yellow, Barbie    Clarity, UA Clear Clear    Glucose, UA Negative Negative, 1000 mg/dL (3+) mg/dL    Bilirubin Negative Negative    Ketones, UA Negative Negative    Specific Gravity  1.015 1.005 - 1.030    Blood, UA Negative Negative    pH, Urine 7.0 5.0 - 8.0    Protein, POC Negative Negative mg/dL    Urobilinogen, UA Normal Normal    Nitrite, UA Negative Negative    Leukocytes Negative Negative     International Prostate Symptom Score  The following is posted based on patient questionnaire answers:  0 - not at all    1-7 mild symptoms  1- Less than one time in five  8-19 moderate symptoms  2 -Less than half the time  20-35 severe symptoms  3 - About half the time  4 - More than half the time  5 - Almost always     For following sections:  Incomplete Emptying: - How often have you had the " sensation  of not emptying your bladder completely after you finished urinating?  0  Frequency: -How often have you had to urinate again less than   two hours after you finished urinating?      0  Intermittency: -How often have you found you stopped and started again  Several times when you urinate?       1  Urgency: -How often do you find it difficult to postpone urination?             0  Weak stream: - How often have you had a weak urinary stream?             1  Straining: - How often have you had to push or strain to begin  Urination?          5  Sleeping: -How many times did you most typically get up to urinate   From the time you went to bed at night until the time you got up in the   2  Morning          Total `  9    Quality of Life  How would you feel if you had to live with your urinary condition the way   3  It is now, no better, no worse for the rest of your life?    Where: 0=delighted; 1= pleased, 2= mostly satisfied, 3= mixed, 4 = mostly  Dissatisfied, 5= Unhappy, 6 = terrible      Imaging Results (Last 7 Days)     ** No results found for the last 168 hours. **            Assessment and Plan  Diagnoses and all orders for this visit:    BPH with obstruction/lower urinary tract symptoms  -     POC Urinalysis Dipstick, Multipro      Erectile dysfunction, unspecified erectile dysfunction type  -     tadalafil (CIALIS) 5 MG tablet; Take 1 tablet by mouth Daily for 360 days.    -Patient has significant BPH with obstruction.  Present time his symptoms are actually quite good.  Continue his tamsulosin.  -Options were discussed regarding the medications.  I did explain that there are more affordable ways to get tadalafil.  He also was talking me about how much bother it is to plan sexual activity based on when he has the medication available.  We discussed the possibility of tadalafil on a daily basis.  Both pros and cons were discussed.  He would like to change at this formulation.      (Please note that  portions of this note were completed with a voice recognition program.)  Oscar Bazan MD  09/05/20  11:41

## 2020-09-03 ENCOUNTER — OFFICE VISIT (OUTPATIENT)
Dept: UROLOGY | Facility: CLINIC | Age: 65
End: 2020-09-03

## 2020-09-03 VITALS — TEMPERATURE: 97.5 F | HEIGHT: 73 IN | WEIGHT: 165 LBS | BODY MASS INDEX: 21.87 KG/M2

## 2020-09-03 DIAGNOSIS — N52.9 ERECTILE DYSFUNCTION, UNSPECIFIED ERECTILE DYSFUNCTION TYPE: ICD-10-CM

## 2020-09-03 DIAGNOSIS — N40.1 BPH WITH OBSTRUCTION/LOWER URINARY TRACT SYMPTOMS: Primary | ICD-10-CM

## 2020-09-03 DIAGNOSIS — N13.8 BPH WITH OBSTRUCTION/LOWER URINARY TRACT SYMPTOMS: Primary | ICD-10-CM

## 2020-09-03 PROCEDURE — 99214 OFFICE O/P EST MOD 30 MIN: CPT | Performed by: UROLOGY

## 2020-09-03 PROCEDURE — 81003 URINALYSIS AUTO W/O SCOPE: CPT | Performed by: UROLOGY

## 2020-09-03 RX ORDER — TADALAFIL 5 MG/1
5 TABLET ORAL DAILY
Qty: 30 TABLET | Refills: 11 | Status: SHIPPED | OUTPATIENT
Start: 2020-09-03 | End: 2021-08-10

## 2020-09-18 ENCOUNTER — TRANSCRIBE ORDERS (OUTPATIENT)
Dept: ADMINISTRATIVE | Facility: HOSPITAL | Age: 65
End: 2020-09-18

## 2020-09-18 DIAGNOSIS — Z01.818 PRE-OP TESTING: Primary | ICD-10-CM

## 2020-09-21 ENCOUNTER — LAB (OUTPATIENT)
Dept: LAB | Facility: HOSPITAL | Age: 65
End: 2020-09-21

## 2020-09-21 PROCEDURE — C9803 HOPD COVID-19 SPEC COLLECT: HCPCS | Performed by: OTOLARYNGOLOGY

## 2020-09-21 PROCEDURE — U0003 INFECTIOUS AGENT DETECTION BY NUCLEIC ACID (DNA OR RNA); SEVERE ACUTE RESPIRATORY SYNDROME CORONAVIRUS 2 (SARS-COV-2) (CORONAVIRUS DISEASE [COVID-19]), AMPLIFIED PROBE TECHNIQUE, MAKING USE OF HIGH THROUGHPUT TECHNOLOGIES AS DESCRIBED BY CMS-2020-01-R: HCPCS | Performed by: OTOLARYNGOLOGY

## 2020-09-23 LAB
COVID LABCORP PRIORITY: NORMAL
SARS-COV-2 RNA RESP QL NAA+PROBE: NOT DETECTED

## 2020-09-23 NOTE — PROGRESS NOTES
YOB: 1955  Location: [facility]  Location Address: [address] [place] [address], [facility], [address] [place] [address]  Location Phone: [phone]    Chief Complaint   Patient presents with   • Post-op       History of Present Illness  Saul Alcantara Sr. is a 65 y.o. male.  Saul Alcantara Sr. is status post Direct laryngoscopy, esophagoscopy with dilatation on 20 and Rigid esophagoscopy and Johnson esophageal dilatation on 20. Patient had DL and biopsy of base of tongue on 9/10/14 with positive pathology for SCCa. The cancer was staged T1M0N0. The patient has a history of radiation therapy due to a base of tongue cancer on the left side that he finished in 2014.The patient reports improved swallowing. The patient continues to have burning mouth and dysphagia with dry mouth. He has tried multiple treatments to help with dryness and burning without much relief. He reports the most relief with viscous lidocaine. He also had a sinus infection the end of  that was treated with antibiotics and steroids with improved symptoms. He reports that his dentures aren't fitting well and states he has an appointment with his dentist for this. The patient continues to smoke cigarettes and reports smoking 1-2 cigarettes a day.           Past Medical History:   Diagnosis Date   • Allergic rhinitis    • Anxiety    • Chronic rhinitis    • Chronic sinusitis    • Depression    • Deviated nasal septum    • Enlarged prostate    • GERD (gastroesophageal reflux disease)    • H/O head and neck radiation 3/3/2017   • History of transfusion    • Hypertension    • Hypothyroidism    • Laryngopharyngeal reflux    • Lymphoma (CMS/HCC)     Non-Hodgkins   • MRSA infection    • Panic attacks    • Peyronie disease    • Pneumonia    • Primary squamous cell carcinoma of tongue (CMS/HCC) 2016    T1N0M0 SCC S/P XRT/Chemo 2014   • Primary squamous cell carcinoma of tongue (CMS/HCC) 2016    T1N0M0 SCC S/P XRT/Chemo  12/2014   • Sicca syndrome (CMS/HCC)    • Sinusitis, acute    • Squamous cell carcinoma     Base of Tongue   • Tobacco use disorder    • Tongue irritation    • Xerostomia        Past Surgical History:   Procedure Laterality Date   • CATARACT EXTRACTION Bilateral    • ESOPHAGOSCOPY N/A 11/20/2019    Procedure: Direct laryngoscopy with esophageal Johnson dilation;  Surgeon: Wayne Richmond MD;  Location:  [facility] OR;  Service: ENT   • HAND SURGERY      metal plate    • KNEE SURGERY     • LARYNGOSCOPY N/A 6/26/2020    Procedure: Rigid esophagoscopy Johnson esophageal dilatation;  Surgeon: Wayne Richmond MD;  Location:  [facility] OR;  Service: ENT;  Laterality: N/A;   • LARYNGOSCOPY N/A 8/24/2020    Procedure: MICRODIRECT LARYNGOSCOPY with esophageal dilatation;  Surgeon: Wayne Richmond MD;  Location:  [facility] OR;  Service: ENT;  Laterality: N/A;   • OTHER SURGICAL HISTORY  09/10/2014    Microlaryngoscopy with Biopsy - Base of Tongue   • SPLENECTOMY     • SQUAMOUS CELL CARCINOMA EXCISION  09/10/2014    Tongue   • TONSILLECTOMY         Outpatient Medications Marked as Taking for the 9/24/20 encounter (Office Visit) with Wayne Richmond MD   Medication Sig Dispense Refill   • albuterol (PROVENTIL) (2.5 MG/3ML) 0.083% nebulizer solution Take 2.5 mg by nebulization Every 4 (Four) Hours As Needed for Wheezing. 50 mL 1   • ALPRAZolam (XANAX) 2 MG tablet Take 2 mg by mouth 3 (Three) Times a Day.     • amLODIPine (NORVASC) 10 MG tablet Take 20 mg by mouth Daily.     • azelastine (ASTELIN) 0.1 % nasal spray      • cyclobenzaprine (FLEXERIL) 10 MG tablet TK 1 T PO TID as needed     • fluticasone (FLONASE) 50 MCG/ACT nasal spray 2 sprays into each nostril Daily. (Patient taking differently: 2 sprays into the nostril(s) as directed by provider Daily As Needed.) 16 g 5   • glycopyrrolate (ROBINUL) 1 MG tablet 0.5 mg 2 (Two) Times a Day.  3   • HYDROcodone-acetaminophen (NORCO) 5-325 MG per tablet Take 1 tablet by mouth  Every 4 (Four) Hours As Needed for Moderate Pain  or Severe Pain  (Pain) for up to 8 doses. 8 tablet 0   • HYDROcodone-acetaminophen (NORCO) 7.5-325 MG per tablet TK 1 T PO QD PRN P  0   • levothyroxine (SYNTHROID, LEVOTHROID) 75 MCG tablet Take 75 mcg by mouth Daily.     • Lidocaine Viscous HCl (XYLOCAINE) 2 % solution Take 5 mL by mouth As Needed for Mild Pain .     • meloxicam (MOBIC) 15 MG tablet Take 15 mg by mouth Daily.     • nystatin (MYCOSTATIN) 801906 UNIT/ML suspension Take 10ml by mouth BID for 28 days (Patient taking differently: Take 200,000 Units by mouth 4 (Four) Times a Day As Needed. Take 10ml by mouth BID for 28 days) 480 mL 2   • ondansetron (ZOFRAN) 8 MG tablet Take 8 mg by mouth Every 8 (Eight) Hours As Needed for Nausea.     • pantoprazole (PROTONIX) 40 MG EC tablet Delayed Release (DR/EC)  Take 1 tablet (40 mg) by oral route 2 times per day,  Take 30 minutes prior to breakfast and evening meal     • senna-docusate (PERICOLACE) 8.6-50 MG per tablet Take 1 tablet by mouth 2 times daily     • sertraline (ZOLOFT) 50 MG tablet Take 50 mg by mouth Daily.     • simvastatin (ZOCOR) 20 MG tablet TK 1 T PO QD  11   • tadalafil (CIALIS) 5 MG tablet Take 1 tablet by mouth Daily for 360 days. 30 tablet 11   • tamsulosin (FLOMAX) 0.4 MG capsule 24 hr capsule Take 0.4 mg by mouth Daily.     • triamcinolone (KENALOG) 0.5 % ointment Apply  topically to the appropriate area as directed 2 (Two) Times a Day. Apply to affected ear 15 g 3   • zolpidem (AMBIEN) 10 MG tablet Take 10 mg by mouth At Night As Needed for Sleep.         Patient has no known allergies.    Family History   Problem Relation Age of Onset   • Diabetes Sister    • Cancer Sister        Social History     Socioeconomic History   • Marital status:      Spouse name: Not on file   • Number of children: Not on file   • Years of education: Not on file   • Highest education level: Not on file   Tobacco Use   • Smoking status: Current Some Day  Smoker     Packs/day: 0.25     Types: Cigarettes   • Smokeless tobacco: Never Used   • Tobacco comment: pack last couple of weeks   Substance and Sexual Activity   • Alcohol use: No   • Drug use: No   • Sexual activity: Defer       Review of Systems   Constitutional: Negative for activity change, appetite change, chills, diaphoresis, fatigue, fever and unexpected weight change.   HENT: Positive for trouble swallowing. Negative for congestion, ear discharge, ear pain, facial swelling, hearing loss, mouth sores, nosebleeds, postnasal drip, rhinorrhea, sinus pressure, sneezing, sore throat, tinnitus and voice change.         Dry mouth  History of radiation therapy  H/O scc of base of tongue   Eyes: Negative for pain, discharge, redness, itching and visual disturbance.   Respiratory: Negative for apnea, cough, choking, chest tightness, shortness of breath, wheezing and stridor.    Gastrointestinal: Negative for nausea and vomiting.   Endocrine: Negative for cold intolerance and heat intolerance.   Musculoskeletal: Negative for arthralgias, back pain, gait problem, neck pain and neck stiffness.   Skin: Negative for rash.   Allergic/Immunologic: Negative for environmental allergies and food allergies.   Neurological: Negative for dizziness, tremors, seizures, syncope, facial asymmetry, speech difficulty, weakness, light-headedness, numbness and headaches.   Hematological: Negative for adenopathy. Does not bruise/bleed easily.   Psychiatric/Behavioral: Negative for behavioral problems, sleep disturbance and suicidal ideas. The patient is not nervous/anxious and is not hyperactive.        Vitals:    09/24/20 1038   BP: 136/77   Pulse: 88   Temp: 98 °F (36.7 °C)       Body mass index is 22.01 kg/m².    Objective     Physical Exam  CONSTITUTIONAL: well nourished, alert, oriented, in no acute distress     COMMUNICATION AND VOICE: able to communicate normally, normal voice quality    HEAD: normocephalic, no lesions, atraumatic,  no tenderness, no masses     FACE: appearance normal, no lesions, no tenderness, no deformities, facial motion symmetric    SALIVARY GLANDS: parotid glands with no tenderness, no swelling, no masses, submandibular glands with normal size, nontender    EYES: ocular motility normal, eyelids normal, orbits normal, no proptosis, conjunctiva normal , pupils equal, round     EARS:  Hearing: response to conversational voice normal bilaterally   External Ears: auricles without lesions, left auricle with tenderness to palpation  Otoscopic: tympanic membrane appearance normal, no lesions, no perforation, normal mobility, no fluid    NOSE:  External Nose: structure normal, no tenderness on palpation, no nasal discharge, no lesions, no evidence of trauma, nostrils patent   Intranasal Exam: nasal mucosa normal, vestibule within normal limits, inferior turbinate normal, nasal septum midline     ORAL:  Lips: upper and lower lips without lesion   Teeth: dentition within normal limits for age   Gums: gingivae healthy   Oral Mucosa: oral mucosa normal, no mucosal lesions   Floor of Mouth: Warthin’s duct patent, mucosa normal  Tongue: lingual mucosa normal without lesions,generalized dryness and irritation, normal tongue mobility   Palate: soft and hard palates with normal mucosa and structure  Oropharynx: oropharyngeal mucosa erythema    NECK: neck appearance normal, no mass,  noted without erythema or tenderness    THYROID: no overt thyromegaly, no tenderness, nodules or mass present on palpation, position midline     LYMPH NODES: no lymphadenopathy    CHEST/RESPIRATORY: respiratory effort normal, normal breath sounds     CARDIOVASCULAR: rate and rhythm normal, extremities without cyanosis or edema      NEUROLOGIC/PSYCHIATRIC: oriented to time, place and person, mood normal, affect appropriate, CN II-XII intact grossly    Assessment/Plan   Saul was seen today for post-op.    Diagnoses and all orders for this  visit:    Pharyngoesophageal dysphagia    Acquired hypothyroidism  -     Cancel: TSH  -     Cancel: Thyroid Peroxidase Antibody  -     Cancel: T3, Free  -     Cancel: T4, Free  -     Cancel: Thyroglobulin    H/O head and neck radiation    Xerostomia      * Surgery not found *  No orders of the defined types were placed in this encounter.    Return in about 6 weeks (around 11/5/2020) for Recheck throat.       Patient Instructions   The patient reports improved symptoms status post dilation of esophagus. Recheck in 6 weeks.    Advised to bring PET scan to follow-up appointment.    I advised the patient of the risks in continuing to use tobacco and recommended complete cessation, The inherent risks including the risk of disability, development of a malignancy and/or death was discussed.  The patient indicated understanding.    For more information:  [employer] [place]  1-800-QUIT-NOW  [email or web address]

## 2020-09-24 ENCOUNTER — OFFICE VISIT (OUTPATIENT)
Dept: OTOLARYNGOLOGY | Facility: CLINIC | Age: 65
End: 2020-09-24

## 2020-09-24 ENCOUNTER — APPOINTMENT (OUTPATIENT)
Dept: LAB | Facility: HOSPITAL | Age: 65
End: 2020-09-24

## 2020-09-24 VITALS
HEART RATE: 88 BPM | TEMPERATURE: 98 F | SYSTOLIC BLOOD PRESSURE: 136 MMHG | HEIGHT: 73 IN | WEIGHT: 166.8 LBS | BODY MASS INDEX: 22.11 KG/M2 | DIASTOLIC BLOOD PRESSURE: 77 MMHG

## 2020-09-24 DIAGNOSIS — E03.9 ACQUIRED HYPOTHYROIDISM: ICD-10-CM

## 2020-09-24 DIAGNOSIS — Z92.3 H/O HEAD AND NECK RADIATION: ICD-10-CM

## 2020-09-24 DIAGNOSIS — R13.14 PHARYNGOESOPHAGEAL DYSPHAGIA: Primary | ICD-10-CM

## 2020-09-24 DIAGNOSIS — K11.7 XEROSTOMIA: ICD-10-CM

## 2020-09-24 PROBLEM — E07.89 THYROID MASS OF UNCLEAR ETIOLOGY: Status: ACTIVE | Noted: 2020-09-24

## 2020-09-24 PROBLEM — E04.2 MULTINODULAR GOITER: Status: ACTIVE | Noted: 2020-09-24

## 2020-09-24 PROCEDURE — 99214 OFFICE O/P EST MOD 30 MIN: CPT | Performed by: PHYSICIAN ASSISTANT

## 2020-09-24 NOTE — PATIENT INSTRUCTIONS
The patient reports improved symptoms status post dilation of esophagus. Recheck in 6 weeks.    Advised to bring PET scan to follow-up appointment.    I advised the patient of the risks in continuing to use tobacco and recommended complete cessation, The inherent risks including the risk of disability, development of a malignancy and/or death was discussed.  The patient indicated understanding.    For more information:  [employer] [place]  1-800-QUIT-NOW  [email or web address]

## 2020-10-02 ENCOUNTER — NURSE TRIAGE (OUTPATIENT)
Dept: CALL CENTER | Facility: HOSPITAL | Age: 65
End: 2020-10-02

## 2020-10-03 NOTE — TELEPHONE ENCOUNTER
"Called pharmacy to see if prescription had been called in; medication is at the pharmacy; notified caller that prescription is available at the pharmacy.    Reason for Disposition  • [1] Prescription prescribed recently is not at pharmacy AND [2] triager has access to patient's EMR AND [3] prescription is recorded in the EMR    Additional Information  • Negative: Drug overdose and triager unable to answer question  • Negative: Caller requesting information unrelated to medicine  • Negative: Caller requesting a prescription for Strep throat and has a positive culture result  • Negative: Rash while taking a medication or within 3 days of stopping it  • Negative: Immunization reaction suspected  • Negative: [1] Asthma and [2] having symptoms of asthma (cough, wheezing, etc.)  • Negative: [1] Influenza symptoms AND [2] anti-viral med prescription request, such as Tamiflu  • Negative: [1] Symptom of illness (e.g., headache, abdominal pain, earache, vomiting) AND [2] more than mild  • Negative: MORE THAN A DOUBLE DOSE of a prescription or over-the-counter (OTC) drug  • Negative: [1] DOUBLE DOSE (an extra dose or lesser amount) of over-the-counter (OTC) drug AND [2] any symptoms (e.g., dizziness, nausea, pain, sleepiness)  • Negative: [1] DOUBLE DOSE (an extra dose or lesser amount) of prescription drug AND [2] any symptoms (e.g., dizziness, nausea, pain, sleepiness)  • Negative: Took another person's prescription drug  • Negative: [1] DOUBLE DOSE (an extra dose or lesser amount) of prescription drug AND [2] NO symptoms (Exception: a double dose of antibiotics)  • Negative: Diabetes drug error or overdose (e.g., took wrong type of insulin or took extra dose)  • Negative: [1] Request for URGENT new prescription or refill of \"essential\" medication (i.e., likelihood of harm to patient if not taken) AND [2] triager unable to fill per unit policy  • Negative: [1] Prescription not at pharmacy AND [2] was prescribed by PCP " "recently  • Negative: [1] Pharmacy calling with prescription questions AND [2] triager unable to answer question  • Negative: [1] Caller has URGENT medication question about med that PCP or specialist prescribed AND [2] triager unable to answer question  • Negative: [1] Caller has NON-URGENT medication question about med that PCP prescribed AND [2] triager unable to answer question  • Negative: [1] Caller requesting a NON-URGENT new prescription or refill AND [2] triager unable to refill per unit policy  • Negative: [1] Caller has medication question about med not prescribed by PCP AND [2] triager unable to answer question (e.g., compatibility with other med, storage)  • Negative: Caller requesting a CONTROLLED substance prescription refill (e.g., narcotics, ADHD medicines)  • Negative: Caller wants to use a complementary or alternative medicine    Answer Assessment - Initial Assessment Questions  1.   NAME of MEDICATION: \"What medicine are you calling about?\"      xanax  2.   QUESTION: \"What is your question?\"      Refill was not available at [facility], today at noon  3.   PRESCRIBING HCP: \"Who prescribed it?\" Reason: if prescribed by specialist, call should be referred to that group.      Dr. Joe Stubbs  4. SYMPTOMS: \"Do you have any symptoms?\"      Will be out tomorrow  5. SEVERITY: If symptoms are present, ask \"Are they mild, moderate or severe?\"      na  6.  PREGNANCY:  \"Is there any chance that you are pregnant?\" \"When was your last menstrual period?\"      na    Protocols used: MEDICATION QUESTION CALL-ADULT-      "

## 2020-12-28 ENCOUNTER — TRANSCRIBE ORDERS (OUTPATIENT)
Dept: LAB | Facility: HOSPITAL | Age: 65
End: 2020-12-28

## 2020-12-28 DIAGNOSIS — Z01.818 PRE-OP TESTING: Primary | ICD-10-CM

## 2021-01-02 ENCOUNTER — LAB (OUTPATIENT)
Dept: LAB | Facility: HOSPITAL | Age: 66
End: 2021-01-02

## 2021-01-02 LAB — SARS-COV-2 ORF1AB RESP QL NAA+PROBE: NOT DETECTED

## 2021-01-02 PROCEDURE — U0004 COV-19 TEST NON-CDC HGH THRU: HCPCS | Performed by: OTOLARYNGOLOGY

## 2021-01-02 PROCEDURE — C9803 HOPD COVID-19 SPEC COLLECT: HCPCS | Performed by: OTOLARYNGOLOGY

## 2021-01-04 NOTE — PROGRESS NOTES
YOB: 1955  Location: [facility]  Location Address: [address] [place] [address], [facility], [address] [place] [address]  Location Phone: [phone]    Chief Complaint   Patient presents with   • Follow-up       History of Present Illness  Saul Alcantara Sr. is a 65 y.o. male.  Saul Alcantara Sr. is status post Direct laryngoscopy, esophagoscopy with dilatation on 20 and Rigid esophagoscopy and Johnson esophageal dilatation on 20. Patient had DL and biopsy of base of tongue on 9/10/14 with positive pathology for SCCa. The cancer was staged T1M0N0. The patient has a history of radiation therapy due to a base of tongue cancer on the left side that he finished in 2014.The patient reports improved swallowing. The patient continues to have burning mouth and dysphagia with dry mouth. He has tried multiple treatments to help with dryness and burning without much relief. He reports the most relief with viscous lidocaine. Patient states today he has had a reoccurrence of his lymphoma and is starting Truxima treatments. He states so far the treatments have not made his symptoms of his mouth worse.     His dysphagia is stable and he is tolerating p.o. relatively well.  He complains of mouth dryness.    I have personally reviewed the information imported into the chart during this visit.      I have personally reviewed the review of systems.           Past Medical History:   Diagnosis Date   • Allergic rhinitis    • Anxiety    • Chronic rhinitis    • Chronic sinusitis    • Depression    • Deviated nasal septum    • Enlarged prostate    • GERD (gastroesophageal reflux disease)    • H/O head and neck radiation 3/3/2017   • History of transfusion    • Hypertension    • Hypothyroidism    • Laryngopharyngeal reflux    • Lymphoma (CMS/HCC)     Non-Hodgkins   • MRSA infection    • Panic attacks    • Peyronie disease    • Pneumonia    • Primary squamous cell carcinoma of tongue (CMS/HCC) 2016    T1N0M0 SCC S/P  XRT/Chemo 12/2014   • 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    T1N0M0 SCC S/P XRT/Chemo 12/2014   • Sicca syndrome (CMS/HCC)    • Sinusitis, acute    • Squamous cell carcinoma     Base of Tongue   • Tobacco use disorder    • Tongue irritation    • Xerostomia        Past Surgical History:   Procedure Laterality Date   • CATARACT EXTRACTION Bilateral    • ESOPHAGOSCOPY N/A 11/20/2019    Procedure: Direct laryngoscopy with esophageal Johnson dilation;  Surgeon: Wayne Richmond MD;  Location:  [facility];  Service: ENT   • HAND SURGERY      metal plate    • KNEE SURGERY     • LARYNGOSCOPY N/A 6/26/2020    Procedure: Rigid esophagoscopy Johnson esophageal dilatation;  Surgeon: Wayne Richmond MD;  Location:  [facility];  Service: ENT;  Laterality: N/A;   • LARYNGOSCOPY N/A 8/24/2020    Procedure: MICRODIRECT LARYNGOSCOPY with esophageal dilatation;  Surgeon: Wayne Richmond MD;  Location:  [facility];  Service: ENT;  Laterality: N/A;   • OTHER SURGICAL HISTORY  09/10/2014    Microlaryngoscopy with Biopsy - Base of Tongue   • SPLENECTOMY     • SQUAMOUS CELL CARCINOMA EXCISION  09/10/2014    Tongue   • TONSILLECTOMY         Outpatient Medications Marked as Taking for the 1/5/21 encounter (Office Visit) with Wayne Richmond MD   Medication Sig Dispense Refill   • albuterol (PROVENTIL) (2.5 MG/3ML) 0.083% nebulizer solution Take 2.5 mg by nebulization Every 4 (Four) Hours As Needed for Wheezing. 50 mL 1   • ALPRAZolam (XANAX) 2 MG tablet Take 2 mg by mouth 3 (Three) Times a Day.     • amLODIPine (NORVASC) 10 MG tablet Take 20 mg by mouth Daily.     • azelastine (ASTELIN) 0.1 % nasal spray      • cyclobenzaprine (FLEXERIL) 10 MG tablet TK 1 T PO TID as needed     • fluticasone (FLONASE) 50 MCG/ACT nasal spray 2 sprays into each nostril Daily. (Patient taking differently: 2 sprays into the nostril(s) as directed by provider Daily As Needed.) 16 g 5   • glycopyrrolate (ROBINUL) 1 MG tablet  0.5 mg 2 (Two) Times a Day.  3   • HYDROcodone-acetaminophen (NORCO) 7.5-325 MG per tablet TK 1 T PO QD PRN P  0   • levothyroxine (SYNTHROID, LEVOTHROID) 100 MCG tablet Take 100 mcg by mouth Daily.     • Lidocaine Viscous HCl (XYLOCAINE) 2 % solution Take 5 mL by mouth As Needed for Mild Pain .     • meloxicam (MOBIC) 15 MG tablet Take 15 mg by mouth Daily.     • nystatin (MYCOSTATIN) 045674 UNIT/ML suspension Take 10ml by mouth BID for 28 days (Patient taking differently: Take 200,000 Units by mouth 4 (Four) Times a Day As Needed. Take 10ml by mouth BID for 28 days) 480 mL 2   • ondansetron (ZOFRAN) 8 MG tablet Take 8 mg by mouth Every 8 (Eight) Hours As Needed for Nausea.     • pantoprazole (PROTONIX) 40 MG EC tablet Delayed Release (DR/EC)  Take 1 tablet (40 mg) by oral route 2 times per day,  Take 30 minutes prior to breakfast and evening meal     • riTUXimab-abbs (Truxima) 500 MG/50ML solution injection Infuse 70 mL (700 mg) via IVPB every 8 weeks. Patient specific drug from [facility] for clinic administration     • senna-docusate (PERICOLACE) 8.6-50 MG per tablet Take 1 tablet by mouth 2 times daily     • sertraline (ZOLOFT) 100 MG tablet Take 100 mg by mouth every night at bedtime.     • simvastatin (ZOCOR) 20 MG tablet TK 1 T PO QD  11   • tadalafil (CIALIS) 5 MG tablet Take 1 tablet by mouth Daily for 360 days. 30 tablet 11   • tamsulosin (FLOMAX) 0.4 MG capsule 24 hr capsule Take 0.4 mg by mouth Daily.     • triamcinolone (KENALOG) 0.5 % ointment Apply  topically to the appropriate area as directed 2 (Two) Times a Day. Apply to affected ear 15 g 3   • zolpidem (AMBIEN) 10 MG tablet Take 10 mg by mouth At Night As Needed for Sleep.         Patient has no known allergies.    Family History   Problem Relation Age of Onset   • Diabetes Sister    • Cancer Sister        Social History     Socioeconomic History   • Marital status:      Spouse name: Not on file   • Number of children:  Not on file   • Years of education: Not on file   • Highest education level: Not on file   Tobacco Use   • Smoking status: Current Some Day Smoker     Packs/day: 0.25     Types: Cigarettes   • Smokeless tobacco: Never Used   • Tobacco comment: pack last couple of weeks   Substance and Sexual Activity   • Alcohol use: No   • Drug use: No   • Sexual activity: Defer       Review of Systems   Constitutional: Negative.    HENT: Positive for rhinorrhea.         Burning of mouth   Eyes: Negative.    Respiratory: Negative.    Cardiovascular: Negative.    Gastrointestinal: Negative.    Endocrine: Negative.    Genitourinary: Negative.    Musculoskeletal: Negative.    Allergic/Immunologic: Negative.    Neurological: Negative.    Hematological: Negative.    Psychiatric/Behavioral: Negative.        Vitals:    01/05/21 1034   BP: 145/77   Pulse: 101   Temp: 97.8 °F (36.6 °C)       Body mass index is 21.29 kg/m².    Objective     Physical Exam  Physical Exam  CONSTITUTIONAL: well nourished, alert, oriented, in no acute distress      COMMUNICATION AND VOICE: able to communicate normally, normal voice quality     HEAD: normocephalic, no lesions, atraumatic, no tenderness, no masses      FACE: appearance normal, no lesions, no tenderness, no deformities, facial motion symmetric     SALIVARY GLANDS: parotid glands with no tenderness, no swelling, no masses, submandibular glands with normal size, nontender     EYES: ocular motility normal, eyelids normal, orbits normal, no proptosis, conjunctiva normal , pupils equal, round      EARS:  Hearing: response to conversational voice normal bilaterally   External Ears: auricles without lesions  Otoscopic: tympanic membrane appearance normal, no lesions, no perforation, normal mobility, no fluid     NOSE:  External Nose: structure normal, no tenderness on palpation, no nasal discharge, no lesions, no evidence of trauma, nostrils patent   Intranasal Exam: nasal mucosa normal, vestibule within  normal limits, inferior turbinate normal, nasal septum midline   Nasopharynx:      ORAL:  Lips: upper and lower lips without lesion   Teeth: dentition within normal limits for age   Gums: Area of bone exposed in the left posterior mandible consistent with ORN being treated by Dr. West  Oral Mucosa: oral mucosa normal other than dryness, no mucosal lesions   Floor of Mouth: Warthin’s duct patent, mucosa normal  Tongue: lingual mucosa normal without lesions, normal tongue mobility   Palate: soft and hard palates with normal mucosa other than dryness oropharynx: oropharyngeal mucosa with mild dryness     HYPOPHARYNX:   LARYNX: See endoscopy     NECK: neck appearance normal, no mass,  noted without erythema or tenderness-postradiation changes are noted     THYROID: no overt thyromegaly, no tenderness, nodules or mass present on palpation, position midline      LYMPH NODES: no lymphadenopathy     CHEST/RESPIRATORY: respiratory effort normaL     CARDIOVASCULAR: extremities without cyanosis or edema       NEUROLOGIC/PSYCHIATRIC: oriented to time, place and person, mood normal, affect appropriate, CN II-XII intact grossly    Assessment/Plan   Diagnoses and all orders for this visit:    1. Primary squamous cell carcinoma of tongue (CMS/HCC) (Primary)    2. Pharyngoesophageal dysphagia    3. H/O head and neck radiation    4. Sicca syndrome (CMS/HCC)    5. Tobacco use disorder      * Surgery not found *  No orders of the defined types were placed in this encounter.    Return in about 3 months (around 4/5/2021).       Patient Instructions   Be aware of the signs and symptoms of recurrent head and neck cancer including neck mass, persistent or recurrent sore throat, ear pain, hemoptysis, weight loss and hoarseness. If any of these symptoms recur and or persist, call for an evaluation.     D/W patient increasing caloric intake and discussed cruciferous vegetables and protein shakes etc to maintain optimal nutrition.  The  necessity of abstaining from tobacco products was also discussed particularly with respect to not smoking.    Continue F/U and/or treatment with Jaydon   Utilized lemon with water for xerostomia  Call for increasing dysphagia for possible dilatation  Follow-up in 3 months

## 2021-01-04 NOTE — H&P (VIEW-ONLY)
YOB: 1955  Location: [facility]  Location Address: [address] [place] [address], [facility] [address], [address] [place] [address]  Location Phone: [phone]    Chief Complaint   Patient presents with   • Follow-up       History of Present Illness  Saul Alcantara Sr. is a 65 y.o. male.  Saul Alcantara Sr. is status post Direct laryngoscopy, esophagoscopy with dilatation on 20 and Rigid esophagoscopy and Johnson esophageal dilatation on 20. Patient had DL and biopsy of base of tongue on 9/10/14 with positive pathology for SCCa. The cancer was staged T1M0N0. The patient has a history of radiation therapy due to a base of tongue cancer on the left side that he finished in 2014.The patient reports improved swallowing. The patient continues to have burning mouth and dysphagia with dry mouth. He has tried multiple treatments to help with dryness and burning without much relief. He reports the most relief with viscous lidocaine. Patient states today he has had a reoccurrence of his lymphoma and is starting Truxima treatments. He states so far the treatments have not made his symptoms of his mouth worse.     His dysphagia is stable and he is tolerating p.o. relatively well.  He complains of mouth dryness.    I have personally reviewed the information imported into the chart during this visit.      I have personally reviewed the review of systems.           Past Medical History:   Diagnosis Date   • Allergic rhinitis    • Anxiety    • Chronic rhinitis    • Chronic sinusitis    • Depression    • Deviated nasal septum    • Enlarged prostate    • GERD (gastroesophageal reflux disease)    • H/O head and neck radiation 3/3/2017   • History of transfusion    • Hypertension    • Hypothyroidism    • Laryngopharyngeal reflux    • Lymphoma (CMS/HCC)     Non-Hodgkins   • MRSA infection    • Panic attacks    • Peyronie disease    • Pneumonia    • Primary squamous cell carcinoma of tongue (CMS/HCC) 2016    T1N0M0 SCC S/P  XRT/Chemo 12/2014   • 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    T1N0M0 SCC S/P XRT/Chemo 12/2014   • Sicca syndrome (CMS/HCC)    • Sinusitis, acute    • Squamous cell carcinoma     Base of Tongue   • Tobacco use disorder    • Tongue irritation    • Xerostomia        Past Surgical History:   Procedure Laterality Date   • CATARACT EXTRACTION Bilateral    • ESOPHAGOSCOPY N/A 11/20/2019    Procedure: Direct laryngoscopy with esophageal Johnson dilation;  Surgeon: Wayne Richmond MD;  Location:  [facility];  Service: ENT   • HAND SURGERY      metal plate    • KNEE SURGERY     • LARYNGOSCOPY N/A 6/26/2020    Procedure: Rigid esophagoscopy Johnson esophageal dilatation;  Surgeon: Wayne Richmond MD;  Location:  [facility];  Service: ENT;  Laterality: N/A;   • LARYNGOSCOPY N/A 8/24/2020    Procedure: MICRODIRECT LARYNGOSCOPY with esophageal dilatation;  Surgeon: Wayne Richmond MD;  Location:  [facility];  Service: ENT;  Laterality: N/A;   • OTHER SURGICAL HISTORY  09/10/2014    Microlaryngoscopy with Biopsy - Base of Tongue   • SPLENECTOMY     • SQUAMOUS CELL CARCINOMA EXCISION  09/10/2014    Tongue   • TONSILLECTOMY         Outpatient Medications Marked as Taking for the 1/5/21 encounter (Office Visit) with Wayne Richmond MD   Medication Sig Dispense Refill   • albuterol (PROVENTIL) (2.5 MG/3ML) 0.083% nebulizer solution Take 2.5 mg by nebulization Every 4 (Four) Hours As Needed for Wheezing. 50 mL 1   • ALPRAZolam (XANAX) 2 MG tablet Take 2 mg by mouth 3 (Three) Times a Day.     • amLODIPine (NORVASC) 10 MG tablet Take 20 mg by mouth Daily.     • azelastine (ASTELIN) 0.1 % nasal spray      • cyclobenzaprine (FLEXERIL) 10 MG tablet TK 1 T PO TID as needed     • fluticasone (FLONASE) 50 MCG/ACT nasal spray 2 sprays into each nostril Daily. (Patient taking differently: 2 sprays into the nostril(s) as directed by provider Daily As Needed.) 16 g 5   • glycopyrrolate (ROBINUL) 1 MG tablet  0.5 mg 2 (Two) Times a Day.  3   • HYDROcodone-acetaminophen (NORCO) 7.5-325 MG per tablet TK 1 T PO QD PRN P  0   • levothyroxine (SYNTHROID, LEVOTHROID) 100 MCG tablet Take 100 mcg by mouth Daily.     • Lidocaine Viscous HCl (XYLOCAINE) 2 % solution Take 5 mL by mouth As Needed for Mild Pain .     • meloxicam (MOBIC) 15 MG tablet Take 15 mg by mouth Daily.     • nystatin (MYCOSTATIN) 809728 UNIT/ML suspension Take 10ml by mouth BID for 28 days (Patient taking differently: Take 200,000 Units by mouth 4 (Four) Times a Day As Needed. Take 10ml by mouth BID for 28 days) 480 mL 2   • ondansetron (ZOFRAN) 8 MG tablet Take 8 mg by mouth Every 8 (Eight) Hours As Needed for Nausea.     • pantoprazole (PROTONIX) 40 MG EC tablet Delayed Release (DR/EC)  Take 1 tablet (40 mg) by oral route 2 times per day,  Take 30 minutes prior to breakfast and evening meal     • riTUXimab-abbs (Truxima) 500 MG/50ML solution injection Infuse 70 mL (700 mg) via IVPB every 8 weeks. Patient specific drug from [facility] for clinic administration     • senna-docusate (PERICOLACE) 8.6-50 MG per tablet Take 1 tablet by mouth 2 times daily     • sertraline (ZOLOFT) 100 MG tablet Take 100 mg by mouth every night at bedtime.     • simvastatin (ZOCOR) 20 MG tablet TK 1 T PO QD  11   • tadalafil (CIALIS) 5 MG tablet Take 1 tablet by mouth Daily for 360 days. 30 tablet 11   • tamsulosin (FLOMAX) 0.4 MG capsule 24 hr capsule Take 0.4 mg by mouth Daily.     • triamcinolone (KENALOG) 0.5 % ointment Apply  topically to the appropriate area as directed 2 (Two) Times a Day. Apply to affected ear 15 g 3   • zolpidem (AMBIEN) 10 MG tablet Take 10 mg by mouth At Night As Needed for Sleep.         Patient has no known allergies.    Family History   Problem Relation Age of Onset   • Diabetes Sister    • Cancer Sister        Social History     Socioeconomic History   • Marital status:      Spouse name: Not on file   • Number of children:  Not on file   • Years of education: Not on file   • Highest education level: Not on file   Tobacco Use   • Smoking status: Current Some Day Smoker     Packs/day: 0.25     Types: Cigarettes   • Smokeless tobacco: Never Used   • Tobacco comment: pack last couple of weeks   Substance and Sexual Activity   • Alcohol use: No   • Drug use: No   • Sexual activity: Defer       Review of Systems   Constitutional: Negative.    HENT: Positive for rhinorrhea.         Burning of mouth   Eyes: Negative.    Respiratory: Negative.    Cardiovascular: Negative.    Gastrointestinal: Negative.    Endocrine: Negative.    Genitourinary: Negative.    Musculoskeletal: Negative.    Allergic/Immunologic: Negative.    Neurological: Negative.    Hematological: Negative.    Psychiatric/Behavioral: Negative.        Vitals:    01/05/21 1034   BP: 145/77   Pulse: 101   Temp: 97.8 °F (36.6 °C)       Body mass index is 21.29 kg/m².    Objective     Physical Exam  Physical Exam  CONSTITUTIONAL: well nourished, alert, oriented, in no acute distress      COMMUNICATION AND VOICE: able to communicate normally, normal voice quality     HEAD: normocephalic, no lesions, atraumatic, no tenderness, no masses      FACE: appearance normal, no lesions, no tenderness, no deformities, facial motion symmetric     SALIVARY GLANDS: parotid glands with no tenderness, no swelling, no masses, submandibular glands with normal size, nontender     EYES: ocular motility normal, eyelids normal, orbits normal, no proptosis, conjunctiva normal , pupils equal, round      EARS:  Hearing: response to conversational voice normal bilaterally   External Ears: auricles without lesions  Otoscopic: tympanic membrane appearance normal, no lesions, no perforation, normal mobility, no fluid     NOSE:  External Nose: structure normal, no tenderness on palpation, no nasal discharge, no lesions, no evidence of trauma, nostrils patent   Intranasal Exam: nasal mucosa normal, vestibule within  normal limits, inferior turbinate normal, nasal septum midline   Nasopharynx:      ORAL:  Lips: upper and lower lips without lesion   Teeth: dentition within normal limits for age   Gums: Area of bone exposed in the left posterior mandible consistent with ORN being treated by Dr. West  Oral Mucosa: oral mucosa normal other than dryness, no mucosal lesions   Floor of Mouth: Warthin’s duct patent, mucosa normal  Tongue: lingual mucosa normal without lesions, normal tongue mobility   Palate: soft and hard palates with normal mucosa other than dryness oropharynx: oropharyngeal mucosa with mild dryness     HYPOPHARYNX:   LARYNX: See endoscopy     NECK: neck appearance normal, no mass,  noted without erythema or tenderness-postradiation changes are noted     THYROID: no overt thyromegaly, no tenderness, nodules or mass present on palpation, position midline      LYMPH NODES: no lymphadenopathy     CHEST/RESPIRATORY: respiratory effort normaL     CARDIOVASCULAR: extremities without cyanosis or edema       NEUROLOGIC/PSYCHIATRIC: oriented to time, place and person, mood normal, affect appropriate, CN II-XII intact grossly    Assessment/Plan   Diagnoses and all orders for this visit:    1. Primary squamous cell carcinoma of tongue (CMS/HCC) (Primary)    2. Pharyngoesophageal dysphagia    3. H/O head and neck radiation    4. Sicca syndrome (CMS/HCC)    5. Tobacco use disorder      * Surgery not found *  No orders of the defined types were placed in this encounter.    Return in about 3 months (around 4/5/2021).       Patient Instructions   Be aware of the signs and symptoms of recurrent head and neck cancer including neck mass, persistent or recurrent sore throat, ear pain, hemoptysis, weight loss and hoarseness. If any of these symptoms recur and or persist, call for an evaluation.     D/W patient increasing caloric intake and discussed cruciferous vegetables and protein shakes etc to maintain optimal nutrition.  The  necessity of abstaining from tobacco products was also discussed particularly with respect to not smoking.    Continue F/U and/or treatment with Jaydon   Utilized lemon with water for xerostomia  Call for increasing dysphagia for possible dilatation  Follow-up in 3 months

## 2021-01-05 ENCOUNTER — OFFICE VISIT (OUTPATIENT)
Dept: OTOLARYNGOLOGY | Facility: CLINIC | Age: 66
End: 2021-01-05

## 2021-01-05 VITALS
SYSTOLIC BLOOD PRESSURE: 145 MMHG | BODY MASS INDEX: 21.39 KG/M2 | HEIGHT: 73 IN | WEIGHT: 161.4 LBS | HEART RATE: 101 BPM | TEMPERATURE: 97.8 F | DIASTOLIC BLOOD PRESSURE: 77 MMHG

## 2021-01-05 DIAGNOSIS — R13.14 PHARYNGOESOPHAGEAL DYSPHAGIA: ICD-10-CM

## 2021-01-05 DIAGNOSIS — C02.9 PRIMARY SQUAMOUS CELL CARCINOMA OF TONGUE (HCC): Primary | ICD-10-CM

## 2021-01-05 DIAGNOSIS — Z92.3 H/O HEAD AND NECK RADIATION: ICD-10-CM

## 2021-01-05 DIAGNOSIS — F17.200 TOBACCO USE DISORDER: ICD-10-CM

## 2021-01-05 DIAGNOSIS — M35.00 SICCA SYNDROME (HCC): ICD-10-CM

## 2021-01-05 PROCEDURE — 31575 DIAGNOSTIC LARYNGOSCOPY: CPT | Performed by: OTOLARYNGOLOGY

## 2021-01-05 PROCEDURE — 99213 OFFICE O/P EST LOW 20 MIN: CPT | Performed by: OTOLARYNGOLOGY

## 2021-01-05 RX ORDER — LEVOTHYROXINE SODIUM 0.1 MG/1
100 TABLET ORAL DAILY
COMMUNITY
Start: 2020-12-01

## 2021-01-05 RX ORDER — SERTRALINE HYDROCHLORIDE 100 MG/1
100 TABLET, FILM COATED ORAL
COMMUNITY
Start: 2020-12-30

## 2021-01-05 RX ORDER — RITUXIMAB-ABBS 10 MG/ML
INJECTION, SOLUTION INTRAVENOUS
COMMUNITY
Start: 2020-12-21 | End: 2021-05-20

## 2021-01-05 NOTE — PROGRESS NOTES
OPERATIVE NOTE:  Saul Alcantara Sr.    DATE OF PROCEDURE: 1/5/2021    PROCEDURE:   Flexible Fiberoptic Laryngoscopy    ANESTHESIA:  None    REASON FOR PROCEDURE:  Procedure was recommend for re-evaluation of a lesion previously documented and surveillance  Risks, benefits and alternatives were discussed.      DETAILS of OPERATION:  The patient was seated in the exam chair.  A flexible fiberoptic laryngoscopy was performed through the oral cavity.  The scope was introduced into the oral cavity and directed to the level of the glottis, examining the structures of the oropharynx, base of tongue, vallecula, supraglottic larynx, glottic larynx, and hypopharynx.      FINDINGS:  Mucosal surfaces:   The mucosal surfaces demonstrated normal mucosa surfaces with mild inflammation    Base of tongue:  The base of tongue was found to have no mass or lesion.    Epiglottis:  The epiglottis was found to have no mass or lesion.    Aryepligottic fold:  The AE folds were found to have no mass or lesion.    False Vocal Fold:  The false cords were found to have no mass or lesion.    True Vocal Cord:  The true vocal cords were found to have no mass or lesion. Both true vocal cords adduct and abduct normally    Arytenoid:   The arytenoids were found to have no mass or lesion.    Hypopharynx:  The hypopharynx was found to have no mass or lesion.    The patient tolerated procedure well.

## 2021-01-05 NOTE — PATIENT INSTRUCTIONS
Be aware of the signs and symptoms of recurrent head and neck cancer including neck mass, persistent or recurrent sore throat, ear pain, hemoptysis, weight loss and hoarseness. If any of these symptoms recur and or persist, call for an evaluation.     D/W patient increasing caloric intake and discussed cruciferous vegetables and protein shakes etc to maintain optimal nutrition.  The necessity of abstaining from tobacco products was also discussed particularly with respect to not smoking.    Continue F/U and/or treatment with [facility]   Utilized lemon with water for xerostomia  Call for increasing dysphagia for possible dilatation  Follow-up in 3 months

## 2021-01-08 ENCOUNTER — NURSE TRIAGE (OUTPATIENT)
Dept: CALL CENTER | Facility: HOSPITAL | Age: 66
End: 2021-01-08

## 2021-01-09 ENCOUNTER — NURSE TRIAGE (OUTPATIENT)
Dept: CALL CENTER | Facility: HOSPITAL | Age: 66
End: 2021-01-09

## 2021-01-09 NOTE — TELEPHONE ENCOUNTER
"  Reason for Disposition  • [1] Using nasal washes and pain medicine > 24 hours AND [2] sinus pain (around cheekbone or eye) persists    Additional Information  • Negative: Severe difficulty breathing (e.g., struggling for each breath, speaks in single words)  • Negative: Sounds like a life-threatening emergency to the triager  • Negative: [1] Sinus infection AND [2] taking an antibiotic AND [3] symptoms continue  • Negative: [1] Difficulty breathing AND [2] not from stuffy nose (e.g., not relieved by cleaning out the nose)  • Negative: [1] SEVERE headache AND [2] fever  • Negative: [1] Redness or swelling on the cheek, forehead or around the eye AND [2] fever  • Negative: Fever > 104 F (40 C)  • Negative: Patient sounds very sick or weak to the triager  • Negative: [1] SEVERE pain AND [2] not improved 2 hours after pain medicine  • Negative: [1] Redness or swelling on the cheek, forehead or around the eye AND [2] no fever  • Negative: [1] Fever > 101 F (38.3 C) AND [2] age > 60  • Negative: [1] Fever > 100.0 F (37.8 C) AND [2] bedridden (e.g., nursing home patient, CVA, chronic illness, recovering from surgery)  • Negative: [1] Fever > 100.0 F (37.8 C) AND [2] diabetes mellitus or weak immune system (e.g., HIV positive, cancer chemo, splenectomy, organ transplant, chronic steroids)  • Negative: Fever present > 3 days (72 hours)  • Negative: [1] Fever returns after gone for over 24 hours AND [2] symptoms worse or not improved  • Negative: [1] Sinus pain (not just congestion) AND [2] fever  • Negative: Earache  • Negative: [1] Sinus congestion (pressure, fullness) AND [2] present > 10 days  • Negative: [1] Nasal discharge AND [2] present > 10 days  • Negative: Lots of coughing  • Negative: [1] Sinus congestion as part of a cold AND [2] present < 10 days    Answer Assessment - Initial Assessment Questions  1. LOCATION: \"Where does it hurt?\"        sinuses  2. ONSET: \"When did the sinus pain start?\"  (e.g., hours, " "days)        2 days  3. SEVERITY: \"How bad is the pain?\"   (Scale 1-10; mild, moderate or severe)    - MILD (1-3): doesn't interfere with normal activities     - MODERATE (4-7): interferes with normal activities (e.g., work or school) or awakens from sleep    - SEVERE (8-10): excruciating pain and patient unable to do any normal activities         Mild/mod  4. RECURRENT SYMPTOM: \"Have you ever had sinus problems before?\" If so, ask: \"When was the last time?\" and \"What happened that time?\"       n/a  5. NASAL CONGESTION: \"Is the nose blocked?\" If so, ask, \"Can you open it or must you breathe through the mouth?\"      congestion  6. NASAL DISCHARGE: \"Do you have discharge from your nose?\" If so ask, \"What color?\"      greenish  7. FEVER: \"Do you have a fever?\" If so, ask: \"What is it, how was it measured, and when did it start?\"       denies  8. OTHER SYMPTOMS: \"Do you have any other symptoms?\" (e.g., sore throat, cough, earache, difficulty breathing)     None mentioned  9. PREGNANCY: \"Is there any chance you are pregnant?\" \"When was your last menstrual period?\"      no    Protocols used: SINUS PAIN OR CONGESTION-ADULT-AH    "

## 2021-01-09 NOTE — TELEPHONE ENCOUNTER
Pt stated he is  Congested, hoarse, just hacked up green mucous. Afebrile. Wanting an antibiotic, But can't  until morning . Told wife to call in morning. Has had s/s since yesterday and they just don't want to bring him to ER. Has hx of non hodgkins lymphoma  And tongue cancer.

## 2021-01-09 NOTE — TELEPHONE ENCOUNTER
"9:23am Dr. Stubbs notified of symptoms and request for antibiotic, he will take care of it. [facility] pharmacy        Wife is caller.  has a sinus infection and is requesting antibiotics. Doing cancer treatments next week and doesn't need to be sick. Face swelling on left side and having greenish, yellow nasal discharge, doing nebulizers. Had a COVID test last week and it was negative. Has not been out since COVID test.    Reason for Disposition  • [1] Redness or swelling on the cheek, forehead or around the eye AND [2] no fever    Additional Information  • Negative: Severe difficulty breathing (e.g., struggling for each breath, speaks in single words)  • Negative: Sounds like a life-threatening emergency to the triager  • Negative: [1] Sinus infection AND [2] taking an antibiotic AND [3] symptoms continue  • Negative: [1] Difficulty breathing AND [2] not from stuffy nose (e.g., not relieved by cleaning out the nose)  • Negative: [1] SEVERE headache AND [2] fever  • Negative: [1] Redness or swelling on the cheek, forehead or around the eye AND [2] fever  • Negative: Fever > 104 F (40 C)  • Negative: Patient sounds very sick or weak to the triager  • Negative: [1] SEVERE pain AND [2] not improved 2 hours after pain medicine    Answer Assessment - Initial Assessment Questions  1. LOCATION: \"Where does it hurt?\"      Facial, left side swelling, a little redness  2. ONSET: \"When did the sinus pain start?\"  (e.g., hours, days)   Thursday am  3. SEVERITY: \"How bad is the pain?\"   (Scale 1-10; mild, moderate or severe)    - MILD (1-3): doesn't interfere with normal activities     - MODERATE (4-7): interferes with normal activities (e.g., work or school) or awakens from sleep    - SEVERE (8-10): excruciating pain and patient unable to do any normal activities        moderate  4. RECURRENT SYMPTOM: \"Have you ever had sinus problems before?\" If so, ask: \"When was the last time?\" and \"What happened that time?\"    " "3-4 months  5. NASAL CONGESTION: \"Is the nose blocked?\" If so, ask, \"Can you open it or must you breathe through the mouth?\"      *No Answer*  6. NASAL DISCHARGE: \"Do you have discharge from your nose?\" If so ask, \"What color?\"  Yellow green  7. FEVER: \"Do you have a fever?\" If so, ask: \"What is it, how was it measured, and when did it start?\"   No fever  8. OTHER SYMPTOMS: \"Do you have any other symptoms?\" (e.g., sore throat, cough, earache, difficulty breathing)  Sore throat  9. PREGNANCY: \"Is there any chance you are pregnant?\" \"When was your last menstrual period?\"  na    Protocols used: SINUS PAIN OR CONGESTION-ADULT-AH      "

## 2021-01-12 ENCOUNTER — PREP FOR SURGERY (OUTPATIENT)
Dept: OTHER | Facility: HOSPITAL | Age: 66
End: 2021-01-12

## 2021-01-12 DIAGNOSIS — F17.200 TOBACCO USE DISORDER: ICD-10-CM

## 2021-01-12 DIAGNOSIS — K21.9 LARYNGOPHARYNGEAL REFLUX: ICD-10-CM

## 2021-01-12 DIAGNOSIS — R13.14 PHARYNGOESOPHAGEAL DYSPHAGIA: Primary | ICD-10-CM

## 2021-01-18 ENCOUNTER — TRANSCRIBE ORDERS (OUTPATIENT)
Dept: OTOLARYNGOLOGY | Facility: CLINIC | Age: 66
End: 2021-01-18

## 2021-01-18 DIAGNOSIS — Z01.818 PRE-OP TESTING: Primary | ICD-10-CM

## 2021-01-19 ENCOUNTER — APPOINTMENT (OUTPATIENT)
Dept: PREADMISSION TESTING | Facility: HOSPITAL | Age: 66
End: 2021-01-19

## 2021-01-20 ENCOUNTER — APPOINTMENT (OUTPATIENT)
Dept: PREADMISSION TESTING | Facility: HOSPITAL | Age: 66
End: 2021-01-20

## 2021-01-20 ENCOUNTER — HOSPITAL ENCOUNTER (OUTPATIENT)
Dept: GENERAL RADIOLOGY | Facility: HOSPITAL | Age: 66
Discharge: HOME | End: 2021-01-20

## 2021-01-20 ENCOUNTER — LAB (OUTPATIENT)
Dept: LAB | Facility: HOSPITAL | Age: 66
End: 2021-01-20

## 2021-01-20 VITALS
HEART RATE: 88 BPM | BODY MASS INDEX: 21.3 KG/M2 | RESPIRATION RATE: 18 BRPM | SYSTOLIC BLOOD PRESSURE: 134 MMHG | HEIGHT: 73 IN | DIASTOLIC BLOOD PRESSURE: 78 MMHG | WEIGHT: 160.72 LBS | OXYGEN SATURATION: 98 %

## 2021-01-20 DIAGNOSIS — K21.9 LARYNGOPHARYNGEAL REFLUX: ICD-10-CM

## 2021-01-20 DIAGNOSIS — R13.14 PHARYNGOESOPHAGEAL DYSPHAGIA: ICD-10-CM

## 2021-01-20 DIAGNOSIS — F17.200 TOBACCO USE DISORDER: ICD-10-CM

## 2021-01-20 LAB
ALBUMIN SERPL-MCNC: 4.4 G/DL (ref 3.5–5.2)
ALBUMIN/GLOB SERPL: 1.8 G/DL
ALP SERPL-CCNC: 83 U/L (ref 39–117)
ALT SERPL W P-5'-P-CCNC: 18 U/L (ref 1–41)
ANION GAP SERPL CALCULATED.3IONS-SCNC: 7 MMOL/L (ref 5–15)
AST SERPL-CCNC: 23 U/L (ref 1–40)
BASOPHILS # BLD AUTO: 0.06 10*3/MM3 (ref 0–0.2)
BASOPHILS NFR BLD AUTO: 0.9 % (ref 0–1.5)
BILIRUB SERPL-MCNC: 0.4 MG/DL (ref 0–1.2)
BUN SERPL-MCNC: 19 MG/DL (ref 8–23)
BUN/CREAT SERPL: 15.6 (ref 7–25)
CALCIUM SPEC-SCNC: 9.5 MG/DL (ref 8.6–10.5)
CHLORIDE SERPL-SCNC: 102 MMOL/L (ref 98–107)
CO2 SERPL-SCNC: 29 MMOL/L (ref 22–29)
CREAT SERPL-MCNC: 1.22 MG/DL (ref 0.76–1.27)
DEPRECATED RDW RBC AUTO: 43.3 FL (ref 37–54)
EOSINOPHIL # BLD AUTO: 0.33 10*3/MM3 (ref 0–0.4)
EOSINOPHIL NFR BLD AUTO: 5.1 % (ref 0.3–6.2)
ERYTHROCYTE [DISTWIDTH] IN BLOOD BY AUTOMATED COUNT: 12.7 % (ref 12.3–15.4)
GFR SERPL CREATININE-BSD FRML MDRD: 60 ML/MIN/1.73
GLOBULIN UR ELPH-MCNC: 2.4 GM/DL
GLUCOSE SERPL-MCNC: 113 MG/DL (ref 65–99)
HCT VFR BLD AUTO: 35.8 % (ref 37.5–51)
HGB BLD-MCNC: 12.2 G/DL (ref 13–17.7)
IMM GRANULOCYTES # BLD AUTO: 0.02 10*3/MM3 (ref 0–0.05)
IMM GRANULOCYTES NFR BLD AUTO: 0.3 % (ref 0–0.5)
LYMPHOCYTES # BLD AUTO: 1.1 10*3/MM3 (ref 0.7–3.1)
LYMPHOCYTES NFR BLD AUTO: 16.8 % (ref 19.6–45.3)
MCH RBC QN AUTO: 31.9 PG (ref 26.6–33)
MCHC RBC AUTO-ENTMCNC: 34.1 G/DL (ref 31.5–35.7)
MCV RBC AUTO: 93.5 FL (ref 79–97)
MONOCYTES # BLD AUTO: 0.61 10*3/MM3 (ref 0.1–0.9)
MONOCYTES NFR BLD AUTO: 9.3 % (ref 5–12)
NEUTROPHILS NFR BLD AUTO: 4.41 10*3/MM3 (ref 1.7–7)
NEUTROPHILS NFR BLD AUTO: 67.6 % (ref 42.7–76)
NRBC BLD AUTO-RTO: 0 /100 WBC (ref 0–0.2)
PLATELET # BLD AUTO: 368 10*3/MM3 (ref 140–450)
PMV BLD AUTO: 10.8 FL (ref 6–12)
POTASSIUM SERPL-SCNC: 4.8 MMOL/L (ref 3.5–5.2)
PROT SERPL-MCNC: 6.8 G/DL (ref 6–8.5)
RBC # BLD AUTO: 3.83 10*6/MM3 (ref 4.14–5.8)
SARS-COV-2 ORF1AB RESP QL NAA+PROBE: NOT DETECTED
SODIUM SERPL-SCNC: 138 MMOL/L (ref 136–145)
WBC # BLD AUTO: 6.53 10*3/MM3 (ref 3.4–10.8)

## 2021-01-20 PROCEDURE — 36415 COLL VENOUS BLD VENIPUNCTURE: CPT

## 2021-01-20 PROCEDURE — U0004 COV-19 TEST NON-CDC HGH THRU: HCPCS | Performed by: OTOLARYNGOLOGY

## 2021-01-20 PROCEDURE — 93005 ELECTROCARDIOGRAM TRACING: CPT

## 2021-01-20 PROCEDURE — 85025 COMPLETE CBC W/AUTO DIFF WBC: CPT

## 2021-01-20 PROCEDURE — 80053 COMPREHEN METABOLIC PANEL: CPT

## 2021-01-20 PROCEDURE — 71046 X-RAY EXAM CHEST 2 VIEWS: CPT

## 2021-01-20 PROCEDURE — C9803 HOPD COVID-19 SPEC COLLECT: HCPCS | Performed by: OTOLARYNGOLOGY

## 2021-01-20 PROCEDURE — 93010 ELECTROCARDIOGRAM REPORT: CPT | Performed by: INTERNAL MEDICINE

## 2021-01-20 NOTE — DISCHARGE INSTRUCTIONS
DAY OF SURGERY INSTRUCTIONS        YOUR SURGEON: Dr Wayne Richmond    PROCEDURE: Direct Laryngoscopy and Esophagoscopy with Esophageal Dilation    DATE OF SURGERY: Jan, 22, 21    ARRIVAL TIME: AS DIRECTED BY OFFICE    YOU MAY TAKE THE FOLLOWING MEDICATION(S) THE MORNING OF SURGERY WITH A SIP OF WATER: Norco and Xanax    ALL OTHER HOME MEDICATIONS CHECK WITH YOUR DOCTOR    DO NOT TAKE ANY ERECTILE DYSFUNCTION MEDICATIONS (EX:  CIALIS, VIAGRA) 24 HOURS PRIOR TO SURGERY              MANAGING PAIN AFTER SURGERY    We know you are probably wondering what your pain will be like after surgery.  Following surgery it is unrealistic to expect you will not have pain.   Pain is how our bodies let us know that something is wrong or cautions us to be careful.  That said, our goal is to make your pain tolerable.    Methods we may use to treat your pain include (oral or IV medications, PCAs, epidurals, nerve blocks, etc.)   While some procedures require IV pain medications for a short time after surgery, transitioning to pain medications by mouth allows for better management of pain.   Your nurse will encourage you to take oral pain medications whenever possible.  IV medications work almost immediately, but only last a short while.  Taking medications by mouth allows for a more constant level of medication in your blood stream for a longer period of time.      Once your pain is out of control it is harder to get back under control.  It is important you are aware when your next dose of pain medication is due.  If you are admitted, your nurse may write the time of your next dose on the white board in your room to help you remember.      We are interested in your pain and encourage you to inform us about aggravating factors during your visit.   Many times a simple repositioning every few hours can make a big difference.    If your physician says it is okay, do not let your pain prevent you from getting out of bed. Be sure to call your  nurse for assistance prior to getting up so you do not fall.      Before surgery, please decide your tolerable pain goal.  These faces help describe the pain ratings we use on a 0-10 scale.   Be prepared to tell us your goal and whether or not you take pain or anxiety medications at home.      BEFORE YOU COME TO THE HOSPITAL  (Pre-op instructions)  • Do not eat, drink, smoke or chew gum after midnight the night before surgery.  This also includes no mints.  • Morning of surgery take only the medicines you have been instructed with a sip of water unless otherwise instructed  by your physician.  • Do not shave, wear makeup or dark nail polish.  • Remove all jewelry including rings.  • Leave anything you consider valuable at home.  • Leave your suitcase in the car until after your surgery.  • Bring the following with you if applicable:  o Picture ID and insurance, Medicare or Medicaid cards  o Co-pay/deductible required by insurance (cash, check, credit card)  o Copy of advance directive, living will or power-of- documents if not brought to PAT  o CPAP or BIPAP mask and tubing  o Relaxation aids ( book, magazine), etc.  o Hearing aids                                 ON THE DAY OF SURGERY  · On the day of surgery check in at registration located at the main entrance of the hospital.   ? You will be registered and given a beeper with instructions where to wait in the main lobby.  ? When your beeper lights up and vibrates a member of the Outpatient Surgery staff will meet you at the double doors under the stair steps and escort you to your preoperative room.   · You may have cloth compression devices placed on your legs. These help to prevent blood clots and reduce swelling in your legs.  · An IV may be inserted into one of your veins.  · In the operating room, you may be given one or more of the following:  ? A medicine to help you relax (sedative).  ? A medicine to numb the area (local anesthetic).  ? A medicine  "to make you fall asleep (general anesthetic).  ? A medicine that is injected into an area of your body to numb everything below the injection site (regional anesthetic).  · Your surgical site will be marked or identified.  · You may be given an antibiotic through your IV to help prevent infection.  Contact a health care provider if you:  · Develop a fever of more than 100.4°F (38°C) or other feelings of illness during the 48 hours before your surgery.  · Have symptoms that get worse.  Have questions or concerns about your surgery    General Anesthesia/Surgery, Adult  General anesthesia is the use of medicines to make a person \"go to sleep\" (unconscious) for a medical procedure. General anesthesia must be used for certain procedures, and is often recommended for procedures that:  · Last a long time.  · Require you to be still or in an unusual position.  · Are major and can cause blood loss.  The medicines used for general anesthesia are called general anesthetics. As well as making you unconscious for a certain amount of time, these medicines:  · Prevent pain.  · Control your blood pressure.  · Relax your muscles.  Tell a health care provider about:  · Any allergies you have.  · All medicines you are taking, including vitamins, herbs, eye drops, creams, and over-the-counter medicines.  · Any problems you or family members have had with anesthetic medicines.  · Types of anesthetics you have had in the past.  · Any blood disorders you have.  · Any surgeries you have had.  · Any medical conditions you have.  · Any recent upper respiratory, chest, or ear infections.  · Any history of:  ? Heart or lung conditions, such as heart failure, sleep apnea, asthma, or chronic obstructive pulmonary disease (COPD).  ?  service.  ? Depression or anxiety.  · Any tobacco or drug use, including marijuana or alcohol use.  · Whether you are pregnant or may be pregnant.  What are the risks?  Generally, this is a safe procedure. " However, problems may occur, including:  · Allergic reaction.  · Lung and heart problems.  · Inhaling food or liquid from the stomach into the lungs (aspiration).  · Nerve injury.  · Air in the bloodstream, which can lead to stroke.  · Extreme agitation or confusion (delirium) when you wake up from the anesthetic.  · Waking up during your procedure and being unable to move. This is rare.  These problems are more likely to develop if you are having a major surgery or if you have an advanced or serious medical condition. You can prevent some of these complications by answering all of your health care provider's questions thoroughly and by following all instructions before your procedure.  General anesthesia can cause side effects, including:  · Nausea or vomiting.  · A sore throat from the breathing tube.  · Hoarseness.  · Wheezing or coughing.  · Shaking chills.  · Tiredness.  · Body aches.  · Anxiety.  · Sleepiness or drowsiness.  · Confusion or agitation.  RISKS AND COMPLICATIONS OF SURGERY  Your health care provider will discuss possible risks and complications with you before surgery. Common risks and complications include:    · Problems due to the use of anesthetics.  · Blood loss and replacement (does not apply to minor surgical procedures).  · Temporary increase in pain due to surgery.  · Uncorrected pain or problems that the surgery was meant to correct.  · Infection.  · New damage.    What happens before the procedure?    Medicines  Ask your health care provider about:  · Changing or stopping your regular medicines. This is especially important if you are taking diabetes medicines or blood thinners.  · Taking medicines such as aspirin and ibuprofen. These medicines can thin your blood. Do not take these medicines unless your health care provider tells you to take them.  · Taking over-the-counter medicines, vitamins, herbs, and supplements. Do not take these during the week before your procedure unless your  health care provider approves them.  General instructions  · Starting 3-6 weeks before the procedure, do not use any products that contain nicotine or tobacco, such as cigarettes and e-cigarettes. If you need help quitting, ask your health care provider.  · If you brush your teeth on the morning of the procedure, make sure to spit out all of the toothpaste.  · Tell your health care provider if you become ill or develop a cold, cough, or fever.  · If instructed by your health care provider, bring your sleep apnea device with you on the day of your surgery (if applicable).  · Ask your health care provider if you will be going home the same day, the following day, or after a longer hospital stay.  ? Plan to have someone take you home from the hospital or clinic.  ? Plan to have a responsible adult care for you for at least 24 hours after you leave the hospital or clinic. This is important.  What happens during the procedure?  · You will be given anesthetics through both of the following:  ? A mask placed over your nose and mouth.  ? An IV in one of your veins.  · You may receive a medicine to help you relax (sedative).  · After you are unconscious, a breathing tube may be inserted down your throat to help you breathe. This will be removed before you wake up.  · An anesthesia specialist will stay with you throughout your procedure. He or she will:  ? Keep you comfortable and safe by continuing to give you medicines and adjusting the amount of medicine that you get.  ? Monitor your blood pressure, pulse, and oxygen levels to make sure that the anesthetics do not cause any problems.  The procedure may vary among health care providers and hospitals.  What happens after the procedure?  · Your blood pressure, temperature, heart rate, breathing rate, and blood oxygen level will be monitored until the medicines you were given have worn off.  · You will wake up in a recovery area. You may wake up slowly.  · If you feel anxious  or agitated, you may be given medicine to help you calm down.  · If you will be going home the same day, your health care provider may check to make sure you can walk, drink, and urinate.  · Your health care provider will treat any pain or side effects you have before you go home.  · Do not drive for 24 hours if you were given a sedative.  Summary  · General anesthesia is used to keep you still and prevent pain during a procedure.  · It is important to tell your healthcare provider about your medical history and any surgeries you have had, and previous experience with anesthesia.  · Follow your healthcare provider’s instructions about when to stop eating, drinking, or taking certain medicines before your procedure.  · Plan to have someone take you home from the hospital or clinic.  This information is not intended to replace advice given to you by your health care provider. Make sure you discuss any questions you have with your health care provider.  Document Released: 03/26/2009 Document Revised: 08/03/2018 Document Reviewed: 08/03/2018  [employer] Interactive Patient Education © 2019 [employer]      Fall Prevention in Hospitals, Adult  As a hospital patient, your condition and the treatments you receive can increase your risk for falls. Some additional risk factors for falls in a hospital include:  · Being in an unfamiliar environment.  · Being on bed rest.  · Your surgery.  · Taking certain medicines.  · Your tubing requirements, such as intravenous (IV) therapy or catheters.  It is important that you learn how to decrease fall risks while at the hospital. Below are important tips that can help prevent falls.  SAFETY TIPS FOR PREVENTING FALLS  Talk about your risk of falling.  · Ask your health care provider why you are at risk for falling. Is it your medicine, illness, tubing placement, or something else?  · Make a plan with your health care provider to keep you safe from falls.  · Ask your health care provider  or pharmacist about side effects of your medicines. Some medicines can make you dizzy or affect your coordination.  Ask for help.  · Ask for help before getting out of bed. You may need to press your call button.  · Ask for assistance in getting safely to the toilet.  · Ask for a walker or cane to be put at your bedside. Ask that most of the side rails on your bed be placed up before your health care provider leaves the room.  · Ask family or friends to sit with you.  · Ask for things that are out of your reach, such as your glasses, hearing aids, telephone, bedside table, or call button.  Follow these tips to avoid falling:  · Stay lying or seated, rather than standing, while waiting for help.  · Wear rubber-soled slippers or shoes whenever you walk in the hospital.  · Avoid quick, sudden movements.  ¨ Change positions slowly.  ¨ Sit on the side of your bed before standing.  ¨ Stand up slowly and wait before you start to walk.  · Let your health care provider know if there is a spill on the floor.  · Pay careful attention to the medical equipment, electrical cords, and tubes around you.  · When you need help, use your call button by your bed or in the bathroom. Wait for one of your health care providers to help you.  · If you feel dizzy or unsure of your footing, return to bed and wait for assistance.  · Avoid being distracted by the TV, telephone, or another person in your room.  · Do not lean or support yourself on rolling objects, such as IV poles or bedside tables.     This information is not intended to replace advice given to you by your health care provider. Make sure you discuss any questions you have with your health care provider.     Document Released: 12/15/2001 Document Revised: 01/08/2016 Document Reviewed: 08/25/2013  [employer] Interactive Patient Education ©2016 [employer]

## 2021-01-22 ENCOUNTER — ANESTHESIA EVENT (OUTPATIENT)
Dept: PERIOP | Facility: HOSPITAL | Age: 66
End: 2021-01-22

## 2021-01-22 ENCOUNTER — ANESTHESIA (OUTPATIENT)
Dept: PERIOP | Facility: HOSPITAL | Age: 66
End: 2021-01-22

## 2021-01-22 ENCOUNTER — HOSPITAL ENCOUNTER (OUTPATIENT)
Facility: HOSPITAL | Age: 66
Setting detail: HOSPITAL OUTPATIENT SURGERY
Discharge: HOME | End: 2021-01-22
Attending: OTOLARYNGOLOGY | Admitting: OTOLARYNGOLOGY

## 2021-01-22 VITALS
HEART RATE: 78 BPM | OXYGEN SATURATION: 97 % | SYSTOLIC BLOOD PRESSURE: 133 MMHG | DIASTOLIC BLOOD PRESSURE: 88 MMHG | RESPIRATION RATE: 16 BRPM | TEMPERATURE: 97.8 F

## 2021-01-22 DIAGNOSIS — R13.14 PHARYNGOESOPHAGEAL DYSPHAGIA: Primary | ICD-10-CM

## 2021-01-22 DIAGNOSIS — C02.9 PRIMARY SQUAMOUS CELL CARCINOMA OF TONGUE (HCC): ICD-10-CM

## 2021-01-22 LAB
QT INTERVAL: 376 MS
QTC INTERVAL: 425 MS

## 2021-01-22 PROCEDURE — 25010000002 ONDANSETRON PER 1 MG: Performed by: NURSE ANESTHETIST, CERTIFIED REGISTERED

## 2021-01-22 PROCEDURE — 43214 ESOPHAGOSC DILATE BALLOON 30: CPT | Performed by: OTOLARYNGOLOGY

## 2021-01-22 PROCEDURE — 25010000002 DEXAMETHASONE PER 1 MG: Performed by: NURSE ANESTHETIST, CERTIFIED REGISTERED

## 2021-01-22 PROCEDURE — 25010000002 FENTANYL CITRATE (PF) 100 MCG/2ML SOLUTION: Performed by: NURSE ANESTHETIST, CERTIFIED REGISTERED

## 2021-01-22 PROCEDURE — 25010000002 PROPOFOL 10 MG/ML EMULSION: Performed by: NURSE ANESTHETIST, CERTIFIED REGISTERED

## 2021-01-22 RX ORDER — FENTANYL CITRATE 50 UG/ML
INJECTION, SOLUTION INTRAMUSCULAR; INTRAVENOUS AS NEEDED
Status: DISCONTINUED | OUTPATIENT
Start: 2021-01-22 | End: 2021-01-22 | Stop reason: SURG

## 2021-01-22 RX ORDER — IBUPROFEN 600 MG/1
600 TABLET ORAL ONCE AS NEEDED
Status: DISCONTINUED | OUTPATIENT
Start: 2021-01-22 | End: 2021-01-22 | Stop reason: HOSPADM

## 2021-01-22 RX ORDER — ROCURONIUM BROMIDE 10 MG/ML
INJECTION, SOLUTION INTRAVENOUS AS NEEDED
Status: DISCONTINUED | OUTPATIENT
Start: 2021-01-22 | End: 2021-01-22 | Stop reason: SURG

## 2021-01-22 RX ORDER — OXYCODONE AND ACETAMINOPHEN 7.5; 325 MG/1; MG/1
2 TABLET ORAL EVERY 4 HOURS PRN
Status: DISCONTINUED | OUTPATIENT
Start: 2021-01-22 | End: 2021-01-22 | Stop reason: HOSPADM

## 2021-01-22 RX ORDER — ACETAMINOPHEN 500 MG
1000 TABLET ORAL ONCE
Status: DISCONTINUED | OUTPATIENT
Start: 2021-01-22 | End: 2021-01-22 | Stop reason: HOSPADM

## 2021-01-22 RX ORDER — HYDROCODONE BITARTRATE AND ACETAMINOPHEN 5; 325 MG/1; MG/1
1 TABLET ORAL ONCE AS NEEDED
Status: DISCONTINUED | OUTPATIENT
Start: 2021-01-22 | End: 2021-01-22 | Stop reason: HOSPADM

## 2021-01-22 RX ORDER — FENTANYL CITRATE 50 UG/ML
25 INJECTION, SOLUTION INTRAMUSCULAR; INTRAVENOUS
Status: DISCONTINUED | OUTPATIENT
Start: 2021-01-22 | End: 2021-01-22 | Stop reason: HOSPADM

## 2021-01-22 RX ORDER — SODIUM CHLORIDE, SODIUM LACTATE, POTASSIUM CHLORIDE, CALCIUM CHLORIDE 600; 310; 30; 20 MG/100ML; MG/100ML; MG/100ML; MG/100ML
1000 INJECTION, SOLUTION INTRAVENOUS CONTINUOUS
Status: DISCONTINUED | OUTPATIENT
Start: 2021-01-22 | End: 2021-01-22 | Stop reason: HOSPADM

## 2021-01-22 RX ORDER — ONDANSETRON 2 MG/ML
4 INJECTION INTRAMUSCULAR; INTRAVENOUS ONCE AS NEEDED
Status: DISCONTINUED | OUTPATIENT
Start: 2021-01-22 | End: 2021-01-22 | Stop reason: HOSPADM

## 2021-01-22 RX ORDER — LIDOCAINE HYDROCHLORIDE 40 MG/ML
SOLUTION TOPICAL AS NEEDED
Status: DISCONTINUED | OUTPATIENT
Start: 2021-01-22 | End: 2021-01-22 | Stop reason: SURG

## 2021-01-22 RX ORDER — LIDOCAINE HYDROCHLORIDE 10 MG/ML
0.5 INJECTION, SOLUTION EPIDURAL; INFILTRATION; INTRACAUDAL; PERINEURAL ONCE AS NEEDED
Status: DISCONTINUED | OUTPATIENT
Start: 2021-01-22 | End: 2021-01-22 | Stop reason: HOSPADM

## 2021-01-22 RX ORDER — CHLORHEXIDINE GLUCONATE 0.12 MG/ML
15 RINSE ORAL 2 TIMES DAILY
Qty: 240 ML | Refills: 0 | Status: SHIPPED | OUTPATIENT
Start: 2021-01-22 | End: 2021-03-08 | Stop reason: SDUPTHER

## 2021-01-22 RX ORDER — FLUMAZENIL 0.1 MG/ML
0.2 INJECTION INTRAVENOUS AS NEEDED
Status: DISCONTINUED | OUTPATIENT
Start: 2021-01-22 | End: 2021-01-22 | Stop reason: HOSPADM

## 2021-01-22 RX ORDER — DEXAMETHASONE SODIUM PHOSPHATE 4 MG/ML
INJECTION, SOLUTION INTRA-ARTICULAR; INTRALESIONAL; INTRAMUSCULAR; INTRAVENOUS; SOFT TISSUE AS NEEDED
Status: DISCONTINUED | OUTPATIENT
Start: 2021-01-22 | End: 2021-01-22 | Stop reason: SURG

## 2021-01-22 RX ORDER — SODIUM CHLORIDE 0.9 % (FLUSH) 0.9 %
3 SYRINGE (ML) INJECTION EVERY 12 HOURS SCHEDULED
Status: DISCONTINUED | OUTPATIENT
Start: 2021-01-22 | End: 2021-01-22 | Stop reason: HOSPADM

## 2021-01-22 RX ORDER — ONDANSETRON 4 MG/1
4 TABLET, FILM COATED ORAL ONCE AS NEEDED
Status: DISCONTINUED | OUTPATIENT
Start: 2021-01-22 | End: 2021-01-22 | Stop reason: HOSPADM

## 2021-01-22 RX ORDER — LABETALOL HYDROCHLORIDE 5 MG/ML
5 INJECTION, SOLUTION INTRAVENOUS
Status: DISCONTINUED | OUTPATIENT
Start: 2021-01-22 | End: 2021-01-22 | Stop reason: HOSPADM

## 2021-01-22 RX ORDER — HYDROCODONE BITARTRATE AND ACETAMINOPHEN 5; 325 MG/1; MG/1
1 TABLET ORAL EVERY 4 HOURS PRN
Qty: 8 TABLET | Refills: 0 | Status: SHIPPED | OUTPATIENT
Start: 2021-01-22 | End: 2021-01-22 | Stop reason: SDUPTHER

## 2021-01-22 RX ORDER — MIDAZOLAM HYDROCHLORIDE 1 MG/ML
1 INJECTION INTRAMUSCULAR; INTRAVENOUS
Status: DISCONTINUED | OUTPATIENT
Start: 2021-01-22 | End: 2021-01-22 | Stop reason: HOSPADM

## 2021-01-22 RX ORDER — SODIUM CHLORIDE 0.9 % (FLUSH) 0.9 %
3 SYRINGE (ML) INJECTION AS NEEDED
Status: DISCONTINUED | OUTPATIENT
Start: 2021-01-22 | End: 2021-01-22 | Stop reason: HOSPADM

## 2021-01-22 RX ORDER — ONDANSETRON 2 MG/ML
INJECTION INTRAMUSCULAR; INTRAVENOUS AS NEEDED
Status: DISCONTINUED | OUTPATIENT
Start: 2021-01-22 | End: 2021-01-22

## 2021-01-22 RX ORDER — SODIUM CHLORIDE 0.9 % (FLUSH) 0.9 %
3-10 SYRINGE (ML) INJECTION AS NEEDED
Status: DISCONTINUED | OUTPATIENT
Start: 2021-01-22 | End: 2021-01-22 | Stop reason: HOSPADM

## 2021-01-22 RX ORDER — OXYCODONE AND ACETAMINOPHEN 10; 325 MG/1; MG/1
1 TABLET ORAL ONCE AS NEEDED
Status: DISCONTINUED | OUTPATIENT
Start: 2021-01-22 | End: 2021-01-22 | Stop reason: HOSPADM

## 2021-01-22 RX ORDER — SODIUM CHLORIDE, SODIUM LACTATE, POTASSIUM CHLORIDE, CALCIUM CHLORIDE 600; 310; 30; 20 MG/100ML; MG/100ML; MG/100ML; MG/100ML
100 INJECTION, SOLUTION INTRAVENOUS CONTINUOUS
Status: DISCONTINUED | OUTPATIENT
Start: 2021-01-22 | End: 2021-01-22 | Stop reason: HOSPADM

## 2021-01-22 RX ORDER — PROPOFOL 10 MG/ML
VIAL (ML) INTRAVENOUS AS NEEDED
Status: DISCONTINUED | OUTPATIENT
Start: 2021-01-22 | End: 2021-01-22 | Stop reason: SURG

## 2021-01-22 RX ORDER — ONDANSETRON 2 MG/ML
INJECTION INTRAMUSCULAR; INTRAVENOUS AS NEEDED
Status: DISCONTINUED | OUTPATIENT
Start: 2021-01-22 | End: 2021-01-22 | Stop reason: SURG

## 2021-01-22 RX ORDER — NALOXONE HCL 0.4 MG/ML
0.4 VIAL (ML) INJECTION AS NEEDED
Status: DISCONTINUED | OUTPATIENT
Start: 2021-01-22 | End: 2021-01-22 | Stop reason: HOSPADM

## 2021-01-22 RX ADMIN — FENTANYL CITRATE 100 MCG: 50 INJECTION, SOLUTION INTRAMUSCULAR; INTRAVENOUS at 07:25

## 2021-01-22 RX ADMIN — SODIUM CHLORIDE, POTASSIUM CHLORIDE, SODIUM LACTATE AND CALCIUM CHLORIDE 1000 ML: 600; 310; 30; 20 INJECTION, SOLUTION INTRAVENOUS at 06:06

## 2021-01-22 RX ADMIN — GLYCOPYRROLATE 0.2 MG: 0.2 INJECTION, SOLUTION INTRAMUSCULAR; INTRAVENOUS at 07:25

## 2021-01-22 RX ADMIN — LIDOCAINE HYDROCHLORIDE 1 EACH: 40 SOLUTION TOPICAL at 07:25

## 2021-01-22 RX ADMIN — ROCURONIUM BROMIDE 35 MG: 10 INJECTION INTRAVENOUS at 07:25

## 2021-01-22 RX ADMIN — ONDANSETRON HYDROCHLORIDE 4 MG: 2 SOLUTION INTRAMUSCULAR; INTRAVENOUS at 07:32

## 2021-01-22 RX ADMIN — DEXAMETHASONE SODIUM PHOSPHATE 10 MG: 4 INJECTION, SOLUTION INTRAMUSCULAR; INTRAVENOUS at 07:32

## 2021-01-22 RX ADMIN — PROPOFOL 200 MG: 10 INJECTION, EMULSION INTRAVENOUS at 07:25

## 2021-01-22 RX ADMIN — SUGAMMADEX 150 MG: 100 INJECTION, SOLUTION INTRAVENOUS at 07:48

## 2021-01-22 NOTE — DISCHARGE INSTRUCTIONS

## 2021-01-22 NOTE — ANESTHESIA POSTPROCEDURE EVALUATION
Patient: Saul Alcantara Sr.    Procedure Summary     Date: 01/22/21 Room / Location:  [facility] OR 02 /  [facility]    Anesthesia Start: 0723 Anesthesia Stop: 0757    Procedure: DIRECT LARYNGOSCOPY AND ESOPHAGOSCOPY WITH ESOPHAGEAL DILATION (N/A ) Diagnosis:       Pharyngoesophageal dysphagia      Tobacco use disorder      Laryngopharyngeal reflux      (Pharyngoesophageal dysphagia [R13.14])      (Tobacco use disorder [F17.200])      (Laryngopharyngeal reflux [K21.9])    Surgeon: Wayne Richmond MD Provider: WILLIAM Harrington CRNA    Anesthesia Type: general ASA Status: 3          Anesthesia Type: general    Vitals  Vitals Value Taken Time   /89 01/22/21 0812   Temp 97.2 °F (36.2 °C) 01/22/21 0758   Pulse 82 01/22/21 0815   Resp 14 01/22/21 0810   SpO2 98 % 01/22/21 0815   Vitals shown include unvalidated device data.        Post Anesthesia Care and Evaluation    Patient location during evaluation: PACU  Patient participation: complete - patient participated  Level of consciousness: awake and alert  Pain score: 0  Pain management: adequate  Airway patency: patent  Anesthetic complications: No anesthetic complications    Cardiovascular status: acceptable and stable  Respiratory status: acceptable and unassisted  Hydration status: acceptable

## 2021-01-22 NOTE — ANESTHESIA PROCEDURE NOTES
Airway  Urgency: elective    Date/Time: 1/22/2021 7:26 AM  Airway not difficult    General Information and Staff    Patient location during procedure: OR  CRNA: WILLIAM Harrington CRNA    Indications and Patient Condition  Indications for airway management: airway protection    Preoxygenated: yes  MILS maintained throughout  Mask difficulty assessment: 0 - not attempted    Final Airway Details  Final airway type: endotracheal airway      Successful airway: ETT  Cuffed: yes   Successful intubation technique: direct laryngoscopy and video laryngoscopy  Facilitating devices/methods: intubating stylet  Endotracheal tube insertion site: oral  Blade: Valdes  Blade size: 3  ETT size (mm): 6.0  Cormack-Lehane Classification: grade I - full view of glottis  Placement verified by: chest auscultation and capnometry   Measured from: gums  Number of attempts at approach: 1  Assessment: lips, teeth, and gum same as pre-op and atraumatic intubation

## 2021-01-22 NOTE — OP NOTE
OPERATIVE NOTE  1/22/2021    NAME: Saul Alcantara Sr.    YOB: 1955  MRN: [id number]    PRE-OPERATIVE DIAGNOSIS:    Pharyngoesophageal dysphagia [R13.14]  Tobacco use disorder [F17.200]  Laryngopharyngeal reflux [K21.9]    POST-OPERATIVE DIAGNOSIS:   Post-Op Diagnosis Codes:     * Pharyngoesophageal dysphagia [R13.14]     * Tobacco use disorder [F17.200]     * Laryngopharyngeal reflux [K21.9]    PROCEDURE PERFORMED:   Direct laryngoscopy and esophagoscopy with esophageal dilatation    SURGEON:   Wayne Richmond MD    ASSISTANT(S):   None    ANESTHESIA:   General Anesthesia via Endotracheal Tube    INDICATIONS: The patient is a 65 y.o. male with Pharyngoesophageal dysphagia [R13.14]  Tobacco use disorder [F17.200]  Laryngopharyngeal reflux [K21.9]    PROCEDURE:  The patient was brought to the operating room, given General Anesthesia via Endotracheal Tube, and prepped and draped in the usual manner.     Direct laryngoscopy was performed and no masses lesions, ulcerations or other abnormalities were noted throughout the upper aerodigestive tract examination.  Rigid esophagoscopy was performed and the smallest scope was not able to be passed through the cricopharyngeal opening.    Subsequently Johnson dilatation was performed with successive passage of Johnson dilators from 24 Guatemalan to 58 Guatemalan in increments of 2 Guatemalan.  There is only minimal resistance encountered throughout the dilatation.    Subsequently the rigid esophagoscope was able to be be passed to 25 cm without difficulty.  The mucosa of the esophagus was relatively normal in appearance with no significant evidence of stenosis, scarring, ulceration or lesion.     The patient was transported upon extubation to the postanesthesia care unit in stable condition.    SPECIMENS:  None    COMPLICATIONS: NONE    ESTIMATED BLOOD LOSS:  Minimal    Wayne Richmond MD  1/22/2021

## 2021-02-18 ENCOUNTER — TELEPHONE (OUTPATIENT)
Dept: OTOLARYNGOLOGY | Facility: CLINIC | Age: 66
End: 2021-02-18

## 2021-02-18 NOTE — TELEPHONE ENCOUNTER
----- Message from KRISTA Lino sent at 2/18/2021 10:16 AM CST -----  I honestly can't remember, I don't see anything in the charts. See what the cream is and what the sore is.  ----- Message -----  From: Lisa Michelle RN  Sent: 2/17/2021   4:30 PM CST  To: KRISTA Lino    Did you give patient a cream for a sore on his neck? Its not working

## 2021-02-18 NOTE — TELEPHONE ENCOUNTER
Patient is using kenalog cream on a lesion on the neck that has been there over a year. It started as a pimple like lesion and has grown. I told them it is probably an EIC cyst and to stop the ointment. We will see him back in follow up.

## 2021-02-26 ENCOUNTER — TRANSCRIBE ORDERS (OUTPATIENT)
Dept: LAB | Facility: HOSPITAL | Age: 66
End: 2021-02-26

## 2021-02-26 DIAGNOSIS — Z01.818 PRE-OP TESTING: Primary | ICD-10-CM

## 2021-03-01 ENCOUNTER — LAB (OUTPATIENT)
Dept: LAB | Facility: HOSPITAL | Age: 66
End: 2021-03-01

## 2021-03-01 LAB — SARS-COV-2 ORF1AB RESP QL NAA+PROBE: NOT DETECTED

## 2021-03-01 PROCEDURE — U0005 INFEC AGEN DETEC AMPLI PROBE: HCPCS | Performed by: OTOLARYNGOLOGY

## 2021-03-01 PROCEDURE — U0004 COV-19 TEST NON-CDC HGH THRU: HCPCS | Performed by: OTOLARYNGOLOGY

## 2021-03-01 PROCEDURE — C9803 HOPD COVID-19 SPEC COLLECT: HCPCS | Performed by: OTOLARYNGOLOGY

## 2021-03-08 ENCOUNTER — OFFICE VISIT (OUTPATIENT)
Dept: OTOLARYNGOLOGY | Facility: CLINIC | Age: 66
End: 2021-03-08

## 2021-03-08 VITALS
HEIGHT: 72 IN | WEIGHT: 160.2 LBS | DIASTOLIC BLOOD PRESSURE: 69 MMHG | BODY MASS INDEX: 21.7 KG/M2 | SYSTOLIC BLOOD PRESSURE: 111 MMHG | HEART RATE: 104 BPM | TEMPERATURE: 99.1 F

## 2021-03-08 DIAGNOSIS — R13.14 PHARYNGOESOPHAGEAL DYSPHAGIA: ICD-10-CM

## 2021-03-08 DIAGNOSIS — D48.9 NEOPLASM OF UNCERTAIN BEHAVIOR: ICD-10-CM

## 2021-03-08 DIAGNOSIS — C02.9 PRIMARY SQUAMOUS CELL CARCINOMA OF TONGUE (HCC): Primary | ICD-10-CM

## 2021-03-08 PROCEDURE — 99214 OFFICE O/P EST MOD 30 MIN: CPT | Performed by: OTOLARYNGOLOGY

## 2021-03-08 RX ORDER — AMITRIPTYLINE HYDROCHLORIDE 25 MG/1
25 TABLET, FILM COATED ORAL NIGHTLY
COMMUNITY
Start: 2021-02-22 | End: 2023-03-02

## 2021-03-08 RX ORDER — MEGESTROL ACETATE 40 MG/ML
SUSPENSION ORAL
COMMUNITY
Start: 2021-01-29 | End: 2021-11-30

## 2021-03-08 RX ORDER — CHLORHEXIDINE GLUCONATE 0.12 MG/ML
15 RINSE ORAL 2 TIMES DAILY
Qty: 240 ML | Refills: 0 | Status: SHIPPED | OUTPATIENT
Start: 2021-03-08 | End: 2021-08-05 | Stop reason: SDUPTHER

## 2021-03-08 NOTE — PATIENT INSTRUCTIONS
"Be aware of the signs and symptoms of recurrent head and neck cancer including neck mass, persistent or recurrent sore throat, ear pain, hemoptysis, weight loss and hoarseness. If any of these symptoms recur and or persist, call for an evaluation.     D/W patient increasing caloric intake and discussed cruciferous vegetables and protein shakes etc to maintain optimal nutrition.  The necessity of abstaining from tobacco products was also discussed particularly with respect to not smoking.    Continue F/U and/or treatment with Jaydon         The risks, benefits and options of the procedure were explained to the patient. The possiblity of a persistent cosmetic defect as well as a \"flap\" or a graft to close the defect was discussed. The patient was instructed to stop all aspirin, NSAIDs and VIT E etc.  If appropriate, clearance with primary MD or specialist will be obtained preoperatively.  The patient was scheduled for a LOCAL in the office.    For instructions on the proper use, care and disposal of controled substances, ask you doctor or refer to the [place] Board of Medicine website: http://kbml.[place].gov/hb1/Pages/Considerations-For-Patient-Education.aspx  Continue Peridex    "

## 2021-03-08 NOTE — PROGRESS NOTES
YOB: 1955  Location: [facility]  Location Address: [address] [place] [address], [facility], [address] [place] [address]  Location Phone: [phone]    Chief Complaint   Patient presents with   • Follow-up     lesion on neck wanting Dr. Richmond to look at and possible  remove       History of Present Illness  Saul Alcantara Sr. is a 65 y.o. male.  Saul Alcantara Sr. is status post Direct laryngoscopy and esophagoscopy with esophageal dilatation on 21. Patient had DL and biopsy of base of tongue on 9/10/14 with positive pathology for SCCa. The cancer was staged T1M0N0. The patient has a history of radiation therapy due to a base of tongue cancer on the left side that he finished in 2014. Patient is status post Direct laryngoscopy, esophagoscopy with dilatation on 20 and Rigid esophagoscopy and Johnson esophageal dilatation on 20. Patient is doing well postop with no complaints of sore throat or dysphagia. He is using peridex mouthwash which seems to help mouth and throat.     Patient complains of left neck lesion. It has been present for 1 year. The lesion has not been biopsied.     I have personally reviewed the information imported into the chart during this visit.      I have personally reviewed the review of systems.               Past Medical History:   Diagnosis Date   • Allergic rhinitis    • Anxiety    • Chronic rhinitis    • Chronic sinusitis    • Depression    • Deviated nasal septum    • Enlarged prostate    • GERD (gastroesophageal reflux disease)    • H/O head and neck radiation 3/3/2017   • History of transfusion    • Hyperlipidemia    • Hypertension    • Hypothyroidism    • Laryngopharyngeal reflux    • Lymphoma (CMS/HCC)     Non-Hodgkins   • MRSA infection    • Panic attacks    • Peyronie disease    • Pneumonia    • Primary squamous cell carcinoma of tongue (CMS/HCC) 2016    T1N0M0 SCC S/P XRT/Chemo 2014   • Primary squamous cell carcinoma of tongue (CMS/HCC) 2016       T1N0M0 SCC S/P XRT/Chemo 12/2014   • Sicca syndrome (CMS/HCC)    • Sinusitis, acute    • Squamous cell carcinoma     Base of Tongue   • Tobacco use disorder    • Tongue irritation    • Xerostomia        Past Surgical History:   Procedure Laterality Date   • CATARACT EXTRACTION Bilateral    • ESOPHAGOSCOPY N/A 11/20/2019    Procedure: Direct laryngoscopy with esophageal Johnson dilation;  Surgeon: Wayne Richmond MD;  Location:  [facility] OR;  Service: ENT   • HAND SURGERY      metal plate    • KNEE SURGERY     • LARYNGOSCOPY N/A 6/26/2020    Procedure: Rigid esophagoscopy Johnson esophageal dilatation;  Surgeon: Wayne Richmond MD;  Location:  [facility] OR;  Service: ENT;  Laterality: N/A;   • LARYNGOSCOPY N/A 8/24/2020    Procedure: MICRODIRECT LARYNGOSCOPY with esophageal dilatation;  Surgeon: Wayne Richmond MD;  Location:  [facility] OR;  Service: ENT;  Laterality: N/A;   • MULTIPLE TOOTH EXTRACTIONS      CONTINUING TO HAVE BONE REMOVED 1/2021   • OTHER SURGICAL HISTORY  09/10/2014    Microlaryngoscopy with Biopsy - Base of Tongue   • PANENDOSCOPY N/A 1/22/2021    Procedure: DIRECT LARYNGOSCOPY AND ESOPHAGOSCOPY WITH ESOPHAGEAL DILATION;  Surgeon: Wayne Richmond MD;  Location:  [facility] OR;  Service: ENT;  Laterality: N/A;   • SPLENECTOMY     • SQUAMOUS CELL CARCINOMA EXCISION  09/10/2014    Tongue   • TONSILLECTOMY         Outpatient Medications Marked as Taking for the 3/8/21 encounter (Office Visit) with Wayne Richmond MD   Medication Sig Dispense Refill   • albuterol (PROVENTIL) (2.5 MG/3ML) 0.083% nebulizer solution Take 2.5 mg by nebulization Every 4 (Four) Hours As Needed for Wheezing. 50 mL 1   • ALPRAZolam (XANAX) 2 MG tablet Take 2 mg by mouth 3 (Three) Times a Day.     • amitriptyline (ELAVIL) 25 MG tablet Take 25 mg by mouth every night at bedtime.     • amLODIPine (NORVASC) 10 MG tablet Take 10 mg by mouth Daily.     • azelastine (ASTELIN) 0.1 % nasal spray      • chlorhexidine  (Peridex) 0.12 % solution Apply 15 mL to the mouth or throat 2 (Two) Times a Day. 240 mL 0   • fluticasone (FLONASE) 50 MCG/ACT nasal spray 2 sprays into each nostril Daily. (Patient taking differently: 2 sprays into the nostril(s) as directed by provider Daily As Needed.) 16 g 5   • glycopyrrolate (ROBINUL) 1 MG tablet 0.5 mg 2 (Two) Times a Day.  3   • HYDROcodone-acetaminophen (NORCO) 7.5-325 MG per tablet TK 1 T PO QD PRN P  0   • levothyroxine (SYNTHROID, LEVOTHROID) 100 MCG tablet Take 100 mcg by mouth Daily.     • Lidocaine Viscous HCl (XYLOCAINE) 2 % solution Take 5 mL by mouth As Needed for Mild Pain .     • megestrol (MEGACE) 40 MG/ML suspension TAKE 20 ML BY MOUTH DAILY     • meloxicam (MOBIC) 15 MG tablet Take 15 mg by mouth Daily.     • nystatin (MYCOSTATIN) 374413 UNIT/ML suspension Take 10ml by mouth BID for 28 days (Patient taking differently: Take 200,000 Units by mouth 4 (Four) Times a Day As Needed. Take 10ml by mouth BID for 28 days) 480 mL 2   • ondansetron (ZOFRAN) 8 MG tablet Take 8 mg by mouth Every 8 (Eight) Hours As Needed for Nausea.     • pantoprazole (PROTONIX) 40 MG EC tablet Take 40 mg by mouth Daily. Delayed Release (DR/EC)  Take 1 tablet (40 mg) by oral route 2 times per day,  Take 30 minutes prior to breakfast and evening meal      • riTUXimab-abbs (Truxima) 500 MG/50ML solution injection Infuse 70 mL (700 mg) via IVPB every 8 weeks. Patient specific drug from [facility] for clinic administration     • senna-docusate (PERICOLACE) 8.6-50 MG per tablet Take 1 tablet by mouth 2 times daily     • sertraline (ZOLOFT) 100 MG tablet Take 100 mg by mouth every night at bedtime.     • simvastatin (ZOCOR) 20 MG tablet TK 1 T PO QD  11   • tadalafil (CIALIS) 5 MG tablet Take 1 tablet by mouth Daily for 360 days. 30 tablet 11   • tamsulosin (FLOMAX) 0.4 MG capsule 24 hr capsule Take 0.4 mg by mouth Daily.     • triamcinolone (KENALOG) 0.5 % ointment Apply  topically to the  appropriate area as directed 2 (Two) Times a Day. Apply to affected ear 15 g 3   • zolpidem (AMBIEN) 10 MG tablet Take 10 mg by mouth At Night As Needed for Sleep.     • [DISCONTINUED] chlorhexidine (Peridex) 0.12 % solution Apply 15 mL to the mouth or throat 2 (Two) Times a Day. 240 mL 0       Patient has no known allergies.    Family History   Problem Relation Age of Onset   • Diabetes Sister    • Cancer Sister        Social History     Socioeconomic History   • Marital status:      Spouse name: Not on file   • Number of children: Not on file   • Years of education: Not on file   • Highest education level: Not on file   Tobacco Use   • Smoking status: Current Some Day Smoker     Packs/day: 0.25     Types: Cigarettes   • Smokeless tobacco: Never Used   • Tobacco comment: pack last couple of weeks   Vaping Use   • Vaping Use: Never used   Substance and Sexual Activity   • Alcohol use: No   • Drug use: No   • Sexual activity: Defer       Review of Systems   Constitutional: Negative.    HENT: Negative.    Eyes: Negative.    Respiratory: Negative.    Cardiovascular: Negative.    Gastrointestinal: Negative.    Endocrine: Negative.    Genitourinary: Negative.    Musculoskeletal: Negative.    Skin:        Left neck lesion   Allergic/Immunologic: Negative.    Neurological: Negative.    Hematological: Negative.    Psychiatric/Behavioral: Negative.        Vitals:    03/08/21 1326   BP: 111/69   Pulse: 104   Temp: 99.1 °F (37.3 °C)       Body mass index is 21.73 kg/m².    Objective     Physical Exam  CONSTITUTIONAL: well nourished, well-developed, alert, oriented, in no acute distress     COMMUNICATION AND VOICE: able to communicate normally, normal voice quality    HEAD: normocephalic, no lesions, atraumatic, no tenderness, no masses     FACE: appearance normal, no lesions, no tenderness, no deformities, facial motion symmetric    EYES: ocular motility normal, eyelids normal, orbits normal, no proptosis, conjunctiva  normal , pupils equal, round     EARS:  Hearing: hearing to conversational voice intact bilaterally   External Ears: normal bilaterally, no lesions  TMs  AS-clear and intact TM  AD-clear and intact TM    NOSE:  External Nose: external nasal structure normal, no tenderness on palpation, no nasal discharge, no lesions, no evidence of trauma, nostrils patent     ORAL:  Lips: upper and lower lips without lesion   OC/OP-mild inflammatory changes without mass or lesion    NECK:  Inspection and Palpation: neck appearance normal, no masses or tenderness-1.2 x 0.6 cm area of RKP    CHEST/RESPIRATORY: normal respiratory effort     CARDIOVASCULAR: no cyanosis or edema     NEUROLOGICAL/PSYCHIATRIC: oriented to time, place and person, mood normal, affect appropriate, CN II-XII intact grossly    Assessment/Plan   Diagnoses and all orders for this visit:    1. Primary squamous cell carcinoma of tongue (CMS/HCC) (Primary)  Comments:   SCCa. The cancer was staged T1M0N0. The patient has a history of radiation therapy due to a base of tongue cancer on the left side    2. Pharyngoesophageal dysphagia    3. Neoplasm of uncertain behavior    Other orders  -     chlorhexidine (Peridex) 0.12 % solution; Apply 15 mL to the mouth or throat 2 (Two) Times a Day.  Dispense: 240 mL; Refill: 0      * Surgery not found *  No orders of the defined types were placed in this encounter.    Return in about 3 months (around 6/8/2021).       Patient Instructions   Be aware of the signs and symptoms of recurrent head and neck cancer including neck mass, persistent or recurrent sore throat, ear pain, hemoptysis, weight loss and hoarseness. If any of these symptoms recur and or persist, call for an evaluation.     D/W patient increasing caloric intake and discussed cruciferous vegetables and protein shakes etc to maintain optimal nutrition.  The necessity of abstaining from tobacco products was also discussed particularly with respect to not  "smoking.    Continue F/U and/or treatment with Jaydon         The risks, benefits and options of the procedure were explained to the patient. The possiblity of a persistent cosmetic defect as well as a \"flap\" or a graft to close the defect was discussed. The patient was instructed to stop all aspirin, NSAIDs and VIT E etc.  If appropriate, clearance with primary MD or specialist will be obtained preoperatively.  The patient was scheduled for a LOCAL in the office.    For instructions on the proper use, care and disposal of controled substances, ask you doctor or refer to the [place] Board of Medicine website: http://kbml.[place].gov/hb1/Pages/Considerations-For-Patient-Education.aspx  Continue Peridex      "

## 2021-04-01 ENCOUNTER — PROCEDURE VISIT (OUTPATIENT)
Dept: OTOLARYNGOLOGY | Facility: CLINIC | Age: 66
End: 2021-04-01

## 2021-04-01 DIAGNOSIS — D48.5 NEOPLASM OF UNCERTAIN BEHAVIOR OF SKIN OF NECK: Primary | ICD-10-CM

## 2021-04-01 PROCEDURE — 11424 EXC H-F-NK-SP B9+MARG 3.1-4: CPT | Performed by: OTOLARYNGOLOGY

## 2021-04-01 PROCEDURE — 13132 CMPLX RPR F/C/C/M/N/AX/G/H/F: CPT | Performed by: OTOLARYNGOLOGY

## 2021-04-01 NOTE — PROGRESS NOTES
PROCEDURE NOTE    Saul DORIS Alcantara .    DATE OF PROCEDURE: 04/01/2021    PROCEDURE:   Excision of neoplasm of uncertain behavior of the left neck with complex closure    PREPROCEDURE DIAGNOSIS:   Neoplasm of uncertain behavior of the left neck    POSTPROCEDURE DIAGNOSIS:  SAME    ANESTHESIA:   Injected 1% Lidocaine with 1:100,000 parts epinephrine solution - 3 cc    PROCEDURE DESCRIPTION:    The patient was prepped and draped in the usual fashion.  Following the injection of local anesthesia circumferential elliptical excision was accomplished of the lesion of the left neck without difficulty utilizing a #15 blade.  Excision was 3.2 x 1.0 cm.  The lesion was 1.1 x 0.8 cm.  The margin was 1 mm. Wide undermining was performed with curved iris scissors in order to facilitate closure and preserve normal anatomic relationships..  There was minimal to no bleeding.  The specimen was submitted for permanent pathologic examination.    Reapproximation was accomplished with interrupted 4-0 Vicryl subcutaneously and interrupted 5-0 nylon to reapproximate the epidermis..    Bacitracin ointment was applied and the procedure terminated.  The patient tolerated the procedure well. There were no complications.

## 2021-04-12 ENCOUNTER — OFFICE VISIT (OUTPATIENT)
Dept: OTOLARYNGOLOGY | Facility: CLINIC | Age: 66
End: 2021-04-12

## 2021-04-12 DIAGNOSIS — L58.9 RADIATION DERMATITIS: Primary | ICD-10-CM

## 2021-04-12 PROCEDURE — 99024 POSTOP FOLLOW-UP VISIT: CPT | Performed by: OTOLARYNGOLOGY

## 2021-04-12 NOTE — PROGRESS NOTES
Procedure   Suture Removal    Date/Time: 4/12/2021 12:56 PM  Performed by: Lisa Michelle RN  Authorized by: Wayne Richmond MD   Body area: head/neck  Location details: neck  Comments: Patient presents for suture removal. The incision is well-approximated with no redness or edema noted. Patient notified of path

## 2021-05-07 ENCOUNTER — OFFICE VISIT (OUTPATIENT)
Dept: OTOLARYNGOLOGY | Facility: CLINIC | Age: 66
End: 2021-05-07

## 2021-05-07 DIAGNOSIS — L58.9 RADIATION DERMATITIS: Primary | ICD-10-CM

## 2021-05-07 PROCEDURE — 99024 POSTOP FOLLOW-UP VISIT: CPT | Performed by: OTOLARYNGOLOGY

## 2021-05-07 NOTE — PROGRESS NOTES
Procedure   Suture Removal    Date/Time: 5/7/2021 10:48 AM  Performed by: Lisa Michelle RN  Authorized by: Wayne Richmond MD   Body area: head/neck  Location details: neck  Comments: Patient presents for retained suture removal. The patient has a suture that did not dissolve come through the skin. This has been clipped.

## 2021-05-07 NOTE — PATIENT INSTRUCTIONS
Patient to use nasal sprays for congestion and nasal drainage. Patient to use peridex for mouth irritation

## 2021-05-18 ENCOUNTER — TRANSCRIBE ORDERS (OUTPATIENT)
Dept: ADMINISTRATIVE | Facility: HOSPITAL | Age: 66
End: 2021-05-18

## 2021-05-18 DIAGNOSIS — Z92.3 HX OF RADIATION THERAPY: Primary | ICD-10-CM

## 2021-05-18 DIAGNOSIS — Z11.59 SCREENING FOR VIRAL DISEASE: Primary | ICD-10-CM

## 2021-05-18 DIAGNOSIS — R13.14 PHARYNGOESOPHAGEAL DYSPHAGIA: ICD-10-CM

## 2021-05-20 ENCOUNTER — HOSPITAL ENCOUNTER (OUTPATIENT)
Dept: GENERAL RADIOLOGY | Facility: HOSPITAL | Age: 66
Discharge: HOME | End: 2021-05-20

## 2021-05-20 ENCOUNTER — PRE-ADMISSION TESTING (OUTPATIENT)
Dept: PREADMISSION TESTING | Facility: HOSPITAL | Age: 66
End: 2021-05-20

## 2021-05-20 VITALS
WEIGHT: 158.51 LBS | HEART RATE: 96 BPM | DIASTOLIC BLOOD PRESSURE: 77 MMHG | OXYGEN SATURATION: 98 % | HEIGHT: 73 IN | SYSTOLIC BLOOD PRESSURE: 120 MMHG | BODY MASS INDEX: 21.01 KG/M2

## 2021-05-20 DIAGNOSIS — Z92.3 HX OF RADIATION THERAPY: ICD-10-CM

## 2021-05-20 DIAGNOSIS — R13.14 PHARYNGOESOPHAGEAL DYSPHAGIA: ICD-10-CM

## 2021-05-20 LAB
ALBUMIN SERPL-MCNC: 4.3 G/DL (ref 3.5–5.2)
ALBUMIN/GLOB SERPL: 1.8 G/DL
ALP SERPL-CCNC: 80 U/L (ref 39–117)
ALT SERPL W P-5'-P-CCNC: 15 U/L (ref 1–41)
ANION GAP SERPL CALCULATED.3IONS-SCNC: 9 MMOL/L (ref 5–15)
AST SERPL-CCNC: 24 U/L (ref 1–40)
BILIRUB SERPL-MCNC: 0.5 MG/DL (ref 0–1.2)
BUN SERPL-MCNC: 15 MG/DL (ref 8–23)
BUN/CREAT SERPL: 11.3 (ref 7–25)
CALCIUM SPEC-SCNC: 9.7 MG/DL (ref 8.6–10.5)
CHLORIDE SERPL-SCNC: 102 MMOL/L (ref 98–107)
CO2 SERPL-SCNC: 31 MMOL/L (ref 22–29)
CREAT SERPL-MCNC: 1.33 MG/DL (ref 0.76–1.27)
DEPRECATED RDW RBC AUTO: 45.8 FL (ref 37–54)
ERYTHROCYTE [DISTWIDTH] IN BLOOD BY AUTOMATED COUNT: 13.1 % (ref 12.3–15.4)
GFR SERPL CREATININE-BSD FRML MDRD: 54 ML/MIN/1.73
GLOBULIN UR ELPH-MCNC: 2.4 GM/DL
GLUCOSE SERPL-MCNC: 115 MG/DL (ref 65–99)
HCT VFR BLD AUTO: 38 % (ref 37.5–51)
HGB BLD-MCNC: 12.9 G/DL (ref 13–17.7)
MCH RBC QN AUTO: 32.2 PG (ref 26.6–33)
MCHC RBC AUTO-ENTMCNC: 33.9 G/DL (ref 31.5–35.7)
MCV RBC AUTO: 94.8 FL (ref 79–97)
PLATELET # BLD AUTO: 294 10*3/MM3 (ref 140–450)
PMV BLD AUTO: 11.4 FL (ref 6–12)
POTASSIUM SERPL-SCNC: 4.8 MMOL/L (ref 3.5–5.2)
PROT SERPL-MCNC: 6.7 G/DL (ref 6–8.5)
RBC # BLD AUTO: 4.01 10*6/MM3 (ref 4.14–5.8)
SODIUM SERPL-SCNC: 142 MMOL/L (ref 136–145)
WBC # BLD AUTO: 5.88 10*3/MM3 (ref 3.4–10.8)

## 2021-05-20 PROCEDURE — 93010 ELECTROCARDIOGRAM REPORT: CPT | Performed by: INTERNAL MEDICINE

## 2021-05-20 PROCEDURE — 71046 X-RAY EXAM CHEST 2 VIEWS: CPT

## 2021-05-20 PROCEDURE — 85027 COMPLETE CBC AUTOMATED: CPT | Performed by: OTOLARYNGOLOGY

## 2021-05-20 PROCEDURE — 93005 ELECTROCARDIOGRAM TRACING: CPT | Performed by: OTOLARYNGOLOGY

## 2021-05-20 PROCEDURE — 80053 COMPREHEN METABOLIC PANEL: CPT | Performed by: OTOLARYNGOLOGY

## 2021-05-21 ENCOUNTER — LAB (OUTPATIENT)
Dept: LAB | Facility: HOSPITAL | Age: 66
End: 2021-05-21

## 2021-05-21 LAB — SARS-COV-2 ORF1AB RESP QL NAA+PROBE: NOT DETECTED

## 2021-05-21 PROCEDURE — U0004 COV-19 TEST NON-CDC HGH THRU: HCPCS | Performed by: OTOLARYNGOLOGY

## 2021-05-21 PROCEDURE — U0005 INFEC AGEN DETEC AMPLI PROBE: HCPCS | Performed by: OTOLARYNGOLOGY

## 2021-05-21 PROCEDURE — C9803 HOPD COVID-19 SPEC COLLECT: HCPCS | Performed by: OTOLARYNGOLOGY

## 2021-05-22 LAB
QT INTERVAL: 364 MS
QTC INTERVAL: 433 MS

## 2021-05-24 ENCOUNTER — HOSPITAL ENCOUNTER (OUTPATIENT)
Facility: HOSPITAL | Age: 66
Setting detail: HOSPITAL OUTPATIENT SURGERY
Discharge: HOME | End: 2021-05-24
Attending: OTOLARYNGOLOGY | Admitting: OTOLARYNGOLOGY

## 2021-05-24 ENCOUNTER — ANESTHESIA EVENT (OUTPATIENT)
Dept: PERIOP | Facility: HOSPITAL | Age: 66
End: 2021-05-24

## 2021-05-24 ENCOUNTER — ANESTHESIA (OUTPATIENT)
Dept: PERIOP | Facility: HOSPITAL | Age: 66
End: 2021-05-24

## 2021-05-24 VITALS
RESPIRATION RATE: 16 BRPM | HEART RATE: 67 BPM | DIASTOLIC BLOOD PRESSURE: 87 MMHG | TEMPERATURE: 98.1 F | OXYGEN SATURATION: 96 % | SYSTOLIC BLOOD PRESSURE: 147 MMHG

## 2021-05-24 DIAGNOSIS — R13.14 PHARYNGOESOPHAGEAL DYSPHAGIA: ICD-10-CM

## 2021-05-24 DIAGNOSIS — Z92.3 HX OF RADIATION THERAPY: ICD-10-CM

## 2021-05-24 PROCEDURE — 43450 DILATE ESOPHAGUS 1/MULT PASS: CPT | Performed by: OTOLARYNGOLOGY

## 2021-05-24 PROCEDURE — 31525 DX LARYNGOSCOPY EXCL NB: CPT | Performed by: OTOLARYNGOLOGY

## 2021-05-24 PROCEDURE — 25010000002 ONDANSETRON PER 1 MG: Performed by: NURSE ANESTHETIST, CERTIFIED REGISTERED

## 2021-05-24 PROCEDURE — 25010000002 PROPOFOL 10 MG/ML EMULSION: Performed by: NURSE ANESTHETIST, CERTIFIED REGISTERED

## 2021-05-24 PROCEDURE — 25010000002 SUCCINYLCHOLINE PER 20 MG: Performed by: NURSE ANESTHETIST, CERTIFIED REGISTERED

## 2021-05-24 PROCEDURE — 25010000002 DEXAMETHASONE PER 1 MG: Performed by: NURSE ANESTHETIST, CERTIFIED REGISTERED

## 2021-05-24 PROCEDURE — 25010000002 FENTANYL CITRATE (PF) 100 MCG/2ML SOLUTION: Performed by: NURSE ANESTHETIST, CERTIFIED REGISTERED

## 2021-05-24 RX ORDER — NALOXONE HCL 0.4 MG/ML
0.4 VIAL (ML) INJECTION AS NEEDED
Status: DISCONTINUED | OUTPATIENT
Start: 2021-05-24 | End: 2021-05-24 | Stop reason: HOSPADM

## 2021-05-24 RX ORDER — FLUTICASONE PROPIONATE 50 MCG
2 SPRAY, SUSPENSION (ML) NASAL DAILY
Qty: 18.2 ML | Refills: 6 | Status: SHIPPED | OUTPATIENT
Start: 2021-05-24 | End: 2021-10-08

## 2021-05-24 RX ORDER — OXYCODONE AND ACETAMINOPHEN 7.5; 325 MG/1; MG/1
2 TABLET ORAL EVERY 4 HOURS PRN
Status: DISCONTINUED | OUTPATIENT
Start: 2021-05-24 | End: 2021-05-24 | Stop reason: HOSPADM

## 2021-05-24 RX ORDER — ROCURONIUM BROMIDE 10 MG/ML
INJECTION, SOLUTION INTRAVENOUS AS NEEDED
Status: DISCONTINUED | OUTPATIENT
Start: 2021-05-24 | End: 2021-05-24 | Stop reason: SURG

## 2021-05-24 RX ORDER — LIDOCAINE HYDROCHLORIDE 40 MG/ML
SOLUTION TOPICAL AS NEEDED
Status: DISCONTINUED | OUTPATIENT
Start: 2021-05-24 | End: 2021-05-24 | Stop reason: SURG

## 2021-05-24 RX ORDER — LABETALOL HYDROCHLORIDE 5 MG/ML
5 INJECTION, SOLUTION INTRAVENOUS
Status: DISCONTINUED | OUTPATIENT
Start: 2021-05-24 | End: 2021-05-24 | Stop reason: HOSPADM

## 2021-05-24 RX ORDER — SUCCINYLCHOLINE CHLORIDE 20 MG/ML
INJECTION INTRAMUSCULAR; INTRAVENOUS AS NEEDED
Status: DISCONTINUED | OUTPATIENT
Start: 2021-05-24 | End: 2021-05-24 | Stop reason: SURG

## 2021-05-24 RX ORDER — ONDANSETRON 2 MG/ML
4 INJECTION INTRAMUSCULAR; INTRAVENOUS ONCE AS NEEDED
Status: DISCONTINUED | OUTPATIENT
Start: 2021-05-24 | End: 2021-05-24 | Stop reason: HOSPADM

## 2021-05-24 RX ORDER — HYDROCODONE BITARTRATE AND ACETAMINOPHEN 5; 325 MG/1; MG/1
1 TABLET ORAL EVERY 6 HOURS PRN
Status: DISCONTINUED | OUTPATIENT
Start: 2021-05-24 | End: 2021-05-24 | Stop reason: HOSPADM

## 2021-05-24 RX ORDER — FENTANYL CITRATE 50 UG/ML
25 INJECTION, SOLUTION INTRAMUSCULAR; INTRAVENOUS
Status: DISCONTINUED | OUTPATIENT
Start: 2021-05-24 | End: 2021-05-24 | Stop reason: HOSPADM

## 2021-05-24 RX ORDER — SODIUM CHLORIDE 0.9 % (FLUSH) 0.9 %
3 SYRINGE (ML) INJECTION EVERY 12 HOURS SCHEDULED
Status: DISCONTINUED | OUTPATIENT
Start: 2021-05-24 | End: 2021-05-24 | Stop reason: HOSPADM

## 2021-05-24 RX ORDER — ONDANSETRON 2 MG/ML
INJECTION INTRAMUSCULAR; INTRAVENOUS AS NEEDED
Status: DISCONTINUED | OUTPATIENT
Start: 2021-05-24 | End: 2021-05-24 | Stop reason: SURG

## 2021-05-24 RX ORDER — FENTANYL CITRATE 50 UG/ML
INJECTION, SOLUTION INTRAMUSCULAR; INTRAVENOUS AS NEEDED
Status: DISCONTINUED | OUTPATIENT
Start: 2021-05-24 | End: 2021-05-24 | Stop reason: SURG

## 2021-05-24 RX ORDER — DEXAMETHASONE SODIUM PHOSPHATE 4 MG/ML
INJECTION, SOLUTION INTRA-ARTICULAR; INTRALESIONAL; INTRAMUSCULAR; INTRAVENOUS; SOFT TISSUE AS NEEDED
Status: DISCONTINUED | OUTPATIENT
Start: 2021-05-24 | End: 2021-05-24 | Stop reason: SURG

## 2021-05-24 RX ORDER — SODIUM CHLORIDE, SODIUM LACTATE, POTASSIUM CHLORIDE, CALCIUM CHLORIDE 600; 310; 30; 20 MG/100ML; MG/100ML; MG/100ML; MG/100ML
100 INJECTION, SOLUTION INTRAVENOUS CONTINUOUS
Status: DISCONTINUED | OUTPATIENT
Start: 2021-05-24 | End: 2021-05-24 | Stop reason: HOSPADM

## 2021-05-24 RX ORDER — PROPOFOL 10 MG/ML
VIAL (ML) INTRAVENOUS AS NEEDED
Status: DISCONTINUED | OUTPATIENT
Start: 2021-05-24 | End: 2021-05-24 | Stop reason: SURG

## 2021-05-24 RX ORDER — FLUMAZENIL 0.1 MG/ML
0.2 INJECTION INTRAVENOUS AS NEEDED
Status: DISCONTINUED | OUTPATIENT
Start: 2021-05-24 | End: 2021-05-24 | Stop reason: HOSPADM

## 2021-05-24 RX ORDER — SODIUM CHLORIDE 0.9 % (FLUSH) 0.9 %
3 SYRINGE (ML) INJECTION AS NEEDED
Status: DISCONTINUED | OUTPATIENT
Start: 2021-05-24 | End: 2021-05-24 | Stop reason: HOSPADM

## 2021-05-24 RX ORDER — LIDOCAINE HYDROCHLORIDE 10 MG/ML
0.5 INJECTION, SOLUTION EPIDURAL; INFILTRATION; INTRACAUDAL; PERINEURAL ONCE AS NEEDED
Status: DISCONTINUED | OUTPATIENT
Start: 2021-05-24 | End: 2021-05-24 | Stop reason: HOSPADM

## 2021-05-24 RX ORDER — SODIUM CHLORIDE, SODIUM LACTATE, POTASSIUM CHLORIDE, CALCIUM CHLORIDE 600; 310; 30; 20 MG/100ML; MG/100ML; MG/100ML; MG/100ML
1000 INJECTION, SOLUTION INTRAVENOUS CONTINUOUS
Status: DISCONTINUED | OUTPATIENT
Start: 2021-05-24 | End: 2021-05-24 | Stop reason: HOSPADM

## 2021-05-24 RX ORDER — MIDAZOLAM HYDROCHLORIDE 1 MG/ML
1 INJECTION INTRAMUSCULAR; INTRAVENOUS
Status: DISCONTINUED | OUTPATIENT
Start: 2021-05-24 | End: 2021-05-24 | Stop reason: HOSPADM

## 2021-05-24 RX ORDER — SODIUM CHLORIDE 0.9 % (FLUSH) 0.9 %
3-10 SYRINGE (ML) INJECTION AS NEEDED
Status: DISCONTINUED | OUTPATIENT
Start: 2021-05-24 | End: 2021-05-24 | Stop reason: HOSPADM

## 2021-05-24 RX ORDER — LIDOCAINE HYDROCHLORIDE 20 MG/ML
INJECTION, SOLUTION EPIDURAL; INFILTRATION; INTRACAUDAL; PERINEURAL AS NEEDED
Status: DISCONTINUED | OUTPATIENT
Start: 2021-05-24 | End: 2021-05-24 | Stop reason: SURG

## 2021-05-24 RX ORDER — OXYCODONE AND ACETAMINOPHEN 10; 325 MG/1; MG/1
1 TABLET ORAL ONCE AS NEEDED
Status: COMPLETED | OUTPATIENT
Start: 2021-05-24 | End: 2021-05-24

## 2021-05-24 RX ORDER — CHLORHEXIDINE GLUCONATE 0.12 MG/ML
15 RINSE ORAL 2 TIMES DAILY
Qty: 240 ML | Refills: 3 | Status: SHIPPED | OUTPATIENT
Start: 2021-05-24 | End: 2021-08-05 | Stop reason: SDUPTHER

## 2021-05-24 RX ADMIN — LIDOCAINE HYDROCHLORIDE 100 MG: 20 INJECTION, SOLUTION EPIDURAL; INFILTRATION; INTRACAUDAL; PERINEURAL at 09:26

## 2021-05-24 RX ADMIN — FENTANYL CITRATE 100 MCG: 50 INJECTION, SOLUTION INTRAMUSCULAR; INTRAVENOUS at 09:26

## 2021-05-24 RX ADMIN — OXYCODONE AND ACETAMINOPHEN 1 TABLET: 325; 10 TABLET ORAL at 10:09

## 2021-05-24 RX ADMIN — ONDANSETRON 4 MG: 2 INJECTION INTRAMUSCULAR; INTRAVENOUS at 09:38

## 2021-05-24 RX ADMIN — LIDOCAINE HYDROCHLORIDE 1 EACH: 40 SOLUTION TOPICAL at 09:26

## 2021-05-24 RX ADMIN — DEXAMETHASONE SODIUM PHOSPHATE 4 MG: 4 INJECTION, SOLUTION INTRA-ARTICULAR; INTRALESIONAL; INTRAMUSCULAR; INTRAVENOUS; SOFT TISSUE at 09:38

## 2021-05-24 RX ADMIN — ROCURONIUM BROMIDE 5 MG: 10 INJECTION INTRAVENOUS at 09:26

## 2021-05-24 RX ADMIN — PROPOFOL 150 MG: 10 INJECTION, EMULSION INTRAVENOUS at 09:26

## 2021-05-24 RX ADMIN — SODIUM CHLORIDE, POTASSIUM CHLORIDE, SODIUM LACTATE AND CALCIUM CHLORIDE 1000 ML: 600; 310; 30; 20 INJECTION, SOLUTION INTRAVENOUS at 07:45

## 2021-05-24 RX ADMIN — SUCCINYLCHOLINE CHLORIDE 100 MG: 20 INJECTION, SOLUTION INTRAMUSCULAR; INTRAVENOUS at 09:26

## 2021-05-24 NOTE — ANESTHESIA PROCEDURE NOTES
Airway  Urgency: elective    Date/Time: 5/24/2021 9:27 AM  Airway not difficult    General Information and Staff    Patient location during procedure: OR  CRNA: Andrés Raman CRNA    Indications and Patient Condition  Indications for airway management: airway protection    Preoxygenated: yes  MILS maintained throughout  Mask difficulty assessment: 1 - vent by mask    Final Airway Details  Final airway type: endotracheal airway      Successful airway: ETT  Cuffed: yes   Successful intubation technique: direct laryngoscopy and video laryngoscopy  Endotracheal tube insertion site: oral  Blade: Valdes  Blade size: 4  ETT size (mm): 6.0  Cormack-Lehane Classification: grade I - full view of glottis  Placement verified by: chest auscultation and capnometry   Cuff volume (mL): 5  Measured from: lips  Number of attempts at approach: 1  Assessment: lips, teeth, and gum same as pre-op and atraumatic intubation

## 2021-05-24 NOTE — OP NOTE
OPERATIVE NOTE  5/24/2021    NAME: Saul Alcantara Sr.    YOB: 1955  MRN: [id number]    PRE-OPERATIVE DIAGNOSIS:    Pharyngoesophageal dysphagia [R13.14]  Hx of radiation therapy [Z92.3]    POST-OPERATIVE DIAGNOSIS:   Post-Op Diagnosis Codes:     * Pharyngoesophageal dysphagia [R13.14]     * Hx of radiation therapy [Z92.3]    PROCEDURE PERFORMED:   Direct laryngoscopy, rigid esophagoscopy with Johnson dilatation    SURGEON:   Wayne Richmond MD    ASSISTANT(S):   None    ANESTHESIA:   General Anesthesia via Endotracheal Tube    INDICATIONS: The patient is a 65 y.o. male with Pharyngoesophageal dysphagia [R13.14]  Hx of radiation therapy [Z92.3]    PROCEDURE:  The patient was brought to the operating room, given General Anesthesia via Endotracheal Tube, and prepped and draped in the usual manner.     Direct laryngoscopy was performed and no masses lesions, ulcerations or other abnormalities were noted throughout the upper aerodigestive tract examination.  Rigid esophagoscopy was performed and the smallest scope was not able to be passed through the cricopharyngeal opening.     Subsequently Johnson dilatation was performed with successive passage of Johnson dilators from 28 Chinese to 58 Chinese in increments of 2 Chinese.  There is only minimal resistance encountered throughout the dilatation. No significant bleeding was noted throughout the case.    Subsequently the rigid esophagoscope was able to be be passed to 25 cm without difficulty.  The mucosa of the esophagus was relatively normal in appearance with no significant evidence of stenosis, scarring, ulceration or lesion.    The patient was transported upon extubation to the postanesthesia care unit in stable condition.    SPECIMENS:  None    COMPLICATIONS: NONE    ESTIMATED BLOOD LOSS:  Minimal    Wayne Richmond MD  5/24/2021

## 2021-05-24 NOTE — H&P
YOB: 1955  Location: [facility]  Location Address: [address] [place] [address], [facility], [address] [place] [address]  Location Phone: [phone]          Chief Complaint   Patient presents with   • Follow-up       lesion on neck wanting Dr. Richmond to look at and possible  remove         History of Present Illness  Saul Alcantara Sr. is a 65 y.o. male.  Saul Alcantara Sr. is status post Direct laryngoscopy and esophagoscopy with esophageal dilatation on 21. Patient had DL and biopsy of base of tongue on 9/10/14 with positive pathology for SCCa. The cancer was staged T1M0N0. The patient has a history of radiation therapy due to a base of tongue cancer on the left side that he finished in 2014. Patient is status post Direct laryngoscopy, esophagoscopy with dilatation on 20 and Rigid esophagoscopy and Johnson esophageal dilatation on 20. Patient is doing well postop with no complaints of sore throat or dysphagia. He is using peridex mouthwash which seems to help mouth and throat.      Patient complains of left neck lesion. It has been present for 1 year. The lesion has not been biopsied.     He improved following his dilatation but is began to have additional difficulty swallowing at this time     I have personally reviewed the information imported into the chart during this visit.       I have personally reviewed the review of systems.                   Medical History        Past Medical History:   Diagnosis Date   • Allergic rhinitis     • Anxiety     • Chronic rhinitis     • Chronic sinusitis     • Depression     • Deviated nasal septum     • Enlarged prostate     • GERD (gastroesophageal reflux disease)     • H/O head and neck radiation 3/3/2017   • History of transfusion     • Hyperlipidemia     • Hypertension     • Hypothyroidism     • Laryngopharyngeal reflux     • Lymphoma (CMS/HCC)       Non-Hodgkins   • MRSA infection     • Panic attacks     • Peyronie disease     • Pneumonia     •  Primary squamous cell carcinoma of tongue (CMS/[facility]) 9/27/2016     T1N0M0 SCC S/P XRT/Chemo 12/2014   • Primary squamous cell carcinoma of tongue (CMS/[facility]ly [facility]) 9/27/2016     T1N0M0 SCC S/P XRT/Chemo 12/2014   • Sicca syndrome (CMS/[facility]ly [facility])     • Sinusitis, acute     • Squamous cell carcinoma       Base of Tongue   • Tobacco use disorder     • Tongue irritation     • Xerostomia              Surgical History         Past Surgical History:   Procedure Laterality Date   • CATARACT EXTRACTION Bilateral     • ESOPHAGOSCOPY N/A 11/20/2019     Procedure: Direct laryngoscopy with esophageal Johnson dilation;  Surgeon: Wayne Richmond MD;  Location:  [facility];  Service: ENT   • HAND SURGERY         metal plate    • KNEE SURGERY       • LARYNGOSCOPY N/A 6/26/2020     Procedure: Rigid esophagoscopy Johnson esophageal dilatation;  Surgeon: Wayne Richmond MD;  Location:  [facility];  Service: ENT;  Laterality: N/A;   • LARYNGOSCOPY N/A 8/24/2020     Procedure: MICRODIRECT LARYNGOSCOPY with esophageal dilatation;  Surgeon: Wayne Richmond MD;  Location:  [facility];  Service: ENT;  Laterality: N/A;   • MULTIPLE TOOTH EXTRACTIONS         CONTINUING TO HAVE BONE REMOVED 1/2021   • OTHER SURGICAL HISTORY   09/10/2014     Microlaryngoscopy with Biopsy - Base of Tongue   • PANENDOSCOPY N/A 1/22/2021     Procedure: DIRECT LARYNGOSCOPY AND ESOPHAGOSCOPY WITH ESOPHAGEAL DILATION;  Surgeon: Wayne Richmond MD;  Location:  [facility];  Service: ENT;  Laterality: N/A;   • SPLENECTOMY       • SQUAMOUS CELL CARCINOMA EXCISION   09/10/2014     Tongue   • TONSILLECTOMY                Medications Taking          Outpatient Medications Marked as Taking for the 3/8/21 encounter (Office Visit) with Wayne Richmond MD   Medication Sig Dispense Refill   • albuterol (PROVENTIL) (2.5 MG/3ML) 0.083% nebulizer solution Take 2.5 mg by nebulization Every 4 (Four) Hours As Needed for Wheezing. 50 mL 1   • ALPRAZolam (XANAX) 2 MG tablet Take 2 mg  by mouth 3 (Three) Times a Day.       • amitriptyline (ELAVIL) 25 MG tablet Take 25 mg by mouth every night at bedtime.       • amLODIPine (NORVASC) 10 MG tablet Take 10 mg by mouth Daily.       • azelastine (ASTELIN) 0.1 % nasal spray         • chlorhexidine (Peridex) 0.12 % solution Apply 15 mL to the mouth or throat 2 (Two) Times a Day. 240 mL 0   • fluticasone (FLONASE) 50 MCG/ACT nasal spray 2 sprays into each nostril Daily. (Patient taking differently: 2 sprays into the nostril(s) as directed by provider Daily As Needed.) 16 g 5   • glycopyrrolate (ROBINUL) 1 MG tablet 0.5 mg 2 (Two) Times a Day.   3   • HYDROcodone-acetaminophen (NORCO) 7.5-325 MG per tablet TK 1 T PO QD PRN P   0   • levothyroxine (SYNTHROID, LEVOTHROID) 100 MCG tablet Take 100 mcg by mouth Daily.       • Lidocaine Viscous HCl (XYLOCAINE) 2 % solution Take 5 mL by mouth As Needed for Mild Pain .       • megestrol (MEGACE) 40 MG/ML suspension TAKE 20 ML BY MOUTH DAILY       • meloxicam (MOBIC) 15 MG tablet Take 15 mg by mouth Daily.       • nystatin (MYCOSTATIN) 123398 UNIT/ML suspension Take 10ml by mouth BID for 28 days (Patient taking differently: Take 200,000 Units by mouth 4 (Four) Times a Day As Needed. Take 10ml by mouth BID for 28 days) 480 mL 2   • ondansetron (ZOFRAN) 8 MG tablet Take 8 mg by mouth Every 8 (Eight) Hours As Needed for Nausea.       • pantoprazole (PROTONIX) 40 MG EC tablet Take 40 mg by mouth Daily. Delayed Release (DR/EC)  Take 1 tablet (40 mg) by oral route 2 times per day,  Take 30 minutes prior to breakfast and evening meal        • riTUXimab-abbs (Truxima) 500 MG/50ML solution injection Infuse 70 mL (700 mg) via IVPB every 8 weeks. Patient specific drug from [facility] for clinic administration       • senna-docusate (PERICOLACE) 8.6-50 MG per tablet Take 1 tablet by mouth 2 times daily       • sertraline (ZOLOFT) 100 MG tablet Take 100 mg by mouth every night at bedtime.       • simvastatin  (ZOCOR) 20 MG tablet TK 1 T PO QD   11   • tadalafil (CIALIS) 5 MG tablet Take 1 tablet by mouth Daily for 360 days. 30 tablet 11   • tamsulosin (FLOMAX) 0.4 MG capsule 24 hr capsule Take 0.4 mg by mouth Daily.       • triamcinolone (KENALOG) 0.5 % ointment Apply  topically to the appropriate area as directed 2 (Two) Times a Day. Apply to affected ear 15 g 3   • zolpidem (AMBIEN) 10 MG tablet Take 10 mg by mouth At Night As Needed for Sleep.       • [DISCONTINUED] chlorhexidine (Peridex) 0.12 % solution Apply 15 mL to the mouth or throat 2 (Two) Times a Day. 240 mL 0            Patient has no known allergies.           Family History   Problem Relation Age of Onset   • Diabetes Sister     • Cancer Sister           Social History   Social History            Socioeconomic History   • Marital status:        Spouse name: Not on file   • Number of children: Not on file   • Years of education: Not on file   • Highest education level: Not on file   Tobacco Use   • Smoking status: Current Some Day Smoker       Packs/day: 0.25       Types: Cigarettes   • Smokeless tobacco: Never Used   • Tobacco comment: pack last couple of weeks   Vaping Use   • Vaping Use: Never used   Substance and Sexual Activity   • Alcohol use: No   • Drug use: No   • Sexual activity: Defer            Review of Systems   Constitutional: Negative.    HENT: Negative.    Eyes: Negative.    Respiratory: Negative.    Cardiovascular: Negative.    Gastrointestinal: Negative.    Endocrine: Negative.    Genitourinary: Negative.    Musculoskeletal: Negative.    Skin:        Left neck lesion   Allergic/Immunologic: Negative.    Neurological: Negative.    Hematological: Negative.    Psychiatric/Behavioral: Negative.              Vitals:     03/08/21 1326   BP: 111/69   Pulse: 104   Temp: 99.1 °F (37.3 °C)         Body mass index is 21.73 kg/m².        Objective         Physical Exam  CONSTITUTIONAL: well nourished, well-developed, alert, oriented, in no  acute distress      COMMUNICATION AND VOICE: able to communicate normally, normal voice quality     HEAD: normocephalic, no lesions, atraumatic, no tenderness, no masses      FACE: appearance normal, no lesions, no tenderness, no deformities, facial motion symmetric     EYES: ocular motility normal, eyelids normal, orbits normal, no proptosis, conjunctiva normal , pupils equal, round      EARS:  Hearing: hearing to conversational voice intact bilaterally   External Ears: normal bilaterally, no lesions  TMs  AS-clear and intact TM  AD-clear and intact TM     NOSE:  External Nose: external nasal structure normal, no tenderness on palpation, no nasal discharge, no lesions, no evidence of trauma, nostrils patent      ORAL:  Lips: upper and lower lips without lesion   OC/OP-mild inflammatory changes without mass or lesion     NECK:  Inspection and Palpation: neck appearance normal, no masses or tenderness-1.2 x 0.6 cm area of RKP     CHEST/RESPIRATORY: normal respiratory effort      CARDIOVASCULAR: no cyanosis or edema      NEUROLOGICAL/PSYCHIATRIC: oriented to time, place and person, mood normal, affect appropriate, CN II-XII intact grossly           Assessment/Plan      Diagnoses and all orders for this visit:     1. Primary squamous cell carcinoma of tongue (CMS/HCC) (Primary)  Comments:   SCCa. The cancer was staged T1M0N0. The patient has a history of radiation therapy due to a base of tongue cancer on the left side     2. Pharyngoesophageal dysphagia     3. Neoplasm of uncertain behavior     Other orders  -     chlorhexidine (Peridex) 0.12 % solution; Apply 15 mL to the mouth or throat 2 (Two) Times a Day.  Dispense: 240 mL; Refill: 0        * Surgery not found *  No orders of the defined types were placed in this encounter.     Return in about 3 months (around 6/8/2021).           Patient Instructions   Be aware of the signs and symptoms of recurrent head and neck cancer including neck mass, persistent or  recurrent sore throat, ear pain, hemoptysis, weight loss and hoarseness. If any of these symptoms recur and or persist, call for an evaluation.      D/W patient increasing caloric intake and discussed cruciferous vegetables and protein shakes etc to maintain optimal nutrition.  The necessity of abstaining from tobacco products was also discussed particularly with respect to not smoking.     Continue F/U and/or treatment with Jaydon      Risk benefits and options regarding direct laryngoscopy and esophagoscopy with Johnson dilatation were again discussed and he understands the risk benefits and options and wishes to proceed.

## 2021-05-24 NOTE — ANESTHESIA PREPROCEDURE EVALUATION
Anesthesia Evaluation     Patient summary reviewed   no history of anesthetic complications:  NPO Solid Status: > 8 hours  NPO Liquid Status: > 8 hours           Airway   Mallampati: I  TM distance: >3 FB  Neck ROM: full  Dental    (+) edentulous    Pulmonary     breath sounds clear to auscultation  (+) a smoker Current Abstained day of surgery, COPD, wheezes,   (-) asthma, recent URI, sleep apnea  Cardiovascular - normal exam  Exercise tolerance: good (4-7 METS)    ECG reviewed  Rhythm: regular  Rate: normal    (+) hypertension, hyperlipidemia,   (-) pacemaker, past MI, angina, cardiac stents, CABG      Neuro/Psych  (-) seizures, TIA, CVA  GI/Hepatic/Renal/Endo    (+)  GERD,  thyroid problem hypothyroidism  (-) liver disease, no renal disease, diabetes    Musculoskeletal     Abdominal    Substance History      OB/GYN          Other   arthritis,    history of cancer (SCC of tongue in past)      Other Comment: Xerostomia                    Anesthesia Plan    ASA 3     general     intravenous induction     Anesthetic plan, all risks, benefits, and alternatives have been provided, discussed and informed consent has been obtained with: patient.

## 2021-05-24 NOTE — ANESTHESIA POSTPROCEDURE EVALUATION
Patient: Saul Alcantara Sr.    Procedure Summary     Date: 05/24/21 Room / Location:  [facility] 02 /  [facility]    Anesthesia Start: 0923 Anesthesia Stop:     Procedure: Direct laryngoscopy, esophagoscopy with Johnson dilatation (N/A Throat) Diagnosis:       Pharyngoesophageal dysphagia      Hx of radiation therapy      (Pharyngoesophageal dysphagia [R13.14])      (Hx of radiation therapy [Z92.3])    Surgeons: Wayne Richmond MD Provider: Andrés Raman CRNA    Anesthesia Type: general ASA Status: 3          Anesthesia Type: general    Vitals  Vitals Value Taken Time   /89 05/24/21 1020   Temp 98.1 °F (36.7 °C) 05/24/21 1020   Pulse 71 05/24/21 1020   Resp 14 05/24/21 1020   SpO2 97 % 05/24/21 1020   Vitals shown include unvalidated device data.        Post Anesthesia Care and Evaluation    Patient location during evaluation: PACU  Patient participation: complete - patient participated  Level of consciousness: awake and alert  Pain management: adequate  Airway patency: patent  Anesthetic complications: No anesthetic complications    Cardiovascular status: acceptable  Respiratory status: acceptable  Hydration status: acceptable    Comments: Blood pressure 146/89, pulse 72, temperature 98.1 °F (36.7 °C), resp. rate 14, SpO2 96 %.    Pt discharged from PACU based on jeff score >8  No anesthesia care post op

## 2021-05-24 NOTE — DISCHARGE INSTRUCTIONS

## 2021-05-25 ENCOUNTER — TELEPHONE (OUTPATIENT)
Dept: OTOLARYNGOLOGY | Facility: CLINIC | Age: 66
End: 2021-05-25

## 2021-05-25 NOTE — TELEPHONE ENCOUNTER
Patient is having chest discomfort and coughing up blood after having his esophagus stretched yesterday. Patient denies fever, dysphagia and abnormal cough. Dr. Richmond has states this is normal. Patient to call if there is any change and we will proceed with CBC and CXR.

## 2021-07-29 ENCOUNTER — TRANSCRIBE ORDERS (OUTPATIENT)
Dept: LAB | Facility: HOSPITAL | Age: 66
End: 2021-07-29

## 2021-07-29 DIAGNOSIS — Z01.818 PREOPERATIVE TESTING: Primary | ICD-10-CM

## 2021-08-02 ENCOUNTER — LAB (OUTPATIENT)
Dept: LAB | Facility: HOSPITAL | Age: 66
End: 2021-08-02

## 2021-08-02 DIAGNOSIS — Z01.818 PREOPERATIVE TESTING: ICD-10-CM

## 2021-08-02 LAB — SARS-COV-2 ORF1AB RESP QL NAA+PROBE: NOT DETECTED

## 2021-08-02 PROCEDURE — C9803 HOPD COVID-19 SPEC COLLECT: HCPCS

## 2021-08-02 PROCEDURE — U0004 COV-19 TEST NON-CDC HGH THRU: HCPCS

## 2021-08-04 NOTE — PROGRESS NOTES
"YOB: 1955  Location: [facility]  Location Address: [address] [place] [address], [facility], [address] [place] [address]  Location Phone: [phone]    Chief Complaint   Patient presents with   • Follow-up       History of Present Illness  Saul Alcantara Sr. is a 66 y.o. male.  Saul Alcantara Sr. is status post Direct laryngoscopy and esophagoscopy with esophageal dilatation on 21 and 21. Patient had DL and biopsy of base of tongue on 9/10/14 with positive pathology for SCCa. The cancer was staged T1M0N0. The patient has a history of radiation therapy due to a base of tongue cancer on the left side that he finished in 2014. Patient is status post Direct laryngoscopy, esophagoscopy with dilatation on 20 and Rigid esophagoscopy and Jhonson esophageal dilatation on 20. Patient is doing well postop with no complaints of sore throat or dysphagia. He is using peridex mouthwash which seems to help mouth and throat.     He feels less \"stretching\" was really good and states that he is doing much better.        Past Medical History:   Diagnosis Date   • Allergic rhinitis    • Anxiety    • Arthritis    • Asthma    • Cataract    • Chronic rhinitis    • Chronic sinusitis    • COPD (chronic obstructive pulmonary disease) ([facility])    • COVID-19 vaccine series completed     Moderna   • Depression    • Deviated nasal septum    • Enlarged prostate    • GERD (gastroesophageal reflux disease)    • H/O head and neck radiation 3/3/2017   • History of transfusion    • Hyperlipidemia    • Hypertension    • Hypothyroidism    • Laryngopharyngeal reflux    • Lymphoma ([facility])     Non-Hodgkins   • MRSA infection    • Panic attacks    • Peyronie disease    • Pneumonia    • Primary squamous cell carcinoma of tongue ([facility]) 2016    T1N0M0 SCC S/P XRT/Chemo 2014   • Primary squamous cell carcinoma of tongue ([facility]) 2016    T1N0M0 SCC S/P XRT/Chemo 2014   • Sicca syndrome ([facility])    • Sinusitis, acute    • Squamous " cell carcinoma     Base of Tongue   • Tobacco use disorder    • Tongue irritation    • Visit for monitoring Rituxan therapy     pt getting treatments at [facility] in [place]   • Xerostomia        Past Surgical History:   Procedure Laterality Date   • CATARACT EXTRACTION Bilateral    • ESOPHAGOSCOPY N/A 11/20/2019    Procedure: Direct laryngoscopy with esophageal Johnson dilation;  Surgeon: Wayne Richmond MD;  Location:  [facility];  Service: ENT   • HAND SURGERY      metal plate    • KNEE SURGERY     • LARYNGOSCOPY N/A 6/26/2020    Procedure: Rigid esophagoscopy Johnson esophageal dilatation;  Surgeon: Wayne Richmond MD;  Location:  [facility];  Service: ENT;  Laterality: N/A;   • LARYNGOSCOPY N/A 8/24/2020    Procedure: MICRODIRECT LARYNGOSCOPY with esophageal dilatation;  Surgeon: Wayne Richmond MD;  Location:  [facility];  Service: ENT;  Laterality: N/A;   • LARYNGOSCOPY N/A 5/24/2021    Procedure: Direct laryngoscopy, esophagoscopy with Johnson dilatation;  Surgeon: Wayne Richmond MD;  Location:  [facility];  Service: ENT;  Laterality: N/A;   • LARYNGOSCOPY N/A 12/1/2021    Procedure: Direct laryngoscopy, esophagoscopy with esophageal dilatation (Johnson dilators);  Surgeon: Wayne Richmond MD;  Location:  [facility];  Service: ENT;  Laterality: N/A;   • MULTIPLE TOOTH EXTRACTIONS      CONTINUING TO HAVE BONE REMOVED 1/2021   • OTHER SURGICAL HISTORY  09/10/2014    Microlaryngoscopy with Biopsy - Base of Tongue   • PANENDOSCOPY N/A 1/22/2021    Procedure: DIRECT LARYNGOSCOPY AND ESOPHAGOSCOPY WITH ESOPHAGEAL DILATION;  Surgeon: Wayne Richmond MD;  Location:  [facility];  Service: ENT;  Laterality: N/A;   • PANENDOSCOPY N/A 10/11/2021    Procedure: Direct laryngoscopy, esophagoscopy with Johnson dilatation;  Surgeon: Wayne Richmond MD;  Location:  [facility];  Service: ENT;  Laterality: N/A;   • PROSTATE ULTRASOUND BIOPSY N/A 12/1/2021    Procedure: PROSTATE ULTRASOUND  BIOPSY. NOT A URONAV;  Surgeon: Oscar Bazan MD;  Location[facility];  Service: Urology;  Laterality: N/A;   • SPLENECTOMY     • SQUAMOUS CELL CARCINOMA EXCISION  09/10/2014    Tongue   • TONSILLECTOMY         Outpatient Medications Marked as Taking for the 8/5/21 encounter (Office Visit) with Wayne Richmond MD   Medication Sig Dispense Refill   • albuterol (PROVENTIL) (2.5 MG/3ML) 0.083% nebulizer solution Take 2.5 mg by nebulization Every 4 (Four) Hours As Needed for Wheezing. 50 mL 1   • ALPRAZolam (XANAX) 2 MG tablet Take 2 mg by mouth 3 (Three) Times a Day.     • amitriptyline (ELAVIL) 25 MG tablet Take 25 mg by mouth every night at bedtime.     • amLODIPine (NORVASC) 10 MG tablet Take 10 mg by mouth Daily.     • azelastine (ASTELIN) 0.1 % nasal spray 2 sprays into the nostril(s) as directed by provider Daily As Needed.     • chlorhexidine (Peridex) 0.12 % solution Apply 15 mL to the mouth or throat 2 (Two) Times a Day. 240 mL 3   • fluticasone (FLONASE) 50 MCG/ACT nasal spray 2 sprays into each nostril Daily. (Patient taking differently: 2 sprays into the nostril(s) as directed by provider Daily As Needed.) 16 g 5   • glycopyrrolate (ROBINUL) 1 MG tablet 0.5 mg 2 (Two) Times a Day.  3   • HYDROcodone-acetaminophen (NORCO) 7.5-325 MG per tablet Take 10 tablets by mouth 2 (Two) Times a Day As Needed for Moderate Pain .  0   • levothyroxine (SYNTHROID, LEVOTHROID) 100 MCG tablet Take 100 mcg by mouth Daily.     • Lidocaine Viscous HCl (XYLOCAINE) 2 % solution Take 5 mL by mouth As Needed for Mild Pain .     • meloxicam (MOBIC) 15 MG tablet Take 15 mg by mouth Daily.     • nystatin (MYCOSTATIN) 575553 UNIT/ML suspension Take 10ml by mouth BID for 28 days (Patient taking differently: Take 200,000 Units by mouth 4 (Four) Times a Day As Needed. Take 10ml by mouth BID for 28 days) 480 mL 2   • ondansetron (ZOFRAN) 8 MG tablet Take 8 mg by mouth Every 8 (Eight) Hours As Needed for Nausea.     • pantoprazole  (PROTONIX) 40 MG EC tablet Take 40 mg by mouth Daily. Delayed Release (DR/EC)  Take 1 tablet (40 mg) by oral route 2 times per day,  Take 30 minutes prior to breakfast and evening meal      • senna-docusate (PERICOLACE) 8.6-50 MG per tablet Take 1 tablet by mouth 2 times daily     • sertraline (ZOLOFT) 100 MG tablet Take 100 mg by mouth every night at bedtime.     • simvastatin (ZOCOR) 20 MG tablet TK 1 T PO QD  11   • tamsulosin (FLOMAX) 0.4 MG capsule 24 hr capsule Take 0.4 mg by mouth Daily.     • triamcinolone (KENALOG) 0.5 % ointment Apply  topically to the appropriate area as directed 2 (Two) Times a Day. Apply to affected ear 15 g 3   • zolpidem (AMBIEN) 10 MG tablet Take 10 mg by mouth At Night As Needed for Sleep.     • [DISCONTINUED] chlorhexidine (Peridex) 0.12 % solution Apply 15 mL to the mouth or throat 2 (Two) Times a Day. 240 mL 0   • [DISCONTINUED] chlorhexidine (Peridex) 0.12 % solution Apply 15 mL to the mouth or throat 2 (Two) Times a Day. 240 mL 3   • [DISCONTINUED] chlorhexidine (Peridex) 0.12 % solution Apply 15 mL to the mouth or throat 2 (Two) Times a Day. 240 mL 0   • [DISCONTINUED] fluticasone (FLONASE) 50 MCG/ACT nasal spray 2 sprays into the nostril(s) as directed by provider Daily for 30 days. Administer 2 sprays in each nostril for each dose. 1 bottle 6   • [DISCONTINUED] megestrol (MEGACE) 40 MG/ML suspension TAKE 20 ML BY MOUTH DAILY     • [DISCONTINUED] tadalafil (CIALIS) 5 MG tablet Take 1 tablet by mouth Daily for 360 days. 30 tablet 11       Patient has no known allergies.    Family History   Problem Relation Age of Onset   • Diabetes Sister    • Cancer Sister        Social History     Socioeconomic History   • Marital status:    Tobacco Use   • Smoking status: Former Smoker     Packs/day: 0.25     Types: Cigarettes   • Smokeless tobacco: Never Used   • Tobacco comment: pack last couple of weeks   Vaping Use   • Vaping Use: Never used   Substance and Sexual Activity   •  Alcohol use: No   • Drug use: No   • Sexual activity: Yes     Partners: Female       Review of Systems  Negative except as in HPI  Vitals:    08/05/21 1505   BP: 133/80   Pulse: 94   Temp: 98.4 °F (36.9 °C)       Body mass index is 21.06 kg/m².    Objective     Physical Exam  CONSTITUTIONAL: well nourished, well-developed, alert, oriented, in no acute distress     COMMUNICATION AND VOICE: able to communicate normally, normal voice quality    HEAD: normocephalic, no lesions, atraumatic, no tenderness, no masses     FACE: appearance normal, no lesions, no tenderness, no deformities, facial motion symmetric    EYES: ocular motility normal, eyelids normal, orbits normal, no proptosis, conjunctiva normal , pupils equal, round     EARS:  Hearing: hearing to conversational voice intact bilaterally   External Ears: normal bilaterally, no lesions    NOSE:  External Nose: external nasal structure normal, no tenderness on palpation, no nasal discharge, no lesions, no evidence of trauma, nostrils patent     ORAL:  Lips: upper and lower lips without lesion   OC/OP: No masses or lesions by visualization or palpation    NECK:  Inspection and Palpation: neck appearance normal, no masses or tenderness    CHEST/RESPIRATORY: normal respiratory effort     CARDIOVASCULAR: no cyanosis or edema     NEUROLOGICAL/PSYCHIATRIC: oriented to time, place and person, mood normal, affect appropriate, CN II-XII intact grossly      Assessment/Plan   Diagnoses and all orders for this visit:    1. Primary squamous cell carcinoma of tongue (CMS/HCC) (Primary)  -     Case Request; Standing  -     Comprehensive Metabolic Panel; Future  -     CBC (No Diff); Future  -     ECG 12 Lead; Future  -     XR Chest 2 View; Future  -     Case Request  -     Case Request; Standing  -     Case Request    2. Pharyngoesophageal dysphagia  -     Case Request; Standing  -     Comprehensive Metabolic Panel; Future  -     CBC (No Diff); Future  -     ECG 12 Lead; Future  -      XR Chest 2 View; Future  -     Case Request  -     Case Request; Standing  -     Case Request    3. Laryngopharyngeal reflux  -     Case Request; Standing  -     Comprehensive Metabolic Panel; Future  -     CBC (No Diff); Future  -     ECG 12 Lead; Future  -     XR Chest 2 View; Future  -     Case Request  -     Case Request; Standing  -     Case Request    Other orders  -     Discontinue: chlorhexidine (Peridex) 0.12 % solution; Apply 15 mL to the mouth or throat 2 (Two) Times a Day.  Dispense: 240 mL; Refill: 0  -     chlorhexidine (Peridex) 0.12 % solution; Apply 15 mL to the mouth or throat 2 (Two) Times a Day.  Dispense: 240 mL; Refill: 3  -     Follow Anesthesia Guidelines / Protocol; Future  -     Obtain Informed Consent; Future  -     Follow Anesthesia Guidelines / Protocol; Future  -     Obtain Informed Consent; Future      Direct laryngoscopy, esophagoscopy with esophageal dilatation (Johnson dilators) (N/A)  Orders Placed This Encounter   Procedures   • XR Chest 2 View     Standing Status:   Future     Number of Occurrences:   1     Standing Expiration Date:   10/6/2022     Order Specific Question:   Reason for Exam:     Answer:   Direct laryngoscopy, esophagoscopy with Johnson dilatation     Order Specific Question:   Release to patient     Answer:   Immediate   • Comprehensive Metabolic Panel     Standing Status:   Future     Number of Occurrences:   1     Standing Expiration Date:   10/6/2022     Order Specific Question:   Release to patient     Answer:   Immediate   • CBC (No Diff)     Standing Status:   Future     Number of Occurrences:   1     Standing Expiration Date:   10/6/2022     Order Specific Question:   Release to patient     Answer:   Immediate   • Follow Anesthesia Guidelines / Protocol     Standing Status:   Future   • Obtain Informed Consent     Standing Status:   Future     Order Specific Question:   Informed Consent Given For     Answer:   Direct laryngoscopy, esophagoscopy with  Johnson dilatation   • Follow Anesthesia Guidelines / Protocol     Standing Status:   Future   • Obtain Informed Consent     Standing Status:   Future     Order Specific Question:   Informed Consent Given For     Answer:   Direct laryngoscopy, esophagoscopy with esophageal dilatation (Johnson dilators)   • ECG 12 Lead     Standing Status:   Future     Number of Occurrences:   1     Standing Expiration Date:   10/6/2022     Order Specific Question:   Reason for Exam:     Answer:   Direct laryngoscopy, esophagoscopy with Johnson dilatation     Order Specific Question:   Release to patient     Answer:   Immediate     Return in about 5 months (around 1/5/2022) for Recheck.       Patient Instructions   Call return for problems  Continue Peridex as needed  Open 4 to 5 months and consider esophagoscopy with dilatation in 6 months if needed    Be aware of the signs and symptoms of recurrent head and neck cancer including neck mass, persistent or recurrent sore throat, ear pain, hemoptysis, weight loss and hoarseness. If any of these symptoms recur and or persist, call for an evaluation.     D/W patient increasing caloric intake and discussed cruciferous vegetables and protein shakes etc to maintain optimal nutrition.  The necessity of abstaining from tobacco products was also discussed particularly with respect to not smoking.    Continue F/U and/or treatment with Dr's     ADDENDUM:  Patient called the office and is having progressive difficulty swallowing.  Although it has only been 4 months and we were trying to wait to 6 months his swallowing is significantly more problematic and consideration recommendation made for esophagoscopy with Johnson dilatation.  He is amenable and wishes to proceed this will be scheduled in the near future.  Risk benefits and options were discussed with the patient.    ADDENDUM:  Following esophageal dilatation on October 11, 2021.  The patient called the office with reports that his  "swallowing has improved immediately postoperatively but then began to gradually had increasing difficulty swallowing feeling like it was \"closing down\".  Subsequent discussion with patient on November 29 demonstrated progressive difficulty and recommendation was made to proceed with direct laryngoscopy and esophageal dilatation on 12/6/2021.  Patient understands risks of bleeding, infection, recidivistic dysphagia as well as possible perforation mediastinitis and death etc. he wishes to proceed.  Addendum: Following esophageal dilatation on December 1, 2021, the patient called the office with reports that his swallowing had improved postoperatively.  Subsequently he began to gradually have increasing difficulty swallowing feeling like it was closing down again.  Subsequent discussion demonstrated this progressive difficulty was consistent with previous evaluations recommendation was to proceed with direct laryngoscopy, esophagoscopy and dilatation.  He understands risks of bleeding, infection, recidivistic dysphagia as well as possible perforation, mediastinitis and death.  He wishes to proceed.    "

## 2021-08-05 ENCOUNTER — OFFICE VISIT (OUTPATIENT)
Dept: OTOLARYNGOLOGY | Facility: CLINIC | Age: 66
End: 2021-08-05

## 2021-08-05 VITALS
HEIGHT: 73 IN | HEART RATE: 94 BPM | SYSTOLIC BLOOD PRESSURE: 133 MMHG | DIASTOLIC BLOOD PRESSURE: 80 MMHG | WEIGHT: 159.6 LBS | TEMPERATURE: 98.4 F | BODY MASS INDEX: 21.15 KG/M2

## 2021-08-05 DIAGNOSIS — R13.14 PHARYNGOESOPHAGEAL DYSPHAGIA: ICD-10-CM

## 2021-08-05 DIAGNOSIS — K21.9 LARYNGOPHARYNGEAL REFLUX: ICD-10-CM

## 2021-08-05 DIAGNOSIS — C02.9 PRIMARY SQUAMOUS CELL CARCINOMA OF TONGUE (HCC): Primary | ICD-10-CM

## 2021-08-05 PROCEDURE — 99214 OFFICE O/P EST MOD 30 MIN: CPT | Performed by: OTOLARYNGOLOGY

## 2021-08-05 RX ORDER — CHLORHEXIDINE GLUCONATE 0.12 MG/ML
15 RINSE ORAL 2 TIMES DAILY
Qty: 240 ML | Refills: 0 | Status: SHIPPED | OUTPATIENT
Start: 2021-08-05 | End: 2021-10-08

## 2021-08-05 RX ORDER — CHLORHEXIDINE GLUCONATE 0.12 MG/ML
15 RINSE ORAL 2 TIMES DAILY
Qty: 240 ML | Refills: 3 | Status: SHIPPED | OUTPATIENT
Start: 2021-08-05 | End: 2022-03-15 | Stop reason: SDUPTHER

## 2021-08-05 NOTE — PATIENT INSTRUCTIONS
"Call return for problems  Continue Peridex as needed  Open 4 to 5 months and consider esophagoscopy with dilatation in 6 months if needed    Be aware of the signs and symptoms of recurrent head and neck cancer including neck mass, persistent or recurrent sore throat, ear pain, hemoptysis, weight loss and hoarseness. If any of these symptoms recur and or persist, call for an evaluation.     D/W patient increasing caloric intake and discussed cruciferous vegetables and protein shakes etc to maintain optimal nutrition.  The necessity of abstaining from tobacco products was also discussed particularly with respect to not smoking.    Continue F/U and/or treatment with Dr's     ADDENDUM:  Patient called the office and is having progressive difficulty swallowing.  Although it has only been 4 months and we were trying to wait to 6 months his swallowing is significantly more problematic and consideration recommendation made for esophagoscopy with Johnson dilatation.  He is amenable and wishes to proceed this will be scheduled in the near future.  Risk benefits and options were discussed with the patient.    ADDENDUM:  Following esophageal dilatation on October 11, 2021.  The patient called the office with reports that his swallowing has improved immediately postoperatively but then began to gradually had increasing difficulty swallowing feeling like it was \"closing down\".  Subsequent discussion with patient on November 29 demonstrated progressive difficulty and recommendation was made to proceed with direct laryngoscopy and esophageal dilatation on 12/6/2021.  Patient understands risks of bleeding, infection, recidivistic dysphagia as well as possible perforation mediastinitis and death etc. he wishes to proceed.  Addendum: Following esophageal dilatation on December 1, 2021, the patient called the office with reports that his swallowing had improved postoperatively.  Subsequently he began to gradually have increasing " difficulty swallowing feeling like it was closing down again.  Subsequent discussion demonstrated this progressive difficulty was consistent with previous evaluations recommendation was to proceed with direct laryngoscopy, esophagoscopy and dilatation.  He understands risks of bleeding, infection, recidivistic dysphagia as well as possible perforation, mediastinitis and death.  He wishes to proceed.

## 2021-08-05 NOTE — PROGRESS NOTES
Chief Complaint  1 year follow up for BPH    Subjective          Saul Alcantara Sr. presents to [facility] for   History of Present Illness  Patient here today because his PSA is increased.  I seen him in the past for this.  Increase now up to 4.9.  This was taken few weeks ago.    He also has BPH with obstruction.  He is on tamsulosin which seems to help his symptoms.  See symptoms index below suggesting moderate severity with a moderate amount of bother.    Patient is on tadalafil at 5 mg.  He is not been taking this daily but rather on an as-needed basis.  Adequate erection is not achieved particularly rigidity.        Current Outpatient Medications:   •  albuterol (PROVENTIL) (2.5 MG/3ML) 0.083% nebulizer solution, Take 2.5 mg by nebulization Every 4 (Four) Hours As Needed for Wheezing., Disp: 50 mL, Rfl: 1  •  ALPRAZolam (XANAX) 2 MG tablet, Take 2 mg by mouth 3 (Three) Times a Day., Disp: , Rfl:   •  amitriptyline (ELAVIL) 25 MG tablet, Take 25 mg by mouth every night at bedtime., Disp: , Rfl:   •  amLODIPine (NORVASC) 10 MG tablet, Take 10 mg by mouth Daily., Disp: , Rfl:   •  azelastine (ASTELIN) 0.1 % nasal spray, 2 sprays into the nostril(s) as directed by provider Daily As Needed., Disp: , Rfl:   •  chlorhexidine (Peridex) 0.12 % solution, Apply 15 mL to the mouth or throat 2 (Two) Times a Day., Disp: 240 mL, Rfl: 0  •  chlorhexidine (Peridex) 0.12 % solution, Apply 15 mL to the mouth or throat 2 (Two) Times a Day., Disp: 240 mL, Rfl: 3  •  fluticasone (FLONASE) 50 MCG/ACT nasal spray, 2 sprays into each nostril Daily. (Patient taking differently: 2 sprays into the nostril(s) as directed by provider Daily As Needed.), Disp: 16 g, Rfl: 5  •  fluticasone (FLONASE) 50 MCG/ACT nasal spray, 2 sprays into the nostril(s) as directed by provider Daily for 30 days. Administer 2 sprays in each nostril for each dose., Disp: 1 bottle, Rfl: 6  •  glycopyrrolate (ROBINUL) 1 MG tablet, 0.5  mg 2 (Two) Times a Day., Disp: , Rfl: 3  •  HYDROcodone-acetaminophen (NORCO) 7.5-325 MG per tablet, TK 1 T PO QD PRN P, Disp: , Rfl: 0  •  levothyroxine (SYNTHROID, LEVOTHROID) 100 MCG tablet, Take 100 mcg by mouth Daily., Disp: , Rfl:   •  Lidocaine Viscous HCl (XYLOCAINE) 2 % solution, Take 5 mL by mouth As Needed for Mild Pain ., Disp: , Rfl:   •  megestrol (MEGACE) 40 MG/ML suspension, TAKE 20 ML BY MOUTH DAILY, Disp: , Rfl:   •  meloxicam (MOBIC) 15 MG tablet, Take 15 mg by mouth Daily., Disp: , Rfl:   •  nystatin (MYCOSTATIN) 312970 UNIT/ML suspension, Take 10ml by mouth BID for 28 days (Patient taking differently: Take 200,000 Units by mouth 4 (Four) Times a Day As Needed. Take 10ml by mouth BID for 28 days), Disp: 480 mL, Rfl: 2  •  ondansetron (ZOFRAN) 8 MG tablet, Take 8 mg by mouth Every 8 (Eight) Hours As Needed for Nausea., Disp: , Rfl:   •  pantoprazole (PROTONIX) 40 MG EC tablet, Take 40 mg by mouth Daily. Delayed Release (DR/EC) Take 1 tablet (40 mg) by oral route 2 times per day, Take 30 minutes prior to breakfast and evening meal , Disp: , Rfl:   •  senna-docusate (PERICOLACE) 8.6-50 MG per tablet, Take 1 tablet by mouth 2 times daily, Disp: , Rfl:   •  sertraline (ZOLOFT) 100 MG tablet, Take 100 mg by mouth every night at bedtime., Disp: , Rfl:   •  simvastatin (ZOCOR) 20 MG tablet, TK 1 T PO QD, Disp: , Rfl: 11  •  tamsulosin (FLOMAX) 0.4 MG capsule 24 hr capsule, Take 0.4 mg by mouth Daily., Disp: , Rfl:   •  triamcinolone (KENALOG) 0.5 % ointment, Apply  topically to the appropriate area as directed 2 (Two) Times a Day. Apply to affected ear, Disp: 15 g, Rfl: 3  •  zolpidem (AMBIEN) 10 MG tablet, Take 10 mg by mouth At Night As Needed for Sleep., Disp: , Rfl:   •  fluticasone (Flonase) 50 MCG/ACT nasal spray, 2 sprays into the nostril(s) as directed by provider Daily for 30 days. Administer 2 sprays in each nostril for each dose., Disp: 18.2 mL, Rfl: 6  •  tadalafil (CIALIS) 20 MG tablet, Take  1 tablet by mouth As Needed for Erectile Dysfunction for up to 360 days. 1-24 hours before activity, Disp: 12 tablet, Rfl: 11  Past Medical History:   Diagnosis Date   • Allergic rhinitis    • Anxiety    • Arthritis    • Asthma    • Cataract    • Chronic rhinitis    • Chronic sinusitis    • COPD (chronic obstructive pulmonary disease) (CMS/HCC)    • COVID-19 vaccine series completed     Moderna   • Depression    • Deviated nasal septum    • Enlarged prostate    • GERD (gastroesophageal reflux disease)    • H/O head and neck radiation 3/3/2017   • History of transfusion    • Hyperlipidemia    • Hypertension    • Hypothyroidism    • Laryngopharyngeal reflux    • Lymphoma (CMS/HCC)     Non-Hodgkins   • MRSA infection    • Panic attacks    • Peyronie disease    • Pneumonia    • Primary squamous cell carcinoma of tongue (CMS/[facility]) 9/27/2016    T1N0M0 SCC S/P XRT/Chemo 12/2014   • Primary squamous cell carcinoma of tongue (CMS/[facility]) 9/27/2016    T1N0M0 SCC S/P XRT/Chemo 12/2014   • Sicca syndrome (CMS/[facility])    • Sinusitis, acute    • Squamous cell carcinoma     Base of Tongue   • Tobacco use disorder    • Tongue irritation    • Xerostomia      Past Surgical History:   Procedure Laterality Date   • CATARACT EXTRACTION Bilateral    • ESOPHAGOSCOPY N/A 11/20/2019    Procedure: Direct laryngoscopy with esophageal Johnson dilation;  Surgeon: Wayne Richmond MD;  Location: [facility] OR;  Service: ENT   • HAND SURGERY      metal plate    • KNEE SURGERY     • LARYNGOSCOPY N/A 6/26/2020    Procedure: Rigid esophagoscopy Johnson esophageal dilatation;  Surgeon: Wayne Richmond MD;  Location: [facility] OR;  Service: ENT;  Laterality: N/A;   • LARYNGOSCOPY N/A 8/24/2020    Procedure: MICRODIRECT LARYNGOSCOPY with esophageal dilatation;  Surgeon: Wayne Richmond MD;  Location: [facility] OR;  Service: ENT;  Laterality: N/A;   • LARYNGOSCOPY N/A 5/24/2021    Procedure: Direct laryngoscopy, esophagoscopy with Johnson dilatation;   "Surgeon: Wayne Richmond MD;  Location:  [facility] OR;  Service: ENT;  Laterality: N/A;   • MULTIPLE TOOTH EXTRACTIONS      CONTINUING TO HAVE BONE REMOVED 1/2021   • OTHER SURGICAL HISTORY  09/10/2014    Microlaryngoscopy with Biopsy - Base of Tongue   • PANENDOSCOPY N/A 1/22/2021    Procedure: DIRECT LARYNGOSCOPY AND ESOPHAGOSCOPY WITH ESOPHAGEAL DILATION;  Surgeon: Wayne Richmond MD;  Location:  [facility] OR;  Service: ENT;  Laterality: N/A;   • SPLENECTOMY     • SQUAMOUS CELL CARCINOMA EXCISION  09/10/2014    Tongue   • TONSILLECTOMY           Review  of systems  Constitutional: Negative for chills and fever.   Gastrointestinal: Negative for abdominal pain, anal bleeding and blood in stool.   Genitourinary: Negative for flank pain and hematuria.       Objective   PHYSICAL EXAM  Vital Signs:   Temp 97.7 °F (36.5 °C)   Ht 185.4 cm (73\")   Wt 70.3 kg (155 lb)   BMI 20.45 kg/m²     Constitutional: Patient is without distress or deformity.  Vital signs are reviewed as above.    Neuro: No confusion; No disorientation; Alert and oriented  Pulmonary: No respiratory distress.   Skin: No pallor or diaphoresis  GI: Abdomen is soft and nontender.  No significant distention.  No hernias noted.  : Penis and testicles are normal. Benign feeling prostate without nodule approximately 30 mL in size.           DATA  Result Review :     Lab Results   Component Value Date    PSA 2.060 12/29/2016     PSA  DATE  4.990  07/07/2021    International Prostate Symptom Score  The following is posted based on patient questionnaire answers:  0 - not at all    1-7 mild symptoms  1- Less than one time in five  8-19 moderate symptoms  2 -Less than half the time  20-35 severe symptoms  3 - About half the time  4 - More than half the time  5 - Almost always     For following sections:  Incomplete Emptying: - How often have you had the sensation  of not emptying your bladder completely after you finished urinating?  2  Frequency: -How often " have you had to urinate again less than   two hours after you finished urinating?      3  Intermittency: -How often have you found you stopped and started again  Several times when you urinate?       2  Urgency: -How often do you find it difficult to postpone urination?             2  Weak stream: - How often have you had a weak urinary stream?             4  Straining: - How often have you had to push or strain to begin  Urination?          1  Sleeping: -How many times did you most typically get up to urinate   From the time you went to bed at night until the time you got up in the   2  Morning          Total `  16    Quality of Life  How would you feel if you had to live with your urinary condition the way   3  It is now, no better, no worse for the rest of your life?    Where: 0=delighted; 1= pleased, 2= mostly satisfied, 3= mixed, 4 = mostly  Dissatisfied, 5= Unhappy, 6 = terrible       ASSESSMENT AND PLAN      Problem List Items Addressed This Visit     None      Visit Diagnoses     BPH with obstruction/lower urinary tract symptoms    -  Primary    Relevant Medications    tadalafil (CIALIS) 20 MG tablet    Other Relevant Orders    POC Urinalysis Dipstick, Multipro (Completed)    Erectile dysfunction, unspecified erectile dysfunction type        Relevant Medications    tadalafil (CIALIS) 20 MG tablet    Elevated prostate specific antigen (PSA)        Relevant Orders    PSA DIAGNOSTIC      -We will continue tamsulosin for his BPH.  His symptoms are stable.  No evidence of progression.  -Increase dose of tadalafil 20 mg to use his as needed dose  -This represents an undiagnosed  problem with uncertain prognosis.  I discussed elevated PSA with the patient today.  We discussed that PSA is a protein measured in the bloodstream that comes exclusively from the prostate gland.  I mentioned to him that all men with a prostate gland will have a certain PSA level.  We discussed this number can be compared to all men, or  "men of a certain age.  We can also follow trend of PSA which is called the PSA velocity.  We discussed the prostate cancer is a possible cause of PSA elevation, but benign etiologies such as infection, enlargement, aging, and inflammation should also be considered.  There is also convincing evidence that some patients will have a PSA that waxes and wanes completely unrelated to symptomatic disease of the prostate for cancer.  We discussed that some patients with a normal PSA may also have prostate cancer.  The necessity of digital rectal exam is also discussed.  We discussed the role of free to total PSA ratio.  It is explained that this test is done in hopes of avoiding unnecessary biopsies, but that a few patients with prostate cancer will have false-negative results when measuring there free PSA.  We also discussed that PSA, in many patients, varies considerably for unexplained reasons.  Sometimes repeating the PSA in a few months gives time for a \"correction \" to baseline.  We have elected to repeat the total PSA.  We did discuss the natural course of prostate cancer in most patients.  It is explained that waiting to see what the results of this test*are very unlikely to affect the clinical course of the disease.  However, there are exceptions in the biology of each cancer.  The risks and possible benefits of transrectal ultrasound with biopsy of the prostate gland is also discussed.  I did explain that biopsy is the standard of care for diagnosing prostate cancer. The risks, alternatives, and benefits of this treatment recommendation are discussed.  I did answer all questions of the patient.        FOLLOW UP     Return in about 3 months (around 11/10/2021).        (Please note that portions of this note were completed with a voice recognition program.)  Oscar Bazan MD  08/10/21  11:55 CDT  "

## 2021-08-10 ENCOUNTER — OFFICE VISIT (OUTPATIENT)
Dept: UROLOGY | Facility: CLINIC | Age: 66
End: 2021-08-10

## 2021-08-10 VITALS — WEIGHT: 155 LBS | BODY MASS INDEX: 20.54 KG/M2 | TEMPERATURE: 97.7 F | HEIGHT: 73 IN

## 2021-08-10 DIAGNOSIS — N52.9 ERECTILE DYSFUNCTION, UNSPECIFIED ERECTILE DYSFUNCTION TYPE: ICD-10-CM

## 2021-08-10 DIAGNOSIS — N40.1 BPH WITH OBSTRUCTION/LOWER URINARY TRACT SYMPTOMS: Primary | ICD-10-CM

## 2021-08-10 DIAGNOSIS — N13.8 BPH WITH OBSTRUCTION/LOWER URINARY TRACT SYMPTOMS: Primary | ICD-10-CM

## 2021-08-10 DIAGNOSIS — R97.20 ELEVATED PROSTATE SPECIFIC ANTIGEN (PSA): ICD-10-CM

## 2021-08-10 PROCEDURE — 99214 OFFICE O/P EST MOD 30 MIN: CPT | Performed by: UROLOGY

## 2021-08-10 PROCEDURE — 81001 URINALYSIS AUTO W/SCOPE: CPT | Performed by: UROLOGY

## 2021-08-10 RX ORDER — TADALAFIL 20 MG/1
20 TABLET ORAL AS NEEDED
Qty: 12 TABLET | Refills: 11 | Status: SHIPPED | OUTPATIENT
Start: 2021-08-10 | End: 2022-10-12 | Stop reason: SDUPTHER

## 2021-10-04 ENCOUNTER — TELEPHONE (OUTPATIENT)
Dept: OTOLARYNGOLOGY | Facility: CLINIC | Age: 66
End: 2021-10-04

## 2021-10-04 NOTE — TELEPHONE ENCOUNTER
Patient is having dysphagia even with liquids. I have discussed this with Dr. Richmond and we will proceed with dilatation.

## 2021-10-07 ENCOUNTER — TRANSCRIBE ORDERS (OUTPATIENT)
Dept: LAB | Facility: HOSPITAL | Age: 66
End: 2021-10-07

## 2021-10-07 DIAGNOSIS — Z11.59 SCREENING FOR VIRAL DISEASE: Primary | ICD-10-CM

## 2021-10-08 ENCOUNTER — PRE-ADMISSION TESTING (OUTPATIENT)
Dept: PREADMISSION TESTING | Facility: HOSPITAL | Age: 66
End: 2021-10-08

## 2021-10-08 ENCOUNTER — LAB (OUTPATIENT)
Dept: LAB | Facility: HOSPITAL | Age: 66
End: 2021-10-08

## 2021-10-08 ENCOUNTER — HOSPITAL ENCOUNTER (OUTPATIENT)
Dept: GENERAL RADIOLOGY | Facility: HOSPITAL | Age: 66
Discharge: HOME | End: 2021-10-08

## 2021-10-08 VITALS
DIASTOLIC BLOOD PRESSURE: 84 MMHG | OXYGEN SATURATION: 98 % | HEIGHT: 73 IN | RESPIRATION RATE: 20 BRPM | WEIGHT: 157.85 LBS | HEART RATE: 94 BPM | SYSTOLIC BLOOD PRESSURE: 133 MMHG | BODY MASS INDEX: 20.92 KG/M2

## 2021-10-08 DIAGNOSIS — C02.9 PRIMARY SQUAMOUS CELL CARCINOMA OF TONGUE (HCC): ICD-10-CM

## 2021-10-08 DIAGNOSIS — K21.9 LARYNGOPHARYNGEAL REFLUX: ICD-10-CM

## 2021-10-08 DIAGNOSIS — R13.14 PHARYNGOESOPHAGEAL DYSPHAGIA: ICD-10-CM

## 2021-10-08 LAB
ALBUMIN SERPL-MCNC: 4.7 G/DL (ref 3.5–5.2)
ALBUMIN/GLOB SERPL: 2.5 G/DL
ALP SERPL-CCNC: 94 U/L (ref 39–117)
ALT SERPL W P-5'-P-CCNC: 14 U/L (ref 1–41)
ANION GAP SERPL CALCULATED.3IONS-SCNC: 8 MMOL/L (ref 5–15)
AST SERPL-CCNC: 20 U/L (ref 1–40)
BILIRUB SERPL-MCNC: 0.6 MG/DL (ref 0–1.2)
BUN SERPL-MCNC: 19 MG/DL (ref 8–23)
BUN/CREAT SERPL: 16.4 (ref 7–25)
CALCIUM SPEC-SCNC: 10.2 MG/DL (ref 8.6–10.5)
CHLORIDE SERPL-SCNC: 102 MMOL/L (ref 98–107)
CO2 SERPL-SCNC: 30 MMOL/L (ref 22–29)
CREAT SERPL-MCNC: 1.16 MG/DL (ref 0.76–1.27)
DEPRECATED RDW RBC AUTO: 47.7 FL (ref 37–54)
ERYTHROCYTE [DISTWIDTH] IN BLOOD BY AUTOMATED COUNT: 13.2 % (ref 12.3–15.4)
GFR SERPL CREATININE-BSD FRML MDRD: 63 ML/MIN/1.73
GLOBULIN UR ELPH-MCNC: 1.9 GM/DL
GLUCOSE SERPL-MCNC: 98 MG/DL (ref 65–99)
HCT VFR BLD AUTO: 39.1 % (ref 37.5–51)
HGB BLD-MCNC: 13.1 G/DL (ref 13–17.7)
MCH RBC QN AUTO: 32.6 PG (ref 26.6–33)
MCHC RBC AUTO-ENTMCNC: 33.5 G/DL (ref 31.5–35.7)
MCV RBC AUTO: 97.3 FL (ref 79–97)
PLATELET # BLD AUTO: 335 10*3/MM3 (ref 140–450)
PMV BLD AUTO: 11.2 FL (ref 6–12)
POTASSIUM SERPL-SCNC: 5.6 MMOL/L (ref 3.5–5.2)
PROT SERPL-MCNC: 6.6 G/DL (ref 6–8.5)
RBC # BLD AUTO: 4.02 10*6/MM3 (ref 4.14–5.8)
SARS-COV-2 ORF1AB RESP QL NAA+PROBE: NOT DETECTED
SODIUM SERPL-SCNC: 140 MMOL/L (ref 136–145)
WBC # BLD AUTO: 8.66 10*3/MM3 (ref 3.4–10.8)

## 2021-10-08 PROCEDURE — 71046 X-RAY EXAM CHEST 2 VIEWS: CPT

## 2021-10-08 PROCEDURE — U0005 INFEC AGEN DETEC AMPLI PROBE: HCPCS | Performed by: OTOLARYNGOLOGY

## 2021-10-08 PROCEDURE — 85027 COMPLETE CBC AUTOMATED: CPT

## 2021-10-08 PROCEDURE — 93005 ELECTROCARDIOGRAM TRACING: CPT

## 2021-10-08 PROCEDURE — 93010 ELECTROCARDIOGRAM REPORT: CPT | Performed by: INTERNAL MEDICINE

## 2021-10-08 PROCEDURE — 36415 COLL VENOUS BLD VENIPUNCTURE: CPT

## 2021-10-08 PROCEDURE — C9803 HOPD COVID-19 SPEC COLLECT: HCPCS | Performed by: OTOLARYNGOLOGY

## 2021-10-08 PROCEDURE — U0004 COV-19 TEST NON-CDC HGH THRU: HCPCS | Performed by: OTOLARYNGOLOGY

## 2021-10-08 PROCEDURE — 80053 COMPREHEN METABOLIC PANEL: CPT

## 2021-10-08 NOTE — DISCHARGE INSTRUCTIONS
DAY OF SURGERY INSTRUCTIONS        YOUR SURGEON: ***ANDREY LECHUGA    PROCEDURE: ***DIRECT LARYNGOSCOPY ESOPHAGOSCOPY WITH DALTON DILATATION    DATE OF SURGERY: ***10/11/2021    ARRIVAL TIME: AS DIRECTED BY OFFICE    YOU MAY TAKE THE FOLLOWING MEDICATION(S) THE MORNING OF SURGERY WITH A SIP OF WATER: ***xanax, norco    ALL OTHER HOME MEDICATIONS CHECK WITH YOUR DOCTOR    DO NOT TAKE ANY ERECTILE DYSFUNCTION MEDICATIONS (EX:  CIALIS, VIAGRA) 24 HOURS PRIOR TO SURGERY              MANAGING PAIN AFTER SURGERY    We know you are probably wondering what your pain will be like after surgery.  Following surgery it is unrealistic to expect you will not have pain.   Pain is how our bodies let us know that something is wrong or cautions us to be careful.  That said, our goal is to make your pain tolerable.    Methods we may use to treat your pain include (oral or IV medications, PCAs, epidurals, nerve blocks, etc.)   While some procedures require IV pain medications for a short time after surgery, transitioning to pain medications by mouth allows for better management of pain.   Your nurse will encourage you to take oral pain medications whenever possible.  IV medications work almost immediately, but only last a short while.  Taking medications by mouth allows for a more constant level of medication in your blood stream for a longer period of time.      Once your pain is out of control it is harder to get back under control.  It is important you are aware when your next dose of pain medication is due.  If you are admitted, your nurse may write the time of your next dose on the white board in your room to help you remember.      We are interested in your pain and encourage you to inform us about aggravating factors during your visit.   Many times a simple repositioning every few hours can make a big difference.    If your physician says it is okay, do not let your pain prevent you from getting out of bed. Be sure to call your nurse  for assistance prior to getting up so you do not fall.      Before surgery, please decide your tolerable pain goal.  These faces help describe the pain ratings we use on a 0-10 scale.   Be prepared to tell us your goal and whether or not you take pain or anxiety medications at home.      BEFORE YOU COME TO THE HOSPITAL  (Pre-op instructions)  • Do not eat, drink, smoke or chew gum after midnight the night before surgery.  This also includes no mints.  • Morning of surgery take only the medicines you have been instructed with a sip of water unless otherwise instructed  by your physician.  • Do not shave, wear makeup or dark nail polish.  • Remove all jewelry including rings.  • Leave anything you consider valuable at home.  • Leave your suitcase in the car until after your surgery.  • Bring the following with you if applicable:  o Picture ID and insurance, Medicare or Medicaid cards  o Co-pay/deductible required by insurance (cash, check, credit card)  o Copy of advance directive, living will or power-of- documents if not brought to PAT  o CPAP or BIPAP mask and tubing  o Relaxation aids ( book, magazine), etc.  o Hearing aids                                 ON THE DAY OF SURGERY  · On the day of surgery check in at registration located at the main entrance of the hospital.   ? You will be registered and given a beeper with instructions where to wait in the main lobby.  ? When your beeper lights up and vibrates a member of the Outpatient Surgery staff will meet you at the double doors under the stair steps and escort you to your preoperative room.   · You may have cloth compression devices placed on your legs. These help to prevent blood clots and reduce swelling in your legs.  · An IV may be inserted into one of your veins.  · In the operating room, you may be given one or more of the following:  ? A medicine to help you relax (sedative).  ? A medicine to numb the area (local anesthetic).  ? A medicine to  "make you fall asleep (general anesthetic).  ? A medicine that is injected into an area of your body to numb everything below the injection site (regional anesthetic).  · Your surgical site will be marked or identified.  · You may be given an antibiotic through your IV to help prevent infection.  Contact a health care provider if you:  · Develop a fever of more than 100.4°F (38°C) or other feelings of illness during the 48 hours before your surgery.  · Have symptoms that get worse.  Have questions or concerns about your surgery    General Anesthesia/Surgery, Adult  General anesthesia is the use of medicines to make a person \"go to sleep\" (unconscious) for a medical procedure. General anesthesia must be used for certain procedures, and is often recommended for procedures that:  · Last a long time.  · Require you to be still or in an unusual position.  · Are major and can cause blood loss.  The medicines used for general anesthesia are called general anesthetics. As well as making you unconscious for a certain amount of time, these medicines:  · Prevent pain.  · Control your blood pressure.  · Relax your muscles.  Tell a health care provider about:  · Any allergies you have.  · All medicines you are taking, including vitamins, herbs, eye drops, creams, and over-the-counter medicines.  · Any problems you or family members have had with anesthetic medicines.  · Types of anesthetics you have had in the past.  · Any blood disorders you have.  · Any surgeries you have had.  · Any medical conditions you have.  · Any recent upper respiratory, chest, or ear infections.  · Any history of:  ? Heart or lung conditions, such as heart failure, sleep apnea, asthma, or chronic obstructive pulmonary disease (COPD).  ?  service.  ? Depression or anxiety.  · Any tobacco or drug use, including marijuana or alcohol use.  · Whether you are pregnant or may be pregnant.  What are the risks?  Generally, this is a safe procedure. " However, problems may occur, including:  · Allergic reaction.  · Lung and heart problems.  · Inhaling food or liquid from the stomach into the lungs (aspiration).  · Nerve injury.  · Air in the bloodstream, which can lead to stroke.  · Extreme agitation or confusion (delirium) when you wake up from the anesthetic.  · Waking up during your procedure and being unable to move. This is rare.  These problems are more likely to develop if you are having a major surgery or if you have an advanced or serious medical condition. You can prevent some of these complications by answering all of your health care provider's questions thoroughly and by following all instructions before your procedure.  General anesthesia can cause side effects, including:  · Nausea or vomiting.  · A sore throat from the breathing tube.  · Hoarseness.  · Wheezing or coughing.  · Shaking chills.  · Tiredness.  · Body aches.  · Anxiety.  · Sleepiness or drowsiness.  · Confusion or agitation.  RISKS AND COMPLICATIONS OF SURGERY  Your health care provider will discuss possible risks and complications with you before surgery. Common risks and complications include:    · Problems due to the use of anesthetics.  · Blood loss and replacement (does not apply to minor surgical procedures).  · Temporary increase in pain due to surgery.  · Uncorrected pain or problems that the surgery was meant to correct.  · Infection.  · New damage.    What happens before the procedure?    Medicines  Ask your health care provider about:  · Changing or stopping your regular medicines. This is especially important if you are taking diabetes medicines or blood thinners.  · Taking medicines such as aspirin and ibuprofen. These medicines can thin your blood. Do not take these medicines unless your health care provider tells you to take them.  · Taking over-the-counter medicines, vitamins, herbs, and supplements. Do not take these during the week before your procedure unless your  health care provider approves them.  General instructions  · Starting 3-6 weeks before the procedure, do not use any products that contain nicotine or tobacco, such as cigarettes and e-cigarettes. If you need help quitting, ask your health care provider.  · If you brush your teeth on the morning of the procedure, make sure to spit out all of the toothpaste.  · Tell your health care provider if you become ill or develop a cold, cough, or fever.  · If instructed by your health care provider, bring your sleep apnea device with you on the day of your surgery (if applicable).  · Ask your health care provider if you will be going home the same day, the following day, or after a longer hospital stay.  ? Plan to have someone take you home from the hospital or clinic.  ? Plan to have a responsible adult care for you for at least 24 hours after you leave the hospital or clinic. This is important.  What happens during the procedure?  · You will be given anesthetics through both of the following:  ? A mask placed over your nose and mouth.  ? An IV in one of your veins.  · You may receive a medicine to help you relax (sedative).  · After you are unconscious, a breathing tube may be inserted down your throat to help you breathe. This will be removed before you wake up.  · An anesthesia specialist will stay with you throughout your procedure. He or she will:  ? Keep you comfortable and safe by continuing to give you medicines and adjusting the amount of medicine that you get.  ? Monitor your blood pressure, pulse, and oxygen levels to make sure that the anesthetics do not cause any problems.  The procedure may vary among health care providers and hospitals.  What happens after the procedure?  · Your blood pressure, temperature, heart rate, breathing rate, and blood oxygen level will be monitored until the medicines you were given have worn off.  · You will wake up in a recovery area. You may wake up slowly.  · If you feel anxious  or agitated, you may be given medicine to help you calm down.  · If you will be going home the same day, your health care provider may check to make sure you can walk, drink, and urinate.  · Your health care provider will treat any pain or side effects you have before you go home.  · Do not drive for 24 hours if you were given a sedative.  Summary  · General anesthesia is used to keep you still and prevent pain during a procedure.  · It is important to tell your healthcare provider about your medical history and any surgeries you have had, and previous experience with anesthesia.  · Follow your healthcare provider’s instructions about when to stop eating, drinking, or taking certain medicines before your procedure.  · Plan to have someone take you home from the hospital or clinic.  This information is not intended to replace advice given to you by your health care provider. Make sure you discuss any questions you have with your health care provider.  Document Released: 03/26/2009 Document Revised: 08/03/2018 Document Reviewed: 08/03/2018  [employer] Interactive Patient Education © 2019 [employer]      Fall Prevention in Hospitals, Adult  As a hospital patient, your condition and the treatments you receive can increase your risk for falls. Some additional risk factors for falls in a hospital include:  · Being in an unfamiliar environment.  · Being on bed rest.  · Your surgery.  · Taking certain medicines.  · Your tubing requirements, such as intravenous (IV) therapy or catheters.  It is important that you learn how to decrease fall risks while at the hospital. Below are important tips that can help prevent falls.  SAFETY TIPS FOR PREVENTING FALLS  Talk about your risk of falling.  · Ask your health care provider why you are at risk for falling. Is it your medicine, illness, tubing placement, or something else?  · Make a plan with your health care provider to keep you safe from falls.  · Ask your health care provider  or pharmacist about side effects of your medicines. Some medicines can make you dizzy or affect your coordination.  Ask for help.  · Ask for help before getting out of bed. You may need to press your call button.  · Ask for assistance in getting safely to the toilet.  · Ask for a walker or cane to be put at your bedside. Ask that most of the side rails on your bed be placed up before your health care provider leaves the room.  · Ask family or friends to sit with you.  · Ask for things that are out of your reach, such as your glasses, hearing aids, telephone, bedside table, or call button.  Follow these tips to avoid falling:  · Stay lying or seated, rather than standing, while waiting for help.  · Wear rubber-soled slippers or shoes whenever you walk in the hospital.  · Avoid quick, sudden movements.  ¨ Change positions slowly.  ¨ Sit on the side of your bed before standing.  ¨ Stand up slowly and wait before you start to walk.  · Let your health care provider know if there is a spill on the floor.  · Pay careful attention to the medical equipment, electrical cords, and tubes around you.  · When you need help, use your call button by your bed or in the bathroom. Wait for one of your health care providers to help you.  · If you feel dizzy or unsure of your footing, return to bed and wait for assistance.  · Avoid being distracted by the TV, telephone, or another person in your room.  · Do not lean or support yourself on rolling objects, such as IV poles or bedside tables.     This information is not intended to replace advice given to you by your health care provider. Make sure you discuss any questions you have with your health care provider.     Document Released: 12/15/2001 Document Revised: 01/08/2016 Document Reviewed: 08/25/2013  "[employer] Interactive Patient Education ©2016 "[employer] Inc.            PATIENT/FAMILY/RESPONSIBLE PARTY VERBALIZES UNDERSTANDING OF ABOVE EDUCATION.  COPY OF PAIN SCALE GIVEN AND  REVIEWED WITH VERBALIZED UNDERSTANDING.

## 2021-10-10 LAB
QT INTERVAL: 376 MS
QTC INTERVAL: 441 MS

## 2021-10-11 ENCOUNTER — ANESTHESIA EVENT (OUTPATIENT)
Dept: PERIOP | Facility: HOSPITAL | Age: 66
End: 2021-10-11

## 2021-10-11 ENCOUNTER — ANESTHESIA (OUTPATIENT)
Dept: PERIOP | Facility: HOSPITAL | Age: 66
End: 2021-10-11

## 2021-10-11 ENCOUNTER — HOSPITAL ENCOUNTER (OUTPATIENT)
Facility: HOSPITAL | Age: 66
Setting detail: HOSPITAL OUTPATIENT SURGERY
Discharge: HOME | End: 2021-10-11
Attending: OTOLARYNGOLOGY | Admitting: OTOLARYNGOLOGY

## 2021-10-11 VITALS
RESPIRATION RATE: 18 BRPM | HEART RATE: 79 BPM | TEMPERATURE: 97.6 F | OXYGEN SATURATION: 96 % | DIASTOLIC BLOOD PRESSURE: 83 MMHG | SYSTOLIC BLOOD PRESSURE: 146 MMHG

## 2021-10-11 LAB
ANION GAP SERPL CALCULATED.3IONS-SCNC: 9 MMOL/L (ref 5–15)
BUN SERPL-MCNC: 20 MG/DL (ref 8–23)
BUN/CREAT SERPL: 18.3 (ref 7–25)
CALCIUM SPEC-SCNC: 9.5 MG/DL (ref 8.6–10.5)
CHLORIDE SERPL-SCNC: 103 MMOL/L (ref 98–107)
CO2 SERPL-SCNC: 29 MMOL/L (ref 22–29)
CREAT SERPL-MCNC: 1.09 MG/DL (ref 0.76–1.27)
GFR SERPL CREATININE-BSD FRML MDRD: 68 ML/MIN/1.73
GLUCOSE SERPL-MCNC: 91 MG/DL (ref 65–99)
POTASSIUM SERPL-SCNC: 3.8 MMOL/L (ref 3.5–5.2)
SODIUM SERPL-SCNC: 141 MMOL/L (ref 136–145)

## 2021-10-11 PROCEDURE — 43450 DILATE ESOPHAGUS 1/MULT PASS: CPT | Performed by: OTOLARYNGOLOGY

## 2021-10-11 PROCEDURE — 25010000002 PROPOFOL 10 MG/ML EMULSION: Performed by: NURSE ANESTHETIST, CERTIFIED REGISTERED

## 2021-10-11 PROCEDURE — 80048 BASIC METABOLIC PNL TOTAL CA: CPT | Performed by: OTOLARYNGOLOGY

## 2021-10-11 PROCEDURE — 25010000002 ONDANSETRON PER 1 MG: Performed by: NURSE ANESTHETIST, CERTIFIED REGISTERED

## 2021-10-11 PROCEDURE — 31525 DX LARYNGOSCOPY EXCL NB: CPT | Performed by: OTOLARYNGOLOGY

## 2021-10-11 PROCEDURE — 25010000002 FENTANYL CITRATE (PF) 100 MCG/2ML SOLUTION: Performed by: NURSE ANESTHETIST, CERTIFIED REGISTERED

## 2021-10-11 PROCEDURE — 25010000002 DEXAMETHASONE PER 1 MG: Performed by: NURSE ANESTHETIST, CERTIFIED REGISTERED

## 2021-10-11 RX ORDER — LIDOCAINE HYDROCHLORIDE 10 MG/ML
0.5 INJECTION, SOLUTION EPIDURAL; INFILTRATION; INTRACAUDAL; PERINEURAL ONCE AS NEEDED
Status: DISCONTINUED | OUTPATIENT
Start: 2021-10-11 | End: 2021-10-11 | Stop reason: HOSPADM

## 2021-10-11 RX ORDER — FENTANYL CITRATE 50 UG/ML
INJECTION, SOLUTION INTRAMUSCULAR; INTRAVENOUS AS NEEDED
Status: DISCONTINUED | OUTPATIENT
Start: 2021-10-11 | End: 2021-10-11 | Stop reason: SURG

## 2021-10-11 RX ORDER — SODIUM CHLORIDE, SODIUM LACTATE, POTASSIUM CHLORIDE, CALCIUM CHLORIDE 600; 310; 30; 20 MG/100ML; MG/100ML; MG/100ML; MG/100ML
100 INJECTION, SOLUTION INTRAVENOUS CONTINUOUS
Status: DISCONTINUED | OUTPATIENT
Start: 2021-10-11 | End: 2021-10-11 | Stop reason: HOSPADM

## 2021-10-11 RX ORDER — DEXAMETHASONE SODIUM PHOSPHATE 4 MG/ML
INJECTION, SOLUTION INTRA-ARTICULAR; INTRALESIONAL; INTRAMUSCULAR; INTRAVENOUS; SOFT TISSUE AS NEEDED
Status: DISCONTINUED | OUTPATIENT
Start: 2021-10-11 | End: 2021-10-11 | Stop reason: SURG

## 2021-10-11 RX ORDER — DEXAMETHASONE SODIUM PHOSPHATE 4 MG/ML
4 INJECTION, SOLUTION INTRA-ARTICULAR; INTRALESIONAL; INTRAMUSCULAR; INTRAVENOUS; SOFT TISSUE ONCE AS NEEDED
Status: DISCONTINUED | OUTPATIENT
Start: 2021-10-11 | End: 2021-10-11 | Stop reason: HOSPADM

## 2021-10-11 RX ORDER — FENTANYL CITRATE 50 UG/ML
25 INJECTION, SOLUTION INTRAMUSCULAR; INTRAVENOUS
Status: DISCONTINUED | OUTPATIENT
Start: 2021-10-11 | End: 2021-10-11 | Stop reason: HOSPADM

## 2021-10-11 RX ORDER — LIDOCAINE HYDROCHLORIDE 20 MG/ML
INJECTION, SOLUTION EPIDURAL; INFILTRATION; INTRACAUDAL; PERINEURAL AS NEEDED
Status: DISCONTINUED | OUTPATIENT
Start: 2021-10-11 | End: 2021-10-11 | Stop reason: SURG

## 2021-10-11 RX ORDER — OXYCODONE HCL 5 MG/5 ML
2.5 SOLUTION, ORAL ORAL EVERY 6 HOURS PRN
Status: DISCONTINUED | OUTPATIENT
Start: 2021-10-11 | End: 2021-10-11 | Stop reason: HOSPADM

## 2021-10-11 RX ORDER — PROPOFOL 10 MG/ML
VIAL (ML) INTRAVENOUS AS NEEDED
Status: DISCONTINUED | OUTPATIENT
Start: 2021-10-11 | End: 2021-10-11 | Stop reason: SURG

## 2021-10-11 RX ORDER — MAGNESIUM HYDROXIDE 1200 MG/15ML
LIQUID ORAL AS NEEDED
Status: DISCONTINUED | OUTPATIENT
Start: 2021-10-11 | End: 2021-10-11 | Stop reason: HOSPADM

## 2021-10-11 RX ORDER — ATRACURIUM BESYLATE 10 MG/ML
INJECTION, SOLUTION INTRAVENOUS AS NEEDED
Status: DISCONTINUED | OUTPATIENT
Start: 2021-10-11 | End: 2021-10-11 | Stop reason: SURG

## 2021-10-11 RX ORDER — SODIUM CHLORIDE 0.9 % (FLUSH) 0.9 %
3 SYRINGE (ML) INJECTION EVERY 12 HOURS SCHEDULED
Status: DISCONTINUED | OUTPATIENT
Start: 2021-10-11 | End: 2021-10-11 | Stop reason: HOSPADM

## 2021-10-11 RX ORDER — MIDAZOLAM HYDROCHLORIDE 1 MG/ML
1 INJECTION INTRAMUSCULAR; INTRAVENOUS
Status: DISCONTINUED | OUTPATIENT
Start: 2021-10-11 | End: 2021-10-11 | Stop reason: HOSPADM

## 2021-10-11 RX ORDER — ONDANSETRON 4 MG/1
4 TABLET, FILM COATED ORAL ONCE AS NEEDED
Status: DISCONTINUED | OUTPATIENT
Start: 2021-10-11 | End: 2021-10-11 | Stop reason: HOSPADM

## 2021-10-11 RX ORDER — NALOXONE HCL 0.4 MG/ML
0.04 VIAL (ML) INJECTION AS NEEDED
Status: DISCONTINUED | OUTPATIENT
Start: 2021-10-11 | End: 2021-10-11 | Stop reason: HOSPADM

## 2021-10-11 RX ORDER — FLUMAZENIL 0.1 MG/ML
0.2 INJECTION INTRAVENOUS AS NEEDED
Status: DISCONTINUED | OUTPATIENT
Start: 2021-10-11 | End: 2021-10-11 | Stop reason: HOSPADM

## 2021-10-11 RX ORDER — SODIUM CHLORIDE, SODIUM LACTATE, POTASSIUM CHLORIDE, CALCIUM CHLORIDE 600; 310; 30; 20 MG/100ML; MG/100ML; MG/100ML; MG/100ML
1000 INJECTION, SOLUTION INTRAVENOUS CONTINUOUS
Status: DISCONTINUED | OUTPATIENT
Start: 2021-10-11 | End: 2021-10-11 | Stop reason: HOSPADM

## 2021-10-11 RX ORDER — SODIUM CHLORIDE 0.9 % (FLUSH) 0.9 %
3-10 SYRINGE (ML) INJECTION AS NEEDED
Status: DISCONTINUED | OUTPATIENT
Start: 2021-10-11 | End: 2021-10-11 | Stop reason: HOSPADM

## 2021-10-11 RX ORDER — ONDANSETRON 2 MG/ML
4 INJECTION INTRAMUSCULAR; INTRAVENOUS AS NEEDED
Status: DISCONTINUED | OUTPATIENT
Start: 2021-10-11 | End: 2021-10-11 | Stop reason: HOSPADM

## 2021-10-11 RX ORDER — ONDANSETRON 2 MG/ML
INJECTION INTRAMUSCULAR; INTRAVENOUS AS NEEDED
Status: DISCONTINUED | OUTPATIENT
Start: 2021-10-11 | End: 2021-10-11 | Stop reason: SURG

## 2021-10-11 RX ORDER — LABETALOL HYDROCHLORIDE 5 MG/ML
5 INJECTION, SOLUTION INTRAVENOUS
Status: DISCONTINUED | OUTPATIENT
Start: 2021-10-11 | End: 2021-10-11 | Stop reason: HOSPADM

## 2021-10-11 RX ORDER — HYDROMORPHONE HYDROCHLORIDE 1 MG/ML
0.5 INJECTION, SOLUTION INTRAMUSCULAR; INTRAVENOUS; SUBCUTANEOUS
Status: DISCONTINUED | OUTPATIENT
Start: 2021-10-11 | End: 2021-10-11 | Stop reason: HOSPADM

## 2021-10-11 RX ORDER — SODIUM CHLORIDE 0.9 % (FLUSH) 0.9 %
3 SYRINGE (ML) INJECTION AS NEEDED
Status: DISCONTINUED | OUTPATIENT
Start: 2021-10-11 | End: 2021-10-11 | Stop reason: HOSPADM

## 2021-10-11 RX ADMIN — LIDOCAINE HYDROCHLORIDE 200 MG: 20 INJECTION, SOLUTION EPIDURAL; INFILTRATION; INTRACAUDAL; PERINEURAL at 09:40

## 2021-10-11 RX ADMIN — ONDANSETRON 8 MG: 2 INJECTION INTRAMUSCULAR; INTRAVENOUS at 09:57

## 2021-10-11 RX ADMIN — ATRACURIUM BESYLATE 10 MG: 10 INJECTION, SOLUTION INTRAVENOUS at 09:40

## 2021-10-11 RX ADMIN — PROPOFOL 200 MG: 10 INJECTION, EMULSION INTRAVENOUS at 09:40

## 2021-10-11 RX ADMIN — FENTANYL CITRATE 100 MCG: 50 INJECTION, SOLUTION INTRAMUSCULAR; INTRAVENOUS at 09:40

## 2021-10-11 RX ADMIN — SODIUM CHLORIDE, POTASSIUM CHLORIDE, SODIUM LACTATE AND CALCIUM CHLORIDE 1000 ML: 600; 310; 30; 20 INJECTION, SOLUTION INTRAVENOUS at 08:09

## 2021-10-11 RX ADMIN — DEXAMETHASONE SODIUM PHOSPHATE 8 MG: 4 INJECTION, SOLUTION INTRA-ARTICULAR; INTRALESIONAL; INTRAMUSCULAR; INTRAVENOUS; SOFT TISSUE at 09:58

## 2021-10-11 NOTE — ANESTHESIA POSTPROCEDURE EVALUATION
Patient: Saul Alcantara Sr.    Procedure Summary     Date: 10/11/21 Room / Location:  [facility] OR 02 /  [facility]    Anesthesia Start: 0937 Anesthesia Stop: 1019    Procedure: Direct laryngoscopy, esophagoscopy with Johnson dilatation (N/A ) Diagnosis:       Primary squamous cell carcinoma of tongue (HCC)      Pharyngoesophageal dysphagia      Laryngopharyngeal reflux      (Primary squamous cell carcinoma of tongue (HCC) [C02.9])      (Pharyngoesophageal dysphagia [R13.14])      (Laryngopharyngeal reflux [K21.9])    Surgeons: Wayne Richmond MD Provider: Kalin Coyle CRNA    Anesthesia Type: general ASA Status: 3          Anesthesia Type: general    Vitals  Vitals Value Taken Time   BP     Temp     Pulse 57 10/11/21 1019   Resp     SpO2 99 % 10/11/21 1019   Vitals shown include unvalidated device data.        Post Anesthesia Care and Evaluation    Patient location during evaluation: PACU

## 2021-10-11 NOTE — DISCHARGE INSTRUCTIONS

## 2021-10-11 NOTE — OP NOTE
OPERATIVE NOTE  10/11/2021    NAME: Saul Alcantara Sr.    YOB: 1955  MRN: [id number]    PRE-OPERATIVE DIAGNOSIS:    Primary squamous cell carcinoma of tongue (HCC) [C02.9]  Pharyngoesophageal dysphagia [R13.14]  Laryngopharyngeal reflux [K21.9]    POST-OPERATIVE DIAGNOSIS:   Post-Op Diagnosis Codes:     * Primary squamous cell carcinoma of tongue (HCC) [C02.9]     * Pharyngoesophageal dysphagia [R13.14]     * Laryngopharyngeal reflux [K21.9]    PROCEDURE PERFORMED:   Direct laryngoscopy  Esophagoscopy with Johnson dilatation    SURGEON:   Wayne Richmond MD    ASSISTANT(S):   None    ANESTHESIA:   General Anesthesia via Endotracheal Tube    INDICATIONS: The patient is a 66 y.o. male with Primary squamous cell carcinoma of tongue (HCC) [C02.9]  Pharyngoesophageal dysphagia [R13.14]  Laryngopharyngeal reflux [K21.9]    PROCEDURE:  The patient was brought to the operating room, given General Anesthesia via Endotracheal Tube, and prepped and draped in the usual manner.     Direct laryngoscopy was performed and no masses, lesions, ulcerations or other abnormalities were noted throughout the upper aerodigestive tract examination.  Minimal edema both true vocal cords were appreciated but there were no lesions.  Rigid esophagoscopy was performed the small scope was not able to be passed to the cricopharyngeal opening.    Subsequently Johnson dilatation was performed with successive passage of Johnson dilators of 24 German, 26 German, 30 German, 32 German, 34 German, 36 German, 38 German, 40 German, 42 German, 46 German, 50 German, 58 German and finally 60 German.  The scope of the esophagus scope was able to be passed to 25 cm without difficulty and the mucosa was relatively normal in appearance.    The patient was transported upon extubation to the postanesthesia care unit in stable condition.       SPECIMENS:  None    COMPLICATIONS: NONE    ESTIMATED BLOOD LOSS:  Minimal    Wayne Richmond  MD  10/11/2021

## 2021-10-11 NOTE — ANESTHESIA PROCEDURE NOTES
Airway  Urgency: elective    Date/Time: 10/11/2021 9:40 AM  Airway not difficult    General Information and Staff    Patient location during procedure: OR  CRNA: Kalin Coyle CRNA    Indications and Patient Condition  Indications for airway management: airway protection    Preoxygenated: yes  MILS maintained throughout  Mask difficulty assessment: 1 - vent by mask    Final Airway Details  Final airway type: endotracheal airway      Successful airway: ETT (MLT)  Cuffed: yes   Successful intubation technique: direct laryngoscopy  Endotracheal tube insertion site: oral  Blade: Zamora  Blade size: 2  ETT size (mm): 6.0  Cormack-Lehane Classification: grade I - full view of glottis  Placement verified by: chest auscultation and capnometry   Cuff volume (mL): 5  Measured from: lips  ETT/EBT  to lips (cm): 20  Number of attempts at approach: 1  Assessment: lips, teeth, and gum same as pre-op and atraumatic intubation

## 2021-10-11 NOTE — ANESTHESIA PREPROCEDURE EVALUATION
Anesthesia Evaluation     Patient summary reviewed   no history of anesthetic complications:  NPO Solid Status: > 8 hours             Airway   Mallampati: I  TM distance: >3 FB  Neck ROM: full  Dental    (+) edentulous and upper dentures    Pulmonary    (+) a smoker (quit 1 week ago, prior smokes in morning with coffee) Former, COPD,   (-) asthma, recent URI, sleep apnea  Cardiovascular   Exercise tolerance: good (4-7 METS)    ECG reviewed    (+) hypertension, hyperlipidemia,   (-) pacemaker, past MI, angina, cardiac stents      Neuro/Psych  (-) seizures, TIA, CVA  GI/Hepatic/Renal/Endo    (+)  GERD,  thyroid problem hypothyroidism  (-) liver disease, no renal disease, diabetes    Musculoskeletal     Abdominal    Substance History      OB/GYN          Other   arthritis,    history of cancer (SCC of tongue in past)      Other Comment: Xerostomia                  Anesthesia Plan    ASA 3     general   (Repeat BMP pending)  intravenous induction     Anesthetic plan, all risks, benefits, and alternatives have been provided, discussed and informed consent has been obtained with: patient.

## 2021-10-11 NOTE — H&P
"YOB: 1955  Location: [facility]  Location Address: [address] [place] [address], [facility], [address] [place] [address]  Location Phone: [phone]         Chief Complaint   Patient presents with   • Follow-up         History of Present Illness  Saul Alcantara Sr. is a 66 y.o. male.  Saul Alcantara Sr. is status post Direct laryngoscopy and esophagoscopy with esophageal dilatation on 21 and 21. Patient had DL and biopsy of base of tongue on 9/10/14 with positive pathology for SCCa. The cancer was staged T1M0N0. The patient has a history of radiation therapy due to a base of tongue cancer on the left side that he finished in 2014. Patient is status post Direct laryngoscopy, esophagoscopy with dilatation on 20 and Rigid esophagoscopy and Johnson esophageal dilatation on 20. Patient is doing well postop with no complaints of sore throat or dysphagia. He is using peridex mouthwash which seems to help mouth and throat.      He feels less \"stretching\" was really good and states that he is doing much better.        Medical History        Past Medical History:   Diagnosis Date   • Allergic rhinitis     • Anxiety     • Arthritis     • Asthma     • Cataract     • Chronic rhinitis     • Chronic sinusitis     • COPD (chronic obstructive pulmonary disease) (CMS/[facility])     • COVID-19 vaccine series completed       Moderna   • Depression     • Deviated nasal septum     • Enlarged prostate     • GERD (gastroesophageal reflux disease)     • H/O head and neck radiation 3/3/2017   • History of transfusion     • Hyperlipidemia     • Hypertension     • Hypothyroidism     • Laryngopharyngeal reflux     • Lymphoma (CMS/[facility])       Non-Hodgkins   • MRSA infection     • Panic attacks     • Peyronie disease     • Pneumonia     • Primary squamous cell carcinoma of tongue (CMS/[facility]) 2016     T1N0M0 SCC S/P XRT/Chemo 2014   • Primary squamous cell carcinoma of tongue (CMS/[facility]) 2016     T1N0M0 SCC S/P " XRT/Chemo 12/2014   • Sicca syndrome (CMS/HCC)     • Sinusitis, acute     • Squamous cell carcinoma       Base of Tongue   • Tobacco use disorder     • Tongue irritation     • Xerostomia              Surgical History         Past Surgical History:   Procedure Laterality Date   • CATARACT EXTRACTION Bilateral     • ESOPHAGOSCOPY N/A 11/20/2019     Procedure: Direct laryngoscopy with esophageal Johnson dilation;  Surgeon: Wayne Richmond MD;  Location:  [facility];  Service: ENT   • HAND SURGERY         metal plate    • KNEE SURGERY       • LARYNGOSCOPY N/A 6/26/2020     Procedure: Rigid esophagoscopy Johnson esophageal dilatation;  Surgeon: Wayne Richmond MD;  Location:  [facility];  Service: ENT;  Laterality: N/A;   • LARYNGOSCOPY N/A 8/24/2020     Procedure: MICRODIRECT LARYNGOSCOPY with esophageal dilatation;  Surgeon: Wayne Richmond MD;  Location:  [facility];  Service: ENT;  Laterality: N/A;   • LARYNGOSCOPY N/A 5/24/2021     Procedure: Direct laryngoscopy, esophagoscopy with Johnson dilatation;  Surgeon: Wayne Richmond MD;  Location:  [facility];  Service: ENT;  Laterality: N/A;   • MULTIPLE TOOTH EXTRACTIONS         CONTINUING TO HAVE BONE REMOVED 1/2021   • OTHER SURGICAL HISTORY   09/10/2014     Microlaryngoscopy with Biopsy - Base of Tongue   • PANENDOSCOPY N/A 1/22/2021     Procedure: DIRECT LARYNGOSCOPY AND ESOPHAGOSCOPY WITH ESOPHAGEAL DILATION;  Surgeon: Wayne Richmond MD;  Location:  [facility];  Service: ENT;  Laterality: N/A;   • SPLENECTOMY       • SQUAMOUS CELL CARCINOMA EXCISION   09/10/2014     Tongue   • TONSILLECTOMY                Medications Taking          Outpatient Medications Marked as Taking for the 8/5/21 encounter (Office Visit) with Wayne Richmond MD   Medication Sig Dispense Refill   • albuterol (PROVENTIL) (2.5 MG/3ML) 0.083% nebulizer solution Take 2.5 mg by nebulization Every 4 (Four) Hours As Needed for Wheezing. 50 mL 1   • ALPRAZolam (XANAX) 2 MG tablet  Take 2 mg by mouth 3 (Three) Times a Day.       • amitriptyline (ELAVIL) 25 MG tablet Take 25 mg by mouth every night at bedtime.       • amLODIPine (NORVASC) 10 MG tablet Take 10 mg by mouth Daily.       • azelastine (ASTELIN) 0.1 % nasal spray 2 sprays into the nostril(s) as directed by provider Daily As Needed.       • chlorhexidine (Peridex) 0.12 % solution Apply 15 mL to the mouth or throat 2 (Two) Times a Day. 240 mL 0   • chlorhexidine (Peridex) 0.12 % solution Apply 15 mL to the mouth or throat 2 (Two) Times a Day. 240 mL 3   • fluticasone (FLONASE) 50 MCG/ACT nasal spray 2 sprays into each nostril Daily. (Patient taking differently: 2 sprays into the nostril(s) as directed by provider Daily As Needed.) 16 g 5   • fluticasone (FLONASE) 50 MCG/ACT nasal spray 2 sprays into the nostril(s) as directed by provider Daily for 30 days. Administer 2 sprays in each nostril for each dose. 1 bottle 6   • glycopyrrolate (ROBINUL) 1 MG tablet 0.5 mg 2 (Two) Times a Day.   3   • HYDROcodone-acetaminophen (NORCO) 7.5-325 MG per tablet TK 1 T PO QD PRN P   0   • levothyroxine (SYNTHROID, LEVOTHROID) 100 MCG tablet Take 100 mcg by mouth Daily.       • Lidocaine Viscous HCl (XYLOCAINE) 2 % solution Take 5 mL by mouth As Needed for Mild Pain .       • megestrol (MEGACE) 40 MG/ML suspension TAKE 20 ML BY MOUTH DAILY       • meloxicam (MOBIC) 15 MG tablet Take 15 mg by mouth Daily.       • nystatin (MYCOSTATIN) 815562 UNIT/ML suspension Take 10ml by mouth BID for 28 days (Patient taking differently: Take 200,000 Units by mouth 4 (Four) Times a Day As Needed. Take 10ml by mouth BID for 28 days) 480 mL 2   • ondansetron (ZOFRAN) 8 MG tablet Take 8 mg by mouth Every 8 (Eight) Hours As Needed for Nausea.       • pantoprazole (PROTONIX) 40 MG EC tablet Take 40 mg by mouth Daily. Delayed Release (DR/EC)  Take 1 tablet (40 mg) by oral route 2 times per day,  Take 30 minutes prior to breakfast and evening meal        • senna-docusate  (PERICOLACE) 8.6-50 MG per tablet Take 1 tablet by mouth 2 times daily       • sertraline (ZOLOFT) 100 MG tablet Take 100 mg by mouth every night at bedtime.       • simvastatin (ZOCOR) 20 MG tablet TK 1 T PO QD   11   • tamsulosin (FLOMAX) 0.4 MG capsule 24 hr capsule Take 0.4 mg by mouth Daily.       • triamcinolone (KENALOG) 0.5 % ointment Apply  topically to the appropriate area as directed 2 (Two) Times a Day. Apply to affected ear 15 g 3   • zolpidem (AMBIEN) 10 MG tablet Take 10 mg by mouth At Night As Needed for Sleep.       • [DISCONTINUED] chlorhexidine (Peridex) 0.12 % solution Apply 15 mL to the mouth or throat 2 (Two) Times a Day. 240 mL 0   • [DISCONTINUED] chlorhexidine (Peridex) 0.12 % solution Apply 15 mL to the mouth or throat 2 (Two) Times a Day. 240 mL 3   • [DISCONTINUED] tadalafil (CIALIS) 5 MG tablet Take 1 tablet by mouth Daily for 360 days. 30 tablet 11            Patient has no known allergies.           Family History   Problem Relation Age of Onset   • Diabetes Sister     • Cancer Sister           Social History   Social History            Socioeconomic History   • Marital status:        Spouse name: Not on file   • Number of children: Not on file   • Years of education: Not on file   • Highest education level: Not on file   Tobacco Use   • Smoking status: Current Some Day Smoker       Packs/day: 0.25       Types: Cigarettes   • Smokeless tobacco: Never Used   • Tobacco comment: pack last couple of weeks   Vaping Use   • Vaping Use: Never used   Substance and Sexual Activity   • Alcohol use: No   • Drug use: No   • Sexual activity: Defer            Review of Systems  Negative except as in HPI      Vitals:     08/05/21 1505   BP: 133/80   Pulse: 94   Temp: 98.4 °F (36.9 °C)         Body mass index is 21.06 kg/m².        Objective         Physical Exam  CONSTITUTIONAL: well nourished, well-developed, alert, oriented, in no acute distress      COMMUNICATION AND VOICE: able to  communicate normally, normal voice quality     HEAD: normocephalic, no lesions, atraumatic, no tenderness, no masses      FACE: appearance normal, no lesions, no tenderness, no deformities, facial motion symmetric     EYES: ocular motility normal, eyelids normal, orbits normal, no proptosis, conjunctiva normal , pupils equal, round      EARS:  Hearing: hearing to conversational voice intact bilaterally   External Ears: normal bilaterally, no lesions     NOSE:  External Nose: external nasal structure normal, no tenderness on palpation, no nasal discharge, no lesions, no evidence of trauma, nostrils patent      ORAL:  Lips: upper and lower lips without lesion   OC/OP: No masses or lesions by visualization or palpation     NECK:  Inspection and Palpation: neck appearance normal, no masses or tenderness     CHEST/RESPIRATORY: normal respiratory effort      CARDIOVASCULAR: no cyanosis or edema      NEUROLOGICAL/PSYCHIATRIC: oriented to time, place and person, mood normal, affect appropriate, CN II-XII intact grossly              Assessment/Plan      Diagnoses and all orders for this visit:     1. Primary squamous cell carcinoma of tongue (CMS/HCC) (Primary)  -     Case Request; Standing  -     Comprehensive Metabolic Panel; Future  -     CBC (No Diff); Future  -     ECG 12 Lead; Future  -     XR Chest 2 View; Future     2. Pharyngoesophageal dysphagia  -     Case Request; Standing  -     Comprehensive Metabolic Panel; Future  -     CBC (No Diff); Future  -     ECG 12 Lead; Future  -     XR Chest 2 View; Future     3. Laryngopharyngeal reflux  -     Case Request; Standing  -     Comprehensive Metabolic Panel; Future  -     CBC (No Diff); Future  -     ECG 12 Lead; Future  -     XR Chest 2 View; Future     Other orders  -     chlorhexidine (Peridex) 0.12 % solution; Apply 15 mL to the mouth or throat 2 (Two) Times a Day.  Dispense: 240 mL; Refill: 0  -     chlorhexidine (Peridex) 0.12 % solution; Apply 15 mL to the mouth  or throat 2 (Two) Times a Day.  Dispense: 240 mL; Refill: 3  -     Follow Anesthesia Guidelines / Protocol; Future  -     Obtain Informed Consent; Future  -     Follow Anesthesia Guidelines / Protocol; Standing  -     NPO Diet; Standing  -     Verify NPO Status; Standing  -     Obtain Informed Consent; Standing  -     SCD (Sequential Compression Device) - To Be Placed on Patient in Pre-Op; Standing        Direct laryngoscopy, esophagoscopy with Johnson dilatation (N/A)        Orders Placed This Encounter   Procedures   • XR Chest 2 View       Standing Status:   Future       Standing Expiration Date:   10/6/2022       Order Specific Question:   Reason for Exam:       Answer:   Direct laryngoscopy, esophagoscopy with Johnson dilatation       Order Specific Question:   Release to patient       Answer:   Immediate   • Comprehensive Metabolic Panel       Standing Status:   Future       Standing Expiration Date:   10/6/2022       Order Specific Question:   Release to patient       Answer:   Immediate   • CBC (No Diff)       Standing Status:   Future       Standing Expiration Date:   10/6/2022       Order Specific Question:   Release to patient       Answer:   Immediate   • Follow Anesthesia Guidelines / Protocol       Standing Status:   Future   • Obtain Informed Consent       Standing Status:   Future       Order Specific Question:   Informed Consent Given For       Answer:   Direct laryngoscopy, esophagoscopy with Johnson dilatation   • ECG 12 Lead       Standing Status:   Future       Standing Expiration Date:   10/6/2022       Order Specific Question:   Reason for Exam:       Answer:   Direct laryngoscopy, esophagoscopy with Johnson dilatation       Order Specific Question:   Release to patient       Answer:   Immediate      Return in about 5 months (around 1/5/2022) for Recheck.           Patient Instructions   Call return for problems  Continue Peridex as needed  Open 4 to 5 months and consider esophagoscopy with  dilatation in 6 months if needed     Be aware of the signs and symptoms of recurrent head and neck cancer including neck mass, persistent or recurrent sore throat, ear pain, hemoptysis, weight loss and hoarseness. If any of these symptoms recur and or persist, call for an evaluation.      D/W patient increasing caloric intake and discussed cruciferous vegetables and protein shakes etc to maintain optimal nutrition.  The necessity of abstaining from tobacco products was also discussed particularly with respect to not smoking.     Continue F/U and/or treatment with Dr's      ADDENDUM:  Patient called the office and is having progressive difficulty swallowing.  Although it has only been 4 months and we were trying to wait to 6 months his swallowing is significantly more problematic and consideration recommendation made for esophagoscopy with Johnson dilatation.  He is amenable and wishes to proceed this will be scheduled in the near future.  Risk benefits and options were discussed with the patient.

## 2021-11-05 ENCOUNTER — TRANSCRIBE ORDERS (OUTPATIENT)
Dept: ADMINISTRATIVE | Facility: HOSPITAL | Age: 66
End: 2021-11-05

## 2021-11-05 ENCOUNTER — LAB (OUTPATIENT)
Dept: LAB | Facility: HOSPITAL | Age: 66
End: 2021-11-05

## 2021-11-05 DIAGNOSIS — C85.90 NON-HODGKIN'S LYMPHOMA, UNSPECIFIED BODY REGION, UNSPECIFIED NON-HODGKIN LYMPHOMA TYPE (HCC): ICD-10-CM

## 2021-11-05 DIAGNOSIS — Z00.00 ENCOUNTER FOR GENERAL ADULT MEDICAL EXAMINATION WITHOUT ABNORMAL FINDINGS: Primary | ICD-10-CM

## 2021-11-05 DIAGNOSIS — Z12.5 PROSTATE CANCER SCREENING: ICD-10-CM

## 2021-11-05 DIAGNOSIS — N18.31 CHRONIC KIDNEY DISEASE (CKD) STAGE G3A/A1, MODERATELY DECREASED GLOMERULAR FILTRATION RATE (GFR) BETWEEN 45-59 ML/MIN/1.73 SQUARE METER AND ALBUMINURIA CREATININE RATIO LESS THAN 30 MG/G (CMS/H* (HCC): ICD-10-CM

## 2021-11-05 DIAGNOSIS — R97.20 ELEVATED PROSTATE SPECIFIC ANTIGEN (PSA): ICD-10-CM

## 2021-11-05 DIAGNOSIS — E78.00 PURE HYPERCHOLESTEROLEMIA: ICD-10-CM

## 2021-11-05 DIAGNOSIS — E03.9 HYPOTHYROIDISM, UNSPECIFIED TYPE: ICD-10-CM

## 2021-11-05 LAB
ANION GAP SERPL CALCULATED.3IONS-SCNC: 5.2 MMOL/L (ref 5–15)
BASOPHILS # BLD AUTO: 0.08 10*3/MM3 (ref 0–0.2)
BASOPHILS NFR BLD AUTO: 1.1 % (ref 0–1.5)
BILIRUB UR QL STRIP: NEGATIVE
BUN SERPL-MCNC: 18 MG/DL (ref 8–23)
BUN/CREAT SERPL: 14 (ref 7–25)
CALCIUM SPEC-SCNC: 10.3 MG/DL (ref 8.6–10.5)
CHLORIDE SERPL-SCNC: 101 MMOL/L (ref 98–107)
CHOLEST SERPL-MCNC: 181 MG/DL (ref 0–200)
CLARITY UR: CLEAR
CO2 SERPL-SCNC: 29.8 MMOL/L (ref 22–29)
COLOR UR: YELLOW
CREAT SERPL-MCNC: 1.29 MG/DL (ref 0.76–1.27)
DEPRECATED RDW RBC AUTO: 42.7 FL (ref 37–54)
EOSINOPHIL # BLD AUTO: 0.58 10*3/MM3 (ref 0–0.4)
EOSINOPHIL NFR BLD AUTO: 7.8 % (ref 0.3–6.2)
ERYTHROCYTE [DISTWIDTH] IN BLOOD BY AUTOMATED COUNT: 12.3 % (ref 12.3–15.4)
GFR SERPL CREATININE-BSD FRML MDRD: 56 ML/MIN/1.73
GLUCOSE SERPL-MCNC: 89 MG/DL (ref 65–99)
GLUCOSE UR STRIP-MCNC: NEGATIVE MG/DL
HCT VFR BLD AUTO: 40.3 % (ref 37.5–51)
HDLC SERPL-MCNC: 79 MG/DL (ref 40–60)
HGB BLD-MCNC: 13.4 G/DL (ref 13–17.7)
HGB UR QL STRIP.AUTO: NEGATIVE
IMM GRANULOCYTES # BLD AUTO: 0.01 10*3/MM3 (ref 0–0.05)
IMM GRANULOCYTES NFR BLD AUTO: 0.1 % (ref 0–0.5)
KETONES UR QL STRIP: NEGATIVE
LDLC SERPL CALC-MCNC: 90 MG/DL (ref 0–100)
LDLC/HDLC SERPL: 1.13 {RATIO}
LEUKOCYTE ESTERASE UR QL STRIP.AUTO: NEGATIVE
LYMPHOCYTES # BLD AUTO: 1.13 10*3/MM3 (ref 0.7–3.1)
LYMPHOCYTES NFR BLD AUTO: 15.1 % (ref 19.6–45.3)
MCH RBC QN AUTO: 32 PG (ref 26.6–33)
MCHC RBC AUTO-ENTMCNC: 33.3 G/DL (ref 31.5–35.7)
MCV RBC AUTO: 96.2 FL (ref 79–97)
MONOCYTES # BLD AUTO: 0.78 10*3/MM3 (ref 0.1–0.9)
MONOCYTES NFR BLD AUTO: 10.4 % (ref 5–12)
NEUTROPHILS NFR BLD AUTO: 4.9 10*3/MM3 (ref 1.7–7)
NEUTROPHILS NFR BLD AUTO: 65.5 % (ref 42.7–76)
NITRITE UR QL STRIP: NEGATIVE
NRBC BLD AUTO-RTO: 0 /100 WBC (ref 0–0.2)
PH UR STRIP.AUTO: 7.5 [PH] (ref 5–8)
PLATELET # BLD AUTO: 309 10*3/MM3 (ref 140–450)
PMV BLD AUTO: 11.8 FL (ref 6–12)
POTASSIUM SERPL-SCNC: 5.7 MMOL/L (ref 3.5–5.2)
PROT UR QL STRIP: NEGATIVE
PSA SERPL-MCNC: 6.14 NG/ML (ref 0–4)
RBC # BLD AUTO: 4.19 10*6/MM3 (ref 4.14–5.8)
SODIUM SERPL-SCNC: 136 MMOL/L (ref 136–145)
SP GR UR STRIP: 1.01 (ref 1–1.03)
TRIGL SERPL-MCNC: 63 MG/DL (ref 0–150)
UROBILINOGEN UR QL STRIP: NORMAL
VLDLC SERPL-MCNC: 12 MG/DL (ref 5–40)
WBC # BLD AUTO: 7.48 10*3/MM3 (ref 3.4–10.8)

## 2021-11-05 PROCEDURE — 36415 COLL VENOUS BLD VENIPUNCTURE: CPT | Performed by: NURSE PRACTITIONER

## 2021-11-05 PROCEDURE — 84153 ASSAY OF PSA TOTAL: CPT | Performed by: UROLOGY

## 2021-11-05 PROCEDURE — 85025 COMPLETE CBC W/AUTO DIFF WBC: CPT | Performed by: NURSE PRACTITIONER

## 2021-11-05 PROCEDURE — 80048 BASIC METABOLIC PNL TOTAL CA: CPT | Performed by: UROLOGY

## 2021-11-05 PROCEDURE — 80061 LIPID PANEL: CPT | Performed by: UROLOGY

## 2021-11-05 PROCEDURE — 81003 URINALYSIS AUTO W/O SCOPE: CPT | Performed by: NURSE PRACTITIONER

## 2021-11-11 ENCOUNTER — OFFICE VISIT (OUTPATIENT)
Dept: UROLOGY | Facility: CLINIC | Age: 66
End: 2021-11-11

## 2021-11-11 VITALS — TEMPERATURE: 97.6 F | BODY MASS INDEX: 20.7 KG/M2 | WEIGHT: 156.2 LBS | HEIGHT: 73 IN

## 2021-11-11 DIAGNOSIS — N40.1 BPH WITH OBSTRUCTION/LOWER URINARY TRACT SYMPTOMS: ICD-10-CM

## 2021-11-11 DIAGNOSIS — N13.8 BPH WITH OBSTRUCTION/LOWER URINARY TRACT SYMPTOMS: ICD-10-CM

## 2021-11-11 DIAGNOSIS — R97.20 ELEVATED PROSTATE SPECIFIC ANTIGEN (PSA): Primary | ICD-10-CM

## 2021-11-11 PROCEDURE — 99213 OFFICE O/P EST LOW 20 MIN: CPT | Performed by: UROLOGY

## 2021-11-11 NOTE — PROGRESS NOTES
Chief Complaint  Elevated PSA    Subjective          Saul Pozomarie Felix presents to [facility] for follow-up of elevated PSA. See PSA table below. He does have some voiding dysfunction probably secondary to BPH. Notices decreased force of stream along with urgency and frequency. He does think tamsulosin helps this.          Current Outpatient Medications:   •  albuterol (PROVENTIL) (2.5 MG/3ML) 0.083% nebulizer solution, Take 2.5 mg by nebulization Every 4 (Four) Hours As Needed for Wheezing., Disp: 50 mL, Rfl: 1  •  ALPRAZolam (XANAX) 2 MG tablet, Take 2 mg by mouth 3 (Three) Times a Day., Disp: , Rfl:   •  amitriptyline (ELAVIL) 25 MG tablet, Take 25 mg by mouth every night at bedtime., Disp: , Rfl:   •  amLODIPine (NORVASC) 10 MG tablet, Take 10 mg by mouth Daily., Disp: , Rfl:   •  azelastine (ASTELIN) 0.1 % nasal spray, 2 sprays into the nostril(s) as directed by provider Daily As Needed., Disp: , Rfl:   •  chlorhexidine (Peridex) 0.12 % solution, Apply 15 mL to the mouth or throat 2 (Two) Times a Day., Disp: 240 mL, Rfl: 3  •  fluticasone (FLONASE) 50 MCG/ACT nasal spray, 2 sprays into each nostril Daily. (Patient taking differently: 2 sprays into the nostril(s) as directed by provider Daily As Needed.), Disp: 16 g, Rfl: 5  •  glycopyrrolate (ROBINUL) 1 MG tablet, 0.5 mg 2 (Two) Times a Day., Disp: , Rfl: 3  •  HYDROcodone-acetaminophen (NORCO) 7.5-325 MG per tablet, Take 1 tablet by mouth 2 (Two) Times a Day As Needed for Moderate Pain ., Disp: , Rfl: 0  •  levothyroxine (SYNTHROID, LEVOTHROID) 100 MCG tablet, Take 100 mcg by mouth Daily., Disp: , Rfl:   •  Lidocaine Viscous HCl (XYLOCAINE) 2 % solution, Take 5 mL by mouth As Needed for Mild Pain ., Disp: , Rfl:   •  megestrol (MEGACE) 40 MG/ML suspension, TAKE 20 ML BY MOUTH DAILY, Disp: , Rfl:   •  meloxicam (MOBIC) 15 MG tablet, Take 15 mg by mouth Daily., Disp: , Rfl:   •  nystatin (MYCOSTATIN) 507205 UNIT/ML suspension, Take  10ml by mouth BID for 28 days (Patient taking differently: Take 200,000 Units by mouth 4 (Four) Times a Day As Needed. Take 10ml by mouth BID for 28 days), Disp: 480 mL, Rfl: 2  •  ondansetron (ZOFRAN) 8 MG tablet, Take 8 mg by mouth Every 8 (Eight) Hours As Needed for Nausea., Disp: , Rfl:   •  pantoprazole (PROTONIX) 40 MG EC tablet, Take 40 mg by mouth Daily. Delayed Release (DR/EC) Take 1 tablet (40 mg) by oral route 2 times per day, Take 30 minutes prior to breakfast and evening meal , Disp: , Rfl:   •  senna-docusate (PERICOLACE) 8.6-50 MG per tablet, Take 1 tablet by mouth 2 times daily, Disp: , Rfl:   •  sertraline (ZOLOFT) 100 MG tablet, Take 100 mg by mouth every night at bedtime., Disp: , Rfl:   •  simvastatin (ZOCOR) 20 MG tablet, TK 1 T PO QD, Disp: , Rfl: 11  •  tadalafil (CIALIS) 20 MG tablet, Take 1 tablet by mouth As Needed for Erectile Dysfunction for up to 360 days. 1-24 hours before activity, Disp: 12 tablet, Rfl: 11  •  tamsulosin (FLOMAX) 0.4 MG capsule 24 hr capsule, Take 0.4 mg by mouth Daily., Disp: , Rfl:   •  triamcinolone (KENALOG) 0.5 % ointment, Apply  topically to the appropriate area as directed 2 (Two) Times a Day. Apply to affected ear, Disp: 15 g, Rfl: 3  •  zolpidem (AMBIEN) 10 MG tablet, Take 10 mg by mouth At Night As Needed for Sleep., Disp: , Rfl:   Past Medical History:   Diagnosis Date   • Allergic rhinitis    • Anxiety    • Arthritis    • Asthma    • Cataract    • Chronic rhinitis    • Chronic sinusitis    • COPD (chronic obstructive pulmonary disease) (HCC)    • COVID-19 vaccine series completed     Moderna   • Depression    • Deviated nasal septum    • Enlarged prostate    • GERD (gastroesophageal reflux disease)    • H/O head and neck radiation 3/3/2017   • History of transfusion    • Hyperlipidemia    • Hypertension    • Hypothyroidism    • Laryngopharyngeal reflux    • Lymphoma (HCC)     Non-Hodgkins   • MRSA infection    • Panic attacks    • Peyronie disease    •  Pneumonia    • 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    T1N0M0 SCC S/P XRT/Chemo 12/2014   • 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    T1N0M0 SCC S/P XRT/Chemo 12/2014   • Sicca syndrome (HCC)    • Sinusitis, acute    • Squamous cell carcinoma     Base of Tongue   • Tobacco use disorder    • Tongue irritation    • Visit for monitoring Rituxan therapy     pt getting treatments at [facility] office in [place]   • Xerostomia      Past Surgical History:   Procedure Laterality Date   • CATARACT EXTRACTION Bilateral    • ESOPHAGOSCOPY N/A 11/20/2019    Procedure: Direct laryngoscopy with esophageal Johnson dilation;  Surgeon: Wayne Richmond MD;  Location:  [facility];  Service: ENT   • HAND SURGERY      metal plate    • KNEE SURGERY     • LARYNGOSCOPY N/A 6/26/2020    Procedure: Rigid esophagoscopy Johnson esophageal dilatation;  Surgeon: Wayne Richmond MD;  Location:  [facility];  Service: ENT;  Laterality: N/A;   • LARYNGOSCOPY N/A 8/24/2020    Procedure: MICRODIRECT LARYNGOSCOPY with esophageal dilatation;  Surgeon: Wayne Richmond MD;  Location:  [facility];  Service: ENT;  Laterality: N/A;   • LARYNGOSCOPY N/A 5/24/2021    Procedure: Direct laryngoscopy, esophagoscopy with Johnson dilatation;  Surgeon: Wayne Richmond MD;  Location:  [facility];  Service: ENT;  Laterality: N/A;   • MULTIPLE TOOTH EXTRACTIONS      CONTINUING TO HAVE BONE REMOVED 1/2021   • OTHER SURGICAL HISTORY  09/10/2014    Microlaryngoscopy with Biopsy - Base of Tongue   • PANENDOSCOPY N/A 1/22/2021    Procedure: DIRECT LARYNGOSCOPY AND ESOPHAGOSCOPY WITH ESOPHAGEAL DILATION;  Surgeon: Wayne Richmond MD;  Location:  [facility];  Service: ENT;  Laterality: N/A;   • PANENDOSCOPY N/A 10/11/2021    Procedure: Direct laryngoscopy, esophagoscopy with Johnson dilatation;  Surgeon: Wayne Richmond MD;  Location:  [facility];  Service: ENT;  Laterality: N/A;   • SPLENECTOMY     • SQUAMOUS  "CELL CARCINOMA EXCISION  09/10/2014    Tongue   • TONSILLECTOMY             Review  of systems  Constitutional: Negative for chills and fever.   Gastrointestinal: Negative for abdominal pain, anal bleeding and blood in stool.   Genitourinary: Negative for hematuria or flank pain        Objective   PHYSICAL EXAM  Vital Signs:   Temp 97.6 °F (36.4 °C)   Ht 185.4 cm (73\")   Wt 70.9 kg (156 lb 3.2 oz)   BMI 20.61 kg/m²     Constitutional: Patient is without distress or deformity.  Vital signs are reviewed as above.    Neuro: No confusion; No disorientation; Alert and oriented  Pulmonary: No respiratory distress.   Skin: No pallor or diaphoresis      DATA  Result Review :                Lab Results   Component Value Date    PSA 6.070 (H) 11/05/2021    PSA 6.140 (H)  4.99 11/05/2021 07/07/2021    PSA 2.060 12/29/2016            ASSESSMENT AND PLAN          Problem List Items Addressed This Visit     None      Visit Diagnoses     Elevated prostate specific antigen (PSA)    -  Primary    BPH with obstruction/lower urinary tract symptoms          I I think this patient needs a prostate biopsy.  We recently went through pros and cons of PSA screening and discussion that included risks and benefits of biopsy.  I again reiterated that a patient can have a false negative result since we know about 10% of patients that are diagnosed with prostate cancer have a history of negative biopsy.  Also explained that the patient can be diagnosed with a low volume, low risk prostate cancer that may never become life-threatening or symptomatic. They can require additional biopsies to confirm lack of progression.  We we also talked about patients undergoing treatment for prostate cancer exposing them to the risks of urinary and sexual side effects that can be prolonged or even permanent.  Lastly we discussed the potential use of MRI which could lead to a \"targeted\" biopsy. Currently, while I did offer this as an option, I do not think " this is the best approach considering deciding whether or not to do biopsy or just hitting the suspicious lesion from MRI could still miss 20 to 30% high risk cancers.  The current AUA guidelines do not include MRI for the initial biopsy but recommend we offer it as an option. We have experienced some difficulty getting some insurance plans to cover the study.  Risk of bleeding complications, transient sexual dysfunction , and infection which could lead to life-threatening sepsis are also discussed for biopsy. I do need to contact Dr. Mery Rainey, Oncology at [facility], about his current treatment regimen. I think given the risk of infection I can certainly do a transperineal biopsy which would decrease that risk significantly. The other question is whether or not he would be a reasonable candidate for active surveillance if he had a low-grade low risk prostate cancer considering that he has had non-Hodgkin's lymphoma as well as squamous cell carcinoma the base of the tongue.     FOLLOW UP     No follow-ups on file.        (Please note that portions of this note were completed with a voice recognition program.)  Oscar Bazan MD  11/12/21  09:23 CST

## 2021-11-16 ENCOUNTER — PREP FOR SURGERY (OUTPATIENT)
Dept: OTHER | Facility: HOSPITAL | Age: 66
End: 2021-11-16

## 2021-11-16 ENCOUNTER — TELEPHONE (OUTPATIENT)
Dept: UROLOGY | Facility: CLINIC | Age: 66
End: 2021-11-16

## 2021-11-16 DIAGNOSIS — R97.20 ELEVATED PROSTATE SPECIFIC ANTIGEN (PSA): Primary | ICD-10-CM

## 2021-11-16 RX ORDER — LEVOFLOXACIN 5 MG/ML
500 INJECTION, SOLUTION INTRAVENOUS ONCE
Status: CANCELLED | OUTPATIENT
Start: 2021-11-16 | End: 2021-11-16

## 2021-11-16 RX ORDER — FLUTICASONE PROPIONATE 50 MCG
SPRAY, SUSPENSION (ML) NASAL
Qty: 48 ML | Refills: 2 | OUTPATIENT
Start: 2021-11-16

## 2021-11-16 NOTE — TELEPHONE ENCOUNTER
I had the opportunity to speak with Dr. Mery Rainey @ [facility],  his oncologist.  She did tell me that he is doing well from his treatment for lymphoma.  She sees no reason that we should avoid biopsy.  His lymphoma is more of a chronic malignancy with protracted course now present over a decade.  She did mention that a low-grade low risk prostate cancer should warrant a conservative posture.I would certainly agree with that.    Electronically signed by Oscar Bazan MD, 11/16/21, 8:41 AM CST.

## 2021-11-17 PROBLEM — R97.20 ELEVATED PROSTATE SPECIFIC ANTIGEN (PSA): Status: ACTIVE | Noted: 2021-11-17

## 2021-11-22 ENCOUNTER — TELEPHONE (OUTPATIENT)
Dept: OTOLARYNGOLOGY | Facility: CLINIC | Age: 66
End: 2021-11-22

## 2021-11-22 NOTE — TELEPHONE ENCOUNTER
Caller:  ALYSSIA BERNARDO    Relationship: WIFE     Best call back number: [phone]    What is your medical concern? DIFFICULTY SWALLOWING AND TROUBLES WITH TONGUE RUBBING AGAINST DENTURES. SAID FEELS LIKE IT IS SWELLING.    How long has this issue been going on? COUPLE MONTHS    Is your provider already aware of this issue? YES    Have you been treated for this issue? NO.    LEAVE VM IF NOT ABLE TO REACH PT.

## 2021-11-29 ENCOUNTER — TELEPHONE (OUTPATIENT)
Dept: OTOLARYNGOLOGY | Facility: CLINIC | Age: 66
End: 2021-11-29

## 2021-11-29 DIAGNOSIS — N13.8 BPH WITH OBSTRUCTION/LOWER URINARY TRACT SYMPTOMS: Primary | ICD-10-CM

## 2021-11-29 DIAGNOSIS — N40.1 BPH WITH OBSTRUCTION/LOWER URINARY TRACT SYMPTOMS: Primary | ICD-10-CM

## 2021-11-29 NOTE — TELEPHONE ENCOUNTER
Caller:  ALYSSIA BERNARDO    Relationship: WIFE     Best call back number: [phone]    What is your medical concern? CANNOT SWALLOW    How long has this issue been going on? COUPLE WEEKS    Is your provider already aware of this issue? NO    Have you been treated for this issue? NO    PT HAS BEEN UNABLE TO SWALLOW FOR SOMETIME. BEEN GOING ON FOR ABOUT TWO WEEKS BUT IS GETTING WORSE. HAS FUP IN JAN. 2022 WITH DR. LECHUGA BUT WOULD LIKE TO COME IN SOONER. PLEASE CALL TO DISCUSS OR LEAVE VM IF UNABLE TO REACH.

## 2021-11-30 ENCOUNTER — LAB (OUTPATIENT)
Dept: LAB | Facility: HOSPITAL | Age: 66
End: 2021-11-30

## 2021-11-30 ENCOUNTER — PRE-ADMISSION TESTING (OUTPATIENT)
Dept: PREADMISSION TESTING | Facility: HOSPITAL | Age: 66
End: 2021-11-30

## 2021-11-30 ENCOUNTER — TELEPHONE (OUTPATIENT)
Dept: UROLOGY | Facility: CLINIC | Age: 66
End: 2021-11-30

## 2021-11-30 VITALS
DIASTOLIC BLOOD PRESSURE: 84 MMHG | SYSTOLIC BLOOD PRESSURE: 134 MMHG | BODY MASS INDEX: 20.66 KG/M2 | OXYGEN SATURATION: 98 % | HEIGHT: 73 IN | RESPIRATION RATE: 18 BRPM | HEART RATE: 103 BPM | WEIGHT: 155.87 LBS

## 2021-11-30 DIAGNOSIS — N40.1 BPH WITH OBSTRUCTION/LOWER URINARY TRACT SYMPTOMS: ICD-10-CM

## 2021-11-30 DIAGNOSIS — N13.8 BPH WITH OBSTRUCTION/LOWER URINARY TRACT SYMPTOMS: ICD-10-CM

## 2021-11-30 LAB — SARS-COV-2 RNA PNL SPEC NAA+PROBE: NOT DETECTED

## 2021-11-30 PROCEDURE — C9803 HOPD COVID-19 SPEC COLLECT: HCPCS

## 2021-11-30 PROCEDURE — 87635 SARS-COV-2 COVID-19 AMP PRB: CPT

## 2021-11-30 NOTE — TELEPHONE ENCOUNTER
Caller: ALISSA BERNARDO AND ALYSSIA BERNARDO    Relationship: SELF AND WIFE    Best call back number: [phone]    What is the best time to reach you: ANYTIME ASAP    Who are you requesting to speak with (clinical staff, provider,  specific staff member):  NURSE      What was the call regarding: QUESTIONS REGARDING TOMORROW PROCEDURE WITH , WANTING TO KNOW IF DR. LECHUGA (ENT) WILL ALSO BE PRESENT TO STRETCH THROAT.    Do you require a callback:YES

## 2021-12-01 ENCOUNTER — HOSPITAL ENCOUNTER (OUTPATIENT)
Facility: HOSPITAL | Age: 66
Setting detail: HOSPITAL OUTPATIENT SURGERY
Discharge: HOME | End: 2021-12-01
Attending: UROLOGY | Admitting: UROLOGY

## 2021-12-01 ENCOUNTER — ANESTHESIA EVENT (OUTPATIENT)
Dept: PERIOP | Facility: HOSPITAL | Age: 66
End: 2021-12-01

## 2021-12-01 ENCOUNTER — ANESTHESIA (OUTPATIENT)
Dept: PERIOP | Facility: HOSPITAL | Age: 66
End: 2021-12-01

## 2021-12-01 VITALS
DIASTOLIC BLOOD PRESSURE: 72 MMHG | OXYGEN SATURATION: 96 % | RESPIRATION RATE: 19 BRPM | HEART RATE: 88 BPM | TEMPERATURE: 97.4 F | SYSTOLIC BLOOD PRESSURE: 138 MMHG

## 2021-12-01 DIAGNOSIS — R97.20 ELEVATED PROSTATE SPECIFIC ANTIGEN (PSA): ICD-10-CM

## 2021-12-01 LAB
ANION GAP SERPL CALCULATED.3IONS-SCNC: 10 MMOL/L (ref 5–15)
BUN SERPL-MCNC: 19 MG/DL (ref 8–23)
BUN/CREAT SERPL: 17 (ref 7–25)
CALCIUM SPEC-SCNC: 9.1 MG/DL (ref 8.6–10.5)
CHLORIDE SERPL-SCNC: 103 MMOL/L (ref 98–107)
CO2 SERPL-SCNC: 27 MMOL/L (ref 22–29)
CREAT SERPL-MCNC: 1.12 MG/DL (ref 0.76–1.27)
GFR SERPL CREATININE-BSD FRML MDRD: 66 ML/MIN/1.73
GLUCOSE SERPL-MCNC: 94 MG/DL (ref 65–99)
POTASSIUM SERPL-SCNC: 3.5 MMOL/L (ref 3.5–5.2)
SODIUM SERPL-SCNC: 140 MMOL/L (ref 136–145)

## 2021-12-01 PROCEDURE — 43450 DILATE ESOPHAGUS 1/MULT PASS: CPT | Performed by: OTOLARYNGOLOGY

## 2021-12-01 PROCEDURE — G0416 PROSTATE BIOPSY, ANY MTHD: HCPCS | Performed by: UROLOGY

## 2021-12-01 PROCEDURE — 31525 DX LARYNGOSCOPY EXCL NB: CPT | Performed by: OTOLARYNGOLOGY

## 2021-12-01 PROCEDURE — 55700 PR PROSTATE NEEDLE BIOPSY ANY APPROACH: CPT | Performed by: UROLOGY

## 2021-12-01 PROCEDURE — 0 LIDOCAINE 1 % SOLUTION: Performed by: UROLOGY

## 2021-12-01 PROCEDURE — 25010000002 LEVOFLOXACIN PER 250 MG: Performed by: UROLOGY

## 2021-12-01 PROCEDURE — 80048 BASIC METABOLIC PNL TOTAL CA: CPT | Performed by: UROLOGY

## 2021-12-01 PROCEDURE — 25010000002 PROPOFOL 10 MG/ML EMULSION: Performed by: NURSE ANESTHETIST, CERTIFIED REGISTERED

## 2021-12-01 PROCEDURE — 88342 IMHCHEM/IMCYTCHM 1ST ANTB: CPT | Performed by: UROLOGY

## 2021-12-01 PROCEDURE — 76942 ECHO GUIDE FOR BIOPSY: CPT | Performed by: UROLOGY

## 2021-12-01 PROCEDURE — 25010000002 FENTANYL CITRATE (PF) 100 MCG/2ML SOLUTION: Performed by: NURSE ANESTHETIST, CERTIFIED REGISTERED

## 2021-12-01 RX ORDER — SODIUM CHLORIDE, SODIUM LACTATE, POTASSIUM CHLORIDE, CALCIUM CHLORIDE 600; 310; 30; 20 MG/100ML; MG/100ML; MG/100ML; MG/100ML
100 INJECTION, SOLUTION INTRAVENOUS CONTINUOUS
Status: DISCONTINUED | OUTPATIENT
Start: 2021-12-01 | End: 2021-12-01 | Stop reason: HOSPADM

## 2021-12-01 RX ORDER — SODIUM CHLORIDE, SODIUM LACTATE, POTASSIUM CHLORIDE, CALCIUM CHLORIDE 600; 310; 30; 20 MG/100ML; MG/100ML; MG/100ML; MG/100ML
1000 INJECTION, SOLUTION INTRAVENOUS CONTINUOUS
Status: DISCONTINUED | OUTPATIENT
Start: 2021-12-01 | End: 2021-12-01 | Stop reason: HOSPADM

## 2021-12-01 RX ORDER — PROPOFOL 10 MG/ML
VIAL (ML) INTRAVENOUS AS NEEDED
Status: DISCONTINUED | OUTPATIENT
Start: 2021-12-01 | End: 2021-12-01 | Stop reason: SURG

## 2021-12-01 RX ORDER — FENTANYL CITRATE 50 UG/ML
INJECTION, SOLUTION INTRAMUSCULAR; INTRAVENOUS AS NEEDED
Status: DISCONTINUED | OUTPATIENT
Start: 2021-12-01 | End: 2021-12-01 | Stop reason: SURG

## 2021-12-01 RX ORDER — FLUMAZENIL 0.1 MG/ML
0.2 INJECTION INTRAVENOUS AS NEEDED
Status: DISCONTINUED | OUTPATIENT
Start: 2021-12-01 | End: 2021-12-01 | Stop reason: HOSPADM

## 2021-12-01 RX ORDER — MIDAZOLAM HYDROCHLORIDE 1 MG/ML
1 INJECTION INTRAMUSCULAR; INTRAVENOUS
Status: DISCONTINUED | OUTPATIENT
Start: 2021-12-01 | End: 2021-12-01 | Stop reason: HOSPADM

## 2021-12-01 RX ORDER — ONDANSETRON 2 MG/ML
4 INJECTION INTRAMUSCULAR; INTRAVENOUS ONCE AS NEEDED
Status: DISCONTINUED | OUTPATIENT
Start: 2021-12-01 | End: 2021-12-01 | Stop reason: HOSPADM

## 2021-12-01 RX ORDER — PHENYLEPHRINE HCL IN 0.9% NACL 1 MG/10 ML
SYRINGE (ML) INTRAVENOUS AS NEEDED
Status: DISCONTINUED | OUTPATIENT
Start: 2021-12-01 | End: 2021-12-01 | Stop reason: SURG

## 2021-12-01 RX ORDER — LIDOCAINE HYDROCHLORIDE 10 MG/ML
0.5 INJECTION, SOLUTION EPIDURAL; INFILTRATION; INTRACAUDAL; PERINEURAL ONCE AS NEEDED
Status: DISCONTINUED | OUTPATIENT
Start: 2021-12-01 | End: 2021-12-01 | Stop reason: HOSPADM

## 2021-12-01 RX ORDER — ROCURONIUM BROMIDE 10 MG/ML
INJECTION, SOLUTION INTRAVENOUS AS NEEDED
Status: DISCONTINUED | OUTPATIENT
Start: 2021-12-01 | End: 2021-12-01 | Stop reason: SURG

## 2021-12-01 RX ORDER — LIDOCAINE HYDROCHLORIDE 20 MG/ML
INJECTION, SOLUTION EPIDURAL; INFILTRATION; INTRACAUDAL; PERINEURAL AS NEEDED
Status: DISCONTINUED | OUTPATIENT
Start: 2021-12-01 | End: 2021-12-01 | Stop reason: SURG

## 2021-12-01 RX ORDER — ULTRASOUND COUPLING MEDIUM
GEL (GRAM) TOPICAL AS NEEDED
Status: DISCONTINUED | OUTPATIENT
Start: 2021-12-01 | End: 2021-12-01 | Stop reason: HOSPADM

## 2021-12-01 RX ORDER — HYDROCODONE BITARTRATE AND ACETAMINOPHEN 5; 325 MG/1; MG/1
1 TABLET ORAL EVERY 8 HOURS PRN
Qty: 6 TABLET | Refills: 0 | Status: ON HOLD | OUTPATIENT
Start: 2021-12-01 | End: 2022-01-03

## 2021-12-01 RX ORDER — NEOSTIGMINE METHYLSULFATE 5 MG/5 ML
SYRINGE (ML) INTRAVENOUS AS NEEDED
Status: DISCONTINUED | OUTPATIENT
Start: 2021-12-01 | End: 2021-12-01 | Stop reason: SURG

## 2021-12-01 RX ORDER — HYDROCODONE BITARTRATE AND ACETAMINOPHEN 5; 325 MG/1; MG/1
1 TABLET ORAL ONCE AS NEEDED
Status: DISCONTINUED | OUTPATIENT
Start: 2021-12-01 | End: 2021-12-01 | Stop reason: HOSPADM

## 2021-12-01 RX ORDER — ONDANSETRON 4 MG/1
4 TABLET, FILM COATED ORAL ONCE AS NEEDED
Status: DISCONTINUED | OUTPATIENT
Start: 2021-12-01 | End: 2021-12-01 | Stop reason: HOSPADM

## 2021-12-01 RX ORDER — LIDOCAINE HYDROCHLORIDE 40 MG/ML
SOLUTION TOPICAL AS NEEDED
Status: DISCONTINUED | OUTPATIENT
Start: 2021-12-01 | End: 2021-12-01 | Stop reason: SURG

## 2021-12-01 RX ORDER — SODIUM CHLORIDE 0.9 % (FLUSH) 0.9 %
3 SYRINGE (ML) INJECTION AS NEEDED
Status: DISCONTINUED | OUTPATIENT
Start: 2021-12-01 | End: 2021-12-01 | Stop reason: HOSPADM

## 2021-12-01 RX ORDER — EPHEDRINE SULFATE 50 MG/ML
INJECTION, SOLUTION INTRAVENOUS AS NEEDED
Status: DISCONTINUED | OUTPATIENT
Start: 2021-12-01 | End: 2021-12-01 | Stop reason: SURG

## 2021-12-01 RX ORDER — LABETALOL HYDROCHLORIDE 5 MG/ML
5 INJECTION, SOLUTION INTRAVENOUS
Status: DISCONTINUED | OUTPATIENT
Start: 2021-12-01 | End: 2021-12-01 | Stop reason: HOSPADM

## 2021-12-01 RX ORDER — HYDROCODONE BITARTRATE AND ACETAMINOPHEN 5; 325 MG/1; MG/1
1 TABLET ORAL EVERY 8 HOURS PRN
Status: DISCONTINUED | OUTPATIENT
Start: 2021-12-01 | End: 2021-12-01 | Stop reason: HOSPADM

## 2021-12-01 RX ORDER — LIDOCAINE HYDROCHLORIDE 10 MG/ML
INJECTION, SOLUTION INFILTRATION; PERINEURAL AS NEEDED
Status: DISCONTINUED | OUTPATIENT
Start: 2021-12-01 | End: 2021-12-01 | Stop reason: HOSPADM

## 2021-12-01 RX ORDER — LEVOFLOXACIN 5 MG/ML
500 INJECTION, SOLUTION INTRAVENOUS ONCE
Status: COMPLETED | OUTPATIENT
Start: 2021-12-01 | End: 2021-12-01

## 2021-12-01 RX ORDER — NALOXONE HCL 0.4 MG/ML
0.4 VIAL (ML) INJECTION AS NEEDED
Status: DISCONTINUED | OUTPATIENT
Start: 2021-12-01 | End: 2021-12-01 | Stop reason: HOSPADM

## 2021-12-01 RX ORDER — ACETAMINOPHEN 500 MG
1000 TABLET ORAL ONCE
Status: DISCONTINUED | OUTPATIENT
Start: 2021-12-01 | End: 2021-12-01 | Stop reason: HOSPADM

## 2021-12-01 RX ORDER — FENTANYL CITRATE 50 UG/ML
25 INJECTION, SOLUTION INTRAMUSCULAR; INTRAVENOUS
Status: DISCONTINUED | OUTPATIENT
Start: 2021-12-01 | End: 2021-12-01 | Stop reason: HOSPADM

## 2021-12-01 RX ORDER — SODIUM CHLORIDE 0.9 % (FLUSH) 0.9 %
3-10 SYRINGE (ML) INJECTION AS NEEDED
Status: DISCONTINUED | OUTPATIENT
Start: 2021-12-01 | End: 2021-12-01 | Stop reason: HOSPADM

## 2021-12-01 RX ORDER — SODIUM CHLORIDE 0.9 % (FLUSH) 0.9 %
3 SYRINGE (ML) INJECTION EVERY 12 HOURS SCHEDULED
Status: DISCONTINUED | OUTPATIENT
Start: 2021-12-01 | End: 2021-12-01 | Stop reason: HOSPADM

## 2021-12-01 RX ORDER — MAGNESIUM HYDROXIDE 1200 MG/15ML
LIQUID ORAL AS NEEDED
Status: DISCONTINUED | OUTPATIENT
Start: 2021-12-01 | End: 2021-12-01 | Stop reason: HOSPADM

## 2021-12-01 RX ADMIN — PROPOFOL 50 MG: 10 INJECTION, EMULSION INTRAVENOUS at 09:35

## 2021-12-01 RX ADMIN — EPHEDRINE SULFATE 20 MG: 50 INJECTION INTRAVENOUS at 09:58

## 2021-12-01 RX ADMIN — FENTANYL CITRATE 100 MCG: 50 INJECTION, SOLUTION INTRAMUSCULAR; INTRAVENOUS at 09:34

## 2021-12-01 RX ADMIN — PROPOFOL 150 MG: 10 INJECTION, EMULSION INTRAVENOUS at 09:34

## 2021-12-01 RX ADMIN — Medication 3 MG: at 10:22

## 2021-12-01 RX ADMIN — Medication 200 MCG: at 09:46

## 2021-12-01 RX ADMIN — Medication 200 MCG: at 09:43

## 2021-12-01 RX ADMIN — SODIUM CHLORIDE, POTASSIUM CHLORIDE, SODIUM LACTATE AND CALCIUM CHLORIDE: 600; 310; 30; 20 INJECTION, SOLUTION INTRAVENOUS at 10:06

## 2021-12-01 RX ADMIN — ROCURONIUM BROMIDE 40 MG: 10 INJECTION INTRAVENOUS at 09:34

## 2021-12-01 RX ADMIN — GLYCOPYRROLATE 0.4 MG: 0.2 INJECTION, SOLUTION INTRAMUSCULAR; INTRAVENOUS at 10:22

## 2021-12-01 RX ADMIN — VASOPRESSIN 1 ML: 20 INJECTION INTRAVENOUS at 10:10

## 2021-12-01 RX ADMIN — LIDOCAINE HYDROCHLORIDE 1 EACH: 40 SOLUTION TOPICAL at 09:36

## 2021-12-01 RX ADMIN — SODIUM CHLORIDE, POTASSIUM CHLORIDE, SODIUM LACTATE AND CALCIUM CHLORIDE 1000 ML: 600; 310; 30; 20 INJECTION, SOLUTION INTRAVENOUS at 07:10

## 2021-12-01 RX ADMIN — LEVOFLOXACIN 500 MG: 5 INJECTION, SOLUTION INTRAVENOUS at 08:03

## 2021-12-01 RX ADMIN — LIDOCAINE HYDROCHLORIDE 100 MG: 20 INJECTION, SOLUTION EPIDURAL; INFILTRATION; INTRACAUDAL; PERINEURAL at 09:34

## 2021-12-01 NOTE — OP NOTE
OPERATIVE NOTE  12/1/2021    NAME: Saul Alcantara Sr.    YOB: 1955  MRN: [id number]    PRE-OPERATIVE DIAGNOSIS:    Elevated prostate specific antigen (PSA) [R97.20]    POST-OPERATIVE DIAGNOSIS:   Post-Op Diagnosis Codes:     * Elevated prostate specific antigen (PSA) [R97.20]    PROCEDURE PERFORMED:   Direct laryngoscopy with esophageal dilatation    SURGEON:   Wayne Richmond MD    ASSISTANT(S):   None    ANESTHESIA:   General Anesthesia via Endotracheal Tube    INDICATIONS: The patient is a 66 y.o. male with Elevated prostate specific antigen (PSA) [R97.20]    PROCEDURE:  The patient was brought to the operating room, given General Anesthesia via Endotracheal Tube, and prepped and draped in the usual manner.    Dr. Bazan performed a separate operative procedure which is dictated under separate operative report.  The specimens listed were performed under his portion of the procedure.    Subsequently direct laryngoscopy was performed and no masses, lesions, ulcerations or other abnormalities were noted throughout the upper aerodigestive tract examination.  Minimal edema both true vocal cords were appreciated but there were no lesions.  Rigid esophagoscopy was performed the small scope was not able to be passed to the cricopharyngeal opening.     Subsequently Johnson dilatation was performed with successive passage of Johnson dilators of  26 Cook Islander, 30 Cook Islander, 30 [place] Islander, 32 Cook Islander, 34 Cook Islander, 36 Cook Islander, 38 Cook Islander, 40 Cook Islander, 42 Cook Islander, 46 Cook Islander, 48 Cook Islander, 50 Cook Islander, 56 Cook Islander, and 58 Cook Islander.  The dilators were passed to 26 cm without difficulty.    The patient was transported upon extubation to the postanesthesia care unit in stable condition.    SPECIMENS:  Specimens     ID Source Type Tests Collected By Collected At Frozen?    A Prostate Tissue · TISSUE PATHOLOGY EXAM   Oscar Bazan MD 12/1/21 0919     Description: RIGHT MID LATERAL    This specimen was not marked as sent.    B  Prostate Tissue · TISSUE PATHOLOGY EXAM   Oscar Bazan MD 12/1/21 0919     Description: RIGHT MID    This specimen was not marked as sent.    C Prostate Tissue · TISSUE PATHOLOGY EXAM   Oscar Bazan MD 12/1/21 0919     Description: RIGHT LATERAL BASE    This specimen was not marked as sent.    D Prostate Tissue · TISSUE PATHOLOGY EXAM   Oscar Bazan MD 12/1/21 0919     Description: RIGHT BASE    This specimen was not marked as sent.    E Prostate Tissue · TISSUE PATHOLOGY EXAM   Oscar Bazan MD 12/1/21 0919     Description: RIGHT LATERAL APEX    This specimen was not marked as sent.    F Prostate Tissue · TISSUE PATHOLOGY EXAM   Oscar Bazan MD 12/1/21 0919     Description: RIGHT APEX    This specimen was not marked as sent.    G Prostate Tissue · TISSUE PATHOLOGY EXAM   Oscar Bazan MD 12/1/21 0919     Description: LEFT MID LATERAL    This specimen was not marked as sent.    H Prostate Tissue · TISSUE PATHOLOGY EXAM   Oscar Bazan MD 12/1/21 0919     Description: LEFT MID    This specimen was not marked as sent.    I Prostate Tissue · TISSUE PATHOLOGY EXAM   Oscar Bazan MD 12/1/21 0919     Description: LEFT LATERAL BASE    This specimen was not marked as sent.    J Prostate Tissue · TISSUE PATHOLOGY EXAM   Oscar Bazan MD 12/1/21 0919     Description: LEFT BASE    This specimen was not marked as sent.    K Prostate Tissue · TISSUE PATHOLOGY EXAM   Oscar Bazan MD 12/1/21 0919     Description: LEFT LATERAL APEX    This specimen was not marked as sent.    L Prostate Tissue · TISSUE PATHOLOGY EXAM   Oscar Bazan MD 12/1/21 0919     Description: LEFT APEX    This specimen was not marked as sent.          COMPLICATIONS: NONE    ESTIMATED BLOOD LOSS:  Minimal    Wayne Richmond MD  12/1/2021

## 2021-12-01 NOTE — H&P
"YOB: 1955  Location: [facility]  Location Address: [address] [place] [address], [facility], [address] [place] [address]  Location Phone: [phone]         Chief Complaint   Patient presents with   • Follow-up         History of Present Illness  Saul Alcantara Sr. is a 66 y.o. male.  Saul Alcantara Sr. is status post Direct laryngoscopy and esophagoscopy with esophageal dilatation on 21 and 21. Patient had DL and biopsy of base of tongue on 9/10/14 with positive pathology for SCCa. The cancer was staged T1M0N0. The patient has a history of radiation therapy due to a base of tongue cancer on the left side that he finished in 2014. Patient is status post Direct laryngoscopy, esophagoscopy with dilatation on 20 and Rigid esophagoscopy and Johnson esophageal dilatation on 20. Patient is doing well postop with no complaints of sore throat or dysphagia. He is using peridex mouthwash which seems to help mouth and throat.      He feels less \"stretching\" was really good and states that he is doing much better.        Medical History        Past Medical History:   Diagnosis Date   • Allergic rhinitis     • Anxiety     • Arthritis     • Asthma     • Cataract     • Chronic rhinitis     • Chronic sinusitis     • COPD (chronic obstructive pulmonary disease) (HCC)     • COVID-19 vaccine series completed       Moderna   • Depression     • Deviated nasal septum     • Enlarged prostate     • GERD (gastroesophageal reflux disease)     • H/O head and neck radiation 3/3/2017   • History of transfusion     • Hyperlipidemia     • Hypertension     • Hypothyroidism     • Laryngopharyngeal reflux     • Lymphoma (HCC)       Non-Hodgkins   • MRSA infection     • Panic attacks     • Peyronie disease     • Pneumonia     • Primary squamous cell carcinoma of tongue (HCC) 2016     T1N0M0 SCC S/P XRT/Chemo 2014   • Primary squamous cell carcinoma of tongue (HCC) 2016     T1N0M0 SCC S/P XRT/Chemo 2014 "   • Sicca syndrome (HCC)     • Sinusitis, acute     • Squamous cell carcinoma       Base of Tongue   • Tobacco use disorder     • Tongue irritation     • Visit for monitoring Rituxan therapy       pt getting treatments at [facility] in [place]   • Xerostomia              Surgical History         Past Surgical History:   Procedure Laterality Date   • CATARACT EXTRACTION Bilateral     • ESOPHAGOSCOPY N/A 11/20/2019     Procedure: Direct laryngoscopy with esophageal Johnson dilation;  Surgeon: Wayne Richmond MD;  Location:  [facility];  Service: ENT   • HAND SURGERY         metal plate    • KNEE SURGERY       • LARYNGOSCOPY N/A 6/26/2020     Procedure: Rigid esophagoscopy Johnson esophageal dilatation;  Surgeon: Wayne Richmond MD;  Location:  [facility];  Service: ENT;  Laterality: N/A;   • LARYNGOSCOPY N/A 8/24/2020     Procedure: MICRODIRECT LARYNGOSCOPY with esophageal dilatation;  Surgeon: Wayne Richmond MD;  Location:  [facility];  Service: ENT;  Laterality: N/A;   • LARYNGOSCOPY N/A 5/24/2021     Procedure: Direct laryngoscopy, esophagoscopy with Johnson dilatation;  Surgeon: Wayne Richmond MD;  Location:  [facility];  Service: ENT;  Laterality: N/A;   • MULTIPLE TOOTH EXTRACTIONS         CONTINUING TO HAVE BONE REMOVED 1/2021   • OTHER SURGICAL HISTORY   09/10/2014     Microlaryngoscopy with Biopsy - Base of Tongue   • PANENDOSCOPY N/A 1/22/2021     Procedure: DIRECT LARYNGOSCOPY AND ESOPHAGOSCOPY WITH ESOPHAGEAL DILATION;  Surgeon: Wayne Richmond MD;  Location:  [facility];  Service: ENT;  Laterality: N/A;   • PANENDOSCOPY N/A 10/11/2021     Procedure: Direct laryngoscopy, esophagoscopy with Johnson dilatation;  Surgeon: Wayne Richmond MD;  Location:  [facility];  Service: ENT;  Laterality: N/A;   • SPLENECTOMY       • SQUAMOUS CELL CARCINOMA EXCISION   09/10/2014     Tongue   • TONSILLECTOMY                Medications Taking   Outpatient Medications Marked as Taking for the  8/5/21 encounter (Office Visit) with Wayne Richmond MD   Medication Sig Dispense Refill   • albuterol (PROVENTIL) (2.5 MG/3ML) 0.083% nebulizer solution Take 2.5 mg by nebulization Every 4 (Four) Hours As Needed for Wheezing. 50 mL 1   • ALPRAZolam (XANAX) 2 MG tablet Take 2 mg by mouth 3 (Three) Times a Day.       • amitriptyline (ELAVIL) 25 MG tablet Take 25 mg by mouth every night at bedtime.       • amLODIPine (NORVASC) 10 MG tablet Take 10 mg by mouth Daily.       • azelastine (ASTELIN) 0.1 % nasal spray 2 sprays into the nostril(s) as directed by provider Daily As Needed.       • chlorhexidine (Peridex) 0.12 % solution Apply 15 mL to the mouth or throat 2 (Two) Times a Day. 240 mL 3   • fluticasone (FLONASE) 50 MCG/ACT nasal spray 2 sprays into each nostril Daily. (Patient taking differently: 2 sprays into the nostril(s) as directed by provider Daily As Needed.) 16 g 5   • glycopyrrolate (ROBINUL) 1 MG tablet 0.5 mg 2 (Two) Times a Day.   3   • HYDROcodone-acetaminophen (NORCO) 7.5-325 MG per tablet Take 1 tablet by mouth 2 (Two) Times a Day As Needed for Moderate Pain .   0   • levothyroxine (SYNTHROID, LEVOTHROID) 100 MCG tablet Take 100 mcg by mouth Daily.       • Lidocaine Viscous HCl (XYLOCAINE) 2 % solution Take 5 mL by mouth As Needed for Mild Pain .       • megestrol (MEGACE) 40 MG/ML suspension TAKE 20 ML BY MOUTH DAILY       • meloxicam (MOBIC) 15 MG tablet Take 15 mg by mouth Daily.       • nystatin (MYCOSTATIN) 160027 UNIT/ML suspension Take 10ml by mouth BID for 28 days (Patient taking differently: Take 200,000 Units by mouth 4 (Four) Times a Day As Needed. Take 10ml by mouth BID for 28 days) 480 mL 2   • ondansetron (ZOFRAN) 8 MG tablet Take 8 mg by mouth Every 8 (Eight) Hours As Needed for Nausea.       • pantoprazole (PROTONIX) 40 MG EC tablet Take 40 mg by mouth Daily. Delayed Release (DR/EC)  Take 1 tablet (40 mg) by oral route 2 times per day,  Take 30 minutes prior to breakfast and  evening meal        • senna-docusate (PERICOLACE) 8.6-50 MG per tablet Take 1 tablet by mouth 2 times daily       • sertraline (ZOLOFT) 100 MG tablet Take 100 mg by mouth every night at bedtime.       • simvastatin (ZOCOR) 20 MG tablet TK 1 T PO QD   11   • tamsulosin (FLOMAX) 0.4 MG capsule 24 hr capsule Take 0.4 mg by mouth Daily.       • triamcinolone (KENALOG) 0.5 % ointment Apply  topically to the appropriate area as directed 2 (Two) Times a Day. Apply to affected ear 15 g 3   • zolpidem (AMBIEN) 10 MG tablet Take 10 mg by mouth At Night As Needed for Sleep.       • [DISCONTINUED] chlorhexidine (Peridex) 0.12 % solution Apply 15 mL to the mouth or throat 2 (Two) Times a Day. 240 mL 0   • [DISCONTINUED] chlorhexidine (Peridex) 0.12 % solution Apply 15 mL to the mouth or throat 2 (Two) Times a Day. 240 mL 3   • [DISCONTINUED] chlorhexidine (Peridex) 0.12 % solution Apply 15 mL to the mouth or throat 2 (Two) Times a Day. 240 mL 0   • [DISCONTINUED] fluticasone (FLONASE) 50 MCG/ACT nasal spray 2 sprays into the nostril(s) as directed by provider Daily for 30 days. Administer 2 sprays in each nostril for each dose. 1 bottle 6   • [DISCONTINUED] tadalafil (CIALIS) 5 MG tablet Take 1 tablet by mouth Daily for 360 days. 30 tablet 11            Patient has no known allergies.           Family History   Problem Relation Age of Onset   • Diabetes Sister     • Cancer Sister           Social History   Social History            Socioeconomic History   • Marital status:    Tobacco Use   • Smoking status: Former Smoker       Packs/day: 0.25       Types: Cigarettes   • Smokeless tobacco: Never Used   • Tobacco comment: pack last couple of weeks   Vaping Use   • Vaping Use: Never used   Substance and Sexual Activity   • Alcohol use: No   • Drug use: No   • Sexual activity: Yes       Partners: Female            Review of Systems  Negative except as in HPI      Vitals:     08/05/21 1505   BP: 133/80   Pulse: 94   Temp: 98.4  °F (36.9 °C)         Body mass index is 21.06 kg/m².        Objective         Physical Exam  CONSTITUTIONAL: well nourished, well-developed, alert, oriented, in no acute distress      COMMUNICATION AND VOICE: able to communicate normally, normal voice quality     HEAD: normocephalic, no lesions, atraumatic, no tenderness, no masses      FACE: appearance normal, no lesions, no tenderness, no deformities, facial motion symmetric     EYES: ocular motility normal, eyelids normal, orbits normal, no proptosis, conjunctiva normal , pupils equal, round      EARS:  Hearing: hearing to conversational voice intact bilaterally   External Ears: normal bilaterally, no lesions     NOSE:  External Nose: external nasal structure normal, no tenderness on palpation, no nasal discharge, no lesions, no evidence of trauma, nostrils patent      ORAL:  Lips: upper and lower lips without lesion   OC/OP: No masses or lesions by visualization or palpation     NECK:  Inspection and Palpation: neck appearance normal, no masses or tenderness     CHEST/RESPIRATORY: normal respiratory effort      CARDIOVASCULAR: no cyanosis or edema      NEUROLOGICAL/PSYCHIATRIC: oriented to time, place and person, mood normal, affect appropriate, CN II-XII intact grossly              Assessment/Plan      Diagnoses and all orders for this visit:     1. Primary squamous cell carcinoma of tongue (CMS/HCC) (Primary)  -     Case Request; Standing  -     Comprehensive Metabolic Panel; Future  -     CBC (No Diff); Future  -     ECG 12 Lead; Future  -     XR Chest 2 View; Future  -     Case Request  -     Case Request; Standing  -     Case Request     2. Pharyngoesophageal dysphagia  -     Case Request; Standing  -     Comprehensive Metabolic Panel; Future  -     CBC (No Diff); Future  -     ECG 12 Lead; Future  -     XR Chest 2 View; Future  -     Case Request  -     Case Request; Standing  -     Case Request     3. Laryngopharyngeal reflux  -     Case Request;  Standing  -     Comprehensive Metabolic Panel; Future  -     CBC (No Diff); Future  -     ECG 12 Lead; Future  -     XR Chest 2 View; Future  -     Case Request  -     Case Request; Standing  -     Case Request     Other orders  -     Discontinue: chlorhexidine (Peridex) 0.12 % solution; Apply 15 mL to the mouth or throat 2 (Two) Times a Day.  Dispense: 240 mL; Refill: 0  -     chlorhexidine (Peridex) 0.12 % solution; Apply 15 mL to the mouth or throat 2 (Two) Times a Day.  Dispense: 240 mL; Refill: 3  -     Follow Anesthesia Guidelines / Protocol; Future  -     Obtain Informed Consent; Future  -     Follow Anesthesia Guidelines / Protocol; Future  -     Obtain Informed Consent; Future        Direct laryngoscopy, esophagoscopy with esophageal dilatation (Johnson dilators) (N/A)        Orders Placed This Encounter   Procedures   • XR Chest 2 View       Standing Status:   Future       Number of Occurrences:   1       Standing Expiration Date:   10/6/2022       Order Specific Question:   Reason for Exam:       Answer:   Direct laryngoscopy, esophagoscopy with Johnson dilatation       Order Specific Question:   Release to patient       Answer:   Immediate   • Comprehensive Metabolic Panel       Standing Status:   Future       Number of Occurrences:   1       Standing Expiration Date:   10/6/2022       Order Specific Question:   Release to patient       Answer:   Immediate   • CBC (No Diff)       Standing Status:   Future       Number of Occurrences:   1       Standing Expiration Date:   10/6/2022       Order Specific Question:   Release to patient       Answer:   Immediate   • Follow Anesthesia Guidelines / Protocol       Standing Status:   Future   • Obtain Informed Consent       Standing Status:   Future       Order Specific Question:   Informed Consent Given For       Answer:   Direct laryngoscopy, esophagoscopy with Johnson dilatation   • Follow Anesthesia Guidelines / Protocol       Standing Status:   Future   •  Obtain Informed Consent       Standing Status:   Future       Order Specific Question:   Informed Consent Given For       Answer:   Direct laryngoscopy, esophagoscopy with esophageal dilatation (Johnson dilators)   • ECG 12 Lead       Standing Status:   Future       Number of Occurrences:   1       Standing Expiration Date:   10/6/2022       Order Specific Question:   Reason for Exam:       Answer:   Direct laryngoscopy, esophagoscopy with Johnson dilatation       Order Specific Question:   Release to patient       Answer:   Immediate      Return in about 5 months (around 1/5/2022) for Recheck.           Patient Instructions   Call return for problems  Continue Peridex as needed  Open 4 to 5 months and consider esophagoscopy with dilatation in 6 months if needed     Be aware of the signs and symptoms of recurrent head and neck cancer including neck mass, persistent or recurrent sore throat, ear pain, hemoptysis, weight loss and hoarseness. If any of these symptoms recur and or persist, call for an evaluation.      D/W patient increasing caloric intake and discussed cruciferous vegetables and protein shakes etc to maintain optimal nutrition.  The necessity of abstaining from tobacco products was also discussed particularly with respect to not smoking.     Continue F/U and/or treatment with 's      ADDENDUM:  Patient called the office and is having progressive difficulty swallowing.  Although it has only been 4 months and we were trying to wait to 6 months his swallowing is significantly more problematic and consideration recommendation made for esophagoscopy with Johnson dilatation.  He is amenable and wishes to proceed this will be scheduled in the near future.  Risk benefits and options were discussed with the patient.     ADDENDUM:  Following esophageal dilatation on October 11, 2021.  The patient called the office with reports that his swallowing has improved immediately postoperatively but then began to  "gradually had increasing difficulty swallowing feeling like it was \"closing down\".  Subsequent discussion with patient on November 29 demonstrated progressive difficulty and recommendation was made to proceed with direct laryngoscopy and esophageal dilatation on 12/6/2021.  Patient understands risks of bleeding, infection, recidivistic dysphagia as well as possible perforation mediastinitis and death etc. he wishes to proceed.             "

## 2021-12-01 NOTE — DISCHARGE INSTRUCTIONS

## 2021-12-01 NOTE — ANESTHESIA PROCEDURE NOTES
Airway  Urgency: elective    Date/Time: 12/1/2021 9:36 AM  Airway not difficult    General Information and Staff    Patient location during procedure: OR  CRNA: Mimi Jimenez CRNA    Indications and Patient Condition  Indications for airway management: airway protection    Preoxygenated: yes  Mask difficulty assessment: 2 - vent by mask + OA or adjuvant +/- NMBA    Final Airway Details  Final airway type: endotracheal airway      Successful airway: ETT  Cuffed: yes   Successful intubation technique: direct laryngoscopy  Facilitating devices/methods: intubating stylet  Endotracheal tube insertion site: oral  Blade: Dada  Blade size: 3.5  ETT size (mm): 7.5  Cormack-Lehane Classification: grade IIb - view of arytenoids or posterior of glottis only  Placement verified by: chest auscultation and capnometry   Cuff volume (mL): 5  Measured from: lips  ETT/EBT  to lips (cm): 23  Number of attempts at approach: 1  Assessment: lips, teeth, and gum same as pre-op and atraumatic intubation

## 2021-12-01 NOTE — ANESTHESIA POSTPROCEDURE EVALUATION
Patient: Saul Alcantara Sr.    Procedure Summary     Date: 12/01/21 Room / Location:  [facility] 01 / [facility]    Anesthesia Start: 0928 Anesthesia Stop: 1034    Procedures:       PROSTATE ULTRASOUND BIOPSY. NOT A URONAV (N/A )      Direct laryngoscopy, esophagoscopy with esophageal dilatation (Johnson dilators) (N/A Throat) Diagnosis:       Elevated prostate specific antigen (PSA)      (Elevated prostate specific antigen (PSA) [R97.20])    Surgeons: Oscar Bazan MD; Wayne Richmond MD Provider: Mimi Jimenez CRNA    Anesthesia Type: general ASA Status: 3          Anesthesia Type: general    Vitals  Vitals Value Taken Time   /90 12/01/21 1050   Temp 97.4 °F (36.3 °C) 12/01/21 1050   Pulse 85 12/01/21 1052   Resp 16 12/01/21 1050   SpO2 97 % 12/01/21 1051   Vitals shown include unvalidated device data.        Post Anesthesia Care and Evaluation    Patient location during evaluation: PACU  Patient participation: complete - patient participated  Level of consciousness: awake and alert  Pain management: adequate  Airway patency: patent  Anesthetic complications: No anesthetic complications    Cardiovascular status: acceptable  Respiratory status: acceptable  Hydration status: acceptable    Comments: Blood pressure 138/72, pulse 88, temperature 97.4 °F (36.3 °C), temperature source Temporal, resp. rate 19, SpO2 96 %.    Pt discharged from PACU based on jeff score >8

## 2021-12-01 NOTE — ANESTHESIA PREPROCEDURE EVALUATION
Anesthesia Evaluation     Patient summary reviewed   no history of anesthetic complications:  NPO Solid Status: > 8 hours  NPO Liquid Status: > 8 hours           Airway   Mallampati: I  TM distance: >3 FB  Neck ROM: full  Dental    (+) edentulous and upper dentures    Pulmonary    (+) a smoker Former, COPD,   (-) asthma, recent URI, sleep apnea  Cardiovascular   Exercise tolerance: good (4-7 METS)    ECG reviewed    (+) hypertension, hyperlipidemia,   (-) pacemaker, past MI, angina, cardiac stents      Neuro/Psych  (-) seizures, TIA, CVA  GI/Hepatic/Renal/Endo    (+)  GERD,  thyroid problem hypothyroidism  (-) liver disease, no renal disease, diabetes    ROS Comment: Esophageal strictures    Musculoskeletal     Abdominal    Substance History      OB/GYN          Other   arthritis,    history of cancer (SCC of tongue s/p radiation)      Other Comment: Xerostomia                    Anesthesia Plan    ASA 3     general     intravenous induction     Anesthetic plan, all risks, benefits, and alternatives have been provided, discussed and informed consent has been obtained with: patient.

## 2021-12-01 NOTE — OP NOTE
Operative Summary    Saul Alcantara .  Date of Procedure: 12/1/2021    Pre-op Diagnosis:   Elevated prostate specific antigen (PSA) [R97.20]    Post-op Diagnosis:     Post-Op Diagnosis Codes:     * Elevated prostate specific antigen (PSA) [R97.20]    Procedure/CPT® Codes:      Procedure(s):  PROSTATE ULTRASOUND BIOPSY. NOT A URONAV  Surgeon(s):  Oscar Bazan MD      Anesthesia: General    Staff:   Circulator: Milo Crump RN, BSN  Scrub Person: Jeancarlos Max; Aleyda Gooden    Indications for procedure:  Elevated PSA    Procedure details:  After appropriate anesthesia, positioning, prep and drape, timeout protocol was observed. The patient is placed in the left lateral decubitus position with the hips and knees flexed.    The well-lubricated ultrasound probe was gently inserted per rectum. Prostate was scanned from the base of the bladder to the apex.  Then 20 ml of 1% lidocaine plain was used to perform a prostate nerve block injecting the junction of the seminal vesicle and bladder laterally.  Transverse and sagittal images of the prostate are taken.    PSA: 6 6.007    Prostate length: 37.9 mm    Prostate width: 52.5 mm    Prostate height: 24.5 mm    Prostate volume: 25.5 ml    PSA density: 0.24    Findings: Symmetric appearing prostate.  Small hypoechoic area about 1 x 1 cm on the left medial aspect the prostate.  This was about mid gland.  Seminal vesicles were normal.  No ejaculatory duct dilatation.  Transition zone only slightly enlarged.    A total of 12 biopsies were taken from the base, apex, and mid portion of the gland on both the right and the left sides.         Estimated Blood Loss: Less than 30 mL    Specimens:                Specimens     ID Source Type Tests Collected By Collected At Frozen?    A Prostate Tissue · TISSUE PATHOLOGY EXAM   Oscar Bazan MD 12/1/21 0919     Description: RIGHT MID LATERAL    B Prostate Tissue · TISSUE PATHOLOGY EXAM   Oscar Bazan MD  12/1/21 0919     Description: RIGHT MID    C Prostate Tissue · TISSUE PATHOLOGY EXAM   Oscar Bazan MD 12/1/21 0919     Description: RIGHT LATERAL BASE    D Prostate Tissue · TISSUE PATHOLOGY EXAM   Oscar Bazan MD 12/1/21 0919     Description: RIGHT BASE    E Prostate Tissue · TISSUE PATHOLOGY EXAM   Oscar Bazan MD 12/1/21 0919     Description: RIGHT LATERAL APEX    F Prostate Tissue · TISSUE PATHOLOGY EXAM   Oscar Bazan MD 12/1/21 0919     Description: RIGHT APEX    G Prostate Tissue · TISSUE PATHOLOGY EXAM   Oscar Bazan MD 12/1/21 0919     Description: LEFT MID LATERAL    H Prostate Tissue · TISSUE PATHOLOGY EXAM   Oscar Bazan MD 12/1/21 0919     Description: LEFT MID    I Prostate Tissue · TISSUE PATHOLOGY EXAM   Oscar Bazan MD 12/1/21 0919     Description: LEFT LATERAL BASE    J Prostate Tissue · TISSUE PATHOLOGY EXAM   Oscar Bazan MD 12/1/21 0919     Description: LEFT BASE    K Prostate Tissue · TISSUE PATHOLOGY EXAM   Oscar Bazan MD 12/1/21 0919     Description: LEFT LATERAL APEX    L Prostate Tissue · TISSUE PATHOLOGY EXAM   Oscar Bazan MD 12/1/21 0919     Description: LEFT APEX            Drains: None  Complications: none    Plan: He was instructed to call the office in one week to get the pathology results at which time we will arrange follow-up dependent upon those.  He is also to contact us if he develops fever >101 or has hematuria significant enough to obstruct urine stream.      (Please note that portions of this note were completed with a voice recognition program.)  Oscar Bazan MD     Date: 12/1/2021  Time: 10:30 CST

## 2021-12-03 ENCOUNTER — APPOINTMENT (OUTPATIENT)
Dept: PREADMISSION TESTING | Facility: HOSPITAL | Age: 66
End: 2021-12-03

## 2021-12-06 ENCOUNTER — TELEPHONE (OUTPATIENT)
Dept: UROLOGY | Facility: CLINIC | Age: 66
End: 2021-12-06

## 2021-12-06 NOTE — TELEPHONE ENCOUNTER
Pt wife called wanting to set pt cancer consult up for 12/14/2021 instead of 12/16/2021.    Advised pt that providers note had instructed the consult be set up on 12/16/2021 and that I would make contact to see if the date could be changed.     Pt would like to see dr. Bazan prior to his appointment with a  In [place], [place].    Please Advise    EW

## 2021-12-07 NOTE — TELEPHONE ENCOUNTER
Called pt told him I would speak with dr nunn about moving his apt but it would be after clinic before I could call him back.  he verbalized understanding.

## 2021-12-14 ENCOUNTER — OFFICE VISIT (OUTPATIENT)
Dept: UROLOGY | Facility: CLINIC | Age: 66
End: 2021-12-14

## 2021-12-14 VITALS — WEIGHT: 154 LBS | TEMPERATURE: 98 F | BODY MASS INDEX: 20.41 KG/M2 | HEIGHT: 73 IN

## 2021-12-14 DIAGNOSIS — C61 PROSTATE CANCER (HCC): Primary | ICD-10-CM

## 2021-12-14 PROCEDURE — 99215 OFFICE O/P EST HI 40 MIN: CPT | Performed by: UROLOGY

## 2021-12-14 NOTE — PROGRESS NOTES
Prostate Cancer consult  Mr. Alcantara is 66 y.o. male    Chief complaint: I am here about my prostate biopsy.    He is here today to discuss his newly diagnosed prostate cancer.  His biopsy was done 3 week(s) ago. This was done in the context of Elevated PSA. Severity best described as adenocarcinoma in 6/12 cores with the maximum robyn grade of 3+4. The patient did not need imaging based on risk stratification. . Symptoms include none.  The patients potency status can be defined as Impotence unresponsive to therapy.      Current Outpatient Medications:   •  albuterol (PROVENTIL) (2.5 MG/3ML) 0.083% nebulizer solution, Take 2.5 mg by nebulization Every 4 (Four) Hours As Needed for Wheezing., Disp: 50 mL, Rfl: 1  •  ALPRAZolam (XANAX) 2 MG tablet, Take 2 mg by mouth 3 (Three) Times a Day., Disp: , Rfl:   •  amitriptyline (ELAVIL) 25 MG tablet, Take 25 mg by mouth every night at bedtime., Disp: , Rfl:   •  amLODIPine (NORVASC) 10 MG tablet, Take 10 mg by mouth Daily., Disp: , Rfl:   •  azelastine (ASTELIN) 0.1 % nasal spray, 2 sprays into the nostril(s) as directed by provider Daily As Needed., Disp: , Rfl:   •  chlorhexidine (Peridex) 0.12 % solution, Apply 15 mL to the mouth or throat 2 (Two) Times a Day., Disp: 240 mL, Rfl: 3  •  fluticasone (FLONASE) 50 MCG/ACT nasal spray, 2 sprays into each nostril Daily. (Patient taking differently: 2 sprays into the nostril(s) as directed by provider Daily As Needed.), Disp: 16 g, Rfl: 5  •  glycopyrrolate (ROBINUL) 1 MG tablet, 0.5 mg 2 (Two) Times a Day., Disp: , Rfl: 3  •  HYDROcodone-acetaminophen (NORCO) 5-325 MG per tablet, Take 1 tablet by mouth Every 8 (Eight) Hours As Needed for Moderate Pain  (Pain)., Disp: 6 tablet, Rfl: 0  •  HYDROcodone-acetaminophen (NORCO) 7.5-325 MG per tablet, Take 10 tablets by mouth 2 (Two) Times a Day As Needed for Moderate Pain ., Disp: , Rfl: 0  •  levothyroxine (SYNTHROID, LEVOTHROID) 100 MCG tablet, Take 100 mcg by mouth Daily.,  Disp: , Rfl:   •  Lidocaine Viscous HCl (XYLOCAINE) 2 % solution, Take 5 mL by mouth As Needed for Mild Pain ., Disp: , Rfl:   •  meloxicam (MOBIC) 15 MG tablet, Take 15 mg by mouth Daily., Disp: , Rfl:   •  nystatin (MYCOSTATIN) 372666 UNIT/ML suspension, Take 10ml by mouth BID for 28 days (Patient taking differently: Take 200,000 Units by mouth 4 (Four) Times a Day As Needed. Take 10ml by mouth BID for 28 days), Disp: 480 mL, Rfl: 2  •  ondansetron (ZOFRAN) 8 MG tablet, Take 8 mg by mouth Every 8 (Eight) Hours As Needed for Nausea., Disp: , Rfl:   •  pantoprazole (PROTONIX) 40 MG EC tablet, Take 40 mg by mouth Daily. Delayed Release (DR/EC) Take 1 tablet (40 mg) by oral route 2 times per day, Take 30 minutes prior to breakfast and evening meal , Disp: , Rfl:   •  senna-docusate (PERICOLACE) 8.6-50 MG per tablet, Take 1 tablet by mouth 2 times daily, Disp: , Rfl:   •  sertraline (ZOLOFT) 100 MG tablet, Take 100 mg by mouth every night at bedtime., Disp: , Rfl:   •  simvastatin (ZOCOR) 20 MG tablet, TK 1 T PO QD, Disp: , Rfl: 11  •  tadalafil (CIALIS) 20 MG tablet, Take 1 tablet by mouth As Needed for Erectile Dysfunction for up to 360 days. 1-24 hours before activity, Disp: 12 tablet, Rfl: 11  •  tamsulosin (FLOMAX) 0.4 MG capsule 24 hr capsule, Take 0.4 mg by mouth Daily., Disp: , Rfl:   •  triamcinolone (KENALOG) 0.5 % ointment, Apply  topically to the appropriate area as directed 2 (Two) Times a Day. Apply to affected ear, Disp: 15 g, Rfl: 3  •  zolpidem (AMBIEN) 10 MG tablet, Take 10 mg by mouth At Night As Needed for Sleep., Disp: , Rfl:     Past Medical History:   Diagnosis Date   • Allergic rhinitis    • Anxiety    • Arthritis    • Asthma    • Cataract    • Chronic rhinitis    • Chronic sinusitis    • COPD (chronic obstructive pulmonary disease) ([facility])    • COVID-19 vaccine series completed     Moderna   • Depression    • Deviated nasal septum    • Enlarged prostate    • GERD (gastroesophageal reflux disease)     • H/O head and neck radiation 3/3/2017   • History of transfusion    • Hyperlipidemia    • Hypertension    • Hypothyroidism    • Laryngopharyngeal reflux    • Lymphoma (HCC)     Non-Hodgkins   • MRSA infection    • Panic attacks    • Peyronie disease    • Pneumonia    • 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    T1N0M0 SCC S/P XRT/Chemo 12/2014   • 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    T1N0M0 SCC S/P XRT/Chemo 12/2014   • Sicca syndrome (HCC)    • Sinusitis, acute    • Squamous cell carcinoma     Base of Tongue   • Tobacco use disorder    • Tongue irritation    • Visit for monitoring Rituxan therapy     pt getting treatments at [facility] in [place]   • Xerostomia        Past Surgical History:   Procedure Laterality Date   • CATARACT EXTRACTION Bilateral    • ESOPHAGOSCOPY N/A 11/20/2019    Procedure: Direct laryngoscopy with esophageal Johnson dilation;  Surgeon: Wayne Richmond MD;  Location: [facility] OR;  Service: ENT   • HAND SURGERY      metal plate    • KNEE SURGERY     • LARYNGOSCOPY N/A 6/26/2020    Procedure: Rigid esophagoscopy Johnson esophageal dilatation;  Surgeon: Wayne Richmond MD;  Location: [facility] OR;  Service: ENT;  Laterality: N/A;   • LARYNGOSCOPY N/A 8/24/2020    Procedure: MICRODIRECT LARYNGOSCOPY with esophageal dilatation;  Surgeon: Wayne Richmond MD;  Location: [facility] OR;  Service: ENT;  Laterality: N/A;   • LARYNGOSCOPY N/A 5/24/2021    Procedure: Direct laryngoscopy, esophagoscopy with Johnson dilatation;  Surgeon: Wayne Richmond MD;  Location: [facility] OR;  Service: ENT;  Laterality: N/A;   • LARYNGOSCOPY N/A 12/1/2021    Procedure: Direct laryngoscopy, esophagoscopy with esophageal dilatation (Johnson dilators);  Surgeon: Wayne Richmond MD;  Location: [facility] OR;  Service: ENT;  Laterality: N/A;   • MULTIPLE TOOTH EXTRACTIONS      CONTINUING TO HAVE BONE REMOVED 1/2021   • OTHER SURGICAL HISTORY  09/10/2014     Microlaryngoscopy with Biopsy - Base of Tongue   • PANENDOSCOPY N/A 1/22/2021    Procedure: DIRECT LARYNGOSCOPY AND ESOPHAGOSCOPY WITH ESOPHAGEAL DILATION;  Surgeon: Wayne Richmond MD;  Location:  [facility] OR;  Service: ENT;  Laterality: N/A;   • PANENDOSCOPY N/A 10/11/2021    Procedure: Direct laryngoscopy, esophagoscopy with Johnson dilatation;  Surgeon: Wayne Richmond MD;  Location:  [facility] OR;  Service: ENT;  Laterality: N/A;   • PROSTATE ULTRASOUND BIOPSY N/A 12/1/2021    Procedure: PROSTATE ULTRASOUND BIOPSY. NOT A URONAV;  Surgeon: Oscar Bazan MD;  Location:  [facility] OR;  Service: Urology;  Laterality: N/A;   • SPLENECTOMY     • SQUAMOUS CELL CARCINOMA EXCISION  09/10/2014    Tongue   • TONSILLECTOMY         Tissue Pathology Exam (12/01/2021 09:19)       Discussion:    After finding out the patient has done well from the biopsy, we went over the pathology report including the assignment and application of the [place] score, the volume of prostate cancer as predicted by the number of cores and percent involvement in each, We went over how this is used to determine whether or not the patient needs a bone scan and or CT scan of the abdomen and pelvis in addition to other preoperative workup. We talked about the use of a Nasim nomogram to predict whether or not this prostate cancer is likely to still be confined to the gland.         Based on the D'Amico risk stratification he would be categorized as :PSA <10, Jose 3+4, T1 or T2a clinical staging and therefore classifying him as moderate risk disease (25-50% failure at 5 years)       MARTHA Score is 3  ? Interpretation: This score result indicates an intermediate risk prostate cancer. Intermediate risk tumors respond to localized surgery or radiation. According to the [facility] - MARTHA research, likelihood of metastasis in these cases within 5 years is 1% while detectable PSA or necessity of second oncologic treatment within 5 years occurred in 18%  of cases.      Active Surveillance  Active surveillance for men with prostate cancer involves the avoidance or postponement of immediate therapy combined with careful surveillance; definitive treatment is then offered if there is evidence that the patient is at increased risk for disease progression.  It is explained that several guidelines, including the National comprehensive care network, indicate that active surveillance is the preferred option for the initial management of most men with localized, very low risk and low risk prostate cancer.    I explained the active surveillance as a treatment option in this setting is based on the following assumptions:  -Prostate cancer is often detected when it is not clinically significant, and it may not become clinically significant during the patient's lifetime.  The growth rate of many prostate cancers is slow, and the biologic propensity for metastasis is low in many cases.  For many men, low-risk prostate cancer either never requires treatment or treatment can be postponed for prolonged period without significantly decreasing the chance of cure  -Patients who have very slow-growing prostate cancer differ by clinical and/or pathologic parameters from those who will have more aggressive disease that often does have symptoms, metastasis, or even death.  -Curable treatment such as prostatectomy and radiation for patients with prostate cancer, including those directed at minimal disease, or associated with significant side effects and cost  -Studies have shown that with appropriate surveillance, patient's can be changed to a higher risk category and received definitive therapy without substantially decreasing the chance of cure.  -It appears that the psychologic burden living with prostate cancer without active treatment is actually modest and certainly has less impact on quality of life than those receiving definitive treatment, when side effects occur.    To explain the  concept that many men have asymptomatic prostate cancer that is indolent and does not require immediate treatment by the following:  -Autopsy studies show there is a high incidence of unknown cancer prostate of men dying without a known prostate cancer.  The frequency of such tumors may be as high as 30% in men under 40 years, and it may approach 70 to 80% in those between the ages of 60 and 80.  -We know that since we introduce PSA based screening number of cases of prostate cancer substantially higher than prior to the use of PSA.    -The low risk of prostate cancer specific mortality in men with low risk disease with illustrated by a series of almost 10,000 patients with organ confined disease and a Jose score of 3+3 = 6 or less, in which only 3 patients  of prostate cancer.  The risk of dying from prostate cancer over 15 years in this group was approximately 1%.  Brenda REYNA,et al,J Urol. 2011;185(3):869. Epub 2011 Peter 15}.    Today, men who have very low to low risk prostate cancer, and who choose no immediate treatment as an alternative to immediate treatment, are closely monitored with intervention -if necessary- at a time when cure is still possible. We now refer to this as active surveillance with selective delayed curative intervention. This means that men undergo periodic evaluations including PSA tests, digital rectal examinations, and prostate biopsies.  More recently we have incorporated the use of MRI so that the biopsies can be targeted.  Studies have shown increased sensitivity and diagnosis of high-grade lesions in most studies.  If there is evidence that the cancer is progressing, treatment is recommended with the intention of curing the disease.     Today, men who have very low to low risk prostate cancer, and who choose no immediate treatment as an alternative to immediate treatment, are closely monitored with intervention -if necessary- at a time when cure is still possible. We now refer  to this as active surveillance with selective delayed curative intervention. This means that men undergo periodic evaluations including PSA tests, digital rectal examinations, and prostate biopsies. If there is evidence that the cancer is progressing, treatment is recommended with the intention of curing the disease.     We discussed what rationale is used to define lower risk prostate cancer based on biopsy results.   Dr. Rodney Barrientos, an expert in prostate pathology at [facility], has shown in two separate studies that cancers less than 0.5 cc (smaller than an eraser tip) can be identified correctly about 75-80 percent of the time using PSA and information from the prostate biopsy. Dr. Barrientos's criteria provide a method for helping men choose between immediate treatment and active surveillance.   If a man decides on active surveillance, what recommendations are made for follow-up of the cancer?   · 12-14 core prostate biopsy periodically until age 75 years   · PSA and Digital Rectal Examination every six months     Studies from [facility] active surveillance program have shown that PSA level changes are not sufficient to alert us as to whether or not the cancer is changing. This necessitates surveillance biopsies for careful monitoring. For those men that have a biopsy done about one year after the original biopsy that shows no cancer, we recommend that the interval between biopsies be lengthened to 18-24 months. This interval may increase with time e.g. every 2-3 years. Prostate biopsies may be discontinued at age 75 years, since prostate cancer is unlikely to cause harm beyond this age if a man has not yet had a change in the cancer with careful monitoring.      What does a man have to lose with active surveillance of a PSA-detected cancer? According to [facility] surveillance study  The major concern is that while surveillance is taking place, the tumor will progress beyond the prostate  to the point where cure is no longer possible. Experience to date with active surveillance allows a number of conclusions regarding this risk.   · About 30 percent of the men who have entered surveillance have undergone treatment within 5 years   · High grade cancers that are Frankfort score 7 or more are found on surveillance biopsies at a rate of about 4% per year   · Most men who undergo treatment do not have high grade cancers   · Recognizing the probability of death from prostate cancer among men who are treated for high grade cancers, it is estimated that a man at age 65 years who enters surveillance with very low risk prostate cancer has   · a 20 year risk of death from prostate cancer of approximately 5%   · a 20 year risk of dying of another cause of approximately 60%     My typical management strategy for Active surveillance    -Repeat prostate biopsy is generally recommended within the first year to rule out high-grade disease based on the original biopsy    -Prostate MRI is typically done prior to this first biopsy.  In certain cases, a prostate MRI interpreted as not having PI-RADS 3 or greater lesions may be used to forego the confirmatory biopsy.  Certainly patients with extensive grade group 1 disease, high PSA density, -American ethnicity, or family history of early prostate cancer mortality should have a confirmatory biopsy not withstanding a negative MRI.    -Subsequent MRI and/or repeat biopsies will be performed every 2 to 5 years.  Even if the MRI is negative or stable, I believe patient should have periodic repeat biopsies, albeit as infrequently as every 5 years.  Those patients who cannot undergo MRI will require repeat systematic biopsies every 2 to 4 years dependent upon risk factors.    -PSA will be repeated no more often than every 6 months and a repeat digital rectal exam no more than every 12 months unless clinically indicated.  Sustained increases in serum PSA, especially when  the PSA doubling time is less than 3 years or there is a rise of PSA >10, or worsening abnormalities on rectal exam will serve as an indicator for more detailed evaluation that will likely include MRI with targeted biopsies along with systematic biopsies.    Healthy Living Tips   If a man chooses active surveillance, what dietary changes should he make?   Most men ask what they should be doing in terms of lifestyle changes to help prevent progression of their disease. This is an area of intense interest now but it's one that is clouded by the fact that dietary supplements are a billion dollar industry in the [place] This often makes it hard for consumers to distinguish between science and marketing.     The Prostate Cancer Foundation (PCF.org <http://www.pcf.org/>) has a free guide that can be ordered from their website titled Nutrition, Exercise, and Prostate Cancer. I would advise all men with prostate cancer to read this. Also, the American Cancer Society <http://www.cancer.org/Healthy/EatHealthyGetActive/ACSGuidelinesonNutritionPhysicalActivityforCancerPrevention/ilfv-hqnrjdkkkv-teb>has up to date information on lifestyle choices and cancer prevention.   The bottom line is that a diet that is low in animal and dairy fat, high in a wide variety of fruits and vegetables and healthy grains and nuts, is thought be a diet that can slow the progression of cancer. Maintaining a healthy weight with daily exercise is also considered important.     Recent studies have shown that a man's lifestyle-especially nutrition and exercise-has a significant influence in prostate cancer prevention and treatment.   · Follow these tips to help you live a healthier life   · Lose body fat by eating fewer calories per day than you burn   · Maintain muscle mass by increasing protein intake and exercise   · Cut carbohydrate intake to cut down on excess fat and weight, which can slow tumor growth   · Exercise every day, combining cardio  "fitness and weight lifting   · Eat nine servings a day of colorful fruits and vegetables   · Reduce stress by focusing on living a balanced life and taking care of yourself   · Plan ahead to eat healthfully and minimize stress   · Track your behaviors and help chart your progress   · Establish a support system by maintaining healthy relationships with people who understand what you are going through     What about patients with intermediate disease risk?   Intermediate disease risk is best defined as [place] Grade 7 disease (preferably 3+4), PSA >10, or palpable disease over 2cm on one side or both.   Studies show that the risk of death on active surveillance with intermediate disease is approximately 15% compared to approximately 5% with low risk disease.   Future studies will likely reveal reasonable candidates for intermediate risk disease.     Need for imaging studies to assess the cancer  -We talked about the role of magnetic resonance imaging of the prostate gland especially in patients that are undergoing active surveillance.  The idea of using the MRI to arrange \"targeted \"biopsy using [employer] software is explained to the patient.  It is explained that there appears to be some advantage to the MRI detection of prostate cancer because it tends to find higher grade lesions that are clinically significant.  It is also explained that surveillance biopsies will also include some type of template or grid biopsy that may be modified depending upon how many targets are seen.  I informed the patient that I do recommend all of my patients on active surveillance be followed with MRI.  -We talked about assessment of metastatic disease.  We discussed the D'Amico classification for risk stratification.  It is explained that only the high and intermediate risk patients appear to have a reasonable chance of having lymph node metastases and or bone metastases.     Robot Assisted Laparoscopic Prostatectomy  We went over the " rationale of the use of robot assisted laparoscopic prostatectomy. I explained my experience with the procedure. I explained to him that I feel confident with this technique because I have done over 400 cases in nine years. I explained that I had not done that many open radical prostatectomies the previous ten years of practice combined which I believe plays a role in my comfort level. I explained that physicians that do that many open retropubic prostatectomies or perineal prostatectomies likely feel that they offer more benefit to patients with their respective technique because they are more proficient, familiar and comfortable. Clinically it is my impression that this procedure offers excellent local control with comparable intermediate cure rates. I also explained the technique of open retropubic prostatectomy. We discussed how previous abdominal surgery makes the operation more challenging in addition to body habitus and previous other anatomic variations that sometimes cause the procedure to last longer than expected or require conversion to the open procedure. A shorter hospital stay, more rapid progression during convalescence, and considerable decrease in blood loss due to pneumoperitoneum are explained. Complications of pneumoperitoneum and trochar placement are explained as a unique problem with robot prostatectomy. Preservation of continence and erections are discussed and compared with open surgery, radiation and other modalities of treatment. The role of early penile rehabilitation for more rapid resumption of erectile treatment is also discussed. Bladder neck contracture, Vesicourethral anastomotic leak, ureteral obstruction, and rectal injury are also discussed.     Radiation therapy options  We also talked about the types of radiation therapy are available. I explained to him the difference in traditional external beam radiotherapy versus three-dimensional conformal IMR T. I explained that the  latter seems to reduce the risks of damage to surrounding tissue with no decrease in the benefit of overall cancer treatment in studies. I did explain that typically this requires the placement of prostate side a shearing markers which will require an additional transrectal ultrasound with transrectal puncture to place these markers. He was explained that this is to accommodate the radiation oncologist in locating the prostate gland at each therapy session. I did explain that 1 disadvantages of this technique as the time consumption 8-9 consecutive weeks of treatment. We also discussed the used to prostate interstitial seed implantation. I explained to him the role of a volume study. It is also explained that radioactive seeds are implanted which increases the overall dose of radiation to the prostate while limiting the exposure to the surrounding tissues. I did also explain that our experience with this type of treatment in the limited number of these therapies that we did lead us to make a decision to no longer offer this to patient's locally. I did explain that I have a referral basis would be delighted to send this patient. We also briefly discussed proton therapy since that is not something available in our area. I explained the basics that normal tissue and a fixed distance from the target receives less scatter radiation from proton therapy than x-rays. This is this is the theoretical advantage to use of proton therapy versus IMRT.     Systemic Therapy  We talked about the role of chemotherapy and hormonal therapy in this setting. I explained that the former is used in patients that have failed primary curative therapy and have radiographic evidence of metastatic disease. It is generally preserved for patients that have failed hormonal therapy because of its high toxicity. Hormonal therapy and prostate cancer is explained. Both orchiectomy as well as systemic therapy using gonadotropin releasing hormones is  explained to the patient. The benefits of hormonal therapy is explained as palliative or adjunctive but not curative. The benefit as shown clinically by neoadjuvant hormonal therapy combined with radiotherapy in high risk patients is explained. I also went over the risks of hormonal therapy including the metabolic effects that may lead to metabolic syndrome. The latter is explained at increased risk in heart attack stroke and death. The metabolic effects on bone are explained including the increased possibility of fracture due to osteoporosis. It is explained at vitamin D and calcium supplementation is the recommendation upon initiation of the stalk treatments. Sexual side effects will also testosterone were also discussed.     Cryotherapy  Cryotherapy is explained as freezing of the prostate gland which is believed to kill prostate cancer cells. Clinical studies have shown a benefit for patients with organ confined prostate cancer. I told the patient that we don't offer this therapy, but also I really have never had a patient that has undergone cryotherapy, but I did offer to find a regional urologist for them to see that does it. I explained that it can also be used to treat patients that have failed initial radiation therapy.    Based on this discussion, the patient is leaning towards observation.  I told him I think it is critical for him to have a discussion with his oncologist.  If his prognosis for his lymphoma is poor, then it would be very unlikely that this prostate cancer would lead to death within 10 years so observation may be the best option.  I have also recommended that he see urology at [place] to at least consider the option of prostatectomy.  This could obviously be timed with the appropriateness of chemotherapy from oncology at [place] for his lymphoma.    I spent 60 minutes caring for Saul on this date of service. This time includes time spent by me in the following activities:preparing  for the visit, reviewing tests, counseling and educating the patient/family/caregiver, referring and communicating with other health care professionals  and documenting information in the medical record    Assessment and Plan  Diagnoses and all orders for this visit:    1. Prostate cancer (HCC) (Primary)        Assessment/Plan         (Please note that portions of this note were completed with a voice recognition program.)Oscar Bazan MD  12/14/21  13:36 CST

## 2021-12-16 ENCOUNTER — TELEPHONE (OUTPATIENT)
Dept: GASTROENTEROLOGY | Age: 66
End: 2021-12-16

## 2021-12-16 NOTE — TELEPHONE ENCOUNTER
12-16-21    I spoke to both A [facility] aprn, the past referral back in 6-2021 from [facility] was for abnormal  weight loss and also has Hx of Non Hodgkins Lymphoma, it appears [facility] had called the patient to get CLN scheduled but then it was CA per the wife because her  was feeling so bad and not sure he would have been able to go through the procedure. The wife said he now has prostate CA and wants to know if getting the CLN at present would be ok at this point or not. I told the wife at [phone] and after speaking with A  and [facility] we feels it best for a new referral and new updated medical records to be reviewed and see if the patient needs OV first or can be scheduled as OA. The wife said she can get [facility] to send a new referral with updated medical information so we can serve the patient better.  Xavier larkin

## 2021-12-16 NOTE — TELEPHONE ENCOUNTER
12-16-21 Left a VM for the wife @ 11:15 am. I have a couple more questions for her so let me know when she calls back, andrae larkin

## 2021-12-17 ENCOUNTER — TELEPHONE (OUTPATIENT)
Dept: GASTROENTEROLOGY | Age: 66
End: 2021-12-17

## 2021-12-22 RX ORDER — HYDROCODONE BITARTRATE AND ACETAMINOPHEN 7.5; 325 MG/1; MG/1
1 TABLET ORAL PRN
COMMUNITY
Start: 2016-11-11

## 2021-12-22 RX ORDER — GLYCOPYRROLATE 1 MG/1
0.5 TABLET ORAL 2 TIMES DAILY PRN
COMMUNITY
End: 2022-10-12 | Stop reason: ALTCHOICE

## 2021-12-22 RX ORDER — CYCLOBENZAPRINE HCL 10 MG
TABLET ORAL 3 TIMES DAILY PRN
COMMUNITY
Start: 2018-02-07

## 2021-12-22 RX ORDER — AMLODIPINE BESYLATE 5 MG/1
1 TABLET ORAL DAILY
COMMUNITY

## 2021-12-22 RX ORDER — AZELASTINE HYDROCHLORIDE, FLUTICASONE PROPIONATE 137; 50 UG/1; UG/1
SPRAY, METERED NASAL 2 TIMES DAILY PRN
COMMUNITY
End: 2022-10-12 | Stop reason: ALTCHOICE

## 2021-12-22 RX ORDER — MELOXICAM 15 MG/1
15 TABLET ORAL DAILY
COMMUNITY

## 2021-12-30 ENCOUNTER — TELEPHONE (OUTPATIENT)
Dept: OTOLARYNGOLOGY | Facility: CLINIC | Age: 66
End: 2021-12-30

## 2021-12-30 NOTE — TELEPHONE ENCOUNTER
Patient having trouble swallowing pureed food, wanting to be stretched again.  Pt was better after being stretched last time.  Pt not having any issues breathing, advised pt wife if the pt was to have any breathing issues to take him to ER. Placed pt on surgery scheduled

## 2021-12-30 NOTE — TELEPHONE ENCOUNTER
Caller: Vianney Alcantara      Relationship: WIFE       Best call back number: [phone], IF WIFE DOESN'T ANSWER CALL PT, PER WIFE     What is the best time to reach you: ANY TIME     Who are you requesting to speak with: CLINICAL STAFF    What was the call regarding: DR. LECHUGA INSTRUCTED PT THAT IF HE FELT LIKE HIS THROAT WAS CLOSING UP AGAIN AND UNABLE TO SWALLOW THEN TO REACH OUT. UNABLE TO WARM TRANSFER. PT CAN' T SWALLOW AND FEELS THROAT CLOSING UP AGAIN.    Do you require a callback: YES

## 2021-12-31 ENCOUNTER — LAB (OUTPATIENT)
Dept: LAB | Facility: HOSPITAL | Age: 66
End: 2021-12-31

## 2021-12-31 DIAGNOSIS — C02.9 PRIMARY SQUAMOUS CELL CARCINOMA OF TONGUE: ICD-10-CM

## 2021-12-31 DIAGNOSIS — R13.14 PHARYNGOESOPHAGEAL DYSPHAGIA: ICD-10-CM

## 2021-12-31 DIAGNOSIS — K21.9 LARYNGOPHARYNGEAL REFLUX: ICD-10-CM

## 2021-12-31 LAB — SARS-COV-2 ORF1AB RESP QL NAA+PROBE: NOT DETECTED

## 2021-12-31 PROCEDURE — U0004 COV-19 TEST NON-CDC HGH THRU: HCPCS

## 2021-12-31 PROCEDURE — C9803 HOPD COVID-19 SPEC COLLECT: HCPCS

## 2022-01-03 ENCOUNTER — ANESTHESIA (OUTPATIENT)
Dept: PERIOP | Facility: HOSPITAL | Age: 67
End: 2022-01-03

## 2022-01-03 ENCOUNTER — HOSPITAL ENCOUNTER (OUTPATIENT)
Facility: HOSPITAL | Age: 67
Setting detail: HOSPITAL OUTPATIENT SURGERY
Discharge: HOME | End: 2022-01-03
Attending: OTOLARYNGOLOGY | Admitting: OTOLARYNGOLOGY

## 2022-01-03 ENCOUNTER — HOSPITAL ENCOUNTER (OUTPATIENT)
Dept: GENERAL RADIOLOGY | Facility: HOSPITAL | Age: 67
Setting detail: HOSPITAL OUTPATIENT SURGERY
Discharge: HOME | End: 2022-01-03

## 2022-01-03 ENCOUNTER — ANESTHESIA EVENT (OUTPATIENT)
Dept: PERIOP | Facility: HOSPITAL | Age: 67
End: 2022-01-03

## 2022-01-03 VITALS
WEIGHT: 154.98 LBS | BODY MASS INDEX: 20.54 KG/M2 | RESPIRATION RATE: 16 BRPM | DIASTOLIC BLOOD PRESSURE: 91 MMHG | OXYGEN SATURATION: 96 % | SYSTOLIC BLOOD PRESSURE: 164 MMHG | TEMPERATURE: 97.3 F | HEART RATE: 92 BPM | HEIGHT: 73 IN

## 2022-01-03 DIAGNOSIS — R13.14 PHARYNGOESOPHAGEAL DYSPHAGIA: ICD-10-CM

## 2022-01-03 DIAGNOSIS — K21.9 LARYNGOPHARYNGEAL REFLUX: ICD-10-CM

## 2022-01-03 DIAGNOSIS — C02.9 PRIMARY SQUAMOUS CELL CARCINOMA OF TONGUE: ICD-10-CM

## 2022-01-03 LAB
ALBUMIN SERPL-MCNC: 4.1 G/DL (ref 3.5–5.2)
ALBUMIN/GLOB SERPL: 1.7 G/DL
ALP SERPL-CCNC: 96 U/L (ref 39–117)
ALT SERPL W P-5'-P-CCNC: 13 U/L (ref 1–41)
ANION GAP SERPL CALCULATED.3IONS-SCNC: 9 MMOL/L (ref 5–15)
AST SERPL-CCNC: 23 U/L (ref 1–40)
BASOPHILS # BLD AUTO: 0.03 10*3/MM3 (ref 0–0.2)
BASOPHILS NFR BLD AUTO: 0.3 % (ref 0–1.5)
BILIRUB SERPL-MCNC: 0.4 MG/DL (ref 0–1.2)
BUN SERPL-MCNC: 18 MG/DL (ref 8–23)
BUN/CREAT SERPL: 17.1 (ref 7–25)
CALCIUM SPEC-SCNC: 9.5 MG/DL (ref 8.6–10.5)
CHLORIDE SERPL-SCNC: 104 MMOL/L (ref 98–107)
CO2 SERPL-SCNC: 30 MMOL/L (ref 22–29)
CREAT SERPL-MCNC: 1.05 MG/DL (ref 0.76–1.27)
DEPRECATED RDW RBC AUTO: 44.2 FL (ref 37–54)
EOSINOPHIL # BLD AUTO: 0.72 10*3/MM3 (ref 0–0.4)
EOSINOPHIL NFR BLD AUTO: 6.6 % (ref 0.3–6.2)
ERYTHROCYTE [DISTWIDTH] IN BLOOD BY AUTOMATED COUNT: 13 % (ref 12.3–15.4)
GFR SERPL CREATININE-BSD FRML MDRD: 71 ML/MIN/1.73
GLOBULIN UR ELPH-MCNC: 2.4 GM/DL
GLUCOSE SERPL-MCNC: 96 MG/DL (ref 65–99)
HCT VFR BLD AUTO: 35 % (ref 37.5–51)
HGB BLD-MCNC: 11.8 G/DL (ref 13–17.7)
IMM GRANULOCYTES # BLD AUTO: 0.03 10*3/MM3 (ref 0–0.05)
IMM GRANULOCYTES NFR BLD AUTO: 0.3 % (ref 0–0.5)
LYMPHOCYTES # BLD AUTO: 1.13 10*3/MM3 (ref 0.7–3.1)
LYMPHOCYTES NFR BLD AUTO: 10.4 % (ref 19.6–45.3)
MCH RBC QN AUTO: 31.6 PG (ref 26.6–33)
MCHC RBC AUTO-ENTMCNC: 33.7 G/DL (ref 31.5–35.7)
MCV RBC AUTO: 93.8 FL (ref 79–97)
MONOCYTES # BLD AUTO: 0.83 10*3/MM3 (ref 0.1–0.9)
MONOCYTES NFR BLD AUTO: 7.6 % (ref 5–12)
NEUTROPHILS NFR BLD AUTO: 74.8 % (ref 42.7–76)
NEUTROPHILS NFR BLD AUTO: 8.13 10*3/MM3 (ref 1.7–7)
NRBC BLD AUTO-RTO: 0 /100 WBC (ref 0–0.2)
PLATELET # BLD AUTO: 302 10*3/MM3 (ref 140–450)
PMV BLD AUTO: 10.9 FL (ref 6–12)
POTASSIUM SERPL-SCNC: 4 MMOL/L (ref 3.5–5.2)
PROT SERPL-MCNC: 6.5 G/DL (ref 6–8.5)
RBC # BLD AUTO: 3.73 10*6/MM3 (ref 4.14–5.8)
SODIUM SERPL-SCNC: 143 MMOL/L (ref 136–145)
WBC NRBC COR # BLD: 10.87 10*3/MM3 (ref 3.4–10.8)

## 2022-01-03 PROCEDURE — 25010000002 PROPOFOL 10 MG/ML EMULSION: Performed by: NURSE ANESTHETIST, CERTIFIED REGISTERED

## 2022-01-03 PROCEDURE — 25010000002 MIDAZOLAM PER 1 MG: Performed by: ANESTHESIOLOGY

## 2022-01-03 PROCEDURE — 25010000002 FENTANYL CITRATE (PF) 100 MCG/2ML SOLUTION: Performed by: NURSE ANESTHETIST, CERTIFIED REGISTERED

## 2022-01-03 PROCEDURE — 25010000002 FENTANYL CITRATE (PF) 50 MCG/ML SOLUTION: Performed by: ANESTHESIOLOGY

## 2022-01-03 PROCEDURE — 25010000002 DEXAMETHASONE PER 1 MG: Performed by: NURSE ANESTHETIST, CERTIFIED REGISTERED

## 2022-01-03 PROCEDURE — 80053 COMPREHEN METABOLIC PANEL: CPT | Performed by: OTOLARYNGOLOGY

## 2022-01-03 PROCEDURE — 25010000002 HYDROMORPHONE PER 4 MG: Performed by: ANESTHESIOLOGY

## 2022-01-03 PROCEDURE — 93005 ELECTROCARDIOGRAM TRACING: CPT | Performed by: OTOLARYNGOLOGY

## 2022-01-03 PROCEDURE — 93010 ELECTROCARDIOGRAM REPORT: CPT | Performed by: INTERNAL MEDICINE

## 2022-01-03 PROCEDURE — 25010000002 ONDANSETRON PER 1 MG: Performed by: NURSE ANESTHETIST, CERTIFIED REGISTERED

## 2022-01-03 PROCEDURE — 85025 COMPLETE CBC W/AUTO DIFF WBC: CPT | Performed by: OTOLARYNGOLOGY

## 2022-01-03 PROCEDURE — 71046 X-RAY EXAM CHEST 2 VIEWS: CPT

## 2022-01-03 PROCEDURE — 31525 DX LARYNGOSCOPY EXCL NB: CPT | Performed by: OTOLARYNGOLOGY

## 2022-01-03 PROCEDURE — 43450 DILATE ESOPHAGUS 1/MULT PASS: CPT | Performed by: OTOLARYNGOLOGY

## 2022-01-03 RX ORDER — OXYCODONE AND ACETAMINOPHEN 10; 325 MG/1; MG/1
1 TABLET ORAL ONCE AS NEEDED
Status: DISCONTINUED | OUTPATIENT
Start: 2022-01-03 | End: 2022-01-03 | Stop reason: HOSPADM

## 2022-01-03 RX ORDER — PROPOFOL 10 MG/ML
VIAL (ML) INTRAVENOUS AS NEEDED
Status: DISCONTINUED | OUTPATIENT
Start: 2022-01-03 | End: 2022-01-03 | Stop reason: SURG

## 2022-01-03 RX ORDER — SUCCINYLCHOLINE/SOD CL,ISO/PF 200MG/10ML
SYRINGE (ML) INTRAVENOUS AS NEEDED
Status: DISCONTINUED | OUTPATIENT
Start: 2022-01-03 | End: 2022-01-03 | Stop reason: SURG

## 2022-01-03 RX ORDER — DEXAMETHASONE SODIUM PHOSPHATE 4 MG/ML
INJECTION, SOLUTION INTRA-ARTICULAR; INTRALESIONAL; INTRAMUSCULAR; INTRAVENOUS; SOFT TISSUE AS NEEDED
Status: DISCONTINUED | OUTPATIENT
Start: 2022-01-03 | End: 2022-01-03 | Stop reason: SURG

## 2022-01-03 RX ORDER — LIDOCAINE HYDROCHLORIDE 10 MG/ML
0.5 INJECTION, SOLUTION EPIDURAL; INFILTRATION; INTRACAUDAL; PERINEURAL ONCE AS NEEDED
Status: DISCONTINUED | OUTPATIENT
Start: 2022-01-03 | End: 2022-01-03 | Stop reason: HOSPADM

## 2022-01-03 RX ORDER — SODIUM CHLORIDE 0.9 % (FLUSH) 0.9 %
3 SYRINGE (ML) INJECTION AS NEEDED
Status: DISCONTINUED | OUTPATIENT
Start: 2022-01-03 | End: 2022-01-03 | Stop reason: HOSPADM

## 2022-01-03 RX ORDER — IBUPROFEN 600 MG/1
600 TABLET ORAL ONCE AS NEEDED
Status: DISCONTINUED | OUTPATIENT
Start: 2022-01-03 | End: 2022-01-03 | Stop reason: HOSPADM

## 2022-01-03 RX ORDER — PHENYLEPHRINE HCL IN 0.9% NACL 1 MG/10 ML
SYRINGE (ML) INTRAVENOUS AS NEEDED
Status: DISCONTINUED | OUTPATIENT
Start: 2022-01-03 | End: 2022-01-03 | Stop reason: SURG

## 2022-01-03 RX ORDER — HYDROCODONE BITARTRATE AND ACETAMINOPHEN 5; 325 MG/1; MG/1
1 TABLET ORAL ONCE AS NEEDED
Status: DISCONTINUED | OUTPATIENT
Start: 2022-01-03 | End: 2022-01-03 | Stop reason: HOSPADM

## 2022-01-03 RX ORDER — MAGNESIUM HYDROXIDE 1200 MG/15ML
LIQUID ORAL AS NEEDED
Status: DISCONTINUED | OUTPATIENT
Start: 2022-01-03 | End: 2022-01-03 | Stop reason: HOSPADM

## 2022-01-03 RX ORDER — MIDAZOLAM HYDROCHLORIDE 1 MG/ML
1 INJECTION INTRAMUSCULAR; INTRAVENOUS
Status: COMPLETED | OUTPATIENT
Start: 2022-01-03 | End: 2022-01-03

## 2022-01-03 RX ORDER — ONDANSETRON 2 MG/ML
4 INJECTION INTRAMUSCULAR; INTRAVENOUS AS NEEDED
Status: DISCONTINUED | OUTPATIENT
Start: 2022-01-03 | End: 2022-01-03 | Stop reason: HOSPADM

## 2022-01-03 RX ORDER — SODIUM CHLORIDE 0.9 % (FLUSH) 0.9 %
10 SYRINGE (ML) INJECTION EVERY 12 HOURS SCHEDULED
Status: DISCONTINUED | OUTPATIENT
Start: 2022-01-03 | End: 2022-01-03 | Stop reason: HOSPADM

## 2022-01-03 RX ORDER — NALOXONE HCL 0.4 MG/ML
0.04 VIAL (ML) INJECTION AS NEEDED
Status: DISCONTINUED | OUTPATIENT
Start: 2022-01-03 | End: 2022-01-03 | Stop reason: HOSPADM

## 2022-01-03 RX ORDER — FENTANYL CITRATE 50 UG/ML
INJECTION, SOLUTION INTRAMUSCULAR; INTRAVENOUS AS NEEDED
Status: DISCONTINUED | OUTPATIENT
Start: 2022-01-03 | End: 2022-01-03 | Stop reason: SURG

## 2022-01-03 RX ORDER — FLUMAZENIL 0.1 MG/ML
0.2 INJECTION INTRAVENOUS AS NEEDED
Status: DISCONTINUED | OUTPATIENT
Start: 2022-01-03 | End: 2022-01-03 | Stop reason: HOSPADM

## 2022-01-03 RX ORDER — FENTANYL CITRATE 50 UG/ML
25 INJECTION, SOLUTION INTRAMUSCULAR; INTRAVENOUS
Status: COMPLETED | OUTPATIENT
Start: 2022-01-03 | End: 2022-01-03

## 2022-01-03 RX ORDER — ONDANSETRON 4 MG/1
4 TABLET, FILM COATED ORAL ONCE AS NEEDED
Status: DISCONTINUED | OUTPATIENT
Start: 2022-01-03 | End: 2022-01-03 | Stop reason: HOSPADM

## 2022-01-03 RX ORDER — DEXTROSE MONOHYDRATE 25 G/50ML
12.5 INJECTION, SOLUTION INTRAVENOUS AS NEEDED
Status: DISCONTINUED | OUTPATIENT
Start: 2022-01-03 | End: 2022-01-03 | Stop reason: HOSPADM

## 2022-01-03 RX ORDER — LABETALOL HYDROCHLORIDE 5 MG/ML
5 INJECTION, SOLUTION INTRAVENOUS
Status: DISCONTINUED | OUTPATIENT
Start: 2022-01-03 | End: 2022-01-03 | Stop reason: HOSPADM

## 2022-01-03 RX ORDER — SODIUM CHLORIDE 0.9 % (FLUSH) 0.9 %
10 SYRINGE (ML) INJECTION AS NEEDED
Status: DISCONTINUED | OUTPATIENT
Start: 2022-01-03 | End: 2022-01-03 | Stop reason: HOSPADM

## 2022-01-03 RX ORDER — SODIUM CHLORIDE, SODIUM LACTATE, POTASSIUM CHLORIDE, CALCIUM CHLORIDE 600; 310; 30; 20 MG/100ML; MG/100ML; MG/100ML; MG/100ML
1000 INJECTION, SOLUTION INTRAVENOUS CONTINUOUS
Status: DISCONTINUED | OUTPATIENT
Start: 2022-01-03 | End: 2022-01-03 | Stop reason: HOSPADM

## 2022-01-03 RX ORDER — HYDROMORPHONE HYDROCHLORIDE 1 MG/ML
0.5 INJECTION, SOLUTION INTRAMUSCULAR; INTRAVENOUS; SUBCUTANEOUS
Status: COMPLETED | OUTPATIENT
Start: 2022-01-03 | End: 2022-01-03

## 2022-01-03 RX ORDER — LIDOCAINE HYDROCHLORIDE 40 MG/ML
SOLUTION TOPICAL AS NEEDED
Status: DISCONTINUED | OUTPATIENT
Start: 2022-01-03 | End: 2022-01-03 | Stop reason: SURG

## 2022-01-03 RX ORDER — HYDROCODONE BITARTRATE AND ACETAMINOPHEN 10; 325 MG/1; MG/1
1 TABLET ORAL EVERY 6 HOURS PRN
COMMUNITY

## 2022-01-03 RX ORDER — FENTANYL CITRATE 50 UG/ML
25 INJECTION, SOLUTION INTRAMUSCULAR; INTRAVENOUS
Status: DISCONTINUED | OUTPATIENT
Start: 2022-01-03 | End: 2022-01-03 | Stop reason: HOSPADM

## 2022-01-03 RX ORDER — SODIUM CHLORIDE, SODIUM LACTATE, POTASSIUM CHLORIDE, CALCIUM CHLORIDE 600; 310; 30; 20 MG/100ML; MG/100ML; MG/100ML; MG/100ML
9 INJECTION, SOLUTION INTRAVENOUS CONTINUOUS
Status: DISCONTINUED | OUTPATIENT
Start: 2022-01-03 | End: 2022-01-03 | Stop reason: HOSPADM

## 2022-01-03 RX ORDER — ONDANSETRON 2 MG/ML
INJECTION INTRAMUSCULAR; INTRAVENOUS AS NEEDED
Status: DISCONTINUED | OUTPATIENT
Start: 2022-01-03 | End: 2022-01-03 | Stop reason: SURG

## 2022-01-03 RX ORDER — LIDOCAINE HYDROCHLORIDE 20 MG/ML
INJECTION, SOLUTION EPIDURAL; INFILTRATION; INTRACAUDAL; PERINEURAL AS NEEDED
Status: DISCONTINUED | OUTPATIENT
Start: 2022-01-03 | End: 2022-01-03 | Stop reason: SURG

## 2022-01-03 RX ADMIN — FENTANYL CITRATE 25 MCG: 50 INJECTION, SOLUTION INTRAMUSCULAR; INTRAVENOUS at 11:52

## 2022-01-03 RX ADMIN — HYDROMORPHONE HYDROCHLORIDE 0.5 MG: 1 INJECTION, SOLUTION INTRAMUSCULAR; INTRAVENOUS; SUBCUTANEOUS at 11:51

## 2022-01-03 RX ADMIN — Medication 200 MCG: at 11:06

## 2022-01-03 RX ADMIN — MIDAZOLAM 1 MG: 1 INJECTION INTRAMUSCULAR; INTRAVENOUS at 09:43

## 2022-01-03 RX ADMIN — Medication 200 MCG: at 10:52

## 2022-01-03 RX ADMIN — MIDAZOLAM 1 MG: 1 INJECTION INTRAMUSCULAR; INTRAVENOUS at 10:26

## 2022-01-03 RX ADMIN — LIDOCAINE HYDROCHLORIDE 100 MG: 20 INJECTION, SOLUTION EPIDURAL; INFILTRATION; INTRACAUDAL; PERINEURAL at 10:41

## 2022-01-03 RX ADMIN — FENTANYL CITRATE 25 MCG: 50 INJECTION, SOLUTION INTRAMUSCULAR; INTRAVENOUS at 12:02

## 2022-01-03 RX ADMIN — FENTANYL CITRATE 100 MCG: 50 INJECTION, SOLUTION INTRAMUSCULAR; INTRAVENOUS at 10:41

## 2022-01-03 RX ADMIN — SODIUM CHLORIDE, POTASSIUM CHLORIDE, SODIUM LACTATE AND CALCIUM CHLORIDE 1000 ML: 600; 310; 30; 20 INJECTION, SOLUTION INTRAVENOUS at 09:18

## 2022-01-03 RX ADMIN — Medication 80 MG: at 10:41

## 2022-01-03 RX ADMIN — PROPOFOL 150 MG: 10 INJECTION, EMULSION INTRAVENOUS at 10:41

## 2022-01-03 RX ADMIN — FENTANYL CITRATE 25 MCG: 50 INJECTION, SOLUTION INTRAMUSCULAR; INTRAVENOUS at 11:57

## 2022-01-03 RX ADMIN — HYDROMORPHONE HYDROCHLORIDE 0.5 MG: 1 INJECTION, SOLUTION INTRAMUSCULAR; INTRAVENOUS; SUBCUTANEOUS at 12:03

## 2022-01-03 RX ADMIN — FENTANYL CITRATE 25 MCG: 50 INJECTION, SOLUTION INTRAMUSCULAR; INTRAVENOUS at 12:07

## 2022-01-03 RX ADMIN — ONDANSETRON 4 MG: 2 INJECTION INTRAMUSCULAR; INTRAVENOUS at 11:12

## 2022-01-03 RX ADMIN — DEXAMETHASONE SODIUM PHOSPHATE 4 MG: 4 INJECTION, SOLUTION INTRA-ARTICULAR; INTRALESIONAL; INTRAMUSCULAR; INTRAVENOUS; SOFT TISSUE at 11:12

## 2022-01-03 RX ADMIN — HYDROMORPHONE HYDROCHLORIDE 0.5 MG: 1 INJECTION, SOLUTION INTRAMUSCULAR; INTRAVENOUS; SUBCUTANEOUS at 12:14

## 2022-01-03 RX ADMIN — LIDOCAINE HYDROCHLORIDE 1 EACH: 40 SOLUTION TOPICAL at 10:41

## 2022-01-03 RX ADMIN — HYDROMORPHONE HYDROCHLORIDE 0.5 MG: 1 INJECTION, SOLUTION INTRAMUSCULAR; INTRAVENOUS; SUBCUTANEOUS at 12:24

## 2022-01-03 NOTE — DISCHARGE INSTRUCTIONS

## 2022-01-03 NOTE — ANESTHESIA POSTPROCEDURE EVALUATION
"Patient: Saul Alcantara Sr.    Procedure Summary     Date: 01/03/22 Room / Location:  [facility] OR 02 /  [facility]    Anesthesia Start: 1038 Anesthesia Stop: 1123    Procedure: Direct laryngoscopy, esophagoscopy with Johnson esophageal dilation (N/A ) Diagnosis:       Primary squamous cell carcinoma of tongue (HCC)      Pharyngoesophageal dysphagia      Laryngopharyngeal reflux      (Primary squamous cell carcinoma of tongue (HCC) [C02.9])      (Pharyngoesophageal dysphagia [R13.14])      (Laryngopharyngeal reflux [K21.9])    Surgeons: Wayne Richmond MD Provider: Andrés Raman CRNA    Anesthesia Type: general ASA Status: 3          Anesthesia Type: general    Vitals  Vitals Value Taken Time   /92 01/03/22 1241   Temp 97.3 °F (36.3 °C) 01/03/22 1120   Pulse 83 01/03/22 1252   Resp 16 01/03/22 1241   SpO2 94 % 01/03/22 1252   Vitals shown include unvalidated device data.        Post Anesthesia Care and Evaluation    PONV Status: none  Comments: Patient d/c from PACU prior to anes eval based on Annetta score.  Please see RN notes for details of d/c criteria.    Blood pressure 142/92, pulse 84, temperature 97.3 °F (36.3 °C), temperature source Temporal, resp. rate 16, height 185 cm (72.84\"), weight 70.3 kg (154 lb 15.7 oz), SpO2 93 %.          "

## 2022-01-03 NOTE — ANESTHESIA PREPROCEDURE EVALUATION
Anesthesia Evaluation     Patient summary reviewed   no history of anesthetic complications:  NPO Solid Status: > 8 hours             Airway   Mallampati: I  TM distance: >3 FB  Neck ROM: full  Dental    (+) edentulous and upper dentures    Pulmonary    (+) a smoker Former, COPD,   (-) asthma, recent URI, sleep apnea  Cardiovascular   Exercise tolerance: good (4-7 METS)    ECG reviewed    (+) hypertension, hyperlipidemia,   (-) pacemaker, past MI, angina, cardiac stents      Neuro/Psych  (-) seizures, TIA, CVA  GI/Hepatic/Renal/Endo    (+)  GERD,  thyroid problem hypothyroidism  (-) liver disease, no renal disease, diabetes    ROS Comment: Esophageal strictures    Musculoskeletal     Abdominal    Substance History      OB/GYN          Other   arthritis,    history of cancer (SCC of tongue s/p radiation)      Other Comment: Xerostomia                    Anesthesia Plan    ASA 3     general     intravenous induction     Anesthetic plan, all risks, benefits, and alternatives have been provided, discussed and informed consent has been obtained with: patient.

## 2022-01-03 NOTE — ANESTHESIA PROCEDURE NOTES
Airway  Urgency: elective    Date/Time: 1/3/2022 10:42 AM  Airway not difficult    General Information and Staff    Patient location during procedure: OR  CRNA: Andrés Raman CRNA    Indications and Patient Condition  Indications for airway management: airway protection    Preoxygenated: yes  MILS maintained throughout  Mask difficulty assessment: 1 - vent by mask    Final Airway Details  Final airway type: endotracheal airway      Successful airway: ETT  Cuffed: yes   Successful intubation technique: direct laryngoscopy and video laryngoscopy  Endotracheal tube insertion site: oral  Blade: Valdes  Blade size: 3  ETT size (mm): 6.0  Cormack-Lehane Classification: grade I - full view of glottis  Placement verified by: chest auscultation and capnometry   Cuff volume (mL): 5  Measured from: lips  Number of attempts at approach: 1  Assessment: lips, teeth, and gum same as pre-op and atraumatic intubation

## 2022-01-03 NOTE — OP NOTE
OPERATIVE NOTE  1/3/2022    NAME: Saul Alcantara Sr.    YOB: 1955  MRN: [id number]    PRE-OPERATIVE DIAGNOSIS:    Primary squamous cell carcinoma of tongue (HCC) [C02.9]  Pharyngoesophageal dysphagia [R13.14]  Laryngopharyngeal reflux [K21.9]    POST-OPERATIVE DIAGNOSIS:   Post-Op Diagnosis Codes:     * Primary squamous cell carcinoma of tongue (HCC) [C02.9]     * Pharyngoesophageal dysphagia [R13.14]     * Laryngopharyngeal reflux [K21.9]    PROCEDURE PERFORMED:   Direct laryngoscopy with esophageal dilatation    SURGEON:   Wayne Richmond MD    ASSISTANT(S):   None    ANESTHESIA:   General Anesthesia via Endotracheal Tube    INDICATIONS: The patient is a 66 y.o. male with Primary squamous cell carcinoma of tongue (HCC) [C02.9]  Pharyngoesophageal dysphagia [R13.14]  Laryngopharyngeal reflux [K21.9]    PROCEDURE:  The patient was brought to the operating room, given General Anesthesia via Endotracheal Tube, and prepped and draped in the usual manner.     Direct laryngoscopy was performed and no masses, lesions, ulcerations or other abnormalities were noted throughout the upper aerodigestive tract examination.  Minimal edema both true vocal cords were appreciated but there were no lesions.  Rigid esophagoscopy was performed the small scope was not able to be passed to the cricopharyngeal opening.     Subsequently Johnson dilatation was performed with successive passage of Johnson dilators of   24 Guinean, 26 Guinean, 30 Guinean, 32 Guinean, 34 Guinean, 36 Guinean, 38 Guinean, 40 Guinean, 42 Guinean, 46 Guinean, 48 Guinean, 50 Guinean,  52 Guinean, 54 Guinean, and 58 Guinean.  The dilators were passed to 45 cm without difficulty.  No significant bleeding or other abnormalities were noted.    The patient was transported upon extubation to the postanesthesia care unit in stable condition.    SPECIMENS:  None    COMPLICATIONS: NONE    ESTIMATED BLOOD LOSS:  Minimal    Wayne Richmond MD  1/3/2022

## 2022-01-03 NOTE — H&P
"YOB: 1955  Location: [facility]  Location Address: [address] [place] [address], [facility], [address] [place] [address]  Location Phone: [phone]         Chief Complaint   Patient presents with   • Follow-up         History of Present Illness  Saul Alcantara Sr. is a 66 y.o. male.  Saul Alcantara Sr. is status post Direct laryngoscopy and esophagoscopy with esophageal dilatation on 21 and 21. Patient had DL and biopsy of base of tongue on 9/10/14 with positive pathology for SCCa. The cancer was staged T1M0N0. The patient has a history of radiation therapy due to a base of tongue cancer on the left side that he finished in 2014. Patient is status post Direct laryngoscopy, esophagoscopy with dilatation on 20 and Rigid esophagoscopy and Johnson esophageal dilatation on 20. Patient is doing well postop with no complaints of sore throat or dysphagia. He is using peridex mouthwash which seems to help mouth and throat.      He feels less \"stretching\" was really good and states that he is doing much better.        Medical History        Past Medical History:   Diagnosis Date   • Allergic rhinitis     • Anxiety     • Arthritis     • Asthma     • Cataract     • Chronic rhinitis     • Chronic sinusitis     • COPD (chronic obstructive pulmonary disease) (HCC)     • COVID-19 vaccine series completed       Moderna   • Depression     • Deviated nasal septum     • Enlarged prostate     • GERD (gastroesophageal reflux disease)     • H/O head and neck radiation 3/3/2017   • History of transfusion     • Hyperlipidemia     • Hypertension     • Hypothyroidism     • Laryngopharyngeal reflux     • Lymphoma (HCC)       Non-Hodgkins   • MRSA infection     • Panic attacks     • Peyronie disease     • Pneumonia     • Primary squamous cell carcinoma of tongue (HCC) 2016     T1N0M0 SCC S/P XRT/Chemo 2014   • Primary squamous cell carcinoma of tongue (HCC) 2016     T1N0M0 SCC S/P XRT/Chemo 2014 "   • Sicca syndrome (HCC)     • Sinusitis, acute     • Squamous cell carcinoma       Base of Tongue   • Tobacco use disorder     • Tongue irritation     • Visit for monitoring Rituxan therapy       pt getting treatments at [facility] in [place]   • Xerostomia              Surgical History         Past Surgical History:   Procedure Laterality Date   • CATARACT EXTRACTION Bilateral     • ESOPHAGOSCOPY N/A 11/20/2019     Procedure: Direct laryngoscopy with esophageal Johnson dilation;  Surgeon: Wayne Richmond MD;  Location:  [facility];  Service: ENT   • HAND SURGERY         metal plate    • KNEE SURGERY       • LARYNGOSCOPY N/A 6/26/2020     Procedure: Rigid esophagoscopy Johnson esophageal dilatation;  Surgeon: Wayne Richmond MD;  Location:  [facility];  Service: ENT;  Laterality: N/A;   • LARYNGOSCOPY N/A 8/24/2020     Procedure: MICRODIRECT LARYNGOSCOPY with esophageal dilatation;  Surgeon: Wayne Richmond MD;  Location:  [facility];  Service: ENT;  Laterality: N/A;   • LARYNGOSCOPY N/A 5/24/2021     Procedure: Direct laryngoscopy, esophagoscopy with Johnson dilatation;  Surgeon: Wayne Richmond MD;  Location:  [facility];  Service: ENT;  Laterality: N/A;   • LARYNGOSCOPY N/A 12/1/2021     Procedure: Direct laryngoscopy, esophagoscopy with esophageal dilatation (Johnson dilators);  Surgeon: Wayne Richmond MD;  Location:  [facility];  Service: ENT;  Laterality: N/A;   • MULTIPLE TOOTH EXTRACTIONS         CONTINUING TO HAVE BONE REMOVED 1/2021   • OTHER SURGICAL HISTORY   09/10/2014     Microlaryngoscopy with Biopsy - Base of Tongue   • PANENDOSCOPY N/A 1/22/2021     Procedure: DIRECT LARYNGOSCOPY AND ESOPHAGOSCOPY WITH ESOPHAGEAL DILATION;  Surgeon: Wayne Richmond MD;  Location:  [facility];  Service: ENT;  Laterality: N/A;   • PANENDOSCOPY N/A 10/11/2021     Procedure: Direct laryngoscopy, esophagoscopy with Johnson dilatation;  Surgeon: Wayne Richmond MD;  Location:  [facility]  OR;  Service: ENT;  Laterality: N/A;   • PROSTATE ULTRASOUND BIOPSY N/A 12/1/2021     Procedure: PROSTATE ULTRASOUND BIOPSY. NOT A URONAV;  Surgeon: Oscar Bazan MD;  Location: [facility] OR;  Service: Urology;  Laterality: N/A;   • SPLENECTOMY       • SQUAMOUS CELL CARCINOMA EXCISION   09/10/2014     Tongue   • TONSILLECTOMY                Medications Taking   Outpatient Medications Marked as Taking for the 8/5/21 encounter (Office Visit) with Wayne Richmond MD   Medication Sig Dispense Refill   • albuterol (PROVENTIL) (2.5 MG/3ML) 0.083% nebulizer solution Take 2.5 mg by nebulization Every 4 (Four) Hours As Needed for Wheezing. 50 mL 1   • ALPRAZolam (XANAX) 2 MG tablet Take 2 mg by mouth 3 (Three) Times a Day.       • amitriptyline (ELAVIL) 25 MG tablet Take 25 mg by mouth every night at bedtime.       • amLODIPine (NORVASC) 10 MG tablet Take 10 mg by mouth Daily.       • azelastine (ASTELIN) 0.1 % nasal spray 2 sprays into the nostril(s) as directed by provider Daily As Needed.       • chlorhexidine (Peridex) 0.12 % solution Apply 15 mL to the mouth or throat 2 (Two) Times a Day. 240 mL 3   • fluticasone (FLONASE) 50 MCG/ACT nasal spray 2 sprays into each nostril Daily. (Patient taking differently: 2 sprays into the nostril(s) as directed by provider Daily As Needed.) 16 g 5   • glycopyrrolate (ROBINUL) 1 MG tablet 0.5 mg 2 (Two) Times a Day.   3   • HYDROcodone-acetaminophen (NORCO) 7.5-325 MG per tablet Take 10 tablets by mouth 2 (Two) Times a Day As Needed for Moderate Pain .   0   • levothyroxine (SYNTHROID, LEVOTHROID) 100 MCG tablet Take 100 mcg by mouth Daily.       • Lidocaine Viscous HCl (XYLOCAINE) 2 % solution Take 5 mL by mouth As Needed for Mild Pain .       • meloxicam (MOBIC) 15 MG tablet Take 15 mg by mouth Daily.       • nystatin (MYCOSTATIN) 267557 UNIT/ML suspension Take 10ml by mouth BID for 28 days (Patient taking differently: Take 200,000 Units by mouth 4 (Four) Times a Day As  Needed. Take 10ml by mouth BID for 28 days) 480 mL 2   • ondansetron (ZOFRAN) 8 MG tablet Take 8 mg by mouth Every 8 (Eight) Hours As Needed for Nausea.       • pantoprazole (PROTONIX) 40 MG EC tablet Take 40 mg by mouth Daily. Delayed Release (DR/EC)  Take 1 tablet (40 mg) by oral route 2 times per day,  Take 30 minutes prior to breakfast and evening meal        • senna-docusate (PERICOLACE) 8.6-50 MG per tablet Take 1 tablet by mouth 2 times daily       • sertraline (ZOLOFT) 100 MG tablet Take 100 mg by mouth every night at bedtime.       • simvastatin (ZOCOR) 20 MG tablet TK 1 T PO QD   11   • tamsulosin (FLOMAX) 0.4 MG capsule 24 hr capsule Take 0.4 mg by mouth Daily.       • triamcinolone (KENALOG) 0.5 % ointment Apply  topically to the appropriate area as directed 2 (Two) Times a Day. Apply to affected ear 15 g 3   • zolpidem (AMBIEN) 10 MG tablet Take 10 mg by mouth At Night As Needed for Sleep.       • [DISCONTINUED] chlorhexidine (Peridex) 0.12 % solution Apply 15 mL to the mouth or throat 2 (Two) Times a Day. 240 mL 0   • [DISCONTINUED] chlorhexidine (Peridex) 0.12 % solution Apply 15 mL to the mouth or throat 2 (Two) Times a Day. 240 mL 3   • [DISCONTINUED] chlorhexidine (Peridex) 0.12 % solution Apply 15 mL to the mouth or throat 2 (Two) Times a Day. 240 mL 0   • [DISCONTINUED] fluticasone (FLONASE) 50 MCG/ACT nasal spray 2 sprays into the nostril(s) as directed by provider Daily for 30 days. Administer 2 sprays in each nostril for each dose. 1 bottle 6   • [DISCONTINUED] megestrol (MEGACE) 40 MG/ML suspension TAKE 20 ML BY MOUTH DAILY       • [DISCONTINUED] tadalafil (CIALIS) 5 MG tablet Take 1 tablet by mouth Daily for 360 days. 30 tablet 11            Patient has no known allergies.           Family History   Problem Relation Age of Onset   • Diabetes Sister     • Cancer Sister           Social History   Social History            Socioeconomic History   • Marital status:    Tobacco Use   •  Smoking status: Former Smoker       Packs/day: 0.25       Types: Cigarettes   • Smokeless tobacco: Never Used   • Tobacco comment: pack last couple of weeks   Vaping Use   • Vaping Use: Never used   Substance and Sexual Activity   • Alcohol use: No   • Drug use: No   • Sexual activity: Yes       Partners: Female            Review of Systems  Negative except as in HPI      Vitals:     08/05/21 1505   BP: 133/80   Pulse: 94   Temp: 98.4 °F (36.9 °C)         Body mass index is 21.06 kg/m².        Objective         Physical Exam  CONSTITUTIONAL: well nourished, well-developed, alert, oriented, in no acute distress      COMMUNICATION AND VOICE: able to communicate normally, normal voice quality     HEAD: normocephalic, no lesions, atraumatic, no tenderness, no masses      FACE: appearance normal, no lesions, no tenderness, no deformities, facial motion symmetric     EYES: ocular motility normal, eyelids normal, orbits normal, no proptosis, conjunctiva normal , pupils equal, round      EARS:  Hearing: hearing to conversational voice intact bilaterally   External Ears: normal bilaterally, no lesions     NOSE:  External Nose: external nasal structure normal, no tenderness on palpation, no nasal discharge, no lesions, no evidence of trauma, nostrils patent      ORAL:  Lips: upper and lower lips without lesion   OC/OP: No masses or lesions by visualization or palpation     NECK:  Inspection and Palpation: neck appearance normal, no masses or tenderness     CHEST/RESPIRATORY: normal respiratory effort      CARDIOVASCULAR: no cyanosis or edema      NEUROLOGICAL/PSYCHIATRIC: oriented to time, place and person, mood normal, affect appropriate, CN II-XII intact grossly              Assessment/Plan      Diagnoses and all orders for this visit:     1. Primary squamous cell carcinoma of tongue (CMS/HCC) (Primary)  -     Case Request; Standing  -     Comprehensive Metabolic Panel; Future  -     CBC (No Diff); Future  -     ECG 12  Lead; Future  -     XR Chest 2 View; Future  -     Case Request  -     Case Request; Standing  -     Case Request     2. Pharyngoesophageal dysphagia  -     Case Request; Standing  -     Comprehensive Metabolic Panel; Future  -     CBC (No Diff); Future  -     ECG 12 Lead; Future  -     XR Chest 2 View; Future  -     Case Request  -     Case Request; Standing  -     Case Request     3. Laryngopharyngeal reflux  -     Case Request; Standing  -     Comprehensive Metabolic Panel; Future  -     CBC (No Diff); Future  -     ECG 12 Lead; Future  -     XR Chest 2 View; Future  -     Case Request  -     Case Request; Standing  -     Case Request     Other orders  -     Discontinue: chlorhexidine (Peridex) 0.12 % solution; Apply 15 mL to the mouth or throat 2 (Two) Times a Day.  Dispense: 240 mL; Refill: 0  -     chlorhexidine (Peridex) 0.12 % solution; Apply 15 mL to the mouth or throat 2 (Two) Times a Day.  Dispense: 240 mL; Refill: 3  -     Follow Anesthesia Guidelines / Protocol; Future  -     Obtain Informed Consent; Future  -     Follow Anesthesia Guidelines / Protocol; Future  -     Obtain Informed Consent; Future        Direct laryngoscopy, esophagoscopy with esophageal dilatation (Johnson dilators) (N/A)        Orders Placed This Encounter   Procedures   • XR Chest 2 View       Standing Status:   Future       Number of Occurrences:   1       Standing Expiration Date:   10/6/2022       Order Specific Question:   Reason for Exam:       Answer:   Direct laryngoscopy, esophagoscopy with Johnson dilatation       Order Specific Question:   Release to patient       Answer:   Immediate   • Comprehensive Metabolic Panel       Standing Status:   Future       Number of Occurrences:   1       Standing Expiration Date:   10/6/2022       Order Specific Question:   Release to patient       Answer:   Immediate   • CBC (No Diff)       Standing Status:   Future       Number of Occurrences:   1       Standing Expiration Date:    10/6/2022       Order Specific Question:   Release to patient       Answer:   Immediate   • Follow Anesthesia Guidelines / Protocol       Standing Status:   Future   • Obtain Informed Consent       Standing Status:   Future       Order Specific Question:   Informed Consent Given For       Answer:   Direct laryngoscopy, esophagoscopy with Johnson dilatation   • Follow Anesthesia Guidelines / Protocol       Standing Status:   Future   • Obtain Informed Consent       Standing Status:   Future       Order Specific Question:   Informed Consent Given For       Answer:   Direct laryngoscopy, esophagoscopy with esophageal dilatation (Johnson dilators)   • ECG 12 Lead       Standing Status:   Future       Number of Occurrences:   1       Standing Expiration Date:   10/6/2022       Order Specific Question:   Reason for Exam:       Answer:   Direct laryngoscopy, esophagoscopy with Johnson dilatation       Order Specific Question:   Release to patient       Answer:   Immediate      Return in about 5 months (around 1/5/2022) for Recheck.           Patient Instructions   Call return for problems  Continue Peridex as needed  Open 4 to 5 months and consider esophagoscopy with dilatation in 6 months if needed     Be aware of the signs and symptoms of recurrent head and neck cancer including neck mass, persistent or recurrent sore throat, ear pain, hemoptysis, weight loss and hoarseness. If any of these symptoms recur and or persist, call for an evaluation.      D/W patient increasing caloric intake and discussed cruciferous vegetables and protein shakes etc to maintain optimal nutrition.  The necessity of abstaining from tobacco products was also discussed particularly with respect to not smoking.     Continue F/U and/or treatment with 's      ADDENDUM:  Patient called the office and is having progressive difficulty swallowing.  Although it has only been 4 months and we were trying to wait to 6 months his swallowing is  "significantly more problematic and consideration recommendation made for esophagoscopy with Johnson dilatation.  He is amenable and wishes to proceed this will be scheduled in the near future.  Risk benefits and options were discussed with the patient.     ADDENDUM:  Following esophageal dilatation on October 11, 2021.  The patient called the office with reports that his swallowing has improved immediately postoperatively but then began to gradually had increasing difficulty swallowing feeling like it was \"closing down\".  Subsequent discussion with patient on November 29 demonstrated progressive difficulty and recommendation was made to proceed with direct laryngoscopy and esophageal dilatation on 12/6/2021.  Patient understands risks of bleeding, infection, recidivistic dysphagia as well as possible perforation mediastinitis and death etc. he wishes to proceed.  Addendum: Following esophageal dilatation on December 1, 2021, the patient called the office with reports that his swallowing had improved postoperatively.  Subsequently he began to gradually have increasing difficulty swallowing feeling like it was closing down again.  Subsequent discussion demonstrated this progressive difficulty was consistent with previous evaluations recommendation was to proceed with direct laryngoscopy, esophagoscopy and dilatation.  He understands risks of bleeding, infection, recidivistic dysphagia as well as possible perforation, mediastinitis and death.  He wishes to proceed.       "

## 2022-01-03 NOTE — NURSING NOTE
Call made to Dr Ledezma' office per patient/family request. Patient states that in previous encounters, there was a notable improvement with his ability to swallow, but today there is no change and this is concerning to him. Unable to contact Lisa in office, message left to return phone call. Upon telling the patient and spouse that I would be waiting for a return phone call, they expressed their desire to be discharged and state they aren't concerned at this time and will call the office if his ability to swallow worsens.

## 2022-01-04 LAB
QT INTERVAL: 380 MS
QTC INTERVAL: 427 MS

## 2022-01-05 ENCOUNTER — APPOINTMENT (OUTPATIENT)
Dept: GENERAL RADIOLOGY | Facility: HOSPITAL | Age: 67
End: 2022-01-05

## 2022-01-05 ENCOUNTER — HOSPITAL ENCOUNTER (EMERGENCY)
Facility: HOSPITAL | Age: 67
Discharge: HOME | End: 2022-01-05
Attending: INTERNAL MEDICINE | Admitting: INTERNAL MEDICINE

## 2022-01-05 ENCOUNTER — APPOINTMENT (OUTPATIENT)
Dept: CT IMAGING | Facility: HOSPITAL | Age: 67
End: 2022-01-05

## 2022-01-05 ENCOUNTER — TELEPHONE (OUTPATIENT)
Dept: OTOLARYNGOLOGY | Facility: CLINIC | Age: 67
End: 2022-01-05

## 2022-01-05 VITALS
TEMPERATURE: 99.7 F | OXYGEN SATURATION: 95 % | SYSTOLIC BLOOD PRESSURE: 121 MMHG | RESPIRATION RATE: 17 BRPM | DIASTOLIC BLOOD PRESSURE: 76 MMHG | BODY MASS INDEX: 20.67 KG/M2 | HEIGHT: 73 IN | WEIGHT: 156 LBS | HEART RATE: 95 BPM

## 2022-01-05 DIAGNOSIS — R91.8 PULMONARY NODULES: ICD-10-CM

## 2022-01-05 DIAGNOSIS — Z01.818 PRE-OPERATIVE EXAMINATION: Primary | ICD-10-CM

## 2022-01-05 DIAGNOSIS — J21.8 ACUTE BRONCHIOLITIS DUE TO OTHER SPECIFIED ORGANISMS: ICD-10-CM

## 2022-01-05 DIAGNOSIS — R50.9 FEVER IN ADULT: Primary | ICD-10-CM

## 2022-01-05 LAB
ALBUMIN SERPL-MCNC: 4.3 G/DL (ref 3.5–5.2)
ALBUMIN/GLOB SERPL: 1.5 G/DL
ALP SERPL-CCNC: 95 U/L (ref 39–117)
ALT SERPL W P-5'-P-CCNC: 15 U/L (ref 1–41)
ANION GAP SERPL CALCULATED.3IONS-SCNC: 12 MMOL/L (ref 5–15)
AST SERPL-CCNC: 23 U/L (ref 1–40)
BASOPHILS # BLD AUTO: 0.03 10*3/MM3 (ref 0–0.2)
BASOPHILS NFR BLD AUTO: 0.2 % (ref 0–1.5)
BILIRUB SERPL-MCNC: 0.5 MG/DL (ref 0–1.2)
BUN SERPL-MCNC: 14 MG/DL (ref 8–23)
BUN/CREAT SERPL: 13.5 (ref 7–25)
CALCIUM SPEC-SCNC: 9.5 MG/DL (ref 8.6–10.5)
CHLORIDE SERPL-SCNC: 99 MMOL/L (ref 98–107)
CO2 SERPL-SCNC: 30 MMOL/L (ref 22–29)
CREAT SERPL-MCNC: 1.04 MG/DL (ref 0.76–1.27)
DEPRECATED RDW RBC AUTO: 45.7 FL (ref 37–54)
EOSINOPHIL # BLD AUTO: 0.57 10*3/MM3 (ref 0–0.4)
EOSINOPHIL NFR BLD AUTO: 3.2 % (ref 0.3–6.2)
ERYTHROCYTE [DISTWIDTH] IN BLOOD BY AUTOMATED COUNT: 13.1 % (ref 12.3–15.4)
GFR SERPL CREATININE-BSD FRML MDRD: 72 ML/MIN/1.73
GLOBULIN UR ELPH-MCNC: 2.8 GM/DL
GLUCOSE SERPL-MCNC: 123 MG/DL (ref 65–99)
HCT VFR BLD AUTO: 35.7 % (ref 37.5–51)
HGB BLD-MCNC: 11.8 G/DL (ref 13–17.7)
HOLD SPECIMEN: NORMAL
HOLD SPECIMEN: NORMAL
IMM GRANULOCYTES # BLD AUTO: 0.08 10*3/MM3 (ref 0–0.05)
IMM GRANULOCYTES NFR BLD AUTO: 0.5 % (ref 0–0.5)
LIPASE SERPL-CCNC: 16 U/L (ref 13–60)
LYMPHOCYTES # BLD AUTO: 0.93 10*3/MM3 (ref 0.7–3.1)
LYMPHOCYTES NFR BLD AUTO: 5.2 % (ref 19.6–45.3)
MCH RBC QN AUTO: 31.5 PG (ref 26.6–33)
MCHC RBC AUTO-ENTMCNC: 33.1 G/DL (ref 31.5–35.7)
MCV RBC AUTO: 95.2 FL (ref 79–97)
MONOCYTES # BLD AUTO: 1.55 10*3/MM3 (ref 0.1–0.9)
MONOCYTES NFR BLD AUTO: 8.7 % (ref 5–12)
NEUTROPHILS NFR BLD AUTO: 14.59 10*3/MM3 (ref 1.7–7)
NEUTROPHILS NFR BLD AUTO: 82.2 % (ref 42.7–76)
NRBC BLD AUTO-RTO: 0 /100 WBC (ref 0–0.2)
PLATELET # BLD AUTO: 320 10*3/MM3 (ref 140–450)
PMV BLD AUTO: 11 FL (ref 6–12)
POTASSIUM SERPL-SCNC: 4.1 MMOL/L (ref 3.5–5.2)
PROT SERPL-MCNC: 7.1 G/DL (ref 6–8.5)
QT INTERVAL: 330 MS
QTC INTERVAL: 454 MS
RBC # BLD AUTO: 3.75 10*6/MM3 (ref 4.14–5.8)
SARS-COV-2 RNA PNL SPEC NAA+PROBE: NOT DETECTED
SODIUM SERPL-SCNC: 141 MMOL/L (ref 136–145)
WBC NRBC COR # BLD: 17.75 10*3/MM3 (ref 3.4–10.8)
WHOLE BLOOD HOLD SPECIMEN: NORMAL
WHOLE BLOOD HOLD SPECIMEN: NORMAL

## 2022-01-05 PROCEDURE — 83690 ASSAY OF LIPASE: CPT | Performed by: INTERNAL MEDICINE

## 2022-01-05 PROCEDURE — 93010 ELECTROCARDIOGRAM REPORT: CPT | Performed by: INTERNAL MEDICINE

## 2022-01-05 PROCEDURE — 71045 X-RAY EXAM CHEST 1 VIEW: CPT

## 2022-01-05 PROCEDURE — 80053 COMPREHEN METABOLIC PANEL: CPT | Performed by: INTERNAL MEDICINE

## 2022-01-05 PROCEDURE — 87635 SARS-COV-2 COVID-19 AMP PRB: CPT | Performed by: INTERNAL MEDICINE

## 2022-01-05 PROCEDURE — 93005 ELECTROCARDIOGRAM TRACING: CPT | Performed by: INTERNAL MEDICINE

## 2022-01-05 PROCEDURE — 99283 EMERGENCY DEPT VISIT LOW MDM: CPT

## 2022-01-05 PROCEDURE — 85025 COMPLETE CBC W/AUTO DIFF WBC: CPT | Performed by: INTERNAL MEDICINE

## 2022-01-05 PROCEDURE — 36415 COLL VENOUS BLD VENIPUNCTURE: CPT

## 2022-01-05 PROCEDURE — 25010000002 CEFTRIAXONE PER 250 MG: Performed by: INTERNAL MEDICINE

## 2022-01-05 PROCEDURE — 96365 THER/PROPH/DIAG IV INF INIT: CPT

## 2022-01-05 PROCEDURE — 71250 CT THORAX DX C-: CPT

## 2022-01-05 RX ORDER — SODIUM CHLORIDE 0.9 % (FLUSH) 0.9 %
10 SYRINGE (ML) INJECTION AS NEEDED
Status: DISCONTINUED | OUTPATIENT
Start: 2022-01-05 | End: 2022-01-05 | Stop reason: HOSPADM

## 2022-01-05 RX ORDER — CEFDINIR 250 MG/5ML
300 POWDER, FOR SUSPENSION ORAL 2 TIMES DAILY
Qty: 120 ML | Refills: 0 | Status: SHIPPED | OUTPATIENT
Start: 2022-01-05 | End: 2022-01-15

## 2022-01-05 RX ADMIN — CEFTRIAXONE SODIUM 1 G: 1 INJECTION, POWDER, FOR SOLUTION INTRAMUSCULAR; INTRAVENOUS at 06:46

## 2022-01-05 NOTE — TELEPHONE ENCOUNTER
Vianney called and patient has been in ER. She states that she got Saul home and when he woke up he ate and had nasal regurgitation. I have spoken with Dr. Richmond. He states that is not related to his surgical procedure. I have relayed this to Vianney and advised her to have patient see Dr. Stubbs

## 2022-01-05 NOTE — ED PROVIDER NOTES
Subjective   Patient is a 66-year-old male who has had 3 different types of cancer and who on Monday had a esophageal dilatation for esophageal stricture.  He states that before the procedure he was already having some cough productive of sputum green sputum along with some chest congestion.  He states is gotten progressively worse even since the procedure.  He is also had some fever at this point time.  As far as he knows he has not been around by sick.  But he has been in and out of the hospital having a procedure.  He states there has been no change in swallowing or difficulty with swallowing after the esophageal dilatation.          Review of Systems   Constitutional: Positive for fever. Negative for chills.   HENT: Negative for congestion, ear pain and sinus pressure.    Eyes: Negative for photophobia, pain and visual disturbance.   Respiratory: Positive for cough. Negative for chest tightness, shortness of breath and wheezing.    Cardiovascular: Positive for chest pain. Negative for palpitations.   Gastrointestinal: Negative for abdominal pain, diarrhea and nausea.   Endocrine: Negative for cold intolerance and heat intolerance.   Genitourinary: Negative for difficulty urinating and urgency.   Musculoskeletal: Negative for arthralgias, joint swelling and myalgias.   Skin: Negative for color change and wound.   Neurological: Negative for dizziness and headaches.   Hematological: Negative for adenopathy. Does not bruise/bleed easily.   Psychiatric/Behavioral: Negative for agitation, behavioral problems, confusion and decreased concentration.       Past Medical History:   Diagnosis Date   • Allergic rhinitis    • Anxiety    • Arthritis    • Cataract    • Chronic sinusitis    • COPD (chronic obstructive pulmonary disease) ([facility])    • COVID-19 vaccine series completed     Moderna   • Depression    • Deviated nasal septum    • Enlarged prostate    • GERD (gastroesophageal reflux disease)    • H/O head and neck  radiation 3/3/2017   • History of transfusion    • Hyperlipidemia    • Hypertension    • Hypothyroidism    • Laryngopharyngeal reflux    • Lymphoma (HCC)     Non-Hodgkins   • MRSA infection    • Panic attacks    • Peyronie disease    • Pneumonia    • 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    T1N0M0 SCC S/P XRT/Chemo 12/2014   • 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    T1N0M0 SCC S/P XRT/Chemo 12/2014   • Sicca syndrome (HCC)    • Sinusitis, acute    • Squamous cell carcinoma     Base of Tongue   • Tobacco use disorder    • Tongue irritation    • Visit for monitoring Rituxan therapy     pt getting treatments at [facility] in [place]   • Xerostomia        No Known Allergies    Past Surgical History:   Procedure Laterality Date   • CATARACT EXTRACTION Bilateral    • ESOPHAGOSCOPY N/A 11/20/2019    Procedure: Direct laryngoscopy with esophageal Johnson dilation;  Surgeon: Wayne Richmond MD;  Location: [facility] OR;  Service: ENT   • HAND SURGERY      metal plate    • KNEE SURGERY     • LARYNGOSCOPY N/A 6/26/2020    Procedure: Rigid esophagoscopy Johnson esophageal dilatation;  Surgeon: Wayne Richmond MD;  Location: [facility] OR;  Service: ENT;  Laterality: N/A;   • LARYNGOSCOPY N/A 8/24/2020    Procedure: MICRODIRECT LARYNGOSCOPY with esophageal dilatation;  Surgeon: Wayne Richmond MD;  Location: [facility] OR;  Service: ENT;  Laterality: N/A;   • LARYNGOSCOPY N/A 5/24/2021    Procedure: Direct laryngoscopy, esophagoscopy with Johnson dilatation;  Surgeon: Wayne Ricmhond MD;  Location: [facility] OR;  Service: ENT;  Laterality: N/A;   • LARYNGOSCOPY N/A 12/1/2021    Procedure: Direct laryngoscopy, esophagoscopy with esophageal dilatation (Johnson dilators);  Surgeon: Wayne Richmond MD;  Location: [facility] OR;  Service: ENT;  Laterality: N/A;   • LARYNGOSCOPY N/A 1/3/2022    Procedure: Direct laryngoscopy, esophagoscopy with Johnson esophageal dilation;   Surgeon: Wayne Richmond MD;  Location:  [facility] OR;  Service: ENT;  Laterality: N/A;   • MULTIPLE TOOTH EXTRACTIONS      CONTINUING TO HAVE BONE REMOVED 1/2021   • OTHER SURGICAL HISTORY  09/10/2014    Microlaryngoscopy with Biopsy - Base of Tongue   • PANENDOSCOPY N/A 1/22/2021    Procedure: DIRECT LARYNGOSCOPY AND ESOPHAGOSCOPY WITH ESOPHAGEAL DILATION;  Surgeon: Wayne Richmond MD;  Location:  [facility] OR;  Service: ENT;  Laterality: N/A;   • PANENDOSCOPY N/A 10/11/2021    Procedure: Direct laryngoscopy, esophagoscopy with Johnson dilatation;  Surgeon: Wayne Richmond MD;  Location:  [facility] OR;  Service: ENT;  Laterality: N/A;   • PROSTATE ULTRASOUND BIOPSY N/A 12/1/2021    Procedure: PROSTATE ULTRASOUND BIOPSY. NOT A URONAV;  Surgeon: Oscar Bazan MD;  Location:  [facility] OR;  Service: Urology;  Laterality: N/A;   • SPLENECTOMY     • SQUAMOUS CELL CARCINOMA EXCISION  09/10/2014    Tongue   • TONSILLECTOMY         Family History   Problem Relation Age of Onset   • Diabetes Sister    • Cancer Sister        Social History     Socioeconomic History   • Marital status:    Tobacco Use   • Smoking status: Former Smoker     Packs/day: 0.25     Types: Cigarettes   • Smokeless tobacco: Never Used   • Tobacco comment: pack last couple of weeks   Vaping Use   • Vaping Use: Never used   Substance and Sexual Activity   • Alcohol use: No   • Drug use: No   • Sexual activity: Yes     Partners: Female           Objective   Physical Exam  Vitals and nursing note reviewed.   Constitutional:       Appearance: Normal appearance. He is well-developed.   HENT:      Head: Normocephalic and atraumatic.   Eyes:      Extraocular Movements: Extraocular movements intact.      Conjunctiva/sclera: Conjunctivae normal.      Pupils: Pupils are equal, round, and reactive to light.   Cardiovascular:      Rate and Rhythm: Normal rate and regular rhythm.      Heart sounds: Normal heart sounds.   Pulmonary:      Effort: Pulmonary  effort is normal.      Breath sounds: Normal breath sounds.   Abdominal:      General: Bowel sounds are normal.      Palpations: Abdomen is soft.      Tenderness: There is no abdominal tenderness.   Musculoskeletal:         General: Normal range of motion.      Cervical back: Normal range of motion and neck supple.   Skin:     General: Skin is warm and dry.      Findings: No rash.   Neurological:      General: No focal deficit present.      Mental Status: He is alert and oriented to person, place, and time.      Cranial Nerves: No cranial nerve deficit.      Deep Tendon Reflexes: Reflexes are normal and symmetric.   Psychiatric:         Behavior: Behavior normal.         Thought Content: Thought content normal.         Procedures           ED Course  ED Course as of 01/05/22 1424   Wed Jan 05, 2022   0630 Taken over from Dr Webb  [TS]   0644 Care of the patient turned over to Justus Barrientos MD [RW]   0725 1. No soft tissue emphysema identified in the lower neck or mediastinum  to suggest esophageal perforation. A do not see any significant  inflammatory changes along the upper esophagus.  2. Numerous small pulmonary nodules are identified, some groundglass  nodules and others appearing solid with surrounding groundglass halo.  Centrilobular distribution with some of these areas appearing clustered,  as is typically seen with inflammatory nodularity. Infectious  bronchiolitis would be my primary differential consideration. Metastatic  disease is not completely excluded and I would recommend short interval  follow-up chest CT to ensure resolution.  3. Coronary atheromatous calcification.  4. No suspicious adenopathy in the chest.  The CT findings were discussed with the patient at length reason for close follow-up [TS]   0725 Patient said that he was having a low-grade fever cough even before the endoscopy [TS]   0725 He has no drooling or sharp stabbing chest pain no significant dysphagia or odynophagia worse than  what he usually has [TS]   0804 This case was discussed with the patient at length he is ready to go home.  His white cell count is 17,000 CT findings were discussed with the patient.  We will treat him empirically with antibiotics and see how he does and then he needs to follow-up with his primary MD and to return the ER for any worsening symptoms further evaluation of this nodularity will have to be performed. [TS]   0805 Risks and benefits of treatments given and alternative treatment options discussed with patient/family. I answered all the questions in simple, plain language, and there was voiced understanding and agreement with plan of care. There were no further questions. Differential diagnosis discussed. Patient/family was advised that the practice of medicine is not always an exact science, and sometimes tests, physical exam, or history may not show the underlying conditions with certainty. Additionally, the condition may change or show itself later after initial presentation. There was also expressed understanding and agreement with this limitation of emergency medicine practice. Patient/family was asked to return to ED if any problem or issues or if condition worsens or does not improved. Patient/family agreed to follow up with PCP/specialist as advised, or return to ED if unable to see a provider in a timely fashion for continued symptoms.  [TS]   0807 Antibiotic treatment in patients with acute bronchitis bronchiolitis    Antibiotics were prescribed because    -Immunocompromise status i.e. diabetes, kidney failure, HIV etc.  ....... xxxx  -COPD or  other disease of the respiratory system excluding asthma....xxxx  -Otitis media  -Acute sinusitis or acute pharyngitis  -UTI     Antibiotics prescribed this patient because he is got history of COPD also immunocompromise with a history of cancer [TS]      ED Course User Index  [RW] Hieu Webb MD  [TS] Justus Barrientos MD                                                HEART Score (for prediction of 6-week risk of major adverse cardiac event) reviewed and/or performed as part of the patient evaluation and treatment planning process.  The result associated with this review/performance is: 4       MDM    Final diagnoses:   Fever in adult   Pulmonary nodules   Acute bronchiolitis due to other specified organisms       ED Disposition  ED Disposition     ED Disposition Condition Comment    Discharge Stable           Joe Stubbs MD  [address] [facility] [address]  [facility] [id number] [phone]    In 3 days           Medication List      New Prescriptions    cefdinir 250 MG/5ML suspension  Commonly known as: OMNICEF  Take 6 mL by mouth 2 (Two) Times a Day for 10 days.        Changed    fluticasone 50 MCG/ACT nasal spray  Commonly known as: FLONASE  2 sprays into each nostril Daily.  What changed:   · when to take this  · reasons to take this     nystatin 100,000 unit/mL suspension  Commonly known as: MYCOSTATIN  Take 10ml by mouth BID for 28 days  What changed:   · how much to take  · how to take this  · when to take this  · reasons to take this     tadalafil 20 MG tablet  Commonly known as: CIALIS  Take 1 tablet by mouth As Needed for Erectile Dysfunction for up to 360 days. 1-24 hours before activity  What changed: additional instructions           Where to Get Your Medications      These medications were sent to [id number] - [place], [place] - [address] - [phone]  - [phone] FX  [address] [facility] [place] [address]    Phone: [phone]   · cefdinir 250 MG/5ML suspension          Hieu Webb MD  01/05/22 1425

## 2022-01-05 NOTE — ED PROVIDER NOTES
Subjective   History of Present Illness    Review of Systems    Past Medical History:   Diagnosis Date   • Allergic rhinitis    • Anxiety    • Arthritis    • Cataract    • Chronic sinusitis    • COPD (chronic obstructive pulmonary disease) ([facility])    • COVID-19 vaccine series completed     Moderna   • Depression    • Deviated nasal septum    • Enlarged prostate    • GERD (gastroesophageal reflux disease)    • H/O head and neck radiation 3/3/2017   • History of transfusion    • Hyperlipidemia    • Hypertension    • Hypothyroidism    • Laryngopharyngeal reflux    • Lymphoma ([facility])     Non-Hodgkins   • MRSA infection    • Panic attacks    • Peyronie disease    • Pneumonia    • Primary squamous cell carcinoma of tongue ([facility]) 9/27/2016    T1N0M0 SCC S/P XRT/Chemo 12/2014   • Primary squamous cell carcinoma of tongue ([facility]) 9/27/2016    T1N0M0 SCC S/P XRT/Chemo 12/2014   • Sicca syndrome ([facility])    • Sinusitis, acute    • Squamous cell carcinoma     Base of Tongue   • Tobacco use disorder    • Tongue irritation    • Visit for monitoring Rituxan therapy     pt getting treatments at [facility] in [place]   • Xerostomia        No Known Allergies    Past Surgical History:   Procedure Laterality Date   • CATARACT EXTRACTION Bilateral    • ESOPHAGOSCOPY N/A 11/20/2019    Procedure: Direct laryngoscopy with esophageal Johnson dilation;  Surgeon: Wayne Richmond MD;  Location: [facility] OR;  Service: ENT   • HAND SURGERY      metal plate    • KNEE SURGERY     • LARYNGOSCOPY N/A 6/26/2020    Procedure: Rigid esophagoscopy Johnson esophageal dilatation;  Surgeon: Wayne Richmond MD;  Location: [facility] OR;  Service: ENT;  Laterality: N/A;   • LARYNGOSCOPY N/A 8/24/2020    Procedure: MICRODIRECT LARYNGOSCOPY with esophageal dilatation;  Surgeon: Wayne Richmond MD;  Location: [facility] OR;  Service: ENT;  Laterality: N/A;   • LARYNGOSCOPY N/A 5/24/2021    Procedure: Direct laryngoscopy, esophagoscopy with Johnson  dilatation;  Surgeon: Wayne Richmond MD;  Location:  [facility] OR;  Service: ENT;  Laterality: N/A;   • LARYNGOSCOPY N/A 12/1/2021    Procedure: Direct laryngoscopy, esophagoscopy with esophageal dilatation (Johnson dilators);  Surgeon: Wayne Richmond MD;  Location:  [facility] OR;  Service: ENT;  Laterality: N/A;   • LARYNGOSCOPY N/A 1/3/2022    Procedure: Direct laryngoscopy, esophagoscopy with Johnson esophageal dilation;  Surgeon: Wayne Richmond MD;  Location:  [facility] OR;  Service: ENT;  Laterality: N/A;   • MULTIPLE TOOTH EXTRACTIONS      CONTINUING TO HAVE BONE REMOVED 1/2021   • OTHER SURGICAL HISTORY  09/10/2014    Microlaryngoscopy with Biopsy - Base of Tongue   • PANENDOSCOPY N/A 1/22/2021    Procedure: DIRECT LARYNGOSCOPY AND ESOPHAGOSCOPY WITH ESOPHAGEAL DILATION;  Surgeon: Wayne Richmond MD;  Location:  [facility] OR;  Service: ENT;  Laterality: N/A;   • PANENDOSCOPY N/A 10/11/2021    Procedure: Direct laryngoscopy, esophagoscopy with Johnson dilatation;  Surgeon: Wayne Richmond MD;  Location:  [facility] OR;  Service: ENT;  Laterality: N/A;   • PROSTATE ULTRASOUND BIOPSY N/A 12/1/2021    Procedure: PROSTATE ULTRASOUND BIOPSY. NOT A URONAV;  Surgeon: Oscar Bazan MD;  Location:  [facility] OR;  Service: Urology;  Laterality: N/A;   • SPLENECTOMY     • SQUAMOUS CELL CARCINOMA EXCISION  09/10/2014    Tongue   • TONSILLECTOMY         Family History   Problem Relation Age of Onset   • Diabetes Sister    • Cancer Sister        Social History     Socioeconomic History   • Marital status:    Tobacco Use   • Smoking status: Former Smoker     Packs/day: 0.25     Types: Cigarettes   • Smokeless tobacco: Never Used   • Tobacco comment: pack last couple of weeks   Vaping Use   • Vaping Use: Never used   Substance and Sexual Activity   • Alcohol use: No   • Drug use: No   • Sexual activity: Yes     Partners: Female           Objective   Physical Exam    Procedures           ED Course  ED Course as  of 01/05/22 0812   Wed Jan 05, 2022   0630 Taken over from Dr Webb  [TS]   0644 Care of the patient turned over to Justus Barrientos MD [RW]   0725 1. No soft tissue emphysema identified in the lower neck or mediastinum  to suggest esophageal perforation. A do not see any significant  inflammatory changes along the upper esophagus.  2. Numerous small pulmonary nodules are identified, some groundglass  nodules and others appearing solid with surrounding groundglass halo.  Centrilobular distribution with some of these areas appearing clustered,  as is typically seen with inflammatory nodularity. Infectious  bronchiolitis would be my primary differential consideration. Metastatic  disease is not completely excluded and I would recommend short interval  follow-up chest CT to ensure resolution.  3. Coronary atheromatous calcification.  4. No suspicious adenopathy in the chest.  The CT findings were discussed with the patient at length reason for close follow-up [TS]   0725 Patient said that he was having a low-grade fever cough even before the endoscopy [TS]   0725 He has no drooling or sharp stabbing chest pain no significant dysphagia or odynophagia worse than what he usually has [TS]   0804 This case was discussed with the patient at length he is ready to go home.  His white cell count is 17,000 CT findings were discussed with the patient.  We will treat him empirically with antibiotics and see how he does and then he needs to follow-up with his primary MD and to return the ER for any worsening symptoms further evaluation of this nodularity will have to be performed. [TS]   0805 Risks and benefits of treatments given and alternative treatment options discussed with patient/family. I answered all the questions in simple, plain language, and there was voiced understanding and agreement with plan of care. There were no further questions. Differential diagnosis discussed. Patient/family was advised that the practice of  medicine is not always an exact science, and sometimes tests, physical exam, or history may not show the underlying conditions with certainty. Additionally, the condition may change or show itself later after initial presentation. There was also expressed understanding and agreement with this limitation of emergency medicine practice. Patient/family was asked to return to ED if any problem or issues or if condition worsens or does not improved. Patient/family agreed to follow up with PCP/specialist as advised, or return to ED if unable to see a provider in a timely fashion for continued symptoms.  [TS]   0807 Antibiotic treatment in patients with acute bronchitis bronchiolitis    Antibiotics were prescribed because    -Immunocompromise status i.e. diabetes, kidney failure, HIV etc.  ....... xxxx  -COPD or  other disease of the respiratory system excluding asthma....xxxx  -Otitis media  -Acute sinusitis or acute pharyngitis  -UTI     Antibiotics prescribed this patient because he is got history of COPD also immunocompromise with a history of cancer [TS]      ED Course User Index  [RW] Hieu Webb MD  [TS] Justus Barrientos MD                                                 [facility]    Final diagnoses:   Fever in adult   Pulmonary nodules   Acute bronchiolitis due to other specified organisms       ED Disposition  ED Disposition     ED Disposition Condition Comment    Discharge Stable           Joe Stubbs MD  [address] [id number] [phone]    In 3 days           Medication List      New Prescriptions    cefdinir 250 MG/5ML suspension  Commonly known as: OMNICEF  Take 6 mL by mouth 2 (Two) Times a Day for 10 days.        Changed    fluticasone 50 MCG/ACT nasal spray  Commonly known as: FLONASE  2 sprays into each nostril Daily.  What changed:   · when to take this  · reasons to take this     nystatin 100,000 unit/mL suspension  Commonly known as: MYCOSTATIN  Take 10ml by mouth  BID for 28 days  What changed:   · how much to take  · how to take this  · when to take this  · reasons to take this     tadalafil 20 MG tablet  Commonly known as: CIALIS  Take 1 tablet by mouth As Needed for Erectile Dysfunction for up to 360 days. 1-24 hours before activity  What changed: additional instructions           Where to Get Your Medications      These medications were sent to [facility]/pharmacy #[id number] - [place], [place] - [address] - [phone]  - [phone]   [address] [place] [address]    Phone: [phone]   · cefdinir 250 MG/5ML suspension          Justus Barrientos MD  01/05/22 0839

## 2022-01-06 ENCOUNTER — TELEPHONE (OUTPATIENT)
Dept: GASTROENTEROLOGY | Age: 67
End: 2022-01-06

## 2022-01-06 ENCOUNTER — APPOINTMENT (OUTPATIENT)
Dept: GENERAL RADIOLOGY | Age: 67
End: 2022-01-06
Payer: COMMERCIAL

## 2022-01-06 ENCOUNTER — HOSPITAL ENCOUNTER (EMERGENCY)
Age: 67
Discharge: HOME | End: 2022-01-06
Attending: EMERGENCY MEDICINE
Payer: COMMERCIAL

## 2022-01-06 VITALS
HEIGHT: 73 IN | DIASTOLIC BLOOD PRESSURE: 82 MMHG | RESPIRATION RATE: 14 BRPM | OXYGEN SATURATION: 96 % | HEART RATE: 107 BPM | TEMPERATURE: 98.7 F | SYSTOLIC BLOOD PRESSURE: 121 MMHG | BODY MASS INDEX: 20.94 KG/M2 | WEIGHT: 158 LBS

## 2022-01-06 DIAGNOSIS — R13.10 DYSPHAGIA, UNSPECIFIED TYPE: Primary | ICD-10-CM

## 2022-01-06 LAB
ALBUMIN SERPL-MCNC: 4 G/DL (ref 3.5–5.2)
ALP BLD-CCNC: 99 U/L (ref 40–130)
ALT SERPL-CCNC: 20 U/L (ref 5–41)
ANION GAP SERPL CALCULATED.3IONS-SCNC: 10 MMOL/L (ref 7–19)
AST SERPL-CCNC: 23 U/L (ref 5–40)
BASOPHILS ABSOLUTE: 0.1 K/UL (ref 0–0.2)
BASOPHILS RELATIVE PERCENT: 0.4 % (ref 0–1)
BILIRUB SERPL-MCNC: 0.4 MG/DL (ref 0.2–1.2)
BUN BLDV-MCNC: 15 MG/DL (ref 8–23)
CALCIUM SERPL-MCNC: 9.5 MG/DL (ref 8.8–10.2)
CHLORIDE BLD-SCNC: 100 MMOL/L (ref 98–111)
CO2: 28 MMOL/L (ref 22–29)
CREAT SERPL-MCNC: 1.1 MG/DL (ref 0.5–1.2)
EOSINOPHILS ABSOLUTE: 0.5 K/UL (ref 0–0.6)
EOSINOPHILS RELATIVE PERCENT: 4.4 % (ref 0–5)
GFR AFRICAN AMERICAN: >59
GFR NON-AFRICAN AMERICAN: >60
GLUCOSE BLD-MCNC: 100 MG/DL (ref 74–109)
HCT VFR BLD CALC: 37.3 % (ref 42–52)
HEMOGLOBIN: 12.3 G/DL (ref 14–18)
IMMATURE GRANULOCYTES #: 0.1 K/UL
INR BLD: 0.97 (ref 0.88–1.18)
LACTIC ACID: 0.9 MMOL/L (ref 0.5–1.9)
LIPASE: 15 U/L (ref 13–60)
LYMPHOCYTES ABSOLUTE: 1 K/UL (ref 1.1–4.5)
LYMPHOCYTES RELATIVE PERCENT: 8.3 % (ref 20–40)
MCH RBC QN AUTO: 32 PG (ref 27–31)
MCHC RBC AUTO-ENTMCNC: 33 G/DL (ref 33–37)
MCV RBC AUTO: 97.1 FL (ref 80–94)
MONOCYTES ABSOLUTE: 1.4 K/UL (ref 0–0.9)
MONOCYTES RELATIVE PERCENT: 11.6 % (ref 0–10)
NEUTROPHILS ABSOLUTE: 8.7 K/UL (ref 1.5–7.5)
NEUTROPHILS RELATIVE PERCENT: 74.8 % (ref 50–65)
PDW BLD-RTO: 13.2 % (ref 11.5–14.5)
PLATELET # BLD: 331 K/UL (ref 130–400)
PMV BLD AUTO: 10.8 FL (ref 9.4–12.4)
POTASSIUM SERPL-SCNC: 4 MMOL/L (ref 3.5–5)
PROTHROMBIN TIME: 13.1 SEC (ref 12–14.6)
RBC # BLD: 3.84 M/UL (ref 4.7–6.1)
SODIUM BLD-SCNC: 138 MMOL/L (ref 136–145)
TOTAL PROTEIN: 6.8 G/DL (ref 6.6–8.7)
WBC # BLD: 11.6 K/UL (ref 4.8–10.8)

## 2022-01-06 PROCEDURE — 83605 ASSAY OF LACTIC ACID: CPT

## 2022-01-06 PROCEDURE — 93005 ELECTROCARDIOGRAM TRACING: CPT | Performed by: EMERGENCY MEDICINE

## 2022-01-06 PROCEDURE — 99283 EMERGENCY DEPT VISIT LOW MDM: CPT

## 2022-01-06 PROCEDURE — 87040 BLOOD CULTURE FOR BACTERIA: CPT

## 2022-01-06 PROCEDURE — 71045 X-RAY EXAM CHEST 1 VIEW: CPT

## 2022-01-06 PROCEDURE — 85025 COMPLETE CBC W/AUTO DIFF WBC: CPT

## 2022-01-06 PROCEDURE — 85610 PROTHROMBIN TIME: CPT

## 2022-01-06 PROCEDURE — 36415 COLL VENOUS BLD VENIPUNCTURE: CPT

## 2022-01-06 PROCEDURE — 80053 COMPREHEN METABOLIC PANEL: CPT

## 2022-01-06 PROCEDURE — 83690 ASSAY OF LIPASE: CPT

## 2022-01-06 RX ORDER — 0.9 % SODIUM CHLORIDE 0.9 %
1000 INTRAVENOUS SOLUTION INTRAVENOUS ONCE
Status: DISCONTINUED | OUTPATIENT
Start: 2022-01-06 | End: 2022-01-06 | Stop reason: HOSPADM

## 2022-01-06 ASSESSMENT — ENCOUNTER SYMPTOMS
ABDOMINAL PAIN: 0
VOMITING: 1
TROUBLE SWALLOWING: 1
CONSTIPATION: 1
SHORTNESS OF BREATH: 0

## 2022-01-06 NOTE — ED PROVIDER NOTES
[facility] EMERGENCY DEPT  eMERGENCY dEPARTMENT eNCOUnter      Pt Name: Nancy Paez. MRN: [id number]  Birthdate 1955  Date of evaluation: 1/6/2022  Provider: Austin Fontana MD    CHIEF COMPLAINT       Chief Complaint   Patient presents with    Fever     pt had esophagus stretched Monday, fever yesterday, when pt eats food goes up into his nose         HISTORY OF PRESENT ILLNESS   (Location/Symptom, Timing/Onset,Context/Setting, Quality, Duration, Modifying Factors, Severity)  Note limiting factors. Nancy Paez is a 61 y.o. male who presents to the emergency department with fever yesterday to 101.7. None today. The patient tells me he went to [place] ER last night he was started on Omnicef for possible pneumonia he had been spitting things up he had some lung nodules. The patient has a history of base of the tongue cancer he tells me where he had radiation in the past.  He has no other cancer untold. The patient had a procedure with Dr. Beto Edwards of ENT at [place] on Monday where he stretched his esophagus he tells me since then he states has become worse where he is spitting up food. Through his nose. When he tries to eat he went to [place] yesterday he called Dr. Beto Edwards ENT office and was told it was not a problem with what they did. And he was told to come to our ER by Dr. Ontiveros [facility] office today to be seen by GI. The patient does not have any fevers today he otherwise is here with his wife. He states he did not sleep really well he states when he tried to eat he would swallow it would go down and then come back up and out his nose. The history is provided by the patient, the spouse and medical records. NursingNotes were reviewed. REVIEW OF SYSTEMS    (2-9 systems for level 4, 10 or more for level 5)     Review of Systems   Constitutional: Positive for fatigue and fever. HENT: Positive for trouble swallowing. Respiratory: Negative for shortness of breath. Gastrointestinal: Positive for constipation and vomiting. Negative for abdominal pain. Neurological: Negative for seizures and syncope. Psychiatric/Behavioral: Negative for confusion. A complete review of systems was performed and is negative except as noted above in the HPI.        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    Weight loss     Xerostomia          SURGICAL HISTORY       Past Surgical History:   Procedure Laterality Date    ABSCESS DRAINAGE      ON BUTTOCK    CATARACT REMOVAL      Right eye     [facility]  10-5-2010    [facility]    DENTAL SURGERY Bilateral     ESOPHAGEAL DILATATION      INTRACAPSULAR CATARACT EXTRACTION Right 6/6/2016    EYE CATARACT EMULSIFICATION IOL IMPLANT performed by Gissel Ann MD at [address] Left 7/11/2016     EYE CATARACT EMULSIFICATION IOL IMPLANT performed by Gissel Ann MD at [address] [place] [address] ARTHROSCOPY Bilateral     KNEE SURGERY      BILATERAL scope    OTHER SURGICAL HISTORY      wedge biopsy of the tongue 9/2015    SPLENECTOMY  2004    THROAT SURGERY      06-26-20& 10-11-21    TONGUE SURGERY      TONSILLECTOMY      WRIST FRACTURE SURGERY Right     WRIST SURGERY      RIGHT         CURRENT MEDICATIONS       Previous Medications    ALBUTEROL IN    Inhale into the lungs as needed    ALPRAZOLAM ER PO    Take 2 mg by mouth 3 times daily Indications: Depression with Anxiety     AMLODIPINE (NORVASC) 5 MG TABLET    Take 1 tablet by mouth daily    AZELASTINE-FLUTICASONE 137-50 MCG/ACT SUSP    by Nasal route 2 times daily as needed    CYCLOBENZAPRINE (FLEXERIL) 10 MG TABLET    3 times daily as needed    GLYCOPYRROLATE (ROBINUL) 1 MG TABLET    Take 0.5 mg by mouth 2 times daily as needed    HYDROCODONE-ACETAMINOPHEN (NORCO) 7.5-325 MG PER TABLET    Take 1 tablet by mouth as needed. LEVOTHYROXINE SODIUM    Take 0.088 mg by mouth daily     MELOXICAM (MOBIC) 15 MG TABLET    Take 15 mg by mouth daily    ONDANSETRON (ZOFRAN) 8 MG TABLET    Take 8 mg by mouth every 8 hours as needed for Nausea or Vomiting    PANTOPRAZOLE SODIUM (PROTONIX PO)    Take 40 mg by mouth 2 times daily     SENNA-DOCUSATE (PERICOLACE) 8.6-50 MG PER TABLET    Take 1 tablet by mouth 2 times daily    SERTRALINE (ZOLOFT) 50 MG TABLET    Take 50 mg by mouth nightly    SIMVASTATIN PO    Take 20 mg by mouth daily     TAMSULOSIN (FLOMAX) 0.4 MG CAPSULE    Take 0.4 mg by mouth nightly        ALLERGIES     Patient has no known allergies.     FAMILY HISTORY       Family History   Problem Relation Age of Onset    Cancer Sister     Stroke Sister     Stroke Brother           SOCIAL HISTORY       Social History     Socioeconomic History    Marital status:      Spouse name: None    Number of children: None    Years of education: None    Highest education level: None   Occupational History    None   Tobacco Use    Smoking status: Current Every Day Smoker     Years: 38.00     Last attempt to quit: 2015     Years since quittin.1    Smokeless tobacco: Never Used   Vaping Use    Vaping Use: Never used   Substance and Sexual Activity    Alcohol use: No     Comment: quit last week    Drug use: No    Sexual activity: None   Other Topics Concern    None   Social History Narrative    None     Social Determinants of Health     Financial Resource Strain:     Difficulty of Paying Living Expenses: Not on file   Food Insecurity:     Worried About Running Out of Food in the Last Year: Not on file    Nubia of Food in the Last Year: Not on file   Transportation Needs:     Lack of Transportation (Medical): Not on file    Lack of Transportation (Non-Medical): Not on file   Physical Activity:     Days of Exercise per Week: Not on file    Minutes of Exercise per Session: Not on file   Stress:     Feeling of Stress : Not on file   Social Connections:     Frequency of Communication with Friends and Family: Not on file    Frequency of Social Gatherings with Friends and Family: Not on file    Attends Scientologist Services: Not on file    Active Member of [address] [place] [address] [employer] or Organizations: Not on file    Attends Club or Organization Meetings: Not on file    Marital Status: Not on file   Intimate Partner Violence:     Fear of Current or Ex-Partner: Not on file    Emotionally Abused: Not on file    Physically Abused: Not on file    Sexually Abused: Not on file   Housing Stability:     Unable to Pay for Housing in the Last Year: Not on file    Number of [place] in the Last Year: Not on file    Unstable Housing in the Last Year: Not on file       SCREENINGS             PHYSICAL EXAM    (up to 7 for level 4, 8 or more for level 5)     ED Triage Vitals [01/06/22 1257]   BP Temp Temp Source Pulse Resp SpO2 Height Weight   121/82 98.7 °F (37.1 °C) Temporal 107 14 96 % 6' 1\" (1.854 m) 158 lb (71.7 kg)       Physical Exam  Vitals and nursing note reviewed. Constitutional:       Comments: Thin cachectic man sitting up in bed no distress he is drinking a bottle of water he spit out the first week and then he was able to swallow some after. Did not come back up. HENT:      Head: Normocephalic and atraumatic. Mouth/Throat:      Mouth: Mucous membranes are dry. Comments: Somewhat dry mucous membranes with caking of the tongue and some anterior changes.   Otherwise widely patent oropharynx no dysphonia or trouble with speech  Eyes:      Extraocular Movements: Extraocular movements intact. Pupils: Pupils are equal, round, and reactive to light. Cardiovascular:      Rate and Rhythm: Regular rhythm. Tachycardia present. Pulmonary:      Effort: Pulmonary effort is normal. No respiratory distress. Abdominal:      General: Abdomen is flat. Palpations: Abdomen is soft. Musculoskeletal:         General: No tenderness. Normal range of motion. Cervical back: Normal range of motion and neck supple. No rigidity. Skin:     General: Skin is warm. Neurological:      General: No focal deficit present. Mental Status: He is alert and oriented to person, place, and time. Psychiatric:         Mood and Affect: Mood normal.         Behavior: Behavior normal.         DIAGNOSTIC RESULTS     EKG: All EKG's are interpreted by the Emergency Department Physician who either signs or Co-signs this chart in the absence of a cardiologist.    EKG sinus 93 there is artifact however there is no obvious acute ST wave changes. QTc 4 4 1 ms. Q waves anteriorly. RADIOLOGY:   Non-plain film images such as CT, Ultrasound and MRI are read by the radiologist. [facility] images are visualized and preliminarily interpreted by the emergency physician with the below findings:        Interpretation per the Radiologist below, if available at the time of this note:    XR CHEST PORTABLE   Final Result   1. No active cardiopulmonary disease.    Signed by Dr Herbert Ortega            ED BEDSIDE ULTRASOUND:   Performed by ED Physician - none    LABS:  Labs Reviewed   CBC WITH AUTO DIFFERENTIAL - Abnormal; Notable for the following components:       Result Value    WBC 11.6 (*)     RBC 3.84 (*)     Hemoglobin 12.3 (*)     Hematocrit 37.3 (*)     MCV 97.1 (*)     MCH 32.0 (*)     Neutrophils % 74.8 (*)     Lymphocytes % 8.3 (*)     Monocytes % 11.6 (*)     Neutrophils Absolute 8.7 (*)     Lymphocytes Absolute 1.0 (*)     Monocytes Absolute 1.40 (*)     All other components within normal limits   CULTURE, BLOOD 1   CULTURE, BLOOD 2   COMPREHENSIVE METABOLIC PANEL   LIPASE   PROTIME-INR   LACTIC ACID, PLASMA   URINE RT REFLEX TO CULTURE       All other labs were within normal range or not returned as of this dictation. EMERGENCY DEPARTMENT COURSE and DIFFERENTIALDIAGNOSIS/MDM:   Vitals:    Vitals:    01/06/22 1257   BP: 121/82   Pulse: 107   Resp: 14   Temp: 98.7 °F (37.1 °C)   TempSrc: Temporal   SpO2: 96%   Weight: 158 lb (71.7 kg)   Height: 6' 1\" (1.854 m)       MDM  Number of Diagnoses or Management Options  Dysphagia, unspecified type  Diagnosis management comments: Patient with recent procedure with esophageal stretching by Dr. Ubaldo Rose, ENT at [facility].  The patient was sent to our ER by GI today. ENT told him it was not an issue with what they did. The patient and his wife present today. The patient has had no fevers today he was able to drink a bottle of water for me in the ER.    2:10 PM  Pt drank a whole bottle of water did not come back up       Chest CT from last night 1900 shows at [facility] ER  1. No soft tissue emphysema identified in the lower neck or mediastinum   to suggest esophageal perforation. A do not see any significant   inflammatory changes along the upper esophagus. 2. Numerous small pulmonary nodules are identified, some groundglass   nodules and others appearing solid with surrounding groundglass halo. Centrilobular distribution with some of these areas appearing clustered,   as is typically seen with inflammatory nodularity. Infectious   bronchiolitis would be my primary differential consideration. Metastatic   disease is not completely excluded and I would recommend short interval   follow-up chest CT to ensure resolution. 3. Coronary atheromatous calcification.    4. No suspicious adenopathy in the chest.     Notes in computer: 1/5 [facility] ENT  \":Bárbara called and patient has been in ER. She states that she got Sherri Osler home and when he woke up he ate and had nasal regurgitation. I have spoken with Dr. Kamron Rosario. He states that is not related to his surgical procedure. I have relayed this to Marion Nguyen and advised her to have patient see Dr. Lukas Marquez \"       I do think he probably needs another EGD and scope to look. The patient's case was discussed with Dr. Torres Pinto as there is a snowstorm and the patient needs an EGD. There is no in the office. The patient will be n.p.o. after midnight and call the office of office or call him tomorrow to schedule an EGD tomorrow. Dr. Torres Pinto is aware. Labs are reasonable today compared to [facility] yesterday and baseline as well. COVID TEST NEGATIVE 1/5 at [facility]       O2 saturation in the mid 90s 95% on room air heart rate in the high 80s at time of discharge. Amount and/or Complexity of Data Reviewed  Clinical lab tests: ordered and reviewed  Tests in the radiology section of CPT®: ordered and reviewed  Discuss the patient with other providers: yes    Patient Progress  Patient progress: improved        CONSULTS:  None    PROCEDURES:  Unless otherwise notedbelow, none     Procedures    FINAL IMPRESSION     1.  Dysphagia, unspecified type          DISPOSITION/PLAN   DISPOSITION        PATIENT REFERRED TO:  Indra Moss MD  [address] [place] [address]  [phone]    Call in 1 day  call the office tomorrow after 0900 to discuss his scope if someone hasn't called you      DISCHARGE MEDICATIONS:  New Prescriptions    No medications on file          (Please note that portions of this note were completed with a voice recognition program.  Efforts were made to edit the dictations butoccasionally words are mis-transcribed.)    Berto Kearney MD (electronically signed)  AttendingEmerValley Behavioral Health Systemcy Physician         Berto Kearney MD  01/06/22 8930

## 2022-01-06 NOTE — TELEPHONE ENCOUNTER
This patient last saw Evin Acosta back in 2010 for scopes. Patient has an OV scheduled with [facility] Naomi norrismichaelle on 1-25-22 for weight loss and dysphagia. The patient's wife called today from [phone] said Laz Weir is having so much trouble swallowing any food and is currently on a pureed diet and this sometimes will come out his nose, the wife said she took him to [facility] ED last night and said  there did attend to his throat but it has not helped. Patient per the wife also has multiple lung nodules. I looked to see if Espinoza farmer had any openings sooner and she does not. I suggested she take him to [facility] ED where they have 24/7 GI coverage. At present with the bad weather we are closing soon and not sure we will be here tomorrow due to the weather but I did assure her I will forward this message to [facility] [employer] [facility] in [facility] [facility] to review and see if she has any further recommendations at present. The wife thanked me for the return call and said if things don't improve she will take him to [facility] ED.    cma

## 2022-01-07 ENCOUNTER — HOSPITAL ENCOUNTER (OUTPATIENT)
Age: 67
Setting detail: OUTPATIENT SURGERY
Discharge: HOME | End: 2022-01-07
Attending: INTERNAL MEDICINE | Admitting: INTERNAL MEDICINE
Payer: COMMERCIAL

## 2022-01-07 ENCOUNTER — ANESTHESIA EVENT (OUTPATIENT)
Dept: ENDOSCOPY | Age: 67
End: 2022-01-07
Payer: COMMERCIAL

## 2022-01-07 ENCOUNTER — ANESTHESIA (OUTPATIENT)
Dept: ENDOSCOPY | Age: 67
End: 2022-01-07
Payer: COMMERCIAL

## 2022-01-07 ENCOUNTER — LAB (OUTPATIENT)
Dept: LAB | Facility: HOSPITAL | Age: 67
End: 2022-01-07

## 2022-01-07 VITALS
OXYGEN SATURATION: 97 % | SYSTOLIC BLOOD PRESSURE: 119 MMHG | DIASTOLIC BLOOD PRESSURE: 73 MMHG | RESPIRATION RATE: 13 BRPM

## 2022-01-07 VITALS
OXYGEN SATURATION: 99 % | RESPIRATION RATE: 18 BRPM | DIASTOLIC BLOOD PRESSURE: 81 MMHG | SYSTOLIC BLOOD PRESSURE: 137 MMHG | BODY MASS INDEX: 20.94 KG/M2 | TEMPERATURE: 98.7 F | HEIGHT: 73 IN | HEART RATE: 87 BPM | WEIGHT: 158 LBS

## 2022-01-07 DIAGNOSIS — Z01.818 PRE-OPERATIVE EXAMINATION: ICD-10-CM

## 2022-01-07 LAB
EKG P AXIS: 81 DEGREES
EKG P-R INTERVAL: 144 MS
EKG Q-T INTERVAL: 342 MS
EKG QRS DURATION: 92 MS
EKG QTC CALCULATION (BAZETT): 399 MS
EKG T AXIS: 75 DEGREES
SARS-COV-2 ORF1AB RESP QL NAA+PROBE: NOT DETECTED

## 2022-01-07 PROCEDURE — 3700000001 HC ADD 15 MINUTES (ANESTHESIA): Performed by: INTERNAL MEDICINE

## 2022-01-07 PROCEDURE — C9803 HOPD COVID-19 SPEC COLLECT: HCPCS

## 2022-01-07 PROCEDURE — 7100000011 HC PHASE II RECOVERY - ADDTL 15 MIN: Performed by: INTERNAL MEDICINE

## 2022-01-07 PROCEDURE — U0004 COV-19 TEST NON-CDC HGH THRU: HCPCS

## 2022-01-07 PROCEDURE — 2500000003 HC RX 250 WO HCPCS

## 2022-01-07 PROCEDURE — 6360000002 HC RX W HCPCS

## 2022-01-07 PROCEDURE — 43248 EGD GUIDE WIRE INSERTION: CPT | Performed by: INTERNAL MEDICINE

## 2022-01-07 PROCEDURE — 2580000003 HC RX 258: Performed by: INTERNAL MEDICINE

## 2022-01-07 PROCEDURE — 7100000010 HC PHASE II RECOVERY - FIRST 15 MIN: Performed by: INTERNAL MEDICINE

## 2022-01-07 PROCEDURE — 43239 EGD BIOPSY SINGLE/MULTIPLE: CPT | Performed by: INTERNAL MEDICINE

## 2022-01-07 PROCEDURE — 88342 IMHCHEM/IMCYTCHM 1ST ANTB: CPT

## 2022-01-07 PROCEDURE — 88305 TISSUE EXAM BY PATHOLOGIST: CPT

## 2022-01-07 PROCEDURE — 3700000000 HC ANESTHESIA ATTENDED CARE: Performed by: INTERNAL MEDICINE

## 2022-01-07 PROCEDURE — 3609012700 HC EGD DILATION SAVORY: Performed by: INTERNAL MEDICINE

## 2022-01-07 PROCEDURE — 3609012400 HC EGD TRANSORAL BIOPSY SINGLE/MULTIPLE: Performed by: INTERNAL MEDICINE

## 2022-01-07 PROCEDURE — 2709999900 HC NON-CHARGEABLE SUPPLY: Performed by: INTERNAL MEDICINE

## 2022-01-07 RX ORDER — SODIUM CHLORIDE, SODIUM LACTATE, POTASSIUM CHLORIDE, CALCIUM CHLORIDE 600; 310; 30; 20 MG/100ML; MG/100ML; MG/100ML; MG/100ML
INJECTION, SOLUTION INTRAVENOUS CONTINUOUS
Status: DISCONTINUED | OUTPATIENT
Start: 2022-01-07 | End: 2022-01-07 | Stop reason: HOSPADM

## 2022-01-07 RX ORDER — PROPOFOL 10 MG/ML
INJECTION, EMULSION INTRAVENOUS PRN
Status: DISCONTINUED | OUTPATIENT
Start: 2022-01-07 | End: 2022-01-07 | Stop reason: SDUPTHER

## 2022-01-07 RX ORDER — CEFTRIAXONE SODIUM 250 MG/1
250 INJECTION, POWDER, FOR SOLUTION INTRAMUSCULAR; INTRAVENOUS 2 TIMES DAILY
Status: ON HOLD | COMMUNITY
End: 2022-03-16 | Stop reason: ALTCHOICE

## 2022-01-07 RX ORDER — LIDOCAINE HYDROCHLORIDE 10 MG/ML
INJECTION, SOLUTION EPIDURAL; INFILTRATION; INTRACAUDAL; PERINEURAL PRN
Status: DISCONTINUED | OUTPATIENT
Start: 2022-01-07 | End: 2022-01-07 | Stop reason: SDUPTHER

## 2022-01-07 RX ADMIN — PROPOFOL 20 MG: 10 INJECTION, EMULSION INTRAVENOUS at 08:26

## 2022-01-07 RX ADMIN — PROPOFOL 20 MG: 10 INJECTION, EMULSION INTRAVENOUS at 08:23

## 2022-01-07 RX ADMIN — PROPOFOL 20 MG: 10 INJECTION, EMULSION INTRAVENOUS at 08:35

## 2022-01-07 RX ADMIN — PROPOFOL 20 MG: 10 INJECTION, EMULSION INTRAVENOUS at 08:20

## 2022-01-07 RX ADMIN — PROPOFOL 20 MG: 10 INJECTION, EMULSION INTRAVENOUS at 08:15

## 2022-01-07 RX ADMIN — PROPOFOL 20 MG: 10 INJECTION, EMULSION INTRAVENOUS at 08:17

## 2022-01-07 RX ADMIN — PROPOFOL 80 MG: 10 INJECTION, EMULSION INTRAVENOUS at 08:11

## 2022-01-07 RX ADMIN — LIDOCAINE HYDROCHLORIDE 50 MG: 10 INJECTION, SOLUTION EPIDURAL; INFILTRATION; INTRACAUDAL; PERINEURAL at 08:11

## 2022-01-07 RX ADMIN — PROPOFOL 20 MG: 10 INJECTION, EMULSION INTRAVENOUS at 08:32

## 2022-01-07 RX ADMIN — PROPOFOL 20 MG: 10 INJECTION, EMULSION INTRAVENOUS at 08:29

## 2022-01-07 RX ADMIN — PROPOFOL 20 MG: 10 INJECTION, EMULSION INTRAVENOUS at 08:13

## 2022-01-07 RX ADMIN — SODIUM CHLORIDE, POTASSIUM CHLORIDE, SODIUM LACTATE AND CALCIUM CHLORIDE: 600; 310; 30; 20 INJECTION, SOLUTION INTRAVENOUS at 07:53

## 2022-01-07 ASSESSMENT — PAIN - FUNCTIONAL ASSESSMENT: PAIN_FUNCTIONAL_ASSESSMENT: 0-10

## 2022-01-07 ASSESSMENT — PAIN SCALES - GENERAL
PAINLEVEL_OUTOF10: 0
PAINLEVEL_OUTOF10: 0

## 2022-01-07 NOTE — OP NOTE
Gastric biopsies were taken from the antrum and body to rule out Helicobacter pylori infection. Duodenum: normal      IMPRESSION:  1. S/p empiric dilation of the esophagus to 54F  2. Dysphagia - likely due to history of radiation. RECOMMENDATIONS:    1. Await path results, the patient will be contacted in 7-10 days with biopsy results. 2.  Repeat EGD with dilation as needed for recurrent symptoms. The results were discussed with the patient and family. A copy of the images obtained were given to the patient.      Hannah Chavez MD  1/7/2022  8:40 AM

## 2022-01-07 NOTE — ANESTHESIA PRE PROCEDURE
Department of Anesthesiology  Preprocedure Note       Name:  Jacque Zepeda. Age:  61 y.o.  :  1955                                          MRN:  [id number]         Date:  2022      Surgeon: Marsha Wilkinson):  Pedrito Schroeder MD    Procedure: Procedure(s):  LT EYE CATARACT EMULSIFICATION IOL IMPLANT    Medications prior to admission:   Prior to Admission medications    Medication Sig Start Date End Date Taking? Authorizing Provider   cefTRIAXone (ROCEPHIN) 250 MG injection Inject 250 mg into the muscle 2 times daily For 10 days, 22    Historical Provider, MD   ALBUTEROL IN Inhale into the lungs as needed    Historical Provider, MD   Azelastine-Fluticasone 137-50 MCG/ACT SUSP by Nasal route 2 times daily as needed    Historical Provider, MD   meloxicam (MOBIC) 15 MG tablet Take 15 mg by mouth daily    Historical Provider, MD   glycopyrrolate (ROBINUL) 1 MG tablet Take 0.5 mg by mouth 2 times daily as needed    Historical Provider, MD   amLODIPine (NORVASC) 5 MG tablet Take 1 tablet by mouth daily    Historical Provider, MD   cyclobenzaprine (FLEXERIL) 10 MG tablet 3 times daily as needed 18   Historical Provider, MD   HYDROcodone-acetaminophen ([facility]) 7.5-325 MG per tablet Take 1 tablet by mouth as needed.  16   Historical Provider, MD   sertraline (ZOLOFT) 50 MG tablet Take 50 mg by mouth daily     Historical Provider, MD   tamsulosin (FLOMAX) 0.4 MG capsule Take 0.4 mg by mouth 2 times daily     Historical Provider, MD   senna-docusate (Nadia Curia) 8.6-50 MG per tablet Take 1 tablet by mouth 2 times daily    Historical Provider, MD   ondansetron (ZOFRAN) 8 MG tablet Take 8 mg by mouth every 8 hours as needed for Nausea or Vomiting    Historical Provider, MD   LEVOTHYROXINE SODIUM Take 0.088 mg by mouth daily     Historical Provider, MD   ALPRAZOLAM ER PO Take 2 mg by mouth 3 times daily Indications: Depression with Anxiety     Historical Provider, MD   Pantoprazole Sodium (PROTONIX PO) Take 40 mg by mouth 2 times daily     Historical Provider, MD   SIMVASTATIN PO Take 20 mg by mouth daily     Historical Provider, MD       Current medications:    No current facility-administered medications for this visit. No current outpatient medications on file. Facility-Administered Medications Ordered in Other Visits   Medication Dose Route Frequency Provider Last Rate Last Admin    lactated ringers infusion   IntraVENous Continuous Antione Rockwell  mL/hr at 01/07/22 0753 New Bag at 01/07/22 0753       Allergies:  No Known Allergies    Problem List:    Patient Active Problem List   Diagnosis Code    Late effect of radiation T66. XXXS    Cataract H26.9       Past Medical History:        Diagnosis Date    Anxiety     Chronic kidney disease     Stage 3 Moderated     COPD (chronic obstructive pulmonary disease) (HCC)     Depression     Dysphagia     Elevated cholesterol     Elevated PSA     GERD (gastroesophageal reflux disease)     History of blood transfusion     Hodgkin lymphoma (HCC)     Hypertension     Hypothyroidism     Insomnia     NHL (non-Hodgkin's lymphoma) (HCC)     REMISSION    Palpitations     Prostate cancer (HCC)     Rhinitis     Seizure ([facility] 2013    Squamous cell carcinoma of tongue (HCC) 8/2014    HPV related    Weight loss     Xerostomia        Past Surgical History:        Procedure Laterality Date    ABSCESS DRAINAGE      ON BUTTOCK    CATARACT REMOVAL      Right eye     [facility]    DENTAL SURGERY Bilateral     ESOPHAGEAL DILATATION      INTRACAPSULAR CATARACT EXTRACTION Right 6/6/2016    EYE CATARACT EMULSIFICATION IOL IMPLANT performed by Verdon Caller, MD at [facility] Left 7/11/2016    LT EYE CATARACT EMULSIFICATION IOL IMPLANT performed by Verdon Caller, MD at [address] [place] [address] ARTHROSCOPY Bilateral     KNEE SURGERY      BILATERAL scope    OTHER SURGICAL HISTORY      wedge biopsy of the tongue 2015    SPLENECTOMY  2004    THROAT SURGERY      20& 10-11-21    TONGUE SURGERY      TONSILLECTOMY      WRIST FRACTURE SURGERY Right     WRIST SURGERY      RIGHT       Social History:    Social History     Tobacco Use    Smoking status: Current Every Day Smoker     Packs/day: 0.50     Years: 38.00     Pack years: 19.00     Types: Cigarettes     Last attempt to quit: 2015     Years since quittin.1    Smokeless tobacco: Never Used   Substance Use Topics    Alcohol use: No     Comment: quit last week,                                 Ready to quit: Not Answered  Counseling given: Not Answered      Vital Signs (Current): There were no vitals filed for this visit. BP Readings from Last 3 Encounters:   22 131/86   22 121/82   18 (!) 153/92       NPO Status:                                                                                 BMI:   Wt Readings from Last 3 Encounters:   22 158 lb (71.7 kg)   22 158 lb (71.7 kg)   18 149 lb (67.6 kg)     There is no height or weight on file to calculate BMI.    CBC:   Lab Results   Component Value Date    WBC 11.6 2022    RBC 3.84 2022    HGB 12.3 2022    HCT 37.3 2022    MCV 97.1 2022    RDW 13.2 2022     2022       CMP:   Lab Results   Component Value Date     2022    K 4.0 2022     2022    CO2 28 2022    BUN 15 2022    CREATININE 1.1 2022    GFRAA >59 2022    LABGLOM >60 2022    GLUCOSE 100 2022    PROT 6.8 2022    CALCIUM 9.5 2022    BILITOT 0.4 2022    ALKPHOS 99 2022    AST 23 2022    ALT 20 2022       POC Tests: No results for input(s): POCGLU, POCNA, POCK, POCCL, POCBUN, POCHEMO, POCHCT in the last 72 hours.     Coags:   Lab Results   Component Value Date    PROTIME 13.1 2022    INR 0.97 2022 APTT 29.0 02/28/2018       HCG (If Applicable): No results found for: PREGTESTUR, PREGSERUM, HCG, HCGQUANT     ABGs: No results found for: PHART, PO2ART, XEA8YQF, ICM5VTT, BEART, F7SHKUEZ     Type & Screen (If Applicable):  No results found for: LABABO, LABRH    Anesthesia Evaluation    Airway: Mallampati: II  TM distance: <3 FB   Neck ROM: full  Mouth opening: > = 3 FB Dental:    (+) upper dentures and lower dentures      Pulmonary:Negative Pulmonary ROS and normal exam    (+) COPD:                             Cardiovascular:  Exercise tolerance: good (>4 METS),   (+) hypertension: no interval change,                   Neuro/Psych:   (+) psychiatric history:            GI/Hepatic/Renal:   (+) GERD:,           Endo/Other:    (+) hypothyroidism::., .                 Abdominal:             Vascular: Other Findings:             Anesthesia Plan      MAC     ASA 2       Induction: intravenous. Anesthetic plan and risks discussed with patient. Plan discussed with CRNA.     Attending anesthesiologist reviewed and agrees with Preprocedure content              VERONICA Bullock - CRNA   1/7/2022

## 2022-01-07 NOTE — ANESTHESIA POSTPROCEDURE EVALUATION
Department of Anesthesiology  Postprocedure Note    Patient: Fela Castellon MRN: [id number]  YOB: 1955  Date of evaluation: 1/7/2022  Time:  8:44 AM     Procedure Summary     Date: 01/07/22 Room / Location: [address]    Anesthesia Start: 4700 Anesthesia Stop: 1976    Procedures:       EGD BIOPSY (N/A Abdomen)      EGD DILATION SAVORY Diagnosis: (dyphagia, reflux)    Surgeons: Amber Rosas MD Responsible Provider: VERONICA Smith CRNA    Anesthesia Type: MAC ASA Status: 2          Anesthesia Type: MAC    Valentino Phase I:      Valentino Phase II:      Last vitals: Reviewed and per EMR flowsheets.        Anesthesia Post Evaluation    Patient location during evaluation: bedside  Patient participation: waiting for patient participation  Level of consciousness: sleepy but conscious  Pain score: 0  Nausea & Vomiting: no nausea and no vomiting  Complications: no  Cardiovascular status: hemodynamically stable  Respiratory status: room air and acceptable  Hydration status: stable

## 2022-01-07 NOTE — H&P
Patient Name: Ramiro Baltazar.  : 1955  MRN: [id number]  DATE: 22    Allergies: No Known Allergies     ENDOSCOPY  History and Physical    Procedure:    [] Diagnostic Colonoscopy       [] Screening Colonoscopy  [x] EGD      [] ERCP      [] EUS       [] Other    [x] Previous office notes/History and Physical reviewed from the patients chart. Please see EMR for further details of HPI. I have examined the patient's status immediately prior to the procedure and:      Indications/HPI:    [x]dysphagia, h/o tongue cancer     Anesthesia:   [x] MAC [] Moderate Sedation   [] General   [] None     ROS: 12 pt Review of Symptoms was negative unless mentioned above    Medications:   Prior to Admission medications    Medication Sig Start Date End Date Taking? Authorizing Provider   cefTRIAXone (ROCEPHIN) 250 MG injection Inject 250 mg into the muscle 2 times daily For 10 days, 22   Yes Historical Provider, MD   meloxicam (MOBIC) 15 MG tablet Take 15 mg by mouth daily   Yes Historical Provider, MD   glycopyrrolate (ROBINUL) 1 MG tablet Take 0.5 mg by mouth 2 times daily as needed   Yes Historical Provider, MD   amLODIPine (NORVASC) 5 MG tablet Take 1 tablet by mouth daily   Yes Historical Provider, MD   cyclobenzaprine (FLEXERIL) 10 MG tablet 3 times daily as needed 18  Yes Historical Provider, MD   HYDROcodone-acetaminophen ([facility]) 7.5-325 MG per tablet Take 1 tablet by mouth as needed.  16  Yes Historical Provider, MD   sertraline (ZOLOFT) 50 MG tablet Take 50 mg by mouth daily    Yes Historical Provider, MD   tamsulosin (FLOMAX) 0.4 MG capsule Take 0.4 mg by mouth 2 times daily    Yes Historical Provider, MD   senna-docusate (PERICOLACE) 8.6-50 MG per tablet Take 1 tablet by mouth 2 times daily   Yes Historical Provider, MD   LEVOTHYROXINE SODIUM Take 0.088 mg by mouth daily    Yes Historical Provider, MD   ALPRAZOLAM ER PO Take 2 mg by mouth 3 times daily Indications: Depression with Anxiety Yes Historical Provider, MD   Pantoprazole Sodium (PROTONIX PO) Take 40 mg by mouth 2 times daily    Yes Historical Provider, MD   SIMVASTATIN PO Take 20 mg by mouth daily    Yes Historical Provider, MD   ALBUTEROL IN Inhale into the lungs as needed    Historical Provider, MD   Azelastine-Fluticasone 137-50 MCG/ACT SUSP by Nasal route 2 times daily as needed    Historical Provider, MD   ondansetron (ZOFRAN) 8 MG tablet Take 8 mg by mouth every 8 hours as needed for Nausea or Vomiting    Historical Provider, MD       Past Medical History:  Past Medical History:   Diagnosis Date    Anxiety     Chronic kidney disease     Stage 3 Moderated     COPD (chronic obstructive pulmonary disease) ([address]     Depression     Dysphagia     Elevated cholesterol     Elevated PSA     GERD (gastroesophageal reflux disease)     History of blood transfusion     [facility]     Hypertension     Hypothyroidism     Insomnia     NHL (non-Hodgkin's lymphoma) ([facility]     REMISSION    Palpitations     Prostate cancer ([address]     Rhinitis     Seizure ([facility] 2013    Squamous cell carcinoma of tongue ([facility] 8/2014    HPV related    Weight loss     Xerostomia        Past Surgical History:  Past Surgical History:   Procedure Laterality Date    ABSCESS DRAINAGE      ON BUTTOCK    CATARACT REMOVAL      Right eye     [facility]  10-5-2010    [facility]    DENTAL SURGERY Bilateral     ESOPHAGEAL DILATATION      INTRACAPSULAR CATARACT EXTRACTION Right 6/6/2016    EYE CATARACT EMULSIFICATION IOL IMPLANT performed by Sd Gunn MD at [facility] CATARACT EXTRACTION Left 7/11/2016    LT EYE CATARACT EMULSIFICATION IOL IMPLANT performed by Sd Gunn MD at [id number] [place] [address] ARTHROSCOPY Bilateral     KNEE SURGERY      BILATERAL scope    OTHER SURGICAL HISTORY      wedge biopsy of the tongue 9/2015    SPLENECTOMY  2004    THROAT SURGERY      06-26-20& 10-11-21    TONGUE SURGERY     

## 2022-01-10 NOTE — PROGRESS NOTES
YOB: 1955  Location: [facility]  Location Address: [address] [place] [address], [facility], [address] [place] [address]  Location Phone: [phone]    Chief Complaint   Patient presents with   • Follow-up     HX SCC TONGUE       History of Present Illness  Saul Alcantara Sr. is a 66 y.o. male.  Saul Alcantara Sr. is status post Direct laryngoscopy with esophageal dilatation on 1/3/22. Patient was having nasal regurgitation after procedure and seen in ER for fever and acute bronchiolitis.Patient was referred to GI and seen by Dr. Newton Richmond for nasal regurgitation. He had a EGD with biopsy and dilation on 22 by Dr. Newton Richmond. Patient states he is doing well today. He is swallowing well. His bronchitis and fever have cleared. Patient had DL and biopsy of base of tongue on 9/10/14 with positive pathology for SCCa. The cancer was staged T1M0N0. The patient has a history of radiation therapy due to a base of tongue cancer on the left side that he finished in 2014.    He had some palatal dysfunction after the second scope by Dr. Newton Richmond but that has resolved and he is doing well taking antibiotics for presumptive bronchiolitis diagnosed on a CT.  Exam reflects necessity for a follow-up CT.       Past Medical History:   Diagnosis Date   • Allergic rhinitis    • Anxiety    • Arthritis    • Cataract    • Chronic sinusitis    • COPD (chronic obstructive pulmonary disease) (HCC)    • COVID-19 vaccine series completed     Moderna   • Depression    • Deviated nasal septum    • Enlarged prostate    • GERD (gastroesophageal reflux disease)    • H/O head and neck radiation 3/3/2017   • History of transfusion    • Hyperlipidemia    • Hypertension    • Hypothyroidism    • Laryngopharyngeal reflux    • Lymphoma (HCC)     Non-Hodgkins   • MRSA infection    • Panic attacks    • Peyronie disease    • Pneumonia    • Primary squamous cell carcinoma of tongue (HCC) 2016    T1N0M0 SCC S/P XRT/Chemo 2014   • Primary  squamous cell carcinoma of tongue (HCC) 9/27/2016    T1N0M0 SCC S/P XRT/Chemo 12/2014   • Prostate cancer (HCC)    • Sicca syndrome (HCC)    • Sinusitis, acute    • Squamous cell carcinoma     Base of Tongue   • Tobacco use disorder    • Tongue irritation    • Visit for monitoring Rituxan therapy     pt getting treatments at [facility] in [place]   • Xerostomia        Past Surgical History:   Procedure Laterality Date   • CATARACT EXTRACTION Bilateral    • ESOPHAGOSCOPY N/A 11/20/2019    Procedure: Direct laryngoscopy with esophageal Johnson dilation;  Surgeon: Wayne Richmond MD;  Location:  [facility];  Service: ENT   • HAND SURGERY      metal plate    • KNEE SURGERY     • LARYNGOSCOPY N/A 6/26/2020    Procedure: Rigid esophagoscopy Johnson esophageal dilatation;  Surgeon: Wayne Richmond MD;  Location:  [facility];  Service: ENT;  Laterality: N/A;   • LARYNGOSCOPY N/A 8/24/2020    Procedure: MICRODIRECT LARYNGOSCOPY with esophageal dilatation;  Surgeon: Wayne Richmond MD;  Location:  [facility];  Service: ENT;  Laterality: N/A;   • LARYNGOSCOPY N/A 5/24/2021    Procedure: Direct laryngoscopy, esophagoscopy with Johnson dilatation;  Surgeon: Wayne Richmond MD;  Location:  [facility];  Service: ENT;  Laterality: N/A;   • LARYNGOSCOPY N/A 12/1/2021    Procedure: Direct laryngoscopy, esophagoscopy with esophageal dilatation (Johnson dilators);  Surgeon: Wayne Richmond MD;  Location:  [facility];  Service: ENT;  Laterality: N/A;   • LARYNGOSCOPY N/A 1/3/2022    Procedure: Direct laryngoscopy, esophagoscopy with Johnson esophageal dilation;  Surgeon: Wayne Richmond MD;  Location:  [facility];  Service: ENT;  Laterality: N/A;   • MULTIPLE TOOTH EXTRACTIONS      CONTINUING TO HAVE BONE REMOVED 1/2021   • OTHER SURGICAL HISTORY  09/10/2014    Microlaryngoscopy with Biopsy - Base of Tongue   • PANENDOSCOPY N/A 1/22/2021    Procedure: DIRECT LARYNGOSCOPY AND ESOPHAGOSCOPY WITH ESOPHAGEAL  DILATION;  Surgeon: Wayne Richmond MD;  Location:  [facility];  Service: ENT;  Laterality: N/A;   • PANENDOSCOPY N/A 10/11/2021    Procedure: Direct laryngoscopy, esophagoscopy with Johnson dilatation;  Surgeon: Wayne Richmond MD;  Location:  [facility];  Service: ENT;  Laterality: N/A;   • PROSTATE ULTRASOUND BIOPSY N/A 12/1/2021    Procedure: PROSTATE ULTRASOUND BIOPSY. NOT A URONAV;  Surgeon: Oscar Bazan MD;  Location:  [facility];  Service: Urology;  Laterality: N/A;   • SPLENECTOMY     • SQUAMOUS CELL CARCINOMA EXCISION  09/10/2014    Tongue   • TONSILLECTOMY         Outpatient Medications Marked as Taking for the 1/11/22 encounter (Office Visit) with Wayne Richmond MD   Medication Sig Dispense Refill   • albuterol (PROVENTIL) (2.5 MG/3ML) 0.083% nebulizer solution Take 2.5 mg by nebulization Every 4 (Four) Hours As Needed for Wheezing. 50 mL 1   • ALPRAZolam (XANAX) 2 MG tablet Take 2 mg by mouth 3 (Three) Times a Day.     • amitriptyline (ELAVIL) 25 MG tablet Take 25 mg by mouth every night at bedtime.     • amLODIPine (NORVASC) 10 MG tablet Take 10 mg by mouth Daily.     • Azelastine-Fluticasone 137-50 MCG/ACT suspension into the nostril(s) as directed by provider.     • cefdinir (OMNICEF) 250 MG/5ML suspension Take 6 mL by mouth 2 (Two) Times a Day for 10 days. 120 mL 0   • chlorhexidine (Peridex) 0.12 % solution Apply 15 mL to the mouth or throat 2 (Two) Times a Day. 240 mL 3   • cyclobenzaprine (FLEXERIL) 10 MG tablet As Needed.     • glycopyrrolate (ROBINUL) 1 MG tablet 0.5 mg 2 (Two) Times a Day.  3   • HYDROcodone-acetaminophen (NORCO)  MG per tablet Take 1 tablet by mouth Every 6 (Six) Hours As Needed for Moderate Pain  (PER DR RUIZ).     • levothyroxine (SYNTHROID, LEVOTHROID) 100 MCG tablet Take 100 mcg by mouth Daily.     • Lidocaine Viscous HCl (XYLOCAINE) 2 % solution Take 5 mL by mouth As Needed for Mild Pain .     • meloxicam (MOBIC) 15 MG tablet Take 15 mg by mouth  Daily.     • nystatin (MYCOSTATIN) 114206 UNIT/ML suspension Take 10ml by mouth BID for 28 days (Patient taking differently: Take 200,000 Units by mouth 4 (Four) Times a Day As Needed. Take 10ml by mouth BID for 28 days) 480 mL 2   • ondansetron (ZOFRAN) 8 MG tablet Take 8 mg by mouth Every 8 (Eight) Hours As Needed for Nausea.     • pantoprazole (PROTONIX) 40 MG EC tablet Take 40 mg by mouth Daily. Delayed Release (DR/EC)  Take 1 tablet (40 mg) by oral route 2 times per day,  Take 30 minutes prior to breakfast and evening meal      • senna-docusate (PERICOLACE) 8.6-50 MG per tablet Take 1 tablet by mouth 2 times daily     • sertraline (ZOLOFT) 100 MG tablet Take 100 mg by mouth every night at bedtime.     • simvastatin (ZOCOR) 20 MG tablet TK 1 T PO QD  11   • tadalafil (CIALIS) 20 MG tablet Take 1 tablet by mouth As Needed for Erectile Dysfunction for up to 360 days. 1-24 hours before activity (Patient taking differently: Take 20 mg by mouth As Needed for Erectile Dysfunction. - TOLD NOT TO TAKE @ LEAST 24 HOURS BEFORE DOS-1/3/22 -  1-24 hours before activity) 12 tablet 11   • tamsulosin (FLOMAX) 0.4 MG capsule 24 hr capsule Take 0.4 mg by mouth Daily.     • triamcinolone (KENALOG) 0.5 % ointment Apply  topically to the appropriate area as directed 2 (Two) Times a Day. Apply to affected ear 15 g 3   • zolpidem (AMBIEN) 10 MG tablet Take 10 mg by mouth At Night As Needed for Sleep.         Patient has no known allergies.    Family History   Problem Relation Age of Onset   • Diabetes Sister    • Cancer Sister        Social History     Socioeconomic History   • Marital status:    Tobacco Use   • Smoking status: Former Smoker     Packs/day: 0.25     Types: Cigarettes   • Smokeless tobacco: Never Used   • Tobacco comment: pack last couple of weeks   Vaping Use   • Vaping Use: Never used   Substance and Sexual Activity   • Alcohol use: No   • Drug use: No   • Sexual activity: Yes     Partners: Female       Review  of Systems   Constitutional: Negative.    HENT: Negative.    Eyes: Negative.    Respiratory: Negative.    Cardiovascular: Negative.    Gastrointestinal: Negative.    Endocrine: Negative.    Genitourinary: Negative.    Musculoskeletal: Negative.    Skin: Negative.    Allergic/Immunologic: Negative.    Neurological: Negative.    Hematological: Negative.    Psychiatric/Behavioral: Negative.        Vitals:    01/11/22 1318   BP: 100/77   Pulse: 100   Temp: 98.6 °F (37 °C)       Body mass index is 20.45 kg/m².    Objective     Physical Exam  CONSTITUTIONAL: well nourished, well-developed, alert, oriented, in no acute distress     COMMUNICATION AND VOICE: able to communicate normally, normal voice quality    HEAD: normocephalic, no lesions, atraumatic, no tenderness, no masses     FACE: appearance normal, no lesions, no tenderness, no deformities, facial motion symmetric    EYES: ocular motility normal, eyelids normal, orbits normal, no proptosis, conjunctiva normal , pupils equal, round     EARS:  Hearing: hearing to conversational voice intact bilaterally   External Ears: normal bilaterally, no lesions    NOSE:  External Nose: external nasal structure normal, no tenderness on palpation, no nasal discharge, no lesions, no evidence of trauma, nostrils patent     ORAL:  Lips: upper and lower lips without lesion   OC/OP-palate moves well and there are no excoriations, ulcerations or other abnormalities.  No masses or lesions noted by visualization or bilateral palpation    Indirect laryngoscopy: Both true vocal cords adduct and abduct normally    NECK:  Inspection and Palpation: neck appearance normal, no masses or tenderness-postradiation changes noted with no palpable nodules or masses is noted    CHEST/RESPIRATORY: normal respiratory effort     CARDIOVASCULAR: no cyanosis or edema     NEUROLOGICAL/PSYCHIATRIC: oriented to time, place and person, mood normal, affect appropriate, CN II-XII intact grossly    Assessment/Plan    Diagnoses and all orders for this visit:    1. Primary squamous cell carcinoma of tongue (HCC) (Primary)  Comments:  DL /BX BOT  9/10/14 T1M0N0 SCCa. Hx of SCC left BOT 12/2014 with XRT  Orders:  -     CT Chest With & Without Contrast Diagnostic    2. Laryngopharyngeal reflux  -     CT Chest With & Without Contrast Diagnostic    3. Tobacco use disorder  -     CT Chest With & Without Contrast Diagnostic    4. Pharyngoesophageal dysphagia  -     CT Chest With & Without Contrast Diagnostic      * Surgery not found *  Orders Placed This Encounter   Procedures   • CT Chest With & Without Contrast Diagnostic     Return in about 3 months (around 4/11/2022).       Patient Instructions   Please repeat CT scan of the chest for follow-up of nodules noted on CT from ED    Discussed with Dr. Stubbs-office suggest repeat CT    Be aware of the signs and symptoms of recurrent head and neck cancer including neck mass, persistent or recurrent sore throat, ear pain, hemoptysis, weight loss and hoarseness. If any of these symptoms recur and or persist, call for an evaluation.     D/W patient increasing caloric intake and discussed cruciferous vegetables and protein shakes etc to maintain optimal nutrition.  The necessity of abstaining from tobacco products was also discussed particularly with respect to not smoking.    Continue F/U and/or treatment with Jaydon

## 2022-01-11 ENCOUNTER — OFFICE VISIT (OUTPATIENT)
Dept: OTOLARYNGOLOGY | Facility: CLINIC | Age: 67
End: 2022-01-11

## 2022-01-11 VITALS
WEIGHT: 155 LBS | TEMPERATURE: 98.6 F | HEIGHT: 73 IN | SYSTOLIC BLOOD PRESSURE: 100 MMHG | HEART RATE: 100 BPM | DIASTOLIC BLOOD PRESSURE: 77 MMHG | BODY MASS INDEX: 20.54 KG/M2

## 2022-01-11 DIAGNOSIS — R13.14 PHARYNGOESOPHAGEAL DYSPHAGIA: ICD-10-CM

## 2022-01-11 DIAGNOSIS — F17.200 TOBACCO USE DISORDER: ICD-10-CM

## 2022-01-11 DIAGNOSIS — K21.9 LARYNGOPHARYNGEAL REFLUX: ICD-10-CM

## 2022-01-11 DIAGNOSIS — C02.9 PRIMARY SQUAMOUS CELL CARCINOMA OF TONGUE: Primary | ICD-10-CM

## 2022-01-11 LAB
BLOOD CULTURE, ROUTINE: NORMAL
CULTURE, BLOOD 2: NORMAL

## 2022-01-11 PROCEDURE — 99213 OFFICE O/P EST LOW 20 MIN: CPT | Performed by: OTOLARYNGOLOGY

## 2022-01-11 RX ORDER — CEFTRIAXONE SODIUM 250 MG/1
250 INJECTION, POWDER, FOR SOLUTION INTRAMUSCULAR; INTRAVENOUS
COMMUNITY
End: 2022-01-11

## 2022-01-11 RX ORDER — AZELASTINE HYDROCHLORIDE, FLUTICASONE PROPIONATE 137; 50 UG/1; UG/1
SPRAY, METERED NASAL AS NEEDED
COMMUNITY
End: 2023-03-02

## 2022-01-11 RX ORDER — CYCLOBENZAPRINE HCL 10 MG
10 TABLET ORAL AS NEEDED
COMMUNITY
Start: 2021-11-03

## 2022-01-11 RX ORDER — AMOXICILLIN 500 MG/1
TABLET, FILM COATED ORAL
COMMUNITY
Start: 2021-12-17 | End: 2022-01-11

## 2022-01-11 NOTE — PATIENT INSTRUCTIONS
Please repeat CT scan of the chest for follow-up of nodules noted on CT from ED    Discussed with Dr. Stubbs-office suggest repeat CT    Be aware of the signs and symptoms of recurrent head and neck cancer including neck mass, persistent or recurrent sore throat, ear pain, hemoptysis, weight loss and hoarseness. If any of these symptoms recur and or persist, call for an evaluation.     D/W patient increasing caloric intake and discussed cruciferous vegetables and protein shakes etc to maintain optimal nutrition.  The necessity of abstaining from tobacco products was also discussed particularly with respect to not smoking.    Continue F/U and/or treatment with Jaydon

## 2022-01-19 ENCOUNTER — TELEPHONE (OUTPATIENT)
Dept: UROLOGY | Facility: CLINIC | Age: 67
End: 2022-01-19

## 2022-01-19 ENCOUNTER — TELEPHONE (OUTPATIENT)
Dept: GASTROENTEROLOGY | Age: 67
End: 2022-01-19

## 2022-01-19 ENCOUNTER — HOSPITAL ENCOUNTER (OUTPATIENT)
Dept: CT IMAGING | Facility: HOSPITAL | Age: 67
Discharge: HOME | End: 2022-01-19
Admitting: OTOLARYNGOLOGY

## 2022-01-19 LAB — CREAT BLDA-MCNC: 1.3 MG/DL (ref 0.6–1.3)

## 2022-01-19 PROCEDURE — 82565 ASSAY OF CREATININE: CPT

## 2022-01-19 PROCEDURE — 71270 CT THORAX DX C-/C+: CPT

## 2022-01-19 PROCEDURE — 25010000002 IOPAMIDOL 61 % SOLUTION: Performed by: OTOLARYNGOLOGY

## 2022-01-19 RX ADMIN — IOPAMIDOL 100 ML: 612 INJECTION, SOLUTION INTRAVENOUS at 15:01

## 2022-01-19 NOTE — TELEPHONE ENCOUNTER
Pt's wife called requesting to have pt's records printed. Pt is going to [facility] for prostate cancer and requested records of any issues/concerns from pt's doctors. Please contact once completed.     Thank you

## 2022-01-19 NOTE — TELEPHONE ENCOUNTER
Caller: Vianney Alcantara    PT'S WIFE CALLING SAYING THAT DR. JENKINS REFERRED PT TO A UROLOGIST IN [place]. UROLOGIST IN [place] SENT OVER A MEDICAL RECORDS REQUEST REQUESTING RECORDS FROM US, PLEASE FOLLOW UP WITH PT IF THESE HAVE BEEN RECEIVED.     [facility] [phone]     PT HAS TO HAVE PREVIOUS UROLOGIST RECORDS CONFIRMED THEY HAVE BEEN FAXED, PLEASE FOLLOW UP WITH PT AND [facility]    PT'S WIFE ALSO WANTS A COPY OF PT'S MEDICAL RECORDS.

## 2022-01-20 ENCOUNTER — TELEPHONE (OUTPATIENT)
Dept: OTOLARYNGOLOGY | Facility: CLINIC | Age: 67
End: 2022-01-20

## 2022-02-08 DIAGNOSIS — Z11.52 ENCOUNTER FOR SCREENING FOR COVID-19: Primary | ICD-10-CM

## 2022-02-11 LAB
CYTO UR: NORMAL
LAB AP CASE REPORT: NORMAL
PATH REPORT.FINAL DX SPEC: NORMAL
PATH REPORT.GROSS SPEC: NORMAL

## 2022-03-09 DIAGNOSIS — R13.14 PHARYNGOESOPHAGEAL DYSPHAGIA: Primary | ICD-10-CM

## 2022-03-09 DIAGNOSIS — K21.9 LARYNGOPHARYNGEAL REFLUX: ICD-10-CM

## 2022-03-14 ENCOUNTER — ANESTHESIA EVENT (OUTPATIENT)
Dept: ENDOSCOPY | Age: 67
End: 2022-03-14
Payer: COMMERCIAL

## 2022-03-14 DIAGNOSIS — Z11.52 ENCOUNTER FOR SCREENING FOR COVID-19: ICD-10-CM

## 2022-03-14 LAB — SARS-COV-2, PCR: NOT DETECTED

## 2022-03-14 NOTE — PROGRESS NOTES
YOB: 1955  Location: [facility]  Location Address: [address] [place] [address], [facility], [address] [place] [address]  Location Phone: [phone]    Chief Complaint   Patient presents with   • Follow-up       History of Present Illness  Saul Alcantara Sr. is a 66 y.o. male.  Saul Alcantara Sr. is status post Direct laryngoscopy with esophageal dilatation on 1/3/22.  He had a EGD with biopsy and dilation on 22 by Dr. Newton Richmond. Patient had DL and biopsy of base of tongue on 9/10/14 with positive pathology for SCCa. The cancer was staged T1M0N0. The patient has a history of radiation therapy due to a base of tongue cancer on the left side that he finished in 2014. Patient recently called and states that he was having difficulty swallowing again. However, today states he is better and has been eating cookies.          Past Medical History:   Diagnosis Date   • Allergic rhinitis    • Anxiety    • Arthritis    • Cataract    • Chronic sinusitis    • COPD (chronic obstructive pulmonary disease) (HCC)    • COVID-19 vaccine series completed     Moderna   • Depression    • Deviated nasal septum    • Enlarged prostate    • GERD (gastroesophageal reflux disease)    • H/O head and neck radiation 3/3/2017   • History of transfusion    • Hyperlipidemia    • Hypertension    • Hypothyroidism    • Laryngopharyngeal reflux    • Lymphoma (HCC)     Non-Hodgkins   • MRSA infection    • Panic attacks    • Peyronie disease    • Pneumonia    • Primary squamous cell carcinoma of tongue (HCC) 2016    T1N0M0 SCC S/P XRT/Chemo 2014   • Primary squamous cell carcinoma of tongue (HCC) 2016    T1N0M0 SCC S/P XRT/Chemo 2014   • Prostate cancer (HCC)    • Sicca syndrome (HCC)    • Sinusitis, acute    • Squamous cell carcinoma     Base of Tongue   • Tobacco use disorder    • Tongue irritation    • Visit for monitoring Rituxan therapy     pt getting treatments at [facility] office in [place]   • Xerostomia         Past Surgical History:   Procedure Laterality Date   • CATARACT EXTRACTION Bilateral    • ESOPHAGOSCOPY N/A 11/20/2019    Procedure: Direct laryngoscopy with esophageal Johnson dilation;  Surgeon: Wayne Richmond MD;  Location:  [facility];  Service: ENT   • HAND SURGERY      metal plate    • KNEE SURGERY     • LARYNGOSCOPY N/A 6/26/2020    Procedure: Rigid esophagoscopy Johnson esophageal dilatation;  Surgeon: Wayne Richmond MD;  Location:  [facility];  Service: ENT;  Laterality: N/A;   • LARYNGOSCOPY N/A 8/24/2020    Procedure: MICRODIRECT LARYNGOSCOPY with esophageal dilatation;  Surgeon: Wayne Richmond MD;  Location:  [facility];  Service: ENT;  Laterality: N/A;   • LARYNGOSCOPY N/A 5/24/2021    Procedure: Direct laryngoscopy, esophagoscopy with Johnson dilatation;  Surgeon: Wayne Richmond MD;  Location:  [facility];  Service: ENT;  Laterality: N/A;   • LARYNGOSCOPY N/A 12/1/2021    Procedure: Direct laryngoscopy, esophagoscopy with esophageal dilatation (Johnson dilators);  Surgeon: Wayne Richmond MD;  Location:  [facility];  Service: ENT;  Laterality: N/A;   • LARYNGOSCOPY N/A 1/3/2022    Procedure: Direct laryngoscopy, esophagoscopy with Johnson esophageal dilation;  Surgeon: Wayne Richmond MD;  Location:  [facility];  Service: ENT;  Laterality: N/A;   • MULTIPLE TOOTH EXTRACTIONS      CONTINUING TO HAVE BONE REMOVED 1/2021   • OTHER SURGICAL HISTORY  09/10/2014    Microlaryngoscopy with Biopsy - Base of Tongue   • PANENDOSCOPY N/A 1/22/2021    Procedure: DIRECT LARYNGOSCOPY AND ESOPHAGOSCOPY WITH ESOPHAGEAL DILATION;  Surgeon: Wayne Richmond MD;  Location:  [facility];  Service: ENT;  Laterality: N/A;   • PANENDOSCOPY N/A 10/11/2021    Procedure: Direct laryngoscopy, esophagoscopy with Johnson dilatation;  Surgeon: aWyne Richmond MD;  Location:  [facility];  Service: ENT;  Laterality: N/A;   • PROSTATE ULTRASOUND BIOPSY N/A 12/1/2021    Procedure: PROSTATE ULTRASOUND  BIOPSY. NOT A URONAV;  Surgeon: Oscar Bazan MD;  Location: [facility];  Service: Urology;  Laterality: N/A;   • SPLENECTOMY     • SQUAMOUS CELL CARCINOMA EXCISION  09/10/2014    Tongue   • TONSILLECTOMY         Outpatient Medications Marked as Taking for the 3/15/22 encounter (Office Visit) with Wayne Richmond MD   Medication Sig Dispense Refill   • albuterol (PROVENTIL) (2.5 MG/3ML) 0.083% nebulizer solution Take 2.5 mg by nebulization Every 4 (Four) Hours As Needed for Wheezing. 50 mL 1   • ALPRAZolam (XANAX) 2 MG tablet Take 2 mg by mouth 3 (Three) Times a Day.     • amitriptyline (ELAVIL) 25 MG tablet Take 25 mg by mouth every night at bedtime.     • amLODIPine (NORVASC) 10 MG tablet Take 10 mg by mouth Daily.     • Azelastine-Fluticasone 137-50 MCG/ACT suspension into the nostril(s) as directed by provider.     • chlorhexidine (Peridex) 0.12 % solution Apply 15 mL to the mouth or throat 2 (Two) Times a Day. 240 mL 3   • cyclobenzaprine (FLEXERIL) 10 MG tablet As Needed.     • glycopyrrolate (ROBINUL) 1 MG tablet 0.5 mg 2 (Two) Times a Day.  3   • HYDROcodone-acetaminophen (NORCO)  MG per tablet Take 1 tablet by mouth Every 6 (Six) Hours As Needed for Moderate Pain  (PER DR RUZI).     • levothyroxine (SYNTHROID, LEVOTHROID) 100 MCG tablet Take 100 mcg by mouth Daily.     • Lidocaine Viscous HCl (XYLOCAINE) 2 % solution Take 5 mL by mouth As Needed for Mild Pain .     • meloxicam (MOBIC) 15 MG tablet Take 15 mg by mouth Daily.     • nystatin (MYCOSTATIN) 804205 UNIT/ML suspension Take 10ml by mouth BID for 28 days (Patient taking differently: Take 200,000 Units by mouth 4 (Four) Times a Day As Needed. Take 10ml by mouth BID for 28 days) 480 mL 2   • ondansetron (ZOFRAN) 8 MG tablet Take 8 mg by mouth Every 8 (Eight) Hours As Needed for Nausea.     • pantoprazole (PROTONIX) 40 MG EC tablet Take 40 mg by mouth Daily. Delayed Release (DR/EC)  Take 1 tablet (40 mg) by oral route 2 times per  day,  Take 30 minutes prior to breakfast and evening meal     • senna-docusate (PERICOLACE) 8.6-50 MG per tablet Take 1 tablet by mouth 2 times daily     • sertraline (ZOLOFT) 100 MG tablet Take 100 mg by mouth every night at bedtime.     • simvastatin (ZOCOR) 20 MG tablet TK 1 T PO QD  11   • tadalafil (CIALIS) 20 MG tablet Take 1 tablet by mouth As Needed for Erectile Dysfunction for up to 360 days. 1-24 hours before activity (Patient taking differently: Take 20 mg by mouth As Needed for Erectile Dysfunction. - TOLD NOT TO TAKE @ LEAST 24 HOURS BEFORE DOS-1/3/22 -  1-24 hours before activity) 12 tablet 11   • tamsulosin (FLOMAX) 0.4 MG capsule 24 hr capsule Take 0.4 mg by mouth Daily.     • triamcinolone (KENALOG) 0.5 % ointment Apply  topically to the appropriate area as directed 2 (Two) Times a Day. Apply to affected ear 15 g 3   • zolpidem (AMBIEN) 10 MG tablet Take 10 mg by mouth At Night As Needed for Sleep.     • [DISCONTINUED] chlorhexidine (Peridex) 0.12 % solution Apply 15 mL to the mouth or throat 2 (Two) Times a Day. 240 mL 3       Patient has no known allergies.    Family History   Problem Relation Age of Onset   • Diabetes Sister    • Cancer Sister        Social History     Socioeconomic History   • Marital status:    Tobacco Use   • Smoking status: Former Smoker     Packs/day: 0.25     Types: Cigarettes   • Smokeless tobacco: Never Used   • Tobacco comment: pack last couple of weeks   Vaping Use   • Vaping Use: Never used   Substance and Sexual Activity   • Alcohol use: No   • Drug use: No   • Sexual activity: Yes     Partners: Female       Review of Systems   Constitutional: Negative.    HENT: Positive for trouble swallowing.    Eyes: Negative.    Respiratory: Negative.    Cardiovascular: Negative.    Gastrointestinal: Negative.    Endocrine: Negative.    Genitourinary: Negative.    Musculoskeletal: Negative.    Skin: Negative.    Allergic/Immunologic: Negative.    Hematological: Negative.     Psychiatric/Behavioral: Negative.        Vitals:    03/15/22 1057   BP: 125/83   Pulse: 115   Temp: 98.4 °F (36.9 °C)       Body mass index is 19.53 kg/m².    Objective     Physical Exam  CONSTITUTIONAL: well nourished, well-developed, alert, oriented, in no acute distress     COMMUNICATION AND VOICE: able to communicate normally, normal voice quality    HEAD: normocephalic, no lesions, atraumatic, no tenderness, no masses     FACE: appearance normal, no lesions, no tenderness, no deformities, facial motion symmetric    EYES: ocular motility normal, eyelids normal, orbits normal, no proptosis, conjunctiva normal , pupils equal, round     EARS:  Hearing: hearing to conversational voice intact bilaterally   External Ears: normal bilaterally, no lesions    NOSE:  External Nose: external nasal structure normal, no tenderness on palpation, no nasal discharge, no lesions, no evidence of trauma, nostrils patent     ORAL:  Lips: upper and lower lips without lesion     LARYNX:  Indirect laryngoscopy demonstrates mild interarytenoid edema with no erythema.  Both true vocal cords abduct and abduct normally and no masses or lesions are noted by visualization  No masses or lesions are noted by visualization or palpation including bimanual palpation at the base of the tongue    NECK:  Inspection and Palpation: neck appearance normal, no masses or tenderness  No adenopathy-post radiation changes noted    CHEST/RESPIRATORY: normal respiratory effort     CARDIOVASCULAR: no cyanosis or edema     NEUROLOGICAL/PSYCHIATRIC: oriented to time, place and person, mood normal, affect appropriate, CN II-XII intact grossly    Assessment/Plan   Diagnoses and all orders for this visit:    1. Primary squamous cell carcinoma of tongue (HCC) (Primary)  Comments:  DL /BX BOT  9/10/14 T1M0N0 SCCa. Hx of SCC left BOT 12/2014 with XRT    2. Tobacco use disorder    3. Xerostomia    4. Laryngopharyngeal reflux    5. Hx of radiation therapy    Other  orders  -     chlorhexidine (Peridex) 0.12 % solution; Apply 15 mL to the mouth or throat 2 (Two) Times a Day.  Dispense: 240 mL; Refill: 3      * Surgery not found *  No orders of the defined types were placed in this encounter.    Return in about 3 months (around 6/15/2022).       Patient Instructions   Call for recurrent episodes of dysphagia or difficulty swallowing and possible need for stretching  No evidence of disease    Be aware of the signs and symptoms of recurrent head and neck cancer including neck mass, persistent or recurrent sore throat, ear pain, hemoptysis, weight loss and hoarseness. If any of these symptoms recur and or persist, call for an evaluation.     D/W patient increasing caloric intake and discussed cruciferous vegetables and protein shakes etc to maintain optimal nutrition.  The necessity of abstaining from tobacco products was also discussed particularly with respect to not smoking.    Continue F/U and/or treatment with Jaydon

## 2022-03-15 ENCOUNTER — OFFICE VISIT (OUTPATIENT)
Dept: OTOLARYNGOLOGY | Facility: CLINIC | Age: 67
End: 2022-03-15

## 2022-03-15 VITALS
WEIGHT: 148 LBS | TEMPERATURE: 98.4 F | HEIGHT: 73 IN | SYSTOLIC BLOOD PRESSURE: 125 MMHG | DIASTOLIC BLOOD PRESSURE: 83 MMHG | HEART RATE: 115 BPM | BODY MASS INDEX: 19.61 KG/M2

## 2022-03-15 DIAGNOSIS — K11.7 XEROSTOMIA: ICD-10-CM

## 2022-03-15 DIAGNOSIS — K21.9 LARYNGOPHARYNGEAL REFLUX: ICD-10-CM

## 2022-03-15 DIAGNOSIS — Z92.3 HX OF RADIATION THERAPY: ICD-10-CM

## 2022-03-15 DIAGNOSIS — F17.200 TOBACCO USE DISORDER: ICD-10-CM

## 2022-03-15 DIAGNOSIS — C02.9 PRIMARY SQUAMOUS CELL CARCINOMA OF TONGUE: Primary | ICD-10-CM

## 2022-03-15 PROCEDURE — 99213 OFFICE O/P EST LOW 20 MIN: CPT | Performed by: OTOLARYNGOLOGY

## 2022-03-15 RX ORDER — CHLORHEXIDINE GLUCONATE 0.12 MG/ML
15 RINSE ORAL 2 TIMES DAILY
Qty: 240 ML | Refills: 3 | Status: SHIPPED | OUTPATIENT
Start: 2022-03-15 | End: 2022-05-31 | Stop reason: SDUPTHER

## 2022-03-15 NOTE — PATIENT INSTRUCTIONS
Call for recurrent episodes of dysphagia or difficulty swallowing and possible need for stretching  No evidence of disease    Be aware of the signs and symptoms of recurrent head and neck cancer including neck mass, persistent or recurrent sore throat, ear pain, hemoptysis, weight loss and hoarseness. If any of these symptoms recur and or persist, call for an evaluation.     D/W patient increasing caloric intake and discussed cruciferous vegetables and protein shakes etc to maintain optimal nutrition.  The necessity of abstaining from tobacco products was also discussed particularly with respect to not smoking.    Continue F/U and/or treatment with Jaydon

## 2022-03-16 ENCOUNTER — ANESTHESIA (OUTPATIENT)
Dept: ENDOSCOPY | Age: 67
End: 2022-03-16
Payer: COMMERCIAL

## 2022-03-16 ENCOUNTER — HOSPITAL ENCOUNTER (OUTPATIENT)
Age: 67
Setting detail: OUTPATIENT SURGERY
Discharge: HOME | End: 2022-03-16
Attending: INTERNAL MEDICINE | Admitting: INTERNAL MEDICINE
Payer: COMMERCIAL

## 2022-03-16 VITALS
SYSTOLIC BLOOD PRESSURE: 140 MMHG | BODY MASS INDEX: 18.62 KG/M2 | WEIGHT: 140.5 LBS | TEMPERATURE: 97.8 F | RESPIRATION RATE: 18 BRPM | DIASTOLIC BLOOD PRESSURE: 87 MMHG | HEART RATE: 85 BPM | OXYGEN SATURATION: 100 % | HEIGHT: 73 IN

## 2022-03-16 VITALS — OXYGEN SATURATION: 99 % | SYSTOLIC BLOOD PRESSURE: 137 MMHG | DIASTOLIC BLOOD PRESSURE: 88 MMHG

## 2022-03-16 PROCEDURE — 7100000011 HC PHASE II RECOVERY - ADDTL 15 MIN: Performed by: INTERNAL MEDICINE

## 2022-03-16 PROCEDURE — 3700000000 HC ANESTHESIA ATTENDED CARE: Performed by: INTERNAL MEDICINE

## 2022-03-16 PROCEDURE — 45378 DIAGNOSTIC COLONOSCOPY: CPT | Performed by: INTERNAL MEDICINE

## 2022-03-16 PROCEDURE — 2709999900 HC NON-CHARGEABLE SUPPLY: Performed by: INTERNAL MEDICINE

## 2022-03-16 PROCEDURE — 6360000002 HC RX W HCPCS: Performed by: NURSE ANESTHETIST, CERTIFIED REGISTERED

## 2022-03-16 PROCEDURE — 7100000010 HC PHASE II RECOVERY - FIRST 15 MIN: Performed by: INTERNAL MEDICINE

## 2022-03-16 PROCEDURE — 3609027000 HC COLONOSCOPY: Performed by: INTERNAL MEDICINE

## 2022-03-16 PROCEDURE — 3700000001 HC ADD 15 MINUTES (ANESTHESIA): Performed by: INTERNAL MEDICINE

## 2022-03-16 PROCEDURE — 2580000003 HC RX 258: Performed by: INTERNAL MEDICINE

## 2022-03-16 PROCEDURE — 2500000003 HC RX 250 WO HCPCS: Performed by: NURSE ANESTHETIST, CERTIFIED REGISTERED

## 2022-03-16 RX ORDER — LIDOCAINE HYDROCHLORIDE 10 MG/ML
INJECTION, SOLUTION EPIDURAL; INFILTRATION; INTRACAUDAL; PERINEURAL PRN
Status: DISCONTINUED | OUTPATIENT
Start: 2022-03-16 | End: 2022-03-16 | Stop reason: SDUPTHER

## 2022-03-16 RX ORDER — PROPOFOL 10 MG/ML
INJECTION, EMULSION INTRAVENOUS PRN
Status: DISCONTINUED | OUTPATIENT
Start: 2022-03-16 | End: 2022-03-16 | Stop reason: SDUPTHER

## 2022-03-16 RX ORDER — SODIUM CHLORIDE, SODIUM LACTATE, POTASSIUM CHLORIDE, CALCIUM CHLORIDE 600; 310; 30; 20 MG/100ML; MG/100ML; MG/100ML; MG/100ML
INJECTION, SOLUTION INTRAVENOUS CONTINUOUS
Status: DISCONTINUED | OUTPATIENT
Start: 2022-03-16 | End: 2022-03-16 | Stop reason: HOSPADM

## 2022-03-16 RX ADMIN — SODIUM CHLORIDE, POTASSIUM CHLORIDE, SODIUM LACTATE AND CALCIUM CHLORIDE: 600; 310; 30; 20 INJECTION, SOLUTION INTRAVENOUS at 07:29

## 2022-03-16 RX ADMIN — PROPOFOL 400 MG: 10 INJECTION, EMULSION INTRAVENOUS at 08:11

## 2022-03-16 RX ADMIN — LIDOCAINE HYDROCHLORIDE 40 MG: 10 INJECTION, SOLUTION EPIDURAL; INFILTRATION; INTRACAUDAL; PERINEURAL at 08:11

## 2022-03-16 ASSESSMENT — PAIN SCALES - GENERAL
PAINLEVEL_OUTOF10: 0
PAINLEVEL_OUTOF10: 0

## 2022-03-16 ASSESSMENT — LIFESTYLE VARIABLES: SMOKING_STATUS: 1

## 2022-03-16 ASSESSMENT — PAIN - FUNCTIONAL ASSESSMENT: PAIN_FUNCTIONAL_ASSESSMENT: 0-10

## 2022-03-16 NOTE — ANESTHESIA POSTPROCEDURE EVALUATION
Department of Anesthesiology  Postprocedure Note    Patient: Margaux Pagan MRN: [id number]  YOB: 1955  Date of evaluation: 3/16/2022  Time:  8:46 AM     Procedure Summary     Date: 03/16/22 Room / Location: [address]    Anesthesia Start: [facility] Anesthesia Stop:     Procedure: COLORECTAL CANCER SCREENING, NOT HIGH RISK (N/A ) Diagnosis: (SCREEN)    Surgeons: Antione Rockwell MD Responsible Provider: VERONICA Palacios CRNA    Anesthesia Type: general, TIVA ASA Status: 3          Anesthesia Type: No value filed. Valentino Phase I: Valentino Score: 10    Valentino Phase II:      Last vitals: Reviewed and per EMR flowsheets.        Anesthesia Post Evaluation    Patient location during evaluation: bedside  Patient participation: complete - patient participated  Level of consciousness: sleepy but conscious  Pain score: 0  Airway patency: patent  Nausea & Vomiting: no nausea and no vomiting  Complications: no  Cardiovascular status: hemodynamically stable and blood pressure returned to baseline  Respiratory status: acceptable and nasal cannula  Hydration status: stable

## 2022-03-16 NOTE — ANESTHESIA PRE PROCEDURE
Department of Anesthesiology  Preprocedure Note       Name:  Fela Eubanks. Age:  61 y.o.  :  1955                                          MRN:  [id number]         Date:  3/16/2022      Surgeon: Bharathi Brar):  Amber Rosas MD    Procedure: Procedure(s):  LT EYE CATARACT EMULSIFICATION IOL IMPLANT    Medications prior to admission:   Prior to Admission medications    Medication Sig Start Date End Date Taking? Authorizing Provider   ALBUTEROL IN Inhale into the lungs as needed    Historical Provider, MD   Azelastine-Fluticasone 137-50 MCG/ACT SUSP by Nasal route 2 times daily as needed  Patient not taking: Reported on 3/16/2022    Historical Provider, MD   meloxicam (MOBIC) 15 MG tablet Take 15 mg by mouth daily    Historical Provider, MD   glycopyrrolate (ROBINUL) 1 MG tablet Take 0.5 mg by mouth 2 times daily as needed    Historical Provider, MD   amLODIPine (NORVASC) 5 MG tablet Take 1 tablet by mouth daily    Historical Provider, MD   cyclobenzaprine (FLEXERIL) 10 MG tablet 3 times daily as needed 18   Historical Provider, MD   HYDROcodone-acetaminophen ([facility]) 7.5-325 MG per tablet Take 1 tablet by mouth as needed.  16   Historical Provider, MD   sertraline (ZOLOFT) 50 MG tablet Take 50 mg by mouth daily     Historical Provider, MD   tamsulosin (FLOMAX) 0.4 MG capsule Take 0.4 mg by mouth 2 times daily     Historical Provider, MD   senna-docusate (Agustin Ahumada) 8.6-50 MG per tablet Take 1 tablet by mouth 2 times daily    Historical Provider, MD   ondansetron (ZOFRAN) 8 MG tablet Take 8 mg by mouth every 8 hours as needed for Nausea or Vomiting  Patient not taking: Reported on 3/16/2022    Historical Provider, MD   LEVOTHYROXINE SODIUM Take 0.088 mg by mouth daily     Historical Provider, MD   ALPRAZOLAM ER PO Take 2 mg by mouth 3 times daily Indications: Depression with Anxiety     Historical Provider, MD   Pantoprazole Sodium (PROTONIX PO) Take 40 mg by mouth 2 times daily     Historical Provider, MD   SIMVASTATIN PO Take 20 mg by mouth daily     Historical Provider, MD       Current medications:    No current facility-administered medications for this visit. No current outpatient medications on file. Facility-Administered Medications Ordered in Other Visits   Medication Dose Route Frequency Provider Last Rate Last Admin    lactated ringers infusion   IntraVENous Continuous Elton Coelho  mL/hr at 03/16/22 0729 New Bag at 03/16/22 0729       Allergies:  No Known Allergies    Problem List:    Patient Active Problem List   Diagnosis Code    Late effect of radiation T66. Ilene Barley    Cataract H26.9       Past Medical History:        Diagnosis Date    Anxiety     Chronic kidney disease     Stage 3 Moderated     COPD (chronic obstructive pulmonary disease) (HCC)     pt denies any symptoms     Depression     Dysphagia     Elevated cholesterol     Elevated PSA     GERD (gastroesophageal reflux disease)     History of blood transfusion     Hodgkin lymphoma (HCC)     Hypertension     Hypothyroidism     Insomnia     NHL (non-Hodgkin's lymphoma) (HCC)     REMISSION    Palpitations     Prostate cancer (HCC)     Rhinitis     Seizure ([facility] [address] 2013    Squamous cell carcinoma of tongue (HCC) 8/2014    HPV related    Weight loss     Xerostomia        Past Surgical History:        Procedure Laterality Date    ABSCESS DRAINAGE      ON BUTTOCK    CATARACT REMOVAL Right     COLONOSCOPY  10/05/2010    Dr Glen Gross, 5 yr recall    DENTAL SURGERY Bilateral     ESOPHAGEAL DILATATION      INTRACAPSULAR CATARACT EXTRACTION Right 06/06/2016    EYE CATARACT EMULSIFICATION IOL IMPLANT performed by Unique Cuellar MD at [address] [facility] Left 07/11/2016    LT EYE CATARACT EMULSIFICATION IOL IMPLANT performed by Unique Cuellar MD at [address] [place] [address] ARTHROSCOPY Bilateral     KNEE SURGERY Bilateral     scope    SPLENECTOMY  2004    THROAT SURGERY 06--20& 10-11-21    TONGUE BIOPSY  2015    wedge    TONGUE SURGERY      TONSILLECTOMY      UPPER GASTROINTESTINAL ENDOSCOPY N/A 2022    Dr OLENA Cummins-[id number] changes of radiation changes in the very proximal esophagus, gastritis, no h pylori    UPPER GASTROINTESTINAL ENDOSCOPY  2022    Dr Griselda Cummins-[id number] changes of radiation changes in the very proximal esophagus, gastritis, no h pylori    WRIST FRACTURE SURGERY Right     WRIST SURGERY      RIGHT       Social History:    Social History     Tobacco Use    Smoking status: Current Every Day Smoker     Packs/day: 0.50     Years: 38.00     Pack years: 19.00     Types: Cigarettes     Last attempt to quit: 2015     Years since quittin.3    Smokeless tobacco: Former User     Types: Chew   Substance Use Topics    Alcohol use: No     Comment: quit last week, 2014                                Ready to quit: Not Answered  Counseling given: Not Answered      Vital Signs (Current): There were no vitals filed for this visit.                                            BP Readings from Last 3 Encounters:   22 (!) 145/91   22 137/81   22 119/73       NPO Status:                                                                                 BMI:   Wt Readings from Last 3 Encounters:   22 140 lb 8 oz (63.7 kg)   22 158 lb (71.7 kg)   22 158 lb (71.7 kg)     There is no height or weight on file to calculate BMI.    CBC:   Lab Results   Component Value Date    WBC 11.6 2022    RBC 3.84 2022    HGB 12.3 2022    HCT 37.3 2022    MCV 97.1 2022    RDW 13.2 2022     2022       CMP:   Lab Results   Component Value Date     2022    K 4.0 2022     2022    CO2 28 2022    BUN 15 2022    CREATININE 1.1 2022    GFRAA >59 2022    LABGLOM >60 2022    GLUCOSE 100 2022    PROT 6.8 01/06/2022    CALCIUM 9.5 01/06/2022    BILITOT 0.4 01/06/2022    ALKPHOS 99 01/06/2022    AST 23 01/06/2022    ALT 20 01/06/2022       POC Tests: No results for input(s): POCGLU, POCNA, POCK, POCCL, POCBUN, POCHEMO, POCHCT in the last 72 hours. Coags:   Lab Results   Component Value Date    PROTIME 13.1 01/06/2022    INR 0.97 01/06/2022    APTT 29.0 02/28/2018       HCG (If Applicable): No results found for: PREGTESTUR, PREGSERUM, HCG, HCGQUANT     ABGs: No results found for: PHART, PO2ART, PRR2RNP, VOR5ACN, BEART, U7UCKCIB     Type & Screen (If Applicable):  No results found for: LABABO, [address]    Anesthesia Evaluation  Patient summary reviewed  Airway: Mallampati: II  TM distance: <3 FB   Neck ROM: full  Mouth opening: > = 3 FB Dental:    (+) upper dentures and lower dentures      Pulmonary:normal exam    (+) COPD:  current smoker          Patient did not smoke on day of surgery. Cardiovascular:  Exercise tolerance: good (>4 METS),   (+) hypertension: no interval change,          Beta Blocker:  Not on Beta Blocker         Neuro/Psych:   (+) psychiatric history:             ROS comment: Chronic narc use GI/Hepatic/Renal:   (+) GERD:, renal disease: CRI, bowel prep,           Endo/Other:    (+) hypothyroidism::., malignancy/cancer (\"early stages of prostate cancer\",squamous cell carccinoma of tongue  currently recieives radiation every other month). Abdominal:             Vascular: Other Findings:             Anesthesia Plan      general and TIVA     ASA 3       Induction: intravenous. Anesthetic plan and risks discussed with patient. Plan discussed with CRNA.     Attending anesthesiologist reviewed and agrees with Preprocedure content              VERONICA Katz - CRNA   3/16/2022

## 2022-03-16 NOTE — H&P
Patient Name: Rosalio Barclay.  : 1955  MRN: [id number]  DATE: 22    Allergies: No Known Allergies     ENDOSCOPY  History and Physical    Procedure:    [] Diagnostic Colonoscopy       [x] Screening Colonoscopy  [] EGD      [] ERCP      [] EUS       [] Other    [x] Previous office notes/History and Physical reviewed from the patients chart. Please see EMR for further details of HPI. I have examined the patient's status immediately prior to the procedure and:      Indications/HPI:       [x] Screening              [] History of Polyps      []Fhx of colon CA/polyps []+Cologard/DNA/Stool Testing      Anesthesia:   [x] MAC [] Moderate Sedation   [] General   [] None     ROS: 12 pt review of systems was negative unless stated above    Medications:   Prior to Admission medications    Medication Sig Start Date End Date Taking? Authorizing Provider   ALBUTEROL IN Inhale into the lungs as needed    Historical Provider, MD   Azelastine-Fluticasone 137-50 MCG/ACT SUSP by Nasal route 2 times daily as needed  Patient not taking: Reported on 3/16/2022    Historical Provider, MD   meloxicam (MOBIC) 15 MG tablet Take 15 mg by mouth daily    Historical Provider, MD   glycopyrrolate (ROBINUL) 1 MG tablet Take 0.5 mg by mouth 2 times daily as needed    Historical Provider, MD   amLODIPine (NORVASC) 5 MG tablet Take 1 tablet by mouth daily    Historical Provider, MD   cyclobenzaprine (FLEXERIL) 10 MG tablet 3 times daily as needed 18   Historical Provider, MD   HYDROcodone-acetaminophen ([facility]) 7.5-325 MG per tablet Take 1 tablet by mouth as needed.  16   Historical Provider, MD   sertraline (ZOLOFT) 50 MG tablet Take 50 mg by mouth daily     Historical Provider, MD   tamsulosin (FLOMAX) 0.4 MG capsule Take 0.4 mg by mouth 2 times daily     Historical Provider, MD   senna-docusate (Callie Evens) 8.6-50 MG per tablet Take 1 tablet by mouth 2 times daily    Historical Provider, MD   ondansetron (ZOFRAN) 8 MG tablet Take 8 mg by mouth every 8 hours as needed for Nausea or Vomiting  Patient not taking: Reported on 3/16/2022    Historical Provider, MD   LEVOTHYROXINE SODIUM Take 0.088 mg by mouth daily     Historical Provider, MD   ALPRAZOLAM ER PO Take 2 mg by mouth 3 times daily Indications: Depression with Anxiety     Historical Provider, MD   Pantoprazole Sodium (PROTONIX PO) Take 40 mg by mouth 2 times daily     Historical Provider, MD   SIMVASTATIN PO Take 20 mg by mouth daily     Historical Provider, MD       Past Medical History:  Past Medical History:   Diagnosis Date    Anxiety     Chronic kidney disease     Stage 3 Moderated     COPD [facility]chronic obstructive pulmonary disease) [facility] [address]     pt denies any symptoms     Depression     Dysphagia     Elevated cholesterol     Elevated PSA     GERD (gastroesophageal reflux disease)     History of blood transfusion     Hodgkin lymphoma ([facility] [address]     Hypertension     Hypothyroidism     Insomnia     NHL (non-Hodgkin's lymphoma) (HCC)     REMISSION    Palpitations     Prostate cancer ([facility] [address]     Rhinitis     Seizure (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    Weight loss     Xerostomia        Past Surgical History:  Past Surgical History:   Procedure Laterality Date    ABSCESS DRAINAGE      ON BUTTOCK    CATARACT REMOVAL Right     COLONOSCOPY  10/05/2010    Dr Richelle Brand, 5 yr recall    DENTAL SURGERY Bilateral     ESOPHAGEAL DILATATION      INTRACAPSULAR CATARACT EXTRACTION Right 06/06/2016    EYE CATARACT EMULSIFICATION IOL IMPLANT performed by Verdon Caller, MD at [address] CATARACT EXTRACTION Left 07/11/2016     EYE CATARACT EMULSIFICATION IOL IMPLANT performed by Verdon Caller, MD at [address] [facility] ARTHROSCOPY Bilateral     KNEE SURGERY Bilateral     scope    SPLENECTOMY  2004    THROAT SURGERY      06-26-20& 10-11-21    TONGUE BIOPSY  09/2015    wedge    TONGUE SURGERY      TONSILLECTOMY      UPPER GASTROINTESTINAL ENDOSCOPY N/A 2022    Dr Bárbara Cummins-[id number]-Pzhakdtpodpp changes of radiation changes in the very proximal esophagus, gastritis, no h pylori    UPPER GASTROINTESTINAL ENDOSCOPY  2022    Dr Bárbara Cummins-w/[id number]-Jmcbzerqsugw changes of radiation changes in the very proximal esophagus, gastritis, no h pylori    WRIST FRACTURE SURGERY Right     WRIST SURGERY      RIGHT       Social History:  Social History     Tobacco Use    Smoking status: Current Every Day Smoker     Packs/day: 0.50     Years: 38.00     Pack years: 19.00     Types: Cigarettes     Last attempt to quit: 2015     Years since quittin.3    Smokeless tobacco: Former User     Types: [employer] Use    Vaping Use: Never used   Substance Use Topics    Alcohol use: No     Comment: quit last week, 2014    Drug use: No       Vital Signs:   Vitals:    22 0706   BP: (!) 145/91   Pulse: 103   Resp: 16   Temp: 97.8 °F (36.6 °C)   SpO2: 99%        Physical Exam:  Cardiac:  [x]WNL []Comments:  Pulmonary:  [x]WNL   []Comments:  Neuro/Mental Status:  [x]WNL  []Comments:  Abdominal:  [x]WNL    []Comments:  Other:   []WNL  []Comments:    Informed Consent:  The risks and benefits of the procedure have been discussed with either the patient or if they cannot consent, their representative. Assessment:  Patient examined and appropriate for planned sedation and procedure. Plan:  Proceed with planned sedation and procedure as above. Andre Sharpe am scribing for and in the presence of Dr. Elvi Sellers MD.  Electronically signed by Ashutosh Deal RN on 3/16/2022 at 7:57 AM    I personally performed the services described in this documentation as scribed by Mukund Mathias, and it appears accurate and complete.      Elvi Sellers MD  3/16/2022

## 2022-04-14 ENCOUNTER — TELEPHONE (OUTPATIENT)
Dept: OTOLARYNGOLOGY | Facility: CLINIC | Age: 67
End: 2022-04-14

## 2022-04-19 DIAGNOSIS — R13.14 PHARYNGOESOPHAGEAL DYSPHAGIA: Primary | ICD-10-CM

## 2022-04-26 ENCOUNTER — APPOINTMENT (OUTPATIENT)
Dept: PREADMISSION TESTING | Facility: HOSPITAL | Age: 67
End: 2022-04-26

## 2022-04-27 ENCOUNTER — PRE-ADMISSION TESTING (OUTPATIENT)
Dept: PREADMISSION TESTING | Facility: HOSPITAL | Age: 67
End: 2022-04-27

## 2022-04-27 ENCOUNTER — LAB (OUTPATIENT)
Dept: LAB | Facility: HOSPITAL | Age: 67
End: 2022-04-27

## 2022-04-27 VITALS
SYSTOLIC BLOOD PRESSURE: 131 MMHG | RESPIRATION RATE: 18 BRPM | HEIGHT: 73 IN | BODY MASS INDEX: 20.13 KG/M2 | OXYGEN SATURATION: 97 % | DIASTOLIC BLOOD PRESSURE: 78 MMHG | WEIGHT: 151.9 LBS | HEART RATE: 108 BPM

## 2022-04-27 DIAGNOSIS — R13.14 PHARYNGOESOPHAGEAL DYSPHAGIA: ICD-10-CM

## 2022-04-27 DIAGNOSIS — K21.9 LARYNGOPHARYNGEAL REFLUX: ICD-10-CM

## 2022-04-27 LAB
DEPRECATED RDW RBC AUTO: 48.8 FL (ref 37–54)
ERYTHROCYTE [DISTWIDTH] IN BLOOD BY AUTOMATED COUNT: 14 % (ref 12.3–15.4)
HCT VFR BLD AUTO: 38 % (ref 37.5–51)
HGB BLD-MCNC: 12.7 G/DL (ref 13–17.7)
MCH RBC QN AUTO: 31.5 PG (ref 26.6–33)
MCHC RBC AUTO-ENTMCNC: 33.4 G/DL (ref 31.5–35.7)
MCV RBC AUTO: 94.3 FL (ref 79–97)
PLATELET # BLD AUTO: 348 10*3/MM3 (ref 140–450)
PMV BLD AUTO: 10.6 FL (ref 6–12)
RBC # BLD AUTO: 4.03 10*6/MM3 (ref 4.14–5.8)
SARS-COV-2 ORF1AB RESP QL NAA+PROBE: NOT DETECTED
WBC NRBC COR # BLD: 7.81 10*3/MM3 (ref 3.4–10.8)

## 2022-04-27 PROCEDURE — 85027 COMPLETE CBC AUTOMATED: CPT

## 2022-04-27 PROCEDURE — C9803 HOPD COVID-19 SPEC COLLECT: HCPCS

## 2022-04-27 PROCEDURE — U0004 COV-19 TEST NON-CDC HGH THRU: HCPCS

## 2022-04-27 PROCEDURE — 36415 COLL VENOUS BLD VENIPUNCTURE: CPT

## 2022-04-28 ENCOUNTER — ANESTHESIA EVENT (OUTPATIENT)
Dept: PERIOP | Facility: HOSPITAL | Age: 67
End: 2022-04-28

## 2022-04-29 ENCOUNTER — ANESTHESIA (OUTPATIENT)
Dept: PERIOP | Facility: HOSPITAL | Age: 67
End: 2022-04-29

## 2022-04-29 ENCOUNTER — HOSPITAL ENCOUNTER (OUTPATIENT)
Facility: HOSPITAL | Age: 67
Setting detail: HOSPITAL OUTPATIENT SURGERY
Discharge: HOME | End: 2022-04-29
Attending: OTOLARYNGOLOGY | Admitting: OTOLARYNGOLOGY

## 2022-04-29 VITALS
TEMPERATURE: 97.8 F | RESPIRATION RATE: 18 BRPM | OXYGEN SATURATION: 98 % | HEART RATE: 80 BPM | SYSTOLIC BLOOD PRESSURE: 150 MMHG | DIASTOLIC BLOOD PRESSURE: 98 MMHG

## 2022-04-29 DIAGNOSIS — R13.14 PHARYNGOESOPHAGEAL DYSPHAGIA: ICD-10-CM

## 2022-04-29 PROCEDURE — 25010000002 FENTANYL CITRATE (PF) 100 MCG/2ML SOLUTION: Performed by: NURSE ANESTHETIST, CERTIFIED REGISTERED

## 2022-04-29 PROCEDURE — 31525 DX LARYNGOSCOPY EXCL NB: CPT | Performed by: OTOLARYNGOLOGY

## 2022-04-29 PROCEDURE — 43450 DILATE ESOPHAGUS 1/MULT PASS: CPT | Performed by: OTOLARYNGOLOGY

## 2022-04-29 PROCEDURE — 25010000002 ONDANSETRON PER 1 MG: Performed by: NURSE ANESTHETIST, CERTIFIED REGISTERED

## 2022-04-29 PROCEDURE — 25010000002 PROPOFOL 10 MG/ML EMULSION: Performed by: NURSE ANESTHETIST, CERTIFIED REGISTERED

## 2022-04-29 PROCEDURE — 25010000002 DEXAMETHASONE PER 1 MG: Performed by: NURSE ANESTHETIST, CERTIFIED REGISTERED

## 2022-04-29 RX ORDER — DROPERIDOL 2.5 MG/ML
0.62 INJECTION, SOLUTION INTRAMUSCULAR; INTRAVENOUS ONCE AS NEEDED
Status: DISCONTINUED | OUTPATIENT
Start: 2022-04-29 | End: 2022-04-29 | Stop reason: HOSPADM

## 2022-04-29 RX ORDER — LIDOCAINE HYDROCHLORIDE 20 MG/ML
INJECTION, SOLUTION EPIDURAL; INFILTRATION; INTRACAUDAL; PERINEURAL AS NEEDED
Status: DISCONTINUED | OUTPATIENT
Start: 2022-04-29 | End: 2022-04-29 | Stop reason: SURG

## 2022-04-29 RX ORDER — HYDROCODONE BITARTRATE AND ACETAMINOPHEN 5; 325 MG/1; MG/1
1 TABLET ORAL ONCE AS NEEDED
Status: DISCONTINUED | OUTPATIENT
Start: 2022-04-29 | End: 2022-04-29 | Stop reason: HOSPADM

## 2022-04-29 RX ORDER — ONDANSETRON 2 MG/ML
4 INJECTION INTRAMUSCULAR; INTRAVENOUS ONCE AS NEEDED
Status: DISCONTINUED | OUTPATIENT
Start: 2022-04-29 | End: 2022-04-29 | Stop reason: HOSPADM

## 2022-04-29 RX ORDER — LABETALOL HYDROCHLORIDE 5 MG/ML
5 INJECTION, SOLUTION INTRAVENOUS
Status: DISCONTINUED | OUTPATIENT
Start: 2022-04-29 | End: 2022-04-29 | Stop reason: HOSPADM

## 2022-04-29 RX ORDER — SODIUM CHLORIDE 0.9 % (FLUSH) 0.9 %
10 SYRINGE (ML) INJECTION AS NEEDED
Status: DISCONTINUED | OUTPATIENT
Start: 2022-04-29 | End: 2022-04-29 | Stop reason: HOSPADM

## 2022-04-29 RX ORDER — ONDANSETRON 2 MG/ML
INJECTION INTRAMUSCULAR; INTRAVENOUS AS NEEDED
Status: DISCONTINUED | OUTPATIENT
Start: 2022-04-29 | End: 2022-04-29 | Stop reason: SURG

## 2022-04-29 RX ORDER — PROPOFOL 10 MG/ML
VIAL (ML) INTRAVENOUS AS NEEDED
Status: DISCONTINUED | OUTPATIENT
Start: 2022-04-29 | End: 2022-04-29 | Stop reason: SURG

## 2022-04-29 RX ORDER — SUCCINYLCHOLINE/SOD CL,ISO/PF 200MG/10ML
SYRINGE (ML) INTRAVENOUS AS NEEDED
Status: DISCONTINUED | OUTPATIENT
Start: 2022-04-29 | End: 2022-04-29 | Stop reason: SURG

## 2022-04-29 RX ORDER — FLUMAZENIL 0.1 MG/ML
0.2 INJECTION INTRAVENOUS AS NEEDED
Status: DISCONTINUED | OUTPATIENT
Start: 2022-04-29 | End: 2022-04-29 | Stop reason: HOSPADM

## 2022-04-29 RX ORDER — SODIUM CHLORIDE 0.9 % (FLUSH) 0.9 %
10 SYRINGE (ML) INJECTION EVERY 12 HOURS SCHEDULED
Status: DISCONTINUED | OUTPATIENT
Start: 2022-04-29 | End: 2022-04-29 | Stop reason: HOSPADM

## 2022-04-29 RX ORDER — LIDOCAINE HYDROCHLORIDE 40 MG/ML
SOLUTION TOPICAL AS NEEDED
Status: DISCONTINUED | OUTPATIENT
Start: 2022-04-29 | End: 2022-04-29 | Stop reason: SURG

## 2022-04-29 RX ORDER — OXYCODONE AND ACETAMINOPHEN 10; 325 MG/1; MG/1
1 TABLET ORAL ONCE AS NEEDED
Status: DISCONTINUED | OUTPATIENT
Start: 2022-04-29 | End: 2022-04-29 | Stop reason: HOSPADM

## 2022-04-29 RX ORDER — IBUPROFEN 600 MG/1
600 TABLET ORAL ONCE AS NEEDED
Status: DISCONTINUED | OUTPATIENT
Start: 2022-04-29 | End: 2022-04-29 | Stop reason: HOSPADM

## 2022-04-29 RX ORDER — OXYCODONE AND ACETAMINOPHEN 7.5; 325 MG/1; MG/1
2 TABLET ORAL EVERY 4 HOURS PRN
Status: DISCONTINUED | OUTPATIENT
Start: 2022-04-29 | End: 2022-04-29 | Stop reason: HOSPADM

## 2022-04-29 RX ORDER — LIDOCAINE HYDROCHLORIDE 10 MG/ML
0.5 INJECTION, SOLUTION EPIDURAL; INFILTRATION; INTRACAUDAL; PERINEURAL ONCE AS NEEDED
Status: DISCONTINUED | OUTPATIENT
Start: 2022-04-29 | End: 2022-04-29 | Stop reason: HOSPADM

## 2022-04-29 RX ORDER — SODIUM CHLORIDE, SODIUM LACTATE, POTASSIUM CHLORIDE, CALCIUM CHLORIDE 600; 310; 30; 20 MG/100ML; MG/100ML; MG/100ML; MG/100ML
1000 INJECTION, SOLUTION INTRAVENOUS CONTINUOUS
Status: DISCONTINUED | OUTPATIENT
Start: 2022-04-29 | End: 2022-04-29 | Stop reason: HOSPADM

## 2022-04-29 RX ORDER — FENTANYL CITRATE 50 UG/ML
25 INJECTION, SOLUTION INTRAMUSCULAR; INTRAVENOUS
Status: DISCONTINUED | OUTPATIENT
Start: 2022-04-29 | End: 2022-04-29 | Stop reason: HOSPADM

## 2022-04-29 RX ORDER — DEXAMETHASONE SODIUM PHOSPHATE 4 MG/ML
INJECTION, SOLUTION INTRA-ARTICULAR; INTRALESIONAL; INTRAMUSCULAR; INTRAVENOUS; SOFT TISSUE AS NEEDED
Status: DISCONTINUED | OUTPATIENT
Start: 2022-04-29 | End: 2022-04-29 | Stop reason: SURG

## 2022-04-29 RX ORDER — DEXTROSE MONOHYDRATE 25 G/50ML
12.5 INJECTION, SOLUTION INTRAVENOUS AS NEEDED
Status: DISCONTINUED | OUTPATIENT
Start: 2022-04-29 | End: 2022-04-29 | Stop reason: HOSPADM

## 2022-04-29 RX ORDER — NALOXONE HCL 0.4 MG/ML
0.4 VIAL (ML) INJECTION AS NEEDED
Status: DISCONTINUED | OUTPATIENT
Start: 2022-04-29 | End: 2022-04-29 | Stop reason: HOSPADM

## 2022-04-29 RX ORDER — FENTANYL CITRATE 50 UG/ML
INJECTION, SOLUTION INTRAMUSCULAR; INTRAVENOUS AS NEEDED
Status: DISCONTINUED | OUTPATIENT
Start: 2022-04-29 | End: 2022-04-29 | Stop reason: SURG

## 2022-04-29 RX ORDER — SODIUM CHLORIDE 0.9 % (FLUSH) 0.9 %
3 SYRINGE (ML) INJECTION AS NEEDED
Status: DISCONTINUED | OUTPATIENT
Start: 2022-04-29 | End: 2022-04-29 | Stop reason: HOSPADM

## 2022-04-29 RX ORDER — ONDANSETRON 4 MG/1
4 TABLET, FILM COATED ORAL ONCE AS NEEDED
Status: DISCONTINUED | OUTPATIENT
Start: 2022-04-29 | End: 2022-04-29 | Stop reason: HOSPADM

## 2022-04-29 RX ORDER — ROCURONIUM BROMIDE 10 MG/ML
INJECTION, SOLUTION INTRAVENOUS AS NEEDED
Status: DISCONTINUED | OUTPATIENT
Start: 2022-04-29 | End: 2022-04-29 | Stop reason: SURG

## 2022-04-29 RX ORDER — SODIUM CHLORIDE, SODIUM LACTATE, POTASSIUM CHLORIDE, CALCIUM CHLORIDE 600; 310; 30; 20 MG/100ML; MG/100ML; MG/100ML; MG/100ML
9 INJECTION, SOLUTION INTRAVENOUS CONTINUOUS
Status: DISCONTINUED | OUTPATIENT
Start: 2022-04-29 | End: 2022-04-29 | Stop reason: HOSPADM

## 2022-04-29 RX ADMIN — LIDOCAINE HYDROCHLORIDE 80 MG: 20 INJECTION, SOLUTION EPIDURAL; INFILTRATION; INTRACAUDAL; PERINEURAL at 09:11

## 2022-04-29 RX ADMIN — DEXAMETHASONE SODIUM PHOSPHATE 8 MG: 4 INJECTION, SOLUTION INTRA-ARTICULAR; INTRALESIONAL; INTRAMUSCULAR; INTRAVENOUS; SOFT TISSUE at 09:17

## 2022-04-29 RX ADMIN — Medication 100 MG: at 09:11

## 2022-04-29 RX ADMIN — PROPOFOL 120 MG: 10 INJECTION, EMULSION INTRAVENOUS at 09:11

## 2022-04-29 RX ADMIN — SODIUM CHLORIDE, POTASSIUM CHLORIDE, SODIUM LACTATE AND CALCIUM CHLORIDE 1000 ML: 600; 310; 30; 20 INJECTION, SOLUTION INTRAVENOUS at 06:34

## 2022-04-29 RX ADMIN — LIDOCAINE HYDROCHLORIDE 1 EACH: 40 SOLUTION TOPICAL at 09:12

## 2022-04-29 RX ADMIN — ROCURONIUM BROMIDE 10 MG: 10 SOLUTION INTRAVENOUS at 09:11

## 2022-04-29 RX ADMIN — FENTANYL CITRATE 100 MCG: 50 INJECTION, SOLUTION INTRAMUSCULAR; INTRAVENOUS at 09:07

## 2022-04-29 RX ADMIN — ONDANSETRON 4 MG: 2 INJECTION INTRAMUSCULAR; INTRAVENOUS at 09:23

## 2022-04-29 NOTE — ANESTHESIA POSTPROCEDURE EVALUATION
Patient: Saul Alcantara Sr.    Procedure Summary     Date: 04/29/22 Room / Location:  [facility] OR 02 /  [facility] OR    Anesthesia Start: 0907 Anesthesia Stop: 0937    Procedure: Direct laryngoscopy with esophageal dilatation (N/A ) Diagnosis:       Pharyngoesophageal dysphagia      (Pharyngoesophageal dysphagia [R13.14])    Surgeons: Wayne Richmond MD Provider: Michael Gomez CRNA    Anesthesia Type: general ASA Status: 3          Anesthesia Type: general    Vitals  Vitals Value Taken Time   /88 04/29/22 1005   Temp 97.8 °F (36.6 °C) 04/29/22 1005   Pulse 79 04/29/22 1008   Resp 11 04/29/22 1005   SpO2 99 % 04/29/22 1008   Vitals shown include unvalidated device data.        Post Anesthesia Care and Evaluation    PONV Status: none  Comments: Patient d/c from PACU prior to anes eval based on Annetta score.  Please see RN notes for details of d/c criteria.    Blood pressure 143/97, pulse 76, temperature 97.8 °F (36.6 °C), temperature source Temporal, resp. rate 18, SpO2 98 %.

## 2022-04-29 NOTE — ANESTHESIA PROCEDURE NOTES
Airway  Urgency: elective    Date/Time: 4/29/2022 9:13 AM  Airway not difficult    General Information and Staff    Patient location during procedure: OR  CRNA/CAA: Michael Gomez CRNA    Indications and Patient Condition  Indications for airway management: airway protection    Preoxygenated: yes  Mask difficulty assessment: 0 - not attempted    Final Airway Details  Final airway type: endotracheal airway      Successful airway: ETT  Cuffed: yes   Successful intubation technique: direct laryngoscopy  Endotracheal tube insertion site: oral  Blade: Zamora  Blade size: 2  ETT size (mm): 7.0  Cormack-Lehane Classification: grade IIa - partial view of glottis  Placement verified by: chest auscultation and capnometry   Cuff volume (mL): 7  Measured from: lips  ETT/EBT  to lips (cm): 21  Number of attempts at approach: 1  Assessment: lips, teeth, and gum same as pre-op and atraumatic intubation

## 2022-05-09 DIAGNOSIS — Z01.818 PRE-OPERATIVE EXAMINATION: Primary | ICD-10-CM

## 2022-05-31 RX ORDER — CHLORHEXIDINE GLUCONATE 0.12 MG/ML
15 RINSE ORAL 2 TIMES DAILY
Qty: 900 ML | Refills: 2 | Status: SHIPPED | OUTPATIENT
Start: 2022-05-31 | End: 2022-06-30

## 2022-06-02 ENCOUNTER — APPOINTMENT (OUTPATIENT)
Dept: GENERAL RADIOLOGY | Age: 67
End: 2022-06-02
Payer: MEDICARE

## 2022-06-02 ENCOUNTER — HOSPITAL ENCOUNTER (EMERGENCY)
Age: 67
Discharge: HOME | End: 2022-06-02
Attending: EMERGENCY MEDICINE
Payer: MEDICARE

## 2022-06-02 ENCOUNTER — APPOINTMENT (OUTPATIENT)
Dept: CT IMAGING | Age: 67
End: 2022-06-02
Payer: MEDICARE

## 2022-06-02 VITALS
HEART RATE: 75 BPM | BODY MASS INDEX: 18.87 KG/M2 | RESPIRATION RATE: 18 BRPM | SYSTOLIC BLOOD PRESSURE: 139 MMHG | WEIGHT: 143 LBS | OXYGEN SATURATION: 96 % | TEMPERATURE: 97.7 F | DIASTOLIC BLOOD PRESSURE: 86 MMHG

## 2022-06-02 DIAGNOSIS — R91.1 PULMONARY NODULE: ICD-10-CM

## 2022-06-02 DIAGNOSIS — R06.00 DYSPNEA, UNSPECIFIED TYPE: Primary | ICD-10-CM

## 2022-06-02 LAB
ALBUMIN SERPL-MCNC: 4.9 G/DL (ref 3.5–5.2)
ALP BLD-CCNC: 89 U/L (ref 40–130)
ALT SERPL-CCNC: 13 U/L (ref 5–41)
ANION GAP SERPL CALCULATED.3IONS-SCNC: 12 MMOL/L (ref 7–19)
AST SERPL-CCNC: 21 U/L (ref 5–40)
BASOPHILS ABSOLUTE: 0.1 K/UL (ref 0–0.2)
BASOPHILS RELATIVE PERCENT: 1 % (ref 0–1)
BILIRUB SERPL-MCNC: 0.7 MG/DL (ref 0.2–1.2)
BUN BLDV-MCNC: 16 MG/DL (ref 8–23)
CALCIUM SERPL-MCNC: 10 MG/DL (ref 8.8–10.2)
CHLORIDE BLD-SCNC: 100 MMOL/L (ref 98–111)
CO2: 29 MMOL/L (ref 22–29)
CREAT SERPL-MCNC: 0.9 MG/DL (ref 0.5–1.2)
D DIMER: 0.59 UG/ML FEU (ref 0–0.48)
EKG P AXIS: 95 DEGREES
EKG P-R INTERVAL: 152 MS
EKG Q-T INTERVAL: 424 MS
EKG QRS DURATION: 100 MS
EKG QTC CALCULATION (BAZETT): 450 MS
EKG T AXIS: 63 DEGREES
EOSINOPHILS ABSOLUTE: 0.6 K/UL (ref 0–0.6)
EOSINOPHILS RELATIVE PERCENT: 7.9 % (ref 0–5)
GFR AFRICAN AMERICAN: >59
GFR NON-AFRICAN AMERICAN: >60
GLUCOSE BLD-MCNC: 95 MG/DL (ref 74–109)
HCT VFR BLD CALC: 40.6 % (ref 42–52)
HEMOGLOBIN: 13.6 G/DL (ref 14–18)
IMMATURE GRANULOCYTES #: 0 K/UL
LYMPHOCYTES ABSOLUTE: 0.8 K/UL (ref 1.1–4.5)
LYMPHOCYTES RELATIVE PERCENT: 11.4 % (ref 20–40)
MCH RBC QN AUTO: 32.2 PG (ref 27–31)
MCHC RBC AUTO-ENTMCNC: 33.5 G/DL (ref 33–37)
MCV RBC AUTO: 96.2 FL (ref 80–94)
MONOCYTES ABSOLUTE: 0.9 K/UL (ref 0–0.9)
MONOCYTES RELATIVE PERCENT: 12.5 % (ref 0–10)
NEUTROPHILS ABSOLUTE: 4.9 K/UL (ref 1.5–7.5)
NEUTROPHILS RELATIVE PERCENT: 67.1 % (ref 50–65)
PDW BLD-RTO: 13.8 % (ref 11.5–14.5)
PLATELET # BLD: 316 K/UL (ref 130–400)
PMV BLD AUTO: 10.9 FL (ref 9.4–12.4)
POTASSIUM SERPL-SCNC: 3.9 MMOL/L (ref 3.5–5)
PRO-BNP: 36 PG/ML (ref 0–900)
RBC # BLD: 4.22 M/UL (ref 4.7–6.1)
SARS-COV-2, NAAT: NOT DETECTED
SODIUM BLD-SCNC: 141 MMOL/L (ref 136–145)
TOTAL PROTEIN: 7.3 G/DL (ref 6.6–8.7)
TROPONIN: <0.01 NG/ML (ref 0–0.03)
WBC # BLD: 7.4 K/UL (ref 4.8–10.8)

## 2022-06-02 PROCEDURE — 87635 SARS-COV-2 COVID-19 AMP PRB: CPT

## 2022-06-02 PROCEDURE — 71275 CT ANGIOGRAPHY CHEST: CPT | Performed by: RADIOLOGY

## 2022-06-02 PROCEDURE — 71045 X-RAY EXAM CHEST 1 VIEW: CPT

## 2022-06-02 PROCEDURE — 85379 FIBRIN DEGRADATION QUANT: CPT

## 2022-06-02 PROCEDURE — 36415 COLL VENOUS BLD VENIPUNCTURE: CPT

## 2022-06-02 PROCEDURE — 85025 COMPLETE CBC W/AUTO DIFF WBC: CPT

## 2022-06-02 PROCEDURE — 6360000004 HC RX CONTRAST MEDICATION: Performed by: EMERGENCY MEDICINE

## 2022-06-02 PROCEDURE — 71045 X-RAY EXAM CHEST 1 VIEW: CPT | Performed by: RADIOLOGY

## 2022-06-02 PROCEDURE — 93010 ELECTROCARDIOGRAM REPORT: CPT | Performed by: INTERNAL MEDICINE

## 2022-06-02 PROCEDURE — 71275 CT ANGIOGRAPHY CHEST: CPT

## 2022-06-02 PROCEDURE — 80053 COMPREHEN METABOLIC PANEL: CPT

## 2022-06-02 PROCEDURE — 93005 ELECTROCARDIOGRAM TRACING: CPT | Performed by: EMERGENCY MEDICINE

## 2022-06-02 PROCEDURE — 99285 EMERGENCY DEPT VISIT HI MDM: CPT

## 2022-06-02 PROCEDURE — 83880 ASSAY OF NATRIURETIC PEPTIDE: CPT

## 2022-06-02 PROCEDURE — 84484 ASSAY OF TROPONIN QUANT: CPT

## 2022-06-02 RX ORDER — IPRATROPIUM BROMIDE AND ALBUTEROL SULFATE 2.5; .5 MG/3ML; MG/3ML
1 SOLUTION RESPIRATORY (INHALATION) ONCE
Status: DISCONTINUED | OUTPATIENT
Start: 2022-06-02 | End: 2022-06-02

## 2022-06-02 RX ORDER — PNV NO.95/FERROUS FUM/FOLIC AC 28MG-0.8MG
TABLET ORAL
COMMUNITY
End: 2022-10-12 | Stop reason: ALTCHOICE

## 2022-06-02 RX ORDER — ALBUTEROL SULFATE 90 UG/1
2 AEROSOL, METERED RESPIRATORY (INHALATION) 4 TIMES DAILY PRN
Qty: 18 G | Refills: 0 | Status: SHIPPED | OUTPATIENT
Start: 2022-06-02

## 2022-06-02 RX ADMIN — IOPAMIDOL 90 ML: 755 INJECTION, SOLUTION INTRAVENOUS at 08:35

## 2022-06-02 ASSESSMENT — PAIN - FUNCTIONAL ASSESSMENT: PAIN_FUNCTIONAL_ASSESSMENT: NONE - DENIES PAIN

## 2022-06-02 ASSESSMENT — ENCOUNTER SYMPTOMS
ABDOMINAL PAIN: 0
NAUSEA: 0
VOMITING: 0
RHINORRHEA: 0
SHORTNESS OF BREATH: 1
SORE THROAT: 0
BACK PAIN: 0
DIARRHEA: 0

## 2022-06-02 NOTE — ED PROVIDER NOTES
[facility] EMERGENCY DEPT  eMERGENCY dEPARTMENT eNCOUnter      Pt Name: Lesia Augustin. MRN: [id number]  Birthdate 1955  Date of evaluation: 6/2/2022  Provider: Brice Curtis MD    [address] [place] [address]       Chief Complaint   Patient presents with    Shortness of Breath     Cancer pt, states its been going on \"for a while\" since he started iron pills, states chest pain this morning but not anymore         HISTORY OF PRESENT ILLNESS   (Location/Symptom, Timing/Onset,Context/Setting, Quality, Duration, Modifying Factors, Severity)  Note limiting factors. Lesia Augustin is a 61 y.o. male who presents to the emergency department this of breath. He says its been present for maybe a week or longer. He says when he takes a deep breath he feels like his throat sucks in. He has a history of lymphoma after being a  and being exposed to [place]. He is on Rituxan through his oncologist in [place]. He states that he had some anemia and was started on iron pills. He has been having black stool since starting iron but not prior. No gross blood in his stool. He had a recent colonoscopy with GI. No ho blood clots or heart disease. Reports some mild bilateral lower extremity swelling. No chest pain. No cough or fever. HPI    NursingNotes were reviewed. REVIEW OF SYSTEMS    (2-9 systems for level 4, 10 or more for level 5)     Review of Systems   Constitutional: Negative for chills and fever. HENT: Negative for rhinorrhea and sore throat. Respiratory: Positive for shortness of breath. Cardiovascular: Positive for leg swelling. Negative for chest pain. Gastrointestinal: Negative for abdominal pain, diarrhea, nausea and vomiting. Genitourinary: Negative for difficulty urinating. Musculoskeletal: Negative for back pain and neck pain. Skin: Negative for rash. Neurological: Negative for weakness and headaches. Psychiatric/Behavioral: Negative for confusion.        A complete review of systems was performed and is negative except as noted above in the HPI.        PAST MEDICAL HISTORY     Past Medical History:   Diagnosis Date    Anxiety     Chronic kidney disease     Stage 3 Moderated     COPD (chronic obstructive pulmonary disease) ([address]     pt denies any symptoms     Depression     Dysphagia     Elevated cholesterol     Elevated PSA     GERD (gastroesophageal reflux disease)     History of blood transfusion     Hodgkin lymphoma (HCC)     Hypertension     Hypothyroidism     Insomnia     NHL (non-Hodgkin's lymphoma) (HCC)     REMISSION    Palpitations     Prostate cancer (HCC)     Rhinitis     Seizure ([facility] [address] 2013    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    Weight loss     Xerostomia          SURGICAL HISTORY       Past Surgical History:   Procedure Laterality Date    ABSCESS DRAINAGE      ON BUTTOCK    CATARACT REMOVAL Right     COLONOSCOPY  10/05/2010    Dr Benjamin Javier, 5 yr recall    COLONOSCOPY N/A 03/16/2022    Dr Vonnie Diehl, 5 yr recall    DENTAL SURGERY Bilateral     ESOPHAGEAL DILATATION      INTRACAPSULAR CATARACT EXTRACTION Right 06/06/2016    EYE CATARACT EMULSIFICATION IOL IMPLANT performed by Irish Garcia MD at [address] [facility] Left 07/11/2016     EYE CATARACT EMULSIFICATION IOL IMPLANT performed by Irish Garcia MD at [address] [place] [address] ARTHROSCOPY Bilateral     KNEE SURGERY Bilateral     scope    SPLENECTOMY  2004    THROAT SURGERY      06-26-20& 10-11-21    TONGUE BIOPSY  09/2015    wedge    TONGUE SURGERY      TONSILLECTOMY      UPPER GASTROINTESTINAL ENDOSCOPY N/A 01/07/2022    Dr Alfonso Cummins-w/[id number]-Gpevrvdjaifv changes of radiation changes in the very proximal esophagus, gastritis, no h pylori    UPPER GASTROINTESTINAL ENDOSCOPY  01/07/2022    Dr Alfonso Cummins-w/[id number]-[id number]-Dcgkrztgptic changes of radiation changes in the very proximal esophagus, gastritis, no h pylori    WRIST FRACTURE SURGERY Right     WRIST SURGERY Right          CURRENT MEDICATIONS       Discharge Medication List as of 6/2/2022  9:37 AM      CONTINUE these medications which have NOT CHANGED    Details   Ferrous Sulfate (IRON) 325 (65 Fe) MG TABS Take by mouthHistorical Med      nystatin (MYCOSTATIN) 040858 UNIT/ML suspension Take 5 mLs by mouth 3 times daily as needed (oral pain, thrush) Swish and spit, Oral, 3 TIMES DAILY PRN Starting Wed 3/16/2022, Disp-250 mL, R-3, Normal      ALBUTEROL IN Inhale into the lungs as neededHistorical Med      Azelastine-Fluticasone 137-50 MCG/ACT SUSP by Nasal route 2 times daily as neededHistorical Med      meloxicam (MOBIC) 15 MG tablet Take 15 mg by mouth dailyHistorical Med      glycopyrrolate (ROBINUL) 1 MG tablet Take 0.5 mg by mouth 2 times daily as neededHistorical Med      amLODIPine (NORVASC) 5 MG tablet Take 1 tablet by mouth dailyHistorical Med      cyclobenzaprine (FLEXERIL) 10 MG tablet 3 times daily as neededHistorical Med      HYDROcodone-acetaminophen (NORCO) 7.5-325 MG per tablet Take 1 tablet by mouth as needed. Historical Med      sertraline (ZOLOFT) 50 MG tablet Take 50 mg by mouth daily Historical Med      tamsulosin (FLOMAX) 0.4 MG capsule Take 0.4 mg by mouth 2 times daily Historical Med      senna-docusate (PERICOLACE) 8.6-50 MG per tablet Take 1 tablet by mouth 2 times daily      ondansetron (ZOFRAN) 8 MG tablet Take 8 mg by mouth every 8 hours as needed for Nausea or Vomiting      LEVOTHYROXINE SODIUM Take 0.088 mg by mouth daily Historical Med      ALPRAZOLAM ER PO Take 2 mg by mouth 3 times daily Indications: Depression with Anxiety       Pantoprazole Sodium (PROTONIX PO) Take 40 mg by mouth 2 times daily Historical Med      SIMVASTATIN PO Take 20 mg by mouth daily              ALLERGIES     Patient has no known allergies.     FAMILY HISTORY       Family History   Problem Relation Age of Onset    Cancer Sister     Stroke Sister     Stroke Brother SOCIAL HISTORY       Social History     Socioeconomic History    Marital status:      Spouse name: None    Number of children: None    Years of education: None    Highest education level: None   Occupational History    None   Tobacco Use    Smoking status: Former Smoker     Packs/day: 0.50     Years: 38.00     Pack years: 19.00     Types: Cigarettes     Quit date: 2015     Years since quittin.5    Smokeless tobacco: Former User     Types: [address] Use    Vaping Use: Never used   Substance and Sexual Activity    Alcohol use: No     Comment: quit last week, 2014    Drug use: No    Sexual activity: None   Other Topics Concern    None   Social History Narrative    None     Social Determinants of Health     Financial Resource Strain:     Difficulty of Paying Living Expenses: Not on file   Food Insecurity:     Worried About Running Out of Food in the Last Year: Not on file    Nubia of Food in the Last Year: Not on file   Transportation Needs:     Lack of Transportation (Medical): Not on file    Lack of Transportation (Non-Medical):  Not on file   Physical Activity:     Days of Exercise per Week: Not on file    Minutes of Exercise per Session: Not on file   Stress:     Feeling of Stress : Not on file   Social Connections:     Frequency of Communication with Friends and Family: Not on file    Frequency of Social Gatherings with Friends and Family: Not on file    Attends [employer] Services: Not on file    Active Member of Clubs or Organizations: Not on file    Attends Club or Organization Meetings: Not on file    Marital Status: Not on file   Intimate Partner Violence:     Fear of Current or Ex-Partner: Not on file    Emotionally Abused: Not on file    Physically Abused: Not on file    Sexually Abused: Not on file   Housing Stability:     Unable to Pay for Housing in the Last Year: Not on file    Number of Places Lived in the Last Year: Not on file    Unstable Housing in the Last Year: Not on file       SCREENINGS    Joyce Coma Scale  Eye Opening: Spontaneous  Best Verbal Response: Oriented  Best Motor Response: Obeys commands  Joyce Coma Scale Score: 15        PHYSICAL EXAM    (up to 7 for level 4, 8 or more for level 5)     ED Triage Vitals [06/02/22 0615]   BP Temp Temp Source Heart Rate Resp SpO2 Height Weight   (!) 161/90 98 °F (36.7 °C) Oral 86 24 93 % -- 143 lb (64.9 kg)       Physical Exam  Vitals and nursing note reviewed. Constitutional:       General: He is not in acute distress. Appearance: He is well-developed. He is not diaphoretic. HENT:      Head: Normocephalic and atraumatic. Eyes:      Pupils: Pupils are equal, round, and reactive to light. Cardiovascular:      Rate and Rhythm: Normal rate and regular rhythm. Heart sounds: Normal heart sounds. Pulmonary:      Effort: Pulmonary effort is normal. No respiratory distress. Breath sounds: Normal breath sounds. Abdominal:      General: Bowel sounds are normal. There is no distension. Palpations: Abdomen is soft. Tenderness: There is no abdominal tenderness. Musculoskeletal:         General: Normal range of motion. Cervical back: Normal range of motion and neck supple. Skin:     General: Skin is warm and dry. Findings: No rash. Neurological:      Mental Status: He is alert and oriented to person, place, and time. Cranial Nerves: No cranial nerve deficit. Motor: No abnormal muscle tone.       Coordination: Coordination normal.   Psychiatric:         Behavior: Behavior normal.         DIAGNOSTIC RESULTS     EKG: All EKG's are interpreted by the Emergency Department Physician who either signs or Co-signs this chart in the absence of a cardiologist.    Normal sinus Rhythm rate 75 no acute ST wave abnormality looks pretty similar to prior    RADIOLOGY:   Non-plain film images such as CT, Ultrasound and MRI are read by the radiologist. Jacinto Osorio images are visualized and preliminarily interpreted by the emergency physician with the below findings:        Interpretation per the Radiologist below, if available at the time of this note:    CTA PULMONARY W CONTRAST   Final Result   1. No evidence of pulmonary embolism. No acute abnormality. 2.  Left upper lobe and right lower lobe tiny nodule as described. 3.  Mild COPD change. Recommendation: Follow up as clinically indicated. All CT scans at this facility utilize dose modulation, iterative reconstruction, and/or weight based dosing when appropriate to reduce radiation dose to as low as reasonably achievable. Electronically Signed by Richard Watson MD at 02-Jun-2022 10:03:32 AM               XR CHEST PORTABLE   Final Result   Unremarkable study. Recommendation:  Follow up as clinically indicated. Electronically Signed by Richard Watson MD at 02-Jun-2022 08:59:00 AM                     ED BEDSIDE ULTRASOUND:   Performed by ED Physician - none    LABS:  Labs Reviewed   CBC WITH AUTO DIFFERENTIAL - Abnormal; Notable for the following components:       Result Value    RBC 4.22 (*)     Hemoglobin 13.6 (*)     Hematocrit 40.6 (*)     MCV 96.2 (*)     MCH 32.2 (*)     Neutrophils % 67.1 (*)     Lymphocytes % 11.4 (*)     Monocytes % 12.5 (*)     Eosinophils % 7.9 (*)     Lymphocytes Absolute 0.8 (*)     All other components within normal limits   D-DIMER, QUANTITATIVE - Abnormal; Notable for the following components:    D-Dimer, Quant 0.59 (*)     All other components within normal limits   COVID-19, RAPID   BRAIN NATRIURETIC PEPTIDE   COMPREHENSIVE METABOLIC PANEL   TROPONIN       All other labs were within normal range or not returned as of this dictation.     EMERGENCY DEPARTMENT COURSE and DIFFERENTIALDIAGNOSIS/MDM:   Vitals:    Vitals:    06/02/22 0615 06/02/22 0945   BP: (!) 161/90 139/86   Pulse: 86 75   Resp: 24 18   Temp: 98 °F (36.7 °C) 97.7 °F (36.5 °C)   TempSrc: Oral    SpO2: 93% 96% Weight: 143 lb (64.9 kg)        MDM  Symptoms not exertional. Normal trop, ekg, bnp. No wheezing on exam. No pna. Pt has not had a cough or fever. No PE. D/w pt the nodules and need for follow up. CONSULTS:  None    PROCEDURES:  Unless otherwise notedbelow, none     Procedures    FINAL IMPRESSION     1. Dyspnea, unspecified type    2.  Pulmonary nodule          DISPOSITION/PLAN   DISPOSITION Decision To Discharge 06/02/2022 09:29:26 AM      PATIENT REFERRED TO:  @FUP@    DISCHARGE MEDICATIONS:  Discharge Medication List as of 6/2/2022  9:37 AM      START taking these medications    Details   albuterol sulfate HFA (VENTOLIN HFA) 108 (90 Base) MCG/ACT inhaler Inhale 2 puffs into the lungs 4 times daily as needed for Wheezing, Disp-18 g, R-0Print                (Please note that portions of this note were completed with a voice recognition program.  Efforts were made to edit the dictations butoccasionally words are mis-transcribed.)    Ajith Domínguez MD (electronically signed)  AttendingEmergency Physician         Ajith Domínguez MD  06/02/22 [address] Meagan Villalobos MD  06/02/22 0421

## 2022-06-07 DIAGNOSIS — R13.14 PHARYNGOESOPHAGEAL DYSPHAGIA: Primary | ICD-10-CM

## 2022-06-11 ENCOUNTER — APPOINTMENT (OUTPATIENT)
Dept: GENERAL RADIOLOGY | Age: 67
End: 2022-06-11
Payer: MEDICARE

## 2022-06-11 ENCOUNTER — APPOINTMENT (OUTPATIENT)
Dept: CT IMAGING | Age: 67
End: 2022-06-11
Payer: MEDICARE

## 2022-06-11 ENCOUNTER — HOSPITAL ENCOUNTER (EMERGENCY)
Age: 67
Discharge: HOME | End: 2022-06-11
Payer: MEDICARE

## 2022-06-11 VITALS
DIASTOLIC BLOOD PRESSURE: 82 MMHG | OXYGEN SATURATION: 96 % | SYSTOLIC BLOOD PRESSURE: 151 MMHG | HEART RATE: 87 BPM | TEMPERATURE: 98.7 F | RESPIRATION RATE: 18 BRPM

## 2022-06-11 DIAGNOSIS — R63.4 WEIGHT LOSS: ICD-10-CM

## 2022-06-11 DIAGNOSIS — R13.10 DYSPHAGIA, UNSPECIFIED TYPE: ICD-10-CM

## 2022-06-11 DIAGNOSIS — F41.1 ANXIETY STATE: Primary | ICD-10-CM

## 2022-06-11 LAB
ALBUMIN SERPL-MCNC: 4.6 G/DL (ref 3.5–5.2)
ALP BLD-CCNC: 80 U/L (ref 40–130)
ALT SERPL-CCNC: 12 U/L (ref 5–41)
ANION GAP SERPL CALCULATED.3IONS-SCNC: 12 MMOL/L (ref 7–19)
AST SERPL-CCNC: 19 U/L (ref 5–40)
BASOPHILS ABSOLUTE: 0.1 K/UL (ref 0–0.2)
BASOPHILS RELATIVE PERCENT: 1 % (ref 0–1)
BILIRUB SERPL-MCNC: 0.5 MG/DL (ref 0.2–1.2)
BILIRUBIN URINE: NEGATIVE
BLOOD, URINE: NEGATIVE
BUN BLDV-MCNC: 15 MG/DL (ref 8–23)
CALCIUM SERPL-MCNC: 9.8 MG/DL (ref 8.8–10.2)
CHLORIDE BLD-SCNC: 102 MMOL/L (ref 98–111)
CLARITY: CLEAR
CO2: 23 MMOL/L (ref 22–29)
COLOR: YELLOW
CREAT SERPL-MCNC: 1 MG/DL (ref 0.5–1.2)
EOSINOPHILS ABSOLUTE: 0.2 K/UL (ref 0–0.6)
EOSINOPHILS RELATIVE PERCENT: 2 % (ref 0–5)
GFR AFRICAN AMERICAN: >59
GFR NON-AFRICAN AMERICAN: >60
GLUCOSE BLD-MCNC: 98 MG/DL (ref 74–109)
GLUCOSE URINE: NEGATIVE MG/DL
HCT VFR BLD CALC: 38.3 % (ref 42–52)
HEMOGLOBIN: 12.5 G/DL (ref 14–18)
IMMATURE GRANULOCYTES #: 0 K/UL
KETONES, URINE: NEGATIVE MG/DL
LEUKOCYTE ESTERASE, URINE: NEGATIVE
LYMPHOCYTES ABSOLUTE: 0.9 K/UL (ref 1.1–4.5)
LYMPHOCYTES RELATIVE PERCENT: 11.8 % (ref 20–40)
MCH RBC QN AUTO: 31.5 PG (ref 27–31)
MCHC RBC AUTO-ENTMCNC: 32.6 G/DL (ref 33–37)
MCV RBC AUTO: 96.5 FL (ref 80–94)
MONOCYTES ABSOLUTE: 0.8 K/UL (ref 0–0.9)
MONOCYTES RELATIVE PERCENT: 10 % (ref 0–10)
NEUTROPHILS ABSOLUTE: 5.8 K/UL (ref 1.5–7.5)
NEUTROPHILS RELATIVE PERCENT: 74.9 % (ref 50–65)
NITRITE, URINE: NEGATIVE
PDW BLD-RTO: 13.4 % (ref 11.5–14.5)
PH UA: 8 (ref 5–8)
PLATELET # BLD: 274 K/UL (ref 130–400)
PMV BLD AUTO: 11.2 FL (ref 9.4–12.4)
POTASSIUM REFLEX MAGNESIUM: 3.9 MMOL/L (ref 3.5–5)
PROTEIN UA: NEGATIVE MG/DL
RBC # BLD: 3.97 M/UL (ref 4.7–6.1)
SODIUM BLD-SCNC: 137 MMOL/L (ref 136–145)
SPECIFIC GRAVITY UA: 1.01 (ref 1–1.03)
TOTAL PROTEIN: 6.3 G/DL (ref 6.6–8.7)
TSH REFLEX FT4: 1.79 UIU/ML (ref 0.35–5.5)
UROBILINOGEN, URINE: 0.2 E.U./DL
WBC # BLD: 7.7 K/UL (ref 4.8–10.8)

## 2022-06-11 PROCEDURE — 80053 COMPREHEN METABOLIC PANEL: CPT

## 2022-06-11 PROCEDURE — 81003 URINALYSIS AUTO W/O SCOPE: CPT

## 2022-06-11 PROCEDURE — 36415 COLL VENOUS BLD VENIPUNCTURE: CPT

## 2022-06-11 PROCEDURE — 93971 EXTREMITY STUDY: CPT

## 2022-06-11 PROCEDURE — 70450 CT HEAD/BRAIN W/O DYE: CPT

## 2022-06-11 PROCEDURE — 84443 ASSAY THYROID STIM HORMONE: CPT

## 2022-06-11 PROCEDURE — 70450 CT HEAD/BRAIN W/O DYE: CPT | Performed by: RADIOLOGY

## 2022-06-11 PROCEDURE — 99284 EMERGENCY DEPT VISIT MOD MDM: CPT

## 2022-06-11 PROCEDURE — 71045 X-RAY EXAM CHEST 1 VIEW: CPT | Performed by: RADIOLOGY

## 2022-06-11 PROCEDURE — 85025 COMPLETE CBC W/AUTO DIFF WBC: CPT

## 2022-06-11 PROCEDURE — 71045 X-RAY EXAM CHEST 1 VIEW: CPT

## 2022-06-11 RX ORDER — HYDROXYZINE PAMOATE 25 MG/1
25 CAPSULE ORAL 3 TIMES DAILY PRN
Qty: 20 CAPSULE | Refills: 0 | Status: SHIPPED | OUTPATIENT
Start: 2022-06-11 | End: 2022-06-11 | Stop reason: SDUPTHER

## 2022-06-11 RX ORDER — HYDROXYZINE PAMOATE 25 MG/1
25 CAPSULE ORAL 3 TIMES DAILY PRN
Qty: 20 CAPSULE | Refills: 0 | Status: SHIPPED | OUTPATIENT
Start: 2022-06-11 | End: 2022-06-25

## 2022-06-11 NOTE — PROGRESS NOTES
Left lower extremity venous duplex performed. There is no evidence of DVT SVT or REFLUX noted at this time. This is a preliminary report. Final report pending.

## 2022-06-12 ASSESSMENT — ENCOUNTER SYMPTOMS
CHOKING: 0
FACIAL SWELLING: 0
TROUBLE SWALLOWING: 1
WHEEZING: 0
SHORTNESS OF BREATH: 0

## 2022-06-12 NOTE — ED PROVIDER NOTES
[facility] EMERGENCY DEPT  eMERGENCY dEPARTMENT eNCOUnter      Pt Name: Karine Nolasco. MRN: [id number]  Birthdate 1955  Date of evaluation: 6/11/2022  Provider: VERONICA Hoffman    CHIEF COMPLAINT       Chief Complaint   Patient presents with    Oral Swelling     pt was to see Dr Pretty Hernandez on wednesday pt c/o of oral swelling today hx of tongue cancer         HISTORY OF PRESENT ILLNESS   (Location/Symptom, Timing/Onset,Context/Setting, Quality, Duration, Modifying Factors, Severity)  Note limiting factors. Karine Nolasco is a 61 y.o. male who presents to the emergency department with oral swelling. HAs recently seen henri butler. Pt has had esophageal dilations previously. He has a history of lymphoma and is treated with infusions monthly. He does have a remote history of cancer of his tongue in 2014. His wife complains that he has lost weight recently. He is able to tolerate liquids here. No rash. No wheezing. He does have swelling and pain to his left ankle. This is new. No injury. The history is provided by the patient. NursingNotes were reviewed. REVIEW OF SYSTEMS    (2-9 systems for level 4, 10 or more for level 5)     Review of Systems   Constitutional: Positive for appetite change and unexpected weight change. HENT: Positive for trouble swallowing. Negative for facial swelling and mouth sores. Respiratory: Negative for choking, shortness of breath and wheezing. Cardiovascular: Positive for leg swelling. Skin: Negative for wound. Except as noted above the remainder of the review of systems was reviewed and negative.        PAST MEDICAL HISTORY     Past Medical History:   Diagnosis Date    Anxiety     Chronic kidney disease     Stage 3 Moderated     COPD [facility]chronic obstructive pulmonary disease) ([facility] [address]     pt denies any symptoms     Depression     Dysphagia     Elevated cholesterol     Elevated PSA     GERD (gastroesophageal reflux disease)     History of blood transfusion     Hodgkin lymphoma ([facility] [address]     Hypertension     Hypothyroidism     Insomnia     NHL (non-Hodgkin's lymphoma) (HCC)     REMISSION    Palpitations     Prostate cancer (HCC)     Rhinitis     Seizure ([address] 2013    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    Weight loss     Xerostomia          SURGICALHISTORY       Past Surgical History:   Procedure Laterality Date    ABSCESS DRAINAGE      ON BUTTOCK    CATARACT REMOVAL Right     COLONOSCOPY  10/05/2010    Dr August Kirkland, 5 yr recall    COLONOSCOPY N/A 03/16/2022    Dr Braulio Rae, 5 yr recall    DENTAL SURGERY Bilateral     ESOPHAGEAL DILATATION      INTRACAPSULAR CATARACT EXTRACTION Right 06/06/2016    EYE CATARACT EMULSIFICATION IOL IMPLANT performed by Rojas Amezcua MD at [address] [facility] Left 07/11/2016    LT EYE CATARACT EMULSIFICATION IOL IMPLANT performed by Rojas Amezcua MD at [address] [place] [address] ARTHROSCOPY Bilateral     KNEE SURGERY Bilateral     scope    SPLENECTOMY  2004    THROAT SURGERY      06-26-20& 10-11-21    TONGUE BIOPSY  09/2015    wedge    TONGUE SURGERY      TONSILLECTOMY      UPPER GASTROINTESTINAL ENDOSCOPY N/A 01/07/2022    Dr Farrukh Cummins-[id number] changes of radiation changes in the very proximal esophagus, gastritis, no h pylori    UPPER GASTROINTESTINAL ENDOSCOPY  01/07/2022    Dr Farrukh Cummins-[id number] changes of radiation changes in the very proximal esophagus, gastritis, no h pylori    WRIST FRACTURE SURGERY Right     WRIST SURGERY Right          CURRENT MEDICATIONS       Discharge Medication List as of 6/11/2022  3:28 PM      CONTINUE these medications which have NOT CHANGED    Details   Ferrous Sulfate (IRON) 325 (65 Fe) MG TABS Take by mouthHistorical Med      albuterol sulfate HFA (VENTOLIN HFA) 108 (90 Base) MCG/ACT inhaler Inhale 2 puffs into the lungs 4 times daily as needed for Wheezing, Disp-18 g, R-0Print      nystatin (MYCOSTATIN) 589832 UNIT/ML suspension Take 5 mLs by mouth 3 times daily as needed (oral pain, thrush) Swish and spit, Oral, 3 TIMES DAILY PRN Starting Wed 3/16/2022, Disp-250 mL, R-3, Normal      ALBUTEROL IN Inhale into the lungs as neededHistorical Med      Azelastine-Fluticasone 137-50 MCG/ACT SUSP by Nasal route 2 times daily as neededHistorical Med      meloxicam (MOBIC) 15 MG tablet Take 15 mg by mouth dailyHistorical Med      glycopyrrolate (ROBINUL) 1 MG tablet Take 0.5 mg by mouth 2 times daily as neededHistorical Med      amLODIPine (NORVASC) 5 MG tablet Take 1 tablet by mouth dailyHistorical Med      cyclobenzaprine (FLEXERIL) 10 MG tablet 3 times daily as neededHistorical Med      HYDROcodone-acetaminophen (NORCO) 7.5-325 MG per tablet Take 1 tablet by mouth as needed. Historical Med      sertraline (ZOLOFT) 50 MG tablet Take 50 mg by mouth daily Historical Med      tamsulosin (FLOMAX) 0.4 MG capsule Take 0.4 mg by mouth 2 times daily Historical Med      senna-docusate (PERICOLACE) 8.6-50 MG per tablet Take 1 tablet by mouth 2 times daily      ondansetron (ZOFRAN) 8 MG tablet Take 8 mg by mouth every 8 hours as needed for Nausea or Vomiting      LEVOTHYROXINE SODIUM Take 0.088 mg by mouth daily Historical Med      ALPRAZOLAM ER PO Take 2 mg by mouth 3 times daily Indications: Depression with Anxiety       Pantoprazole Sodium (PROTONIX PO) Take 40 mg by mouth 2 times daily Historical Med      SIMVASTATIN PO Take 20 mg by mouth daily              ALLERGIES     Patient has no known allergies.     FAMILY HISTORY       Family History   Problem Relation Age of Onset    Cancer Sister     Stroke Sister     Stroke Brother           SOCIAL HISTORY       Social History     Socioeconomic History    Marital status:      Spouse name: None    Number of children: None    Years of education: None    Highest education level: None   Occupational History    None   Tobacco Use  Smoking status: Former Smoker     Packs/day: 0.50     Years: 38.00     Pack years: 19.00     Types: Cigarettes     Quit date: 2015     Years since quittin.5    Smokeless tobacco: Former User     Types: [address] Use    Vaping Use: Never used   Substance and Sexual Activity    Alcohol use: No     Comment: quit last week, 2014    Drug use: No    Sexual activity: None   Other Topics Concern    None   Social History Narrative    None     Social Determinants of Health     Financial Resource Strain:     Difficulty of Paying Living Expenses: Not on file   Food Insecurity:     Worried About Running Out of Food in the Last Year: Not on file    Nubia of Food in the Last Year: Not on file   Transportation Needs:     Lack of Transportation (Medical): Not on file    Lack of Transportation (Non-Medical):  Not on file   Physical Activity:     Days of Exercise per Week: Not on file    Minutes of Exercise per Session: Not on file   Stress:     Feeling of Stress : Not on file   Social Connections:     Frequency of Communication with Friends and Family: Not on file    Frequency of Social Gatherings with Friends and Family: Not on file    Attends Caodaism Services: Not on file    Active Member of [address] [place] [address] [employer] or Organizations: Not on file    Attends Club or Organization Meetings: Not on file    Marital Status: Not on file   Intimate Partner Violence:     Fear of Current or Ex-Partner: Not on file    Emotionally Abused: Not on file    Physically Abused: Not on file    Sexually Abused: Not on file   Housing Stability:     Unable to Pay for Housing in the Last Year: Not on file    Number of [place] in the Last Year: Not on file    Unstable Housing in the Last Year: Not on file       SCREENINGS    Jackson Coma Scale  Eye Opening: Spontaneous  Best Verbal Response: Oriented  Best Motor Response: Obeys commands  Joyce Coma Scale Score: 15 @FLOW(81299974)@      PHYSICAL EXAM    (up to 7 for level 4, 8 or more for level 5)     ED Triage Vitals   BP Temp Temp Source Heart Rate Resp SpO2 Height Weight   06/11/22 1135 06/11/22 1135 06/11/22 1533 06/11/22 1135 06/11/22 1135 06/11/22 1135 -- --   (!) 153/87 98.4 °F (36.9 °C) Oral (!) 102 18 94 %         Physical Exam  Vitals and nursing note reviewed. Constitutional:       Appearance: He is well-developed. HENT:      Head: Normocephalic and atraumatic. Jaw: There is normal jaw occlusion. Nose: Nose normal.      Mouth/Throat:      Mouth: Mucous membranes are moist. No angioedema. Dentition: No gingival swelling. Tongue: No lesions. Tongue does not deviate from midline. Pharynx: Oropharynx is clear. Uvula midline. No pharyngeal swelling or uvula swelling. Tonsils: No tonsillar exudate. Comments: Black hairy tongue chronic  Eyes:      General: No scleral icterus. Right eye: No discharge. Left eye: No discharge. Cardiovascular:      Rate and Rhythm: Normal rate. Pulmonary:      Effort: No respiratory distress. Musculoskeletal:      Cervical back: Normal range of motion and neck supple. Legs:    Neurological:      Mental Status: He is alert. Psychiatric:         Mood and Affect: Mood is anxious. Behavior: Behavior normal.      Comments: Patient is arguing with his wife         DIAGNOSTIC RESULTS     EKG: All EKG's are interpreted by the Emergency Department Physician who either signs or Co-signsthis chart in the absence of a cardiologist.        RADIOLOGY:   Jackalyn Officer such as CT, Ultrasound and MRI are read by the radiologist. Plain radiographic images are visualized and preliminarily interpreted by the emergency physician with the below findings:      Interpretation per the Radiologist below, if available at the time of this note:    CT HEAD WO CONTRAST   Final Result   1. No acute intracranial abnormality. Recommendation: Follow up as clinically indicated.    All CT scans at this facility utilize dose modulation, iterative reconstruction, and/or weight based dosing when appropriate to reduce radiation dose to as low as reasonably achievable. Electronically Signed by Chip Lopez MD, MQSA CERTIFIED at [id number] 02:07:48 PM               XR CHEST PORTABLE   Final Result   COPD. No acute pulmonary process. Recommendation: Follow up as clinically indicated. Electronically Signed by Chip Lopez MD, [address] [facility] at [id number] 01:43:36 PM               VL Extremity Venous Left         neg US      ED BEDSIDEULTRASOUND:   Performed by ED Physician -none    LABS:  Labs Reviewed   CBC WITH AUTO DIFFERENTIAL - Abnormal; Notable for the following components:       Result Value    RBC 3.97 (*)     Hemoglobin 12.5 (*)     Hematocrit 38.3 (*)     MCV 96.5 (*)     MCH 31.5 (*)     MCHC 32.6 (*)     Neutrophils % 74.9 (*)     Lymphocytes % 11.8 (*)     Lymphocytes Absolute 0.9 (*)     All other components within normal limits   COMPREHENSIVE METABOLIC PANEL W/ REFLEX TO MG FOR LOW K - Abnormal; Notable for the following components: Total Protein 6.3 (*)     All other components within normal limits   TSH WITH REFLEX TO FT4   URINALYSIS WITH REFLEX TO CULTURE       All other labs were within normal range or not returned as of this dictation. EMERGENCY DEPARTMENT COURSE and DIFFERENTIALDIAGNOSIS/MDM:   Vitals:    Vitals:    06/11/22 1135 06/11/22 1200 06/11/22 1415 06/11/22 1533   BP: (!) 153/87 (!) 154/89 (!) 147/80 (!) 151/82   Pulse: (!) 102  88 87   Resp: 18  18 18   Temp: 98.4 °F (36.9 °C)   98.7 °F (37.1 °C)   TempSrc:    Oral   SpO2: 94% 97% 96% 96%           MDM patient is able to swallow liquids here. He has been instructed to drink Ensure. He tells me he has been doing this recommended that he increase it. There is no sign of any DVT to his left leg. He does have appointments next week with Dr. Kassy Pompa. His wife  requests something for his anxiety.   She would also like this. CONSULTS:  None    PROCEDURES:  Unless otherwise noted below, none     Procedures    FINAL IMPRESSION      1. Anxiety state    2. Weight loss    3.  Dysphagia, unspecified type        DISPOSITION/PLAN   DISPOSITION Decision To Discharge 06/11/2022 03:25:16 PM      PATIENT REFERRED TO:  Joyce Andrade MD  [facility]  [phone]            DISCHARGE MEDICATIONS:  Discharge Medication List as of 6/11/2022  3:28 PM      START taking these medications    Details   hydrOXYzine pamoate (VISTARIL) 25 MG capsule Take 1 capsule by mouth 3 times daily as needed for Anxiety, Disp-20 capsule, R-0Normal                (Please note that portions of this note were completed with a voice recognitionprogram.  Efforts were made to edit the dictations but occasionally words are mis-transcribed.)    VERONICA Reynolds (electronically signed)         VERONICA Reynolds  06/12/22 [id number]

## 2022-06-13 ENCOUNTER — LAB (OUTPATIENT)
Dept: LAB | Facility: HOSPITAL | Age: 67
End: 2022-06-13

## 2022-06-13 DIAGNOSIS — R13.14 PHARYNGOESOPHAGEAL DYSPHAGIA: ICD-10-CM

## 2022-06-13 LAB — SARS-COV-2 ORF1AB RESP QL NAA+PROBE: NOT DETECTED

## 2022-06-13 PROCEDURE — U0004 COV-19 TEST NON-CDC HGH THRU: HCPCS

## 2022-06-13 PROCEDURE — U0005 INFEC AGEN DETEC AMPLI PROBE: HCPCS

## 2022-06-13 PROCEDURE — C9803 HOPD COVID-19 SPEC COLLECT: HCPCS

## 2022-06-15 NOTE — H&P (VIEW-ONLY)
YOB: 1955  Location: [facility]  Location Address: [address] [place] [address], [facility], [address] [place] [address]  Location Phone: [phone]    Chief Complaint   Patient presents with   • Squamous Cell Carcinoma     TONGUE       History of Present Illness  Saul Alcantara Sr. is a 66 y.o. male.  Saul Alcantara Sr. is status post Direct laryngoscopy with esophageal dilatation on 22.  He had a EGD with biopsy and dilation on 22 by Dr. Newton Richmond. Patient had DL and biopsy of base of tongue on 9/10/14 with positive pathology for SCCa. The cancer was staged T1M0N0. The patient has a history of radiation therapy due to a base of tongue cancer on the left side that he finished in 2014. He is undergoing chemo until December. Patient has been diagnosed as anemic and is currently on iron. Patient states he is having difficulty swallowing again and throat is tight. Patient is having issues with his tongue. He has black areas that worsen with coffee. He has reduced his coffee use. Mouthwash prescribed burns tongue.      His dysphagia significantly worsened over the last 2 to 3 weeks.  He experienced significant improvement after the last dilatation .     I have personally reviewed the information imported into the chart during this visit.      I have personally reviewed the review of systems.                 Past Medical History:   Diagnosis Date   • Allergic rhinitis    • Anxiety    • Arthritis    • Cataract    • Chronic sinusitis    • COPD (chronic obstructive pulmonary disease) (HCC)    • COVID-19 vaccine series completed     Moderna   • Depression    • Deviated nasal septum    • Enlarged prostate    • GERD (gastroesophageal reflux disease)    • H/O head and neck radiation 3/3/2017   • History of transfusion    • Hyperlipidemia    • Hypertension    • Hypothyroidism    • Laryngopharyngeal reflux    • Lymphoma (HCC)     Non-Hodgkins   • MRSA infection    • Panic attacks    • Peyronie disease     • Pneumonia    • 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    T1N0M0 SCC S/P XRT/Chemo 12/2014   • 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    T1N0M0 SCC S/P XRT/Chemo 12/2014   • Prostate cancer (HCC)    • Sicca syndrome (HCC)    • Sinusitis, acute    • Squamous cell carcinoma     Base of Tongue   • Tobacco use disorder    • Tongue irritation    • Visit for monitoring Rituxan therapy     pt getting treatments at [facility] in [place]   • Xerostomia        Past Surgical History:   Procedure Laterality Date   • CATARACT EXTRACTION Bilateral    • ESOPHAGOSCOPY N/A 11/20/2019    Procedure: Direct laryngoscopy with esophageal Johnson dilation;  Surgeon: Wayne Richmond MD;  Location:  [facility];  Service: ENT   • HAND SURGERY      metal plate    • KNEE SURGERY     • LARYNGOSCOPY N/A 6/26/2020    Procedure: Rigid esophagoscopy Johnson esophageal dilatation;  Surgeon: Wayne Richmond MD;  Location:  [facility];  Service: ENT;  Laterality: N/A;   • LARYNGOSCOPY N/A 8/24/2020    Procedure: MICRODIRECT LARYNGOSCOPY with esophageal dilatation;  Surgeon: Wayne Richmond MD;  Location:  [facility];  Service: ENT;  Laterality: N/A;   • LARYNGOSCOPY N/A 5/24/2021    Procedure: Direct laryngoscopy, esophagoscopy with Johnson dilatation;  Surgeon: Wayne Richmond MD;  Location:  [facility];  Service: ENT;  Laterality: N/A;   • LARYNGOSCOPY N/A 12/1/2021    Procedure: Direct laryngoscopy, esophagoscopy with esophageal dilatation (Johnson dilators);  Surgeon: Wayne Richmond MD;  Location:  [facility];  Service: ENT;  Laterality: N/A;   • LARYNGOSCOPY N/A 1/3/2022    Procedure: Direct laryngoscopy, esophagoscopy with Johnson esophageal dilation;  Surgeon: Wayne Richmond MD;  Location:  [facility];  Service: ENT;  Laterality: N/A;   • LARYNGOSCOPY N/A 4/29/2022    Procedure: Direct laryngoscopy with esophageal dilatation;  Surgeon: Wayne Richmond MD;  Location:   [facility];  Service: ENT;  Laterality: N/A;   • MULTIPLE TOOTH EXTRACTIONS      CONTINUING TO HAVE BONE REMOVED 1/2021   • OTHER SURGICAL HISTORY  09/10/2014    Microlaryngoscopy with Biopsy - Base of Tongue   • PANENDOSCOPY N/A 1/22/2021    Procedure: DIRECT LARYNGOSCOPY AND ESOPHAGOSCOPY WITH ESOPHAGEAL DILATION;  Surgeon: Wayne Richmond MD;  Location:  [facility];  Service: ENT;  Laterality: N/A;   • PANENDOSCOPY N/A 10/11/2021    Procedure: Direct laryngoscopy, esophagoscopy with Johnson dilatation;  Surgeon: Wayne Richmond MD;  Location:  [facility];  Service: ENT;  Laterality: N/A;   • PROSTATE ULTRASOUND BIOPSY N/A 12/1/2021    Procedure: PROSTATE ULTRASOUND BIOPSY. NOT A URONAV;  Surgeon: Oscar Bazan MD;  Location:  [facility];  Service: Urology;  Laterality: N/A;   • SPLENECTOMY     • SQUAMOUS CELL CARCINOMA EXCISION  09/10/2014    Tongue   • TONSILLECTOMY         Outpatient Medications Marked as Taking for the 6/16/22 encounter (Office Visit) with Wayne Richmond MD   Medication Sig Dispense Refill   • albuterol (PROVENTIL) (2.5 MG/3ML) 0.083% nebulizer solution Take 2.5 mg by nebulization Every 4 (Four) Hours As Needed for Wheezing. 50 mL 1   • ALPRAZolam (XANAX) 2 MG tablet Take 2 mg by mouth 3 (Three) Times a Day.     • amitriptyline (ELAVIL) 25 MG tablet Take 25 mg by mouth every night at bedtime.     • amLODIPine (NORVASC) 10 MG tablet Take 10 mg by mouth Daily.     • Azelastine-Fluticasone 137-50 MCG/ACT suspension into the nostril(s) as directed by provider As Needed.     • chlorhexidine (Peridex) 0.12 % solution Apply 15 mL to the mouth or throat 2 (Two) Times a Day for 30 days. 900 mL 2   • cyclobenzaprine (FLEXERIL) 10 MG tablet As Needed.     • ferrous sulfate 325 (65 Fe) MG tablet Take  by mouth.     • HYDROcodone-acetaminophen (NORCO)  MG per tablet Take 1 tablet by mouth Every 6 (Six) Hours As Needed for Moderate Pain  (PER DR RUIZ).     • hydrOXYzine (ATARAX) 25 MG  tablet Take 25 mg by mouth 3 (Three) Times a Day As Needed for Itching.     • levothyroxine (SYNTHROID, LEVOTHROID) 100 MCG tablet Take 100 mcg by mouth Daily.     • Lidocaine Viscous HCl (XYLOCAINE) 2 % solution Take 5 mL by mouth As Needed for Mild Pain .     • meloxicam (MOBIC) 15 MG tablet Take 15 mg by mouth Daily.     • ondansetron (ZOFRAN) 8 MG tablet Take 8 mg by mouth Every 8 (Eight) Hours As Needed for Nausea.     • pantoprazole (PROTONIX) 40 MG EC tablet Take 40 mg by mouth Daily. Delayed Release (DR/EC)  Take 1 tablet (40 mg) by oral route 2 times per day,  Take 30 minutes prior to breakfast and evening meal     • senna-docusate (PERICOLACE) 8.6-50 MG per tablet Take 1 tablet by mouth 2 times daily     • sertraline (ZOLOFT) 100 MG tablet Take 100 mg by mouth every night at bedtime.     • simvastatin (ZOCOR) 20 MG tablet TK 1 T PO QD  11   • tamsulosin (FLOMAX) 0.4 MG capsule 24 hr capsule Take 0.4 mg by mouth Daily.         Patient has no known allergies.    Family History   Problem Relation Age of Onset   • Diabetes Sister    • Cancer Sister        Social History     Socioeconomic History   • Marital status:    Tobacco Use   • Smoking status: Former Smoker     Packs/day: 0.50     Types: Cigarettes   • Smokeless tobacco: Never Used   • Tobacco comment: pack last couple of weeks   Vaping Use   • Vaping Use: Never used   Substance and Sexual Activity   • Alcohol use: No   • Drug use: No   • Sexual activity: Defer     Partners: Female       Review of Systems   Constitutional: Negative.    HENT: Positive for trouble swallowing.         Geographic tongue   Eyes: Negative.    Respiratory: Negative.    Cardiovascular: Negative.    Gastrointestinal: Negative.    Endocrine: Negative.    Genitourinary: Negative.    Musculoskeletal: Negative.    Skin: Negative.    Allergic/Immunologic: Negative.    Neurological: Negative.    Hematological: Negative.    Psychiatric/Behavioral: Negative.        Vitals:     06/16/22 1358   BP: 133/77   Pulse: 116   Resp: 16   Temp: 98.2 °F (36.8 °C)       Body mass index is 18.84 kg/m².    Objective     Physical Exam  CONSTITUTIONAL: well nourished, well-developed, alert, oriented, in no acute distress     COMMUNICATION AND VOICE: able to communicate normally, normal voice quality    HEAD: normocephalic, no lesions, atraumatic, no tenderness, no masses     FACE: appearance normal, no lesions, no tenderness, no deformities, facial motion symmetric    EYES: ocular motility normal, eyelids normal, orbits normal, no proptosis, conjunctiva normal , pupils equal, round     EARS:  Hearing: hearing to conversational voice intact bilaterally   External Ears: normal bilaterally, no lesions    NOSE:  External Nose: external nasal structure normal, no tenderness on palpation, no nasal discharge, no lesions, no evidence of trauma, nostrils patent     ORAL:  Lips: upper and lower lips without lesion   OC/OP: No masses or lesions by visualization or palpation    LARYNX:  Indirect laryngoscopy demonstrates no masses or lesions.    NECK:  Inspection and Palpation: neck appearance normal, no masses or tenderness bilaterally    CHEST/RESPIRATORY: normal respiratory effort     CARDIOVASCULAR: no cyanosis or edema     NEUROLOGICAL/PSYCHIATRIC: oriented to time, place and person, mood normal, affect appropriate, CN II-XII intact grossly    Assessment & Plan   Diagnoses and all orders for this visit:    1. Primary squamous cell carcinoma of tongue (HCC) (Primary)  Comments:  DL /BX BOT  9/10/14 T1M0N0 SCCa. Hx of SCC left BOT 12/2014 with XRT  Orders:  -     Case Request; Standing  -     COVID PRE-OP / PRE-PROCEDURE SCREENING ORDER (NO ISOLATION) - Swab, Nasopharynx; Future  -     Basic Metabolic Panel; Future  -     CBC (No Diff); Future    2. Hx of radiation therapy  -     Case Request; Standing  -     COVID PRE-OP / PRE-PROCEDURE SCREENING ORDER (NO ISOLATION) - Swab, Nasopharynx; Future  -     Basic  Metabolic Panel; Future  -     CBC (No Diff); Future    3. Laryngopharyngeal reflux  -     Case Request; Standing  -     COVID PRE-OP / PRE-PROCEDURE SCREENING ORDER (NO ISOLATION) - Swab, Nasopharynx; Future  -     Basic Metabolic Panel; Future  -     CBC (No Diff); Future    4. Pharyngoesophageal dysphagia  -     Case Request; Standing  -     COVID PRE-OP / PRE-PROCEDURE SCREENING ORDER (NO ISOLATION) - Swab, Nasopharynx; Future  -     Basic Metabolic Panel; Future  -     CBC (No Diff); Future    Other orders  -     Follow Anesthesia Guidelines / Protocol; Future  -     Obtain Informed Consent      MicroDirect laryngoscopy, esophagoscopy with Johnson dilation (N/A)  Orders Placed This Encounter   Procedures   • COVID PRE-OP / PRE-PROCEDURE SCREENING ORDER (NO ISOLATION) - Swab, Nasopharynx     Standing Status:   Future     Standing Expiration Date:   6/16/2023     Order Specific Question:   Please select your location:     Answer:    [place]     Order Specific Question:   COVID Screening Order:     Answer:   In-House: APTIMA PANTHER, 24 HR TAT [[id number]]     Order Specific Question:   Previously tested for COVID-19?     Answer:   Yes     Order Specific Question:   Employed in healthcare setting?     Answer:   No     Order Specific Question:   Symptomatic for COVID-19 as defined by CDC?     Answer:   No     Order Specific Question:   Hospitalized for COVID-19?     Answer:   No     Order Specific Question:   Admitted to ICU for COVID-19?     Answer:   No     Order Specific Question:   Resident in a congregate (group) care setting?     Answer:   No     Order Specific Question:   Release to patient     Answer:   Immediate   • Basic Metabolic Panel     Standing Status:   Future     Standing Expiration Date:   6/16/2023     Order Specific Question:   Release to patient     Answer:   Immediate   • CBC (No Diff)     Standing Status:   Future     Standing Expiration Date:   6/16/2023     Order Specific Question:   Release  to patient     Answer:   Immediate   • Follow Anesthesia Guidelines / Protocol     Standing Status:   Future   • Obtain Informed Consent     Order Specific Question:   Informed Consent Given For     Answer:   MicroDirect laryngoscopy, esophagoscopy with Johnson dilation     Return for postop.       Patient Instructions   Risk benefits and options regarding direct laryngoscopy, esophagoscopy and Johnson dilatation were discussed with the patient.  He understands risk benefits and options and wishes to proceed.

## 2022-06-15 NOTE — PROGRESS NOTES
YOB: 1955  Location: [facility]  Location Address: [address] [place] [address], [facility], [address] [place] [address]  Location Phone: [phone]    Chief Complaint   Patient presents with   • Squamous Cell Carcinoma     TONGUE       History of Present Illness  Saul Alcantara Sr. is a 66 y.o. male.  Saul Alcantara Sr. is status post Direct laryngoscopy with esophageal dilatation on 22.  He had a EGD with biopsy and dilation on 22 by Dr. Newton Richmond. Patient had DL and biopsy of base of tongue on 9/10/14 with positive pathology for SCCa. The cancer was staged T1M0N0. The patient has a history of radiation therapy due to a base of tongue cancer on the left side that he finished in 2014. He is undergoing chemo until December. Patient has been diagnosed as anemic and is currently on iron. Patient states he is having difficulty swallowing again and throat is tight. Patient is having issues with his tongue. He has black areas that worsen with coffee. He has reduced his coffee use. Mouthwash prescribed burns tongue.      His dysphagia significantly worsened over the last 2 to 3 weeks.  He experienced significant improvement after the last dilatation .     I have personally reviewed the information imported into the chart during this visit.      I have personally reviewed the review of systems.                 Past Medical History:   Diagnosis Date   • Allergic rhinitis    • Anxiety    • Arthritis    • Cataract    • Chronic sinusitis    • COPD (chronic obstructive pulmonary disease) (HCC)    • COVID-19 vaccine series completed     Moderna   • Depression    • Deviated nasal septum    • Enlarged prostate    • GERD (gastroesophageal reflux disease)    • H/O head and neck radiation 3/3/2017   • History of transfusion    • Hyperlipidemia    • Hypertension    • Hypothyroidism    • Laryngopharyngeal reflux    • Lymphoma (HCC)     Non-Hodgkins   • MRSA infection    • Panic attacks    • Peyronie disease     • Pneumonia    • 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    T1N0M0 SCC S/P XRT/Chemo 12/2014   • 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    T1N0M0 SCC S/P XRT/Chemo 12/2014   • Prostate cancer (HCC)    • Sicca syndrome (HCC)    • Sinusitis, acute    • Squamous cell carcinoma     Base of Tongue   • Tobacco use disorder    • Tongue irritation    • Visit for monitoring Rituxan therapy     pt getting treatments at [facility], [place], operated by [facility] in [place]   • Xerostomia        Past Surgical History:   Procedure Laterality Date   • CATARACT EXTRACTION Bilateral    • ESOPHAGOSCOPY N/A 11/20/2019    Procedure: Direct laryngoscopy with esophageal Johnson dilation;  Surgeon: Wayne Richmond MD;  Location:  [facility];  Service: ENT   • HAND SURGERY      metal plate    • KNEE SURGERY     • LARYNGOSCOPY N/A 6/26/2020    Procedure: Rigid esophagoscopy Johnson esophageal dilatation;  Surgeon: Wayne Richmond MD;  Location:  [facility];  Service: ENT;  Laterality: N/A;   • LARYNGOSCOPY N/A 8/24/2020    Procedure: MICRODIRECT LARYNGOSCOPY with esophageal dilatation;  Surgeon: Wayne Richmond MD;  Location:  [facility];  Service: ENT;  Laterality: N/A;   • LARYNGOSCOPY N/A 5/24/2021    Procedure: Direct laryngoscopy, esophagoscopy with Johnosn dilatation;  Surgeon: Wayne Richmond MD;  Location:  [facility];  Service: ENT;  Laterality: N/A;   • LARYNGOSCOPY N/A 12/1/2021    Procedure: Direct laryngoscopy, esophagoscopy with esophageal dilatation (Johnson dilators);  Surgeon: Wayne Richmond MD;  Location:  [facility];  Service: ENT;  Laterality: N/A;   • LARYNGOSCOPY N/A 1/3/2022    Procedure: Direct laryngoscopy, esophagoscopy with Johnson esophageal dilation;  Surgeon: Wayne Richmond MD;  Location:  [facility];  Service: ENT;  Laterality: N/A;   • LARYNGOSCOPY N/A 4/29/2022    Procedure: Direct laryngoscopy with esophageal dilatation;  Surgeon: Wayne Richmond MD;  Location:   [facility];  Service: ENT;  Laterality: N/A;   • MULTIPLE TOOTH EXTRACTIONS      CONTINUING TO HAVE BONE REMOVED 1/2021   • OTHER SURGICAL HISTORY  09/10/2014    Microlaryngoscopy with Biopsy - Base of Tongue   • PANENDOSCOPY N/A 1/22/2021    Procedure: DIRECT LARYNGOSCOPY AND ESOPHAGOSCOPY WITH ESOPHAGEAL DILATION;  Surgeon: Wayne Richmond MD;  Location:  [facility];  Service: ENT;  Laterality: N/A;   • PANENDOSCOPY N/A 10/11/2021    Procedure: Direct laryngoscopy, esophagoscopy with Johnson dilatation;  Surgeon: Wayne Richmond MD;  Location:  [facility];  Service: ENT;  Laterality: N/A;   • PROSTATE ULTRASOUND BIOPSY N/A 12/1/2021    Procedure: PROSTATE ULTRASOUND BIOPSY. NOT A URONAV;  Surgeon: Oscar Bazan MD;  Location:  [facility];  Service: Urology;  Laterality: N/A;   • SPLENECTOMY     • SQUAMOUS CELL CARCINOMA EXCISION  09/10/2014    Tongue   • TONSILLECTOMY         Outpatient Medications Marked as Taking for the 6/16/22 encounter (Office Visit) with Wayne Richmond MD   Medication Sig Dispense Refill   • albuterol (PROVENTIL) (2.5 MG/3ML) 0.083% nebulizer solution Take 2.5 mg by nebulization Every 4 (Four) Hours As Needed for Wheezing. 50 mL 1   • ALPRAZolam (XANAX) 2 MG tablet Take 2 mg by mouth 3 (Three) Times a Day.     • amitriptyline (ELAVIL) 25 MG tablet Take 25 mg by mouth every night at bedtime.     • amLODIPine (NORVASC) 10 MG tablet Take 10 mg by mouth Daily.     • Azelastine-Fluticasone 137-50 MCG/ACT suspension into the nostril(s) as directed by provider As Needed.     • chlorhexidine (Peridex) 0.12 % solution Apply 15 mL to the mouth or throat 2 (Two) Times a Day for 30 days. 900 mL 2   • cyclobenzaprine (FLEXERIL) 10 MG tablet As Needed.     • ferrous sulfate 325 (65 Fe) MG tablet Take  by mouth.     • HYDROcodone-acetaminophen (NORCO)  MG per tablet Take 1 tablet by mouth Every 6 (Six) Hours As Needed for Moderate Pain  (PER DR RUIZ).     • hydrOXYzine (ATARAX) 25 MG  tablet Take 25 mg by mouth 3 (Three) Times a Day As Needed for Itching.     • levothyroxine (SYNTHROID, LEVOTHROID) 100 MCG tablet Take 100 mcg by mouth Daily.     • Lidocaine Viscous HCl (XYLOCAINE) 2 % solution Take 5 mL by mouth As Needed for Mild Pain .     • meloxicam (MOBIC) 15 MG tablet Take 15 mg by mouth Daily.     • ondansetron (ZOFRAN) 8 MG tablet Take 8 mg by mouth Every 8 (Eight) Hours As Needed for Nausea.     • pantoprazole (PROTONIX) 40 MG EC tablet Take 40 mg by mouth Daily. Delayed Release (DR/EC)  Take 1 tablet (40 mg) by oral route 2 times per day,  Take 30 minutes prior to breakfast and evening meal     • senna-docusate (PERICOLACE) 8.6-50 MG per tablet Take 1 tablet by mouth 2 times daily     • sertraline (ZOLOFT) 100 MG tablet Take 100 mg by mouth every night at bedtime.     • simvastatin (ZOCOR) 20 MG tablet TK 1 T PO QD  11   • tamsulosin (FLOMAX) 0.4 MG capsule 24 hr capsule Take 0.4 mg by mouth Daily.         Patient has no known allergies.    Family History   Problem Relation Age of Onset   • Diabetes Sister    • Cancer Sister        Social History     Socioeconomic History   • Marital status:    Tobacco Use   • Smoking status: Former Smoker     Packs/day: 0.50     Types: Cigarettes   • Smokeless tobacco: Never Used   • Tobacco comment: pack last couple of weeks   Vaping Use   • Vaping Use: Never used   Substance and Sexual Activity   • Alcohol use: No   • Drug use: No   • Sexual activity: Defer     Partners: Female       Review of Systems   Constitutional: Negative.    HENT: Positive for trouble swallowing.         Geographic tongue   Eyes: Negative.    Respiratory: Negative.    Cardiovascular: Negative.    Gastrointestinal: Negative.    Endocrine: Negative.    Genitourinary: Negative.    Musculoskeletal: Negative.    Skin: Negative.    Allergic/Immunologic: Negative.    Neurological: Negative.    Hematological: Negative.    Psychiatric/Behavioral: Negative.        Vitals:     06/16/22 1358   BP: 133/77   Pulse: 116   Resp: 16   Temp: 98.2 °F (36.8 °C)       Body mass index is 18.84 kg/m².    Objective     Physical Exam  CONSTITUTIONAL: well nourished, well-developed, alert, oriented, in no acute distress     COMMUNICATION AND VOICE: able to communicate normally, normal voice quality    HEAD: normocephalic, no lesions, atraumatic, no tenderness, no masses     FACE: appearance normal, no lesions, no tenderness, no deformities, facial motion symmetric    EYES: ocular motility normal, eyelids normal, orbits normal, no proptosis, conjunctiva normal , pupils equal, round     EARS:  Hearing: hearing to conversational voice intact bilaterally   External Ears: normal bilaterally, no lesions    NOSE:  External Nose: external nasal structure normal, no tenderness on palpation, no nasal discharge, no lesions, no evidence of trauma, nostrils patent     ORAL:  Lips: upper and lower lips without lesion   OC/OP: No masses or lesions by visualization or palpation    LARYNX:  Indirect laryngoscopy demonstrates no masses or lesions.    NECK:  Inspection and Palpation: neck appearance normal, no masses or tenderness bilaterally    CHEST/RESPIRATORY: normal respiratory effort     CARDIOVASCULAR: no cyanosis or edema     NEUROLOGICAL/PSYCHIATRIC: oriented to time, place and person, mood normal, affect appropriate, CN II-XII intact grossly    Assessment & Plan   Diagnoses and all orders for this visit:    1. Primary squamous cell carcinoma of tongue (HCC) (Primary)  Comments:  DL /BX BOT  9/10/14 T1M0N0 SCCa. Hx of SCC left BOT 12/2014 with XRT  Orders:  -     Case Request; Standing  -     COVID PRE-OP / PRE-PROCEDURE SCREENING ORDER (NO ISOLATION) - Swab, Nasopharynx; Future  -     Basic Metabolic Panel; Future  -     CBC (No Diff); Future    2. Hx of radiation therapy  -     Case Request; Standing  -     COVID PRE-OP / PRE-PROCEDURE SCREENING ORDER (NO ISOLATION) - Swab, Nasopharynx; Future  -     Basic  Metabolic Panel; Future  -     CBC (No Diff); Future    3. Laryngopharyngeal reflux  -     Case Request; Standing  -     COVID PRE-OP / PRE-PROCEDURE SCREENING ORDER (NO ISOLATION) - Swab, Nasopharynx; Future  -     Basic Metabolic Panel; Future  -     CBC (No Diff); Future    4. Pharyngoesophageal dysphagia  -     Case Request; Standing  -     COVID PRE-OP / PRE-PROCEDURE SCREENING ORDER (NO ISOLATION) - Swab, Nasopharynx; Future  -     Basic Metabolic Panel; Future  -     CBC (No Diff); Future    Other orders  -     Follow Anesthesia Guidelines / Protocol; Future  -     Obtain Informed Consent      MicroDirect laryngoscopy, esophagoscopy with Johnson dilation (N/A)  Orders Placed This Encounter   Procedures   • COVID PRE-OP / PRE-PROCEDURE SCREENING ORDER (NO ISOLATION) - Swab, Nasopharynx     Standing Status:   Future     Standing Expiration Date:   6/16/2023     Order Specific Question:   Please select your location:     Answer:    [place]     Order Specific Question:   COVID Screening Order:     Answer:   In-House: APTIMA PANTHER, 24 HR TAT [[id number]]     Order Specific Question:   Previously tested for COVID-19?     Answer:   Yes     Order Specific Question:   Employed in healthcare setting?     Answer:   No     Order Specific Question:   Symptomatic for COVID-19 as defined by CDC?     Answer:   No     Order Specific Question:   Hospitalized for COVID-19?     Answer:   No     Order Specific Question:   Admitted to ICU for COVID-19?     Answer:   No     Order Specific Question:   Resident in a congregate (group) care setting?     Answer:   No     Order Specific Question:   Release to patient     Answer:   Immediate   • Basic Metabolic Panel     Standing Status:   Future     Standing Expiration Date:   6/16/2023     Order Specific Question:   Release to patient     Answer:   Immediate   • CBC (No Diff)     Standing Status:   Future     Standing Expiration Date:   6/16/2023     Order Specific Question:   Release  to patient     Answer:   Immediate   • Follow Anesthesia Guidelines / Protocol     Standing Status:   Future   • Obtain Informed Consent     Order Specific Question:   Informed Consent Given For     Answer:   MicroDirect laryngoscopy, esophagoscopy with Johnson dilation     Return for postop.       Patient Instructions   Risk benefits and options regarding direct laryngoscopy, esophagoscopy and Johnson dilatation were discussed with the patient.  He understands risk benefits and options and wishes to proceed.

## 2022-06-16 ENCOUNTER — OFFICE VISIT (OUTPATIENT)
Dept: OTOLARYNGOLOGY | Facility: CLINIC | Age: 67
End: 2022-06-16

## 2022-06-16 VITALS
WEIGHT: 142.8 LBS | SYSTOLIC BLOOD PRESSURE: 133 MMHG | TEMPERATURE: 98.2 F | HEIGHT: 73 IN | DIASTOLIC BLOOD PRESSURE: 77 MMHG | RESPIRATION RATE: 16 BRPM | BODY MASS INDEX: 18.92 KG/M2 | HEART RATE: 116 BPM

## 2022-06-16 DIAGNOSIS — C02.9 PRIMARY SQUAMOUS CELL CARCINOMA OF TONGUE: Primary | ICD-10-CM

## 2022-06-16 DIAGNOSIS — R13.14 PHARYNGOESOPHAGEAL DYSPHAGIA: ICD-10-CM

## 2022-06-16 DIAGNOSIS — K21.9 LARYNGOPHARYNGEAL REFLUX: ICD-10-CM

## 2022-06-16 DIAGNOSIS — Z92.3 HX OF RADIATION THERAPY: ICD-10-CM

## 2022-06-16 PROCEDURE — 99214 OFFICE O/P EST MOD 30 MIN: CPT | Performed by: OTOLARYNGOLOGY

## 2022-06-16 RX ORDER — HYDROXYZINE HYDROCHLORIDE 25 MG/1
25 TABLET, FILM COATED ORAL 3 TIMES DAILY PRN
COMMUNITY
End: 2023-03-02

## 2022-06-16 RX ORDER — PNV NO.95/FERROUS FUM/FOLIC AC 28MG-0.8MG
TABLET ORAL
Status: ON HOLD | COMMUNITY
End: 2022-09-23

## 2022-06-16 NOTE — PATIENT INSTRUCTIONS
Risk benefits and options regarding direct laryngoscopy, esophagoscopy and Johnson dilatation were discussed with the patient.  He understands risk benefits and options and wishes to proceed.

## 2022-06-22 ENCOUNTER — PRE-ADMISSION TESTING (OUTPATIENT)
Dept: PREADMISSION TESTING | Facility: HOSPITAL | Age: 67
End: 2022-06-22

## 2022-06-22 ENCOUNTER — LAB (OUTPATIENT)
Dept: LAB | Facility: HOSPITAL | Age: 67
End: 2022-06-22

## 2022-06-22 VITALS
OXYGEN SATURATION: 97 % | BODY MASS INDEX: 19.26 KG/M2 | WEIGHT: 142.2 LBS | RESPIRATION RATE: 16 BRPM | HEIGHT: 72 IN | SYSTOLIC BLOOD PRESSURE: 131 MMHG | DIASTOLIC BLOOD PRESSURE: 85 MMHG | HEART RATE: 107 BPM

## 2022-06-22 DIAGNOSIS — Z92.3 HX OF RADIATION THERAPY: ICD-10-CM

## 2022-06-22 DIAGNOSIS — R13.14 PHARYNGOESOPHAGEAL DYSPHAGIA: ICD-10-CM

## 2022-06-22 DIAGNOSIS — C02.9 PRIMARY SQUAMOUS CELL CARCINOMA OF TONGUE: ICD-10-CM

## 2022-06-22 DIAGNOSIS — K21.9 LARYNGOPHARYNGEAL REFLUX: ICD-10-CM

## 2022-06-22 LAB
ANION GAP SERPL CALCULATED.3IONS-SCNC: 9 MMOL/L (ref 5–15)
BUN SERPL-MCNC: 13 MG/DL (ref 8–23)
BUN/CREAT SERPL: 11.8 (ref 7–25)
CALCIUM SPEC-SCNC: 9.8 MG/DL (ref 8.6–10.5)
CHLORIDE SERPL-SCNC: 99 MMOL/L (ref 98–107)
CO2 SERPL-SCNC: 29 MMOL/L (ref 22–29)
CREAT SERPL-MCNC: 1.1 MG/DL (ref 0.76–1.27)
DEPRECATED RDW RBC AUTO: 44.8 FL (ref 37–54)
EGFRCR SERPLBLD CKD-EPI 2021: 74.5 ML/MIN/1.73
ERYTHROCYTE [DISTWIDTH] IN BLOOD BY AUTOMATED COUNT: 13.1 % (ref 12.3–15.4)
GLUCOSE SERPL-MCNC: 98 MG/DL (ref 65–99)
HCT VFR BLD AUTO: 38.1 % (ref 37.5–51)
HGB BLD-MCNC: 13 G/DL (ref 13–17.7)
MCH RBC QN AUTO: 31.9 PG (ref 26.6–33)
MCHC RBC AUTO-ENTMCNC: 34.1 G/DL (ref 31.5–35.7)
MCV RBC AUTO: 93.6 FL (ref 79–97)
PLATELET # BLD AUTO: 303 10*3/MM3 (ref 140–450)
PMV BLD AUTO: 10.6 FL (ref 6–12)
POTASSIUM SERPL-SCNC: 4.3 MMOL/L (ref 3.5–5.2)
RBC # BLD AUTO: 4.07 10*6/MM3 (ref 4.14–5.8)
SARS-COV-2 ORF1AB RESP QL NAA+PROBE: NOT DETECTED
SODIUM SERPL-SCNC: 137 MMOL/L (ref 136–145)
WBC NRBC COR # BLD: 6.98 10*3/MM3 (ref 3.4–10.8)

## 2022-06-22 PROCEDURE — 36415 COLL VENOUS BLD VENIPUNCTURE: CPT

## 2022-06-22 PROCEDURE — 85027 COMPLETE CBC AUTOMATED: CPT

## 2022-06-22 PROCEDURE — U0004 COV-19 TEST NON-CDC HGH THRU: HCPCS

## 2022-06-22 PROCEDURE — U0005 INFEC AGEN DETEC AMPLI PROBE: HCPCS

## 2022-06-22 PROCEDURE — C9803 HOPD COVID-19 SPEC COLLECT: HCPCS

## 2022-06-22 PROCEDURE — 80048 BASIC METABOLIC PNL TOTAL CA: CPT

## 2022-06-22 NOTE — DISCHARGE INSTRUCTIONS
Before you come to the hospital        Arrival time: AS DIRECTED BY OFFICE     YOU MAY TAKE THE FOLLOWING MEDICATION(S) THE MORNING OF SURGERY WITH A SIP OF WATER: XANAX, NORCO           ALL OTHER HOME MEDICATION CHECK WITH YOUR PHYSICIAN (especially if you are taking diabetes medicines or blood thinners)    Do not take any Erectile Dysfunction medications (EX: CIALIS, VIAGRA) 24 hours prior to surgery      If you were given and instructed to use a germ- killing soap, use as directed the night before surgery and the morning of surgery before coming to the hospital.             Eating and drinking restrictions prior to scheduled arrival time    2 Hours before arrival time STOP   Drinking Clear liquids (water, apple juice-no pulp)     6 Hours before arrival time STOP   Milk or drinks that contain milk, full liquids    6 Hours before arrival time STOP   Light meals or foods, such as toast or cereal    8 Hours before arrival time STOP   Heavy foods, such as meat, fried foods, or fatty foods    (It is extremely important that you follow these guidelines to prevent delay or cancelation of your procedure)     Clear Liquids  Water and flavored water                                                                      Clear Fruit juices, such as cranberry juice and apple juice.  Black coffee (NO cream of any kind, including powdered).  Plain tea  Clear bouillon or broth.  Flavored gelatin.  Soda.  Gatorade or Powerade.  Full liquid examples  Juices that have pulp.  Frozen ice pops that contain fruit pieces.  Coffee with creamer  Milk.  Yogurt.              MANAGING PAIN AFTER SURGERY    We know you are probably wondering what your pain will be like after surgery.  Following surgery it is unrealistic to expect you will not have pain.   Pain is how our bodies let us know that something is wrong or cautions us to be careful.  That said, our goal is to make your pain tolerable.    Methods we may use to treat your pain include  (oral or IV medications, PCAs, epidurals, nerve blocks, etc.)   While some procedures require IV pain medications for a short time after surgery, transitioning to pain medications by mouth allows for better management of pain.   Your nurse will encourage you to take oral pain medications whenever possible.  IV medications work almost immediately, but only last a short while.  Taking medications by mouth allows for a more constant level of medication in your blood stream for a longer period of time.      Once your pain is out of control it is harder to get back under control.  It is important you are aware when your next dose of pain medication is due.  If you are admitted, your nurse may write the time of your next dose on the white board in your room to help you remember.      We are interested in your pain and encourage you to inform us about aggravating factors during your visit.   Many times a simple repositioning every few hours can make a big difference.    If your physician says it is okay, do not let your pain prevent you from getting out of bed. Be sure to call your nurse for assistance prior to getting up so you do not fall.      Before surgery, please decide your tolerable pain goal.  These faces help describe the pain ratings we use on a 0-10 scale.   Be prepared to tell us your goal and whether or not you take pain or anxiety medications at home.

## 2022-06-24 ENCOUNTER — ANESTHESIA EVENT (OUTPATIENT)
Dept: PERIOP | Facility: HOSPITAL | Age: 67
End: 2022-06-24

## 2022-06-24 ENCOUNTER — ANESTHESIA (OUTPATIENT)
Dept: PERIOP | Facility: HOSPITAL | Age: 67
End: 2022-06-24

## 2022-06-24 ENCOUNTER — HOSPITAL ENCOUNTER (OUTPATIENT)
Facility: HOSPITAL | Age: 67
Setting detail: HOSPITAL OUTPATIENT SURGERY
Discharge: HOME | End: 2022-06-24
Attending: OTOLARYNGOLOGY | Admitting: OTOLARYNGOLOGY

## 2022-06-24 VITALS
TEMPERATURE: 98.1 F | HEART RATE: 74 BPM | DIASTOLIC BLOOD PRESSURE: 99 MMHG | OXYGEN SATURATION: 98 % | RESPIRATION RATE: 14 BRPM | SYSTOLIC BLOOD PRESSURE: 164 MMHG

## 2022-06-24 PROCEDURE — 25010000002 MIDAZOLAM PER 1 MG: Performed by: ANESTHESIOLOGY

## 2022-06-24 PROCEDURE — 25010000002 FENTANYL CITRATE (PF) 100 MCG/2ML SOLUTION: Performed by: NURSE ANESTHETIST, CERTIFIED REGISTERED

## 2022-06-24 PROCEDURE — 25010000002 DEXAMETHASONE PER 1 MG: Performed by: NURSE ANESTHETIST, CERTIFIED REGISTERED

## 2022-06-24 PROCEDURE — 25010000002 PROPOFOL 10 MG/ML EMULSION: Performed by: NURSE ANESTHETIST, CERTIFIED REGISTERED

## 2022-06-24 PROCEDURE — 25010000002 ONDANSETRON PER 1 MG: Performed by: NURSE ANESTHETIST, CERTIFIED REGISTERED

## 2022-06-24 PROCEDURE — 43196 ESOPHAGOSCP GUIDE WIRE DILAT: CPT | Performed by: OTOLARYNGOLOGY

## 2022-06-24 RX ORDER — LABETALOL HYDROCHLORIDE 5 MG/ML
5 INJECTION, SOLUTION INTRAVENOUS
Status: DISCONTINUED | OUTPATIENT
Start: 2022-06-24 | End: 2022-06-24 | Stop reason: HOSPADM

## 2022-06-24 RX ORDER — CHLORHEXIDINE GLUCONATE 0.12 MG/ML
15 RINSE ORAL 2 TIMES DAILY
Qty: 240 ML | Refills: 0 | Status: SHIPPED | OUTPATIENT
Start: 2022-06-24

## 2022-06-24 RX ORDER — SODIUM CHLORIDE 0.9 % (FLUSH) 0.9 %
3 SYRINGE (ML) INJECTION EVERY 12 HOURS SCHEDULED
Status: DISCONTINUED | OUTPATIENT
Start: 2022-06-24 | End: 2022-06-24 | Stop reason: HOSPADM

## 2022-06-24 RX ORDER — ROCURONIUM BROMIDE 10 MG/ML
INJECTION, SOLUTION INTRAVENOUS AS NEEDED
Status: DISCONTINUED | OUTPATIENT
Start: 2022-06-24 | End: 2022-06-24 | Stop reason: SURG

## 2022-06-24 RX ORDER — SODIUM CHLORIDE, SODIUM LACTATE, POTASSIUM CHLORIDE, CALCIUM CHLORIDE 600; 310; 30; 20 MG/100ML; MG/100ML; MG/100ML; MG/100ML
1000 INJECTION, SOLUTION INTRAVENOUS CONTINUOUS
Status: DISCONTINUED | OUTPATIENT
Start: 2022-06-24 | End: 2022-06-24 | Stop reason: HOSPADM

## 2022-06-24 RX ORDER — PROPOFOL 10 MG/ML
VIAL (ML) INTRAVENOUS AS NEEDED
Status: DISCONTINUED | OUTPATIENT
Start: 2022-06-24 | End: 2022-06-24 | Stop reason: SURG

## 2022-06-24 RX ORDER — SODIUM CHLORIDE 0.9 % (FLUSH) 0.9 %
3-10 SYRINGE (ML) INJECTION AS NEEDED
Status: DISCONTINUED | OUTPATIENT
Start: 2022-06-24 | End: 2022-06-24 | Stop reason: HOSPADM

## 2022-06-24 RX ORDER — OXYCODONE AND ACETAMINOPHEN 10; 325 MG/1; MG/1
1 TABLET ORAL ONCE AS NEEDED
Status: DISCONTINUED | OUTPATIENT
Start: 2022-06-24 | End: 2022-06-24 | Stop reason: HOSPADM

## 2022-06-24 RX ORDER — FENTANYL CITRATE 50 UG/ML
25 INJECTION, SOLUTION INTRAMUSCULAR; INTRAVENOUS
Status: DISCONTINUED | OUTPATIENT
Start: 2022-06-24 | End: 2022-06-24 | Stop reason: HOSPADM

## 2022-06-24 RX ORDER — DROPERIDOL 2.5 MG/ML
0.62 INJECTION, SOLUTION INTRAMUSCULAR; INTRAVENOUS ONCE AS NEEDED
Status: DISCONTINUED | OUTPATIENT
Start: 2022-06-24 | End: 2022-06-24 | Stop reason: HOSPADM

## 2022-06-24 RX ORDER — LIDOCAINE HYDROCHLORIDE 10 MG/ML
0.5 INJECTION, SOLUTION EPIDURAL; INFILTRATION; INTRACAUDAL; PERINEURAL ONCE AS NEEDED
Status: DISCONTINUED | OUTPATIENT
Start: 2022-06-24 | End: 2022-06-24 | Stop reason: HOSPADM

## 2022-06-24 RX ORDER — NALOXONE HCL 0.4 MG/ML
0.4 VIAL (ML) INJECTION AS NEEDED
Status: DISCONTINUED | OUTPATIENT
Start: 2022-06-24 | End: 2022-06-24 | Stop reason: HOSPADM

## 2022-06-24 RX ORDER — HYDROCODONE BITARTRATE AND ACETAMINOPHEN 5; 325 MG/1; MG/1
1 TABLET ORAL ONCE AS NEEDED
Status: DISCONTINUED | OUTPATIENT
Start: 2022-06-24 | End: 2022-06-24 | Stop reason: HOSPADM

## 2022-06-24 RX ORDER — SODIUM CHLORIDE, SODIUM LACTATE, POTASSIUM CHLORIDE, CALCIUM CHLORIDE 600; 310; 30; 20 MG/100ML; MG/100ML; MG/100ML; MG/100ML
100 INJECTION, SOLUTION INTRAVENOUS CONTINUOUS
Status: DISCONTINUED | OUTPATIENT
Start: 2022-06-24 | End: 2022-06-24 | Stop reason: HOSPADM

## 2022-06-24 RX ORDER — OXYCODONE AND ACETAMINOPHEN 7.5; 325 MG/1; MG/1
2 TABLET ORAL EVERY 4 HOURS PRN
Status: DISCONTINUED | OUTPATIENT
Start: 2022-06-24 | End: 2022-06-24 | Stop reason: HOSPADM

## 2022-06-24 RX ORDER — FLUMAZENIL 0.1 MG/ML
0.2 INJECTION INTRAVENOUS AS NEEDED
Status: DISCONTINUED | OUTPATIENT
Start: 2022-06-24 | End: 2022-06-24 | Stop reason: HOSPADM

## 2022-06-24 RX ORDER — MIDAZOLAM HYDROCHLORIDE 1 MG/ML
0.5 INJECTION INTRAMUSCULAR; INTRAVENOUS
Status: DISCONTINUED | OUTPATIENT
Start: 2022-06-24 | End: 2022-06-24 | Stop reason: HOSPADM

## 2022-06-24 RX ORDER — ONDANSETRON 4 MG/1
4 TABLET, FILM COATED ORAL ONCE AS NEEDED
Status: DISCONTINUED | OUTPATIENT
Start: 2022-06-24 | End: 2022-06-24 | Stop reason: HOSPADM

## 2022-06-24 RX ORDER — ONDANSETRON 2 MG/ML
INJECTION INTRAMUSCULAR; INTRAVENOUS AS NEEDED
Status: DISCONTINUED | OUTPATIENT
Start: 2022-06-24 | End: 2022-06-24 | Stop reason: SURG

## 2022-06-24 RX ORDER — ONDANSETRON 2 MG/ML
4 INJECTION INTRAMUSCULAR; INTRAVENOUS ONCE AS NEEDED
Status: DISCONTINUED | OUTPATIENT
Start: 2022-06-24 | End: 2022-06-24 | Stop reason: HOSPADM

## 2022-06-24 RX ORDER — NEOSTIGMINE METHYLSULFATE 5 MG/5 ML
SYRINGE (ML) INTRAVENOUS AS NEEDED
Status: DISCONTINUED | OUTPATIENT
Start: 2022-06-24 | End: 2022-06-24 | Stop reason: SURG

## 2022-06-24 RX ORDER — DEXAMETHASONE SODIUM PHOSPHATE 4 MG/ML
INJECTION, SOLUTION INTRA-ARTICULAR; INTRALESIONAL; INTRAMUSCULAR; INTRAVENOUS; SOFT TISSUE AS NEEDED
Status: DISCONTINUED | OUTPATIENT
Start: 2022-06-24 | End: 2022-06-24 | Stop reason: SURG

## 2022-06-24 RX ORDER — SODIUM CHLORIDE 0.9 % (FLUSH) 0.9 %
3 SYRINGE (ML) INJECTION AS NEEDED
Status: DISCONTINUED | OUTPATIENT
Start: 2022-06-24 | End: 2022-06-24 | Stop reason: HOSPADM

## 2022-06-24 RX ORDER — LIDOCAINE HYDROCHLORIDE 10 MG/ML
0.5 INJECTION, SOLUTION EPIDURAL; INFILTRATION; INTRACAUDAL; PERINEURAL ONCE AS NEEDED
Status: DISCONTINUED | OUTPATIENT
Start: 2022-06-24 | End: 2022-06-24 | Stop reason: SDUPTHER

## 2022-06-24 RX ORDER — FENTANYL CITRATE 50 UG/ML
INJECTION, SOLUTION INTRAMUSCULAR; INTRAVENOUS AS NEEDED
Status: DISCONTINUED | OUTPATIENT
Start: 2022-06-24 | End: 2022-06-24 | Stop reason: SURG

## 2022-06-24 RX ORDER — MIDAZOLAM HYDROCHLORIDE 1 MG/ML
1 INJECTION INTRAMUSCULAR; INTRAVENOUS
Status: DISCONTINUED | OUTPATIENT
Start: 2022-06-24 | End: 2022-06-24 | Stop reason: HOSPADM

## 2022-06-24 RX ADMIN — ONDANSETRON 4 MG: 2 INJECTION INTRAMUSCULAR; INTRAVENOUS at 12:34

## 2022-06-24 RX ADMIN — SODIUM CHLORIDE, POTASSIUM CHLORIDE, SODIUM LACTATE AND CALCIUM CHLORIDE 1000 ML: 600; 310; 30; 20 INJECTION, SOLUTION INTRAVENOUS at 09:49

## 2022-06-24 RX ADMIN — DEXAMETHASONE SODIUM PHOSPHATE 12 MG: 4 INJECTION, SOLUTION INTRA-ARTICULAR; INTRALESIONAL; INTRAMUSCULAR; INTRAVENOUS; SOFT TISSUE at 12:34

## 2022-06-24 RX ADMIN — GLYCOPYRROLATE 0.4 MG: 0.2 INJECTION INTRAMUSCULAR; INTRAVENOUS at 12:44

## 2022-06-24 RX ADMIN — PROPOFOL 150 MG: 10 INJECTION, EMULSION INTRAVENOUS at 12:24

## 2022-06-24 RX ADMIN — Medication 3 MG: at 12:44

## 2022-06-24 RX ADMIN — MIDAZOLAM HYDROCHLORIDE 1 MG: 1 INJECTION, SOLUTION INTRAMUSCULAR; INTRAVENOUS at 11:33

## 2022-06-24 RX ADMIN — ROCURONIUM BROMIDE 20 MG: 10 SOLUTION INTRAVENOUS at 12:24

## 2022-06-24 RX ADMIN — FENTANYL CITRATE 100 MCG: 50 INJECTION, SOLUTION INTRAMUSCULAR; INTRAVENOUS at 12:21

## 2022-06-24 NOTE — OP NOTE
OPERATIVE NOTE  6/24/2022    NAME: Saul Alcantara Sr.    YOB: 1955  MRN: [id number]    PRE-OPERATIVE DIAGNOSIS:    Primary squamous cell carcinoma of tongue (HCC) [C02.9]  Hx of radiation therapy [Z92.3]  Laryngopharyngeal reflux [K21.9]  Pharyngoesophageal dysphagia [R13.14]    POST-OPERATIVE DIAGNOSIS:   Post-Op Diagnosis Codes:     * Primary squamous cell carcinoma of tongue (HCC) [C02.9]     * Hx of radiation therapy [Z92.3]     * Laryngopharyngeal reflux [K21.9]     * Pharyngoesophageal dysphagia [R13.14]    PROCEDURE PERFORMED:   Direct laryngoscopy with esophagoscopy and esophageal dilatation    SURGEON:   Wayne Richmond MD    ASSISTANT(S):   None    ANESTHESIA:   General Anesthesia via Endotracheal Tube    INDICATIONS: The patient is a 67 y.o. male with Primary squamous cell carcinoma of tongue (HCC) [C02.9]  Hx of radiation therapy [Z92.3]  Laryngopharyngeal reflux [K21.9]  Pharyngoesophageal dysphagia [R13.14]    PROCEDURE:  The patient was brought to the operating room, given General Anesthesia via Endotracheal Tube, and prepped and draped in the usual manner.     Direct laryngoscopy was performed and no masses, lesions, ulcerations or other abnormalities were noted throughout the upper aerodigestive tract examination.  Minimal edema both true vocal cords were appreciated but there were no lesions.  Rigid esophagoscopy was performed the small scope was not able to be passed to the cricopharyngeal opening.     Subsequently Johnson dilatation was performed with successive passage of Johnson dilators of 26 Croatian, 30 Croatian, 32 Croatian, 34 Croatian, 36 Croatian, 38 Croatian, 40 Croatian, 42 Croatian, 46 Croatian, 48 Croatian, 50 Croatian,  52 Croatian, 56 Croatian, and 58 Croatian.  The dilators were passed to 45 cm without difficulty.  No significant bleeding or other abnormalities were noted     The patient was transported upon extubation to the postanesthesia care unit in stable  condition.    SPECIMENS:  None    COMPLICATIONS: NONE    ESTIMATED BLOOD LOSS:  Minimal    Wayne Richmond MD  6/24/2022

## 2022-06-24 NOTE — ANESTHESIA PREPROCEDURE EVALUATION
Anesthesia Evaluation     Patient summary reviewed   no history of anesthetic complications:  NPO Solid Status: > 8 hours             Airway   Mallampati: I  TM distance: >3 FB  Neck ROM: full  Dental    (+) edentulous and upper dentures    Pulmonary    (+) a smoker Former, COPD,   (-) asthma, recent URI, sleep apnea  Cardiovascular   Exercise tolerance: good (4-7 METS)    ECG reviewed    (+) hypertension, hyperlipidemia,   (-) pacemaker, past MI, angina, cardiac stents      Neuro/Psych  (-) seizures, TIA, CVA  GI/Hepatic/Renal/Endo    (+)  GERD,  thyroid problem hypothyroidism  (-) liver disease, no renal disease, diabetes    ROS Comment: Esophageal strictures    Musculoskeletal     Abdominal    Substance History      OB/GYN          Other   arthritis,    history of cancer (SCC of tongue s/p radiation)      Other Comment: Xerostomia                    Anesthesia Plan    ASA 3     general     intravenous induction     Anesthetic plan, risks, benefits, and alternatives have been provided, discussed and informed consent has been obtained with: patient.

## 2022-06-24 NOTE — ANESTHESIA PROCEDURE NOTES
Airway  Urgency: elective    Date/Time: 6/24/2022 12:34 PM  Airway not difficult    General Information and Staff    Patient location during procedure: OR  CRNA/CAA: Joe Rand CRNA    Indications and Patient Condition  Indications for airway management: airway protection    Preoxygenated: yes  MILS maintained throughout  Mask difficulty assessment: 1 - vent by mask    Final Airway Details  Final airway type: endotracheal airway      Successful airway: ETT  Cuffed: yes   Successful intubation technique: direct laryngoscopy  Endotracheal tube insertion site: oral  Blade: Zamora  Blade size: 2  ETT size (mm): 7.0  Cormack-Lehane Classification: grade IIb - view of arytenoids or posterior of glottis only  Placement verified by: chest auscultation and capnometry   Cuff volume (mL): 5  Measured from: gums  ETT/EBT to gums (cm): 22  Number of attempts at approach: 1  Assessment: lips, teeth, and gum same as pre-op and atraumatic intubation

## 2022-06-26 ENCOUNTER — APPOINTMENT (OUTPATIENT)
Dept: CT IMAGING | Facility: HOSPITAL | Age: 67
End: 2022-06-26

## 2022-06-26 ENCOUNTER — HOSPITAL ENCOUNTER (EMERGENCY)
Facility: HOSPITAL | Age: 67
Discharge: LEFT BEFORE DISCHARGE | End: 2022-06-26
Attending: EMERGENCY MEDICINE | Admitting: EMERGENCY MEDICINE

## 2022-06-26 VITALS
TEMPERATURE: 98.5 F | SYSTOLIC BLOOD PRESSURE: 152 MMHG | HEART RATE: 105 BPM | BODY MASS INDEX: 18.55 KG/M2 | HEIGHT: 73 IN | WEIGHT: 140 LBS | DIASTOLIC BLOOD PRESSURE: 94 MMHG | OXYGEN SATURATION: 97 % | RESPIRATION RATE: 18 BRPM

## 2022-06-26 DIAGNOSIS — R13.10 DYSPHAGIA, UNSPECIFIED TYPE: Primary | ICD-10-CM

## 2022-06-26 PROCEDURE — 99281 EMR DPT VST MAYX REQ PHY/QHP: CPT

## 2022-06-26 RX ORDER — SODIUM CHLORIDE 0.9 % (FLUSH) 0.9 %
10 SYRINGE (ML) INJECTION AS NEEDED
Status: DISCONTINUED | OUTPATIENT
Start: 2022-06-26 | End: 2022-06-26 | Stop reason: HOSPADM

## 2022-06-28 ENCOUNTER — OFFICE VISIT (OUTPATIENT)
Dept: PULMONOLOGY | Facility: CLINIC | Age: 67
End: 2022-06-28

## 2022-06-28 VITALS
HEIGHT: 73 IN | SYSTOLIC BLOOD PRESSURE: 122 MMHG | HEART RATE: 98 BPM | DIASTOLIC BLOOD PRESSURE: 70 MMHG | WEIGHT: 141 LBS | BODY MASS INDEX: 18.69 KG/M2 | OXYGEN SATURATION: 97 %

## 2022-06-28 DIAGNOSIS — C02.9 PRIMARY SQUAMOUS CELL CARCINOMA OF TONGUE: ICD-10-CM

## 2022-06-28 DIAGNOSIS — J43.2 CENTRILOBULAR EMPHYSEMA: Primary | ICD-10-CM

## 2022-06-28 DIAGNOSIS — R91.8 MULTIPLE LUNG NODULES: ICD-10-CM

## 2022-06-28 PROCEDURE — 94664 DEMO&/EVAL PT USE INHALER: CPT | Performed by: INTERNAL MEDICINE

## 2022-06-28 PROCEDURE — 99204 OFFICE O/P NEW MOD 45 MIN: CPT | Performed by: INTERNAL MEDICINE

## 2022-06-28 RX ORDER — TIOTROPIUM BROMIDE AND OLODATEROL 3.124; 2.736 UG/1; UG/1
2 SPRAY, METERED RESPIRATORY (INHALATION)
Qty: 2 G | Refills: 0 | COMMUNITY
Start: 2022-06-28 | End: 2022-07-26 | Stop reason: SDUPTHER

## 2022-06-28 RX ORDER — RITUXIMAB-ABBS 10 MG/ML
INJECTION, SOLUTION INTRAVENOUS
COMMUNITY
Start: 2022-03-31 | End: 2023-03-02

## 2022-06-28 RX ORDER — GLYCOPYRROLATE 1 MG/1
0.5 TABLET ORAL 2 TIMES DAILY
COMMUNITY
Start: 2022-06-20

## 2022-06-28 NOTE — PROGRESS NOTES
Background:  Pt w cough, mild copd change on ct chest   Chief Complaint  Shortness of Breath    Subjective    History of Present Illness       Saul GEORGE Quinton Felix presents to [facility].  I am asked to see him for lung nodules and possibility of copd.  Shortness of breath has developed over the past 4 years, waxing and waning and he can do well on good days.  He feels one of his medicines may have caused it.  He had xrt, feeding tube, aspiration, pneumonia in 2014 in [facility] in [facility] when treated for cancer of tongue.  He lost all of his teeth.  He is breathing well again now, and can walk up 4 flights of steps.  He got an inhaler and only used it one time.  He has chronic dry mouth.  He also has had splenectomy and also prostate cancer.  He reports that every time he goes to the doctor they find a new cancer.He has had ct imaging at multiple locations identifying various degrees of nodularity.  He seeks recommendation on that as well.     has a past medical history of Allergic rhinitis, Anxiety, Arthritis, Cataract, Chronic sinusitis, COPD (chronic obstructive pulmonary disease) (HCC), COVID-19 vaccine series completed, Depression, Deviated nasal septum, Elevated cholesterol, Enlarged prostate, GERD (gastroesophageal reflux disease), H/O head and neck radiation (03/03/2017), History of transfusion, Hyperlipidemia, Hypertension, Hypothyroidism, Laryngopharyngeal reflux, Lymphoma (HCC), MRSA infection, Panic attacks, Peyronie disease, Pneumonia, Primary squamous cell carcinoma of tongue (HCC) (09/27/2016), Primary squamous cell carcinoma of tongue (HCC) (09/27/2016), Prostate cancer (HCC), Sicca syndrome (HCC), Sinusitis, acute, Squamous cell carcinoma, Tobacco use disorder, Tongue irritation, Visit for monitoring Rituxan therapy, and Xerostomia.   has a past surgical history that includes Squamous cell carcinoma excision (09/10/2014); Hand  surgery; Knee surgery; Other surgical history (09/10/2014); Tonsillectomy; Cataract extraction (Bilateral); Splenectomy, total; Esophagoscopy (N/A, 11/20/2019); Laryngoscopy (N/A, 6/26/2020); Laryngoscopy (N/A, 8/24/2020); Multiple tooth extractions; Panendoscopy (N/A, 1/22/2021); Laryngoscopy (N/A, 5/24/2021); Panendoscopy (N/A, 10/11/2021); Prostate Ultrasound Biopsy (N/A, 12/1/2021); Laryngoscopy (N/A, 12/1/2021); Laryngoscopy (N/A, 1/3/2022); Laryngoscopy (N/A, 4/29/2022); and Laryngoscopy (N/A, 6/24/2022).  family history includes Aneurysm in his brother; Cancer in his mother and sister; Diabetes in his sister; [place]'s disease in his father.   reports that he quit smoking about 17 months ago. His smoking use included cigarettes. He has a 22.00 pack-year smoking history. He has never used smokeless tobacco. He reports that he does not drink alcohol and does not use drugs.  No Known Allergies  Current Outpatient Medications   Medication Instructions   • albuterol (PROVENTIL) 2.5 mg, Nebulization, Every 4 Hours PRN   • ALPRAZolam (XANAX) 2 mg, Oral, 3 Times Daily   • amitriptyline (ELAVIL) 25 mg, Oral, Every Night at Bedtime   • amLODIPine (NORVASC) 10 mg, Oral, Daily   • Azelastine-Fluticasone 137-50 MCG/ACT suspension Nasal, As Needed   • chlorhexidine (Peridex) 0.12 % solution 15 mL, Mouth/Throat, 2 Times Daily   • chlorhexidine (Peridex) 0.12 % solution 15 mL, Mouth/Throat, 2 Times Daily   • cyclobenzaprine (FLEXERIL) 10 MG tablet As Needed   • ferrous sulfate 325 (65 Fe) MG tablet Oral   • glycopyrrolate (ROBINUL) 0.5 mg, Oral, 2 Times Daily   • HYDROcodone-acetaminophen (NORCO)  MG per tablet 1 tablet, Oral, Every 6 Hours PRN   • hydrOXYzine (ATARAX) 25 mg, Oral, 3 Times Daily PRN   • levothyroxine (SYNTHROID, LEVOTHROID) 100 mcg, Oral, Daily   • Lidocaine Viscous HCl (XYLOCAINE) 2 % solution 5 mL, Oral, As Needed   • meloxicam (MOBIC) 15 mg, Oral, Daily   • nystatin (MYCOSTATIN) 300959 UNIT/ML  "suspension Take 10ml by mouth BID for 28 days   • ondansetron (ZOFRAN) 8 mg, Oral, Every 8 Hours PRN   • pantoprazole (PROTONIX) 40 mg, Oral, Daily, Delayed Release (DR/EC)Take 1 tablet (40 mg) by oral route 2 times per day,Take 30 minutes prior to breakfast and evening meal    • senna-docusate (PERICOLACE) 8.6-50 MG per tablet Take 1 tablet by mouth 2 times daily   • sertraline (ZOLOFT) 100 mg, Oral, Every Night at Bedtime   • simvastatin (ZOCOR) 20 MG tablet TK 1 T PO QD   • tadalafil (CIALIS) 20 mg, Oral, As Needed, 1-24 hours before activity   • tamsulosin (FLOMAX) 0.4 mg, Oral, Daily   • tiotropium bromide-olodaterol (Stiolto Respimat) 2.5-2.5 MCG/ACT aerosol solution inhaler 2 puffs, Inhalation, Daily - RT   • triamcinolone (KENALOG) 0.5 % ointment Topical, 2 Times Daily, Apply to affected ear   • Truxima 100 MG/10ML solution injection No dose, route, or frequency recorded.      Objective     Vital Signs:   /70   Pulse 98   Ht 185.4 cm (73\")   Wt 64 kg (141 lb)   SpO2 97% Comment: RA  BMI 18.60 kg/m²   Physical Exam  Constitutional:       Appearance: Normal appearance. He is not ill-appearing or diaphoretic.   Eyes:      Extraocular Movements: Extraocular movements intact.   Pulmonary:      Effort: Pulmonary effort is normal. No respiratory distress.      Breath sounds: No wheezing, rhonchi or rales.   Skin:     Findings: No erythema or rash.   Neurological:      Mental Status: He is alert.        Result Review  Data Reviewed:{ Labs  Result Review  Imaging  Med Tab  Media :23}     CT HEAD WO CONTRAST    Result Date: 6/11/2022  Impression: 1. No acute intracranial abnormality. Recommendation: Follow up as clinically indicated. All CT scans at this facility utilize dose modulation, iterative reconstruction, and/or weight based dosing when appropriate to reduce radiation dose to as low as reasonably achievable. Electronically Signed by GEMA DUQUE MD, SA CERTIFIED at 11-Jun-2022 02:07:48 PM     "         XR CHEST PORTABLE    Result Date: 6/11/2022  Impression: COPD. No acute pulmonary process. Recommendation: Follow up as clinically indicated. Electronically Signed by GEMA DUQUE MD, [facility] CERTIFIED at 11-Jun-2022 01:43:36 PM             CTA PULMONARY W CONTRAST    Result Date: 6/2/2022  Impression: 1.  No evidence of pulmonary embolism.  No acute abnormality. 2.  Left upper lobe and right lower lobe tiny nodule as described. 3.  Mild COPD change. Recommendation: Follow up as clinically indicated. All CT scans at this facility utilize dose modulation, iterative reconstruction, and/or weight based dosing when appropriate to reduce radiation dose to as low as reasonably achievable. Electronically Signed by ROHAN RUSSO MD at 02-Jun-2022 10:03:32 AM             XR CHEST PORTABLE    Result Date: 6/2/2022  Impression: Unremarkable study. Recommendation:  Follow up as clinically indicated. Electronically Signed by ROHAN RUSSO MD at 02-Jun-2022 08:59:00 AM               Exam: CTA OF THE CHEST WITH CONTRAST   Clinical data: Short of air. History of cancer.   Technique: Axial CT angiography images through the lungs were acquired with contrast and imaged using soft tissue and lung algorithms. Coronal, sagittal, and 3D volume reconstructions were performed. Reformatted/3D-MPR images were performed. Radiation   dose: CTDIvol = mGy, DLP = mGy x cm.   Prior studies: Radiograph of the chest dated 06/02/22.   Findings:   Lungs: Left upper lobe 4 mm and right lower lobe 5 mm tiny nodule.  Lingula a few small fibrotic parenchymal change.  Right upper lobe peripheral there is small focal groundglass opacities noted.  Bilateral mild hyper inflation of parenchyma with mild   centrilobular emphysema is present.  No pulmonary consolidation identified. No pleural effusions identified. No pneumothorax. The airway is clear.   Soft Tissues: No mediastinal, axillary or supraclavicular adenopathy isidentified.   Vascular: No filling  defect within the pulmonary arteries to the segmental branch level. Unremarkable aorta. Grossly unremarkable sized heart. No definite abnormality seen on 3D reformatted images.   Bony structures: No acute or destructive abnormality   Upper Abdomen: Limited visualization of the solid upper abdominal organs is grossly unremarkable.   IMPRESSION:   1.  No evidence of pulmonary embolism.  No acute abnormality.   2.  Left upper lobe and right lower lobe tiny nodule as described.   3.  Mild COPD change.   Recommendation: Follow up as clinically indicated.   All CT scans at this facility utilize dose modulation, iterative reconstruction, and/or weight based dosing when appropriate to reduce radiation dose to as low as reasonably achievable.   Electronically Signed by ROHAN RUSSO MD at 02-Jun-2022 10:03:32 AM            My interpretation of radiograph: ground glass density 6/2 Debbie film (L) unchanged from 1/5/22 Anabaptist film (R).  Additional irregular nodules on the right side of the Anabaptist film are resolved on the Debbie film             Assessment and Plan {CC Problem List  Visit Diagnosis  ROS  Review (Popup)  Health Maintenance  Quality  BestPractice  Medications  SmartSets  SnapShot Encounters  Media :23}   Diagnoses and all orders for this visit:    1. Centrilobular emphysema (HCC) (Primary)  -     tiotropium bromide-olodaterol (Stiolto Respimat) 2.5-2.5 MCG/ACT aerosol solution inhaler; Inhale 2 puffs Daily for 30 days.  Dispense: 2 g; Refill: 0    2. Multiple lung nodules  -     tiotropium bromide-olodaterol (Stiolto Respimat) 2.5-2.5 MCG/ACT aerosol solution inhaler; Inhale 2 puffs Daily for 30 days.  Dispense: 2 g; Refill: 0  -     CT Chest Without Contrast Diagnostic; Future    3. Primary squamous cell carcinoma of tongue (HCC)    recommend adding guaifenesin  We discussed the results of ct scans; it appears the right sided nodules are resolved other than the ground glass opacity, and there are  2 new nodules on the left.  Pt is is at elevated risk for malignancy given previous dx tongue cancer.  Will check PFT to determine pulmonary physiology related to emphysema described on ct  Trial of stiolto to see if this affects the intermittent dyspnea.  Pt was provided sample of stiolto inhaler, and we demonstrated proper technique for inhaler use  Follow up later in year.  Will plan ct after that in 6 months.    Follow Up {Instructions Charge Capture  Follow-up Communications :23}   Return in about 4 months (around 10/28/2022).  Patient was given instructions and counseling regarding his condition or for health maintenance advice. Please see specific information pulled into the AVS if appropriate.    Electronically signed by Jaya Glez MD, 6/28/2022, 17:23 CDT

## 2022-07-07 ENCOUNTER — PATIENT ROUNDING (BHMG ONLY) (OUTPATIENT)
Dept: PULMONOLOGY | Facility: CLINIC | Age: 67
End: 2022-07-07

## 2022-07-07 NOTE — PROGRESS NOTES
July 7, 2022    Hello, may I speak with Saul Alcantara Sr.?    My name is Leda Steinberg    I am  with [facility]IRATORY [facility]  [address]  [facility] [address] [phone].    Before we get started may I verify your date of birth? 1955    I am calling to officially welcome you to our practice and ask about your recent visit. Is this a good time to talk? yes    Tell me about your visit with us. What things went well?  Everyone was very nice and polite. Good provider to go to.  Wait time was fine.       We're always looking for ways to make our patients' experiences even better. Do you have recommendations on ways we may improve?  no    Overall were you satisfied with your first visit to our practice? yes       I appreciate you taking the time to speak with me today. Is there anything else I can do for you? no      Thank you, and have a great day.

## 2022-07-18 ENCOUNTER — LAB (OUTPATIENT)
Dept: LAB | Facility: HOSPITAL | Age: 67
End: 2022-07-18

## 2022-07-18 DIAGNOSIS — Z01.818 PREOP TESTING: ICD-10-CM

## 2022-07-18 LAB — SARS-COV-2 ORF1AB RESP QL NAA+PROBE: NOT DETECTED

## 2022-07-18 PROCEDURE — U0004 COV-19 TEST NON-CDC HGH THRU: HCPCS

## 2022-07-18 PROCEDURE — C9803 HOPD COVID-19 SPEC COLLECT: HCPCS

## 2022-07-18 PROCEDURE — U0005 INFEC AGEN DETEC AMPLI PROBE: HCPCS

## 2022-07-20 ENCOUNTER — OFFICE VISIT (OUTPATIENT)
Dept: PULMONOLOGY | Facility: CLINIC | Age: 67
End: 2022-07-20

## 2022-07-20 DIAGNOSIS — J43.2 CENTRILOBULAR EMPHYSEMA: Primary | ICD-10-CM

## 2022-07-20 DIAGNOSIS — Z01.818 PRE-OPERATIVE EXAMINATION: Primary | ICD-10-CM

## 2022-07-20 PROCEDURE — 94729 DIFFUSING CAPACITY: CPT | Performed by: INTERNAL MEDICINE

## 2022-07-20 PROCEDURE — 94727 GAS DIL/WSHOT DETER LNG VOL: CPT | Performed by: INTERNAL MEDICINE

## 2022-07-20 PROCEDURE — 94010 BREATHING CAPACITY TEST: CPT | Performed by: INTERNAL MEDICINE

## 2022-07-22 ENCOUNTER — LAB (OUTPATIENT)
Dept: LAB | Facility: HOSPITAL | Age: 67
End: 2022-07-22

## 2022-07-22 DIAGNOSIS — Z01.818 PRE-OPERATIVE EXAMINATION: ICD-10-CM

## 2022-07-22 LAB — SARS-COV-2 ORF1AB RESP QL NAA+PROBE: NOT DETECTED

## 2022-07-22 PROCEDURE — U0005 INFEC AGEN DETEC AMPLI PROBE: HCPCS

## 2022-07-22 PROCEDURE — U0004 COV-19 TEST NON-CDC HGH THRU: HCPCS

## 2022-07-22 PROCEDURE — C9803 HOPD COVID-19 SPEC COLLECT: HCPCS

## 2022-07-26 ENCOUNTER — OFFICE VISIT (OUTPATIENT)
Dept: OTOLARYNGOLOGY | Facility: CLINIC | Age: 67
End: 2022-07-26

## 2022-07-26 VITALS
DIASTOLIC BLOOD PRESSURE: 76 MMHG | SYSTOLIC BLOOD PRESSURE: 122 MMHG | TEMPERATURE: 98.7 F | HEART RATE: 105 BPM | RESPIRATION RATE: 16 BRPM | HEIGHT: 73 IN | BODY MASS INDEX: 18.29 KG/M2 | WEIGHT: 138 LBS

## 2022-07-26 DIAGNOSIS — R91.8 MULTIPLE LUNG NODULES: ICD-10-CM

## 2022-07-26 DIAGNOSIS — K21.9 LARYNGOPHARYNGEAL REFLUX: ICD-10-CM

## 2022-07-26 DIAGNOSIS — R05.8 PRODUCTIVE COUGH: ICD-10-CM

## 2022-07-26 DIAGNOSIS — Z92.3 HX OF RADIATION THERAPY: ICD-10-CM

## 2022-07-26 DIAGNOSIS — R06.2 WHEEZING: ICD-10-CM

## 2022-07-26 DIAGNOSIS — C02.9 PRIMARY SQUAMOUS CELL CARCINOMA OF TONGUE: Primary | ICD-10-CM

## 2022-07-26 DIAGNOSIS — R13.14 PHARYNGOESOPHAGEAL DYSPHAGIA: ICD-10-CM

## 2022-07-26 DIAGNOSIS — J43.2 CENTRILOBULAR EMPHYSEMA: ICD-10-CM

## 2022-07-26 PROCEDURE — 31575 DIAGNOSTIC LARYNGOSCOPY: CPT | Performed by: OTOLARYNGOLOGY

## 2022-07-26 PROCEDURE — 99024 POSTOP FOLLOW-UP VISIT: CPT | Performed by: OTOLARYNGOLOGY

## 2022-07-26 NOTE — PROGRESS NOTES
YOB: 1955  Location: [facility]  Location Address: [address] [place] [address], [facility], [address] [place] [address]  Location Phone: [phone]    Chief Complaint   Patient presents with   • Squamous Cell Carcinoma     tongue       History of Present Illness  Saul Alcantara Sr. is a 66 y.o. male.  Saul Alcantara Sr. is status post Direct laryngoscopy with esophageal dilatation on 22.  He had a EGD with biopsy and dilation on 22 by Dr. Newton Richmond. Patient had DL and biopsy of base of tongue on 9/10/14 with positive pathology for SCCa. The cancer was staged T1M0N0. The patient has a history of radiation therapy due to a base of tongue cancer on the left side that he finished in 2014. He is undergoing chemo until December. Patient has been diagnosed as anemic and is currently on iron. Patient has no complaints of dysphagia today but feels his food gets stuck at tracheal area. Patient has had recent pulmonary function test.     Procedures            Show:Clear all  [x]Manual[x]Template[]Copied    Added by:  [x]Graciela Flores, RRT[x]Jaya Glez MD      [][facility] for details    Pulmonary Function Test  Performed by: Graciela Flores RRT  Authorized by: Jaya Glez MD       Pre Drug % Predicted    FVC: 88%   FEV1: 72%   FEF 25-75%: 55%   FEV1/FVC: 62%   T%   RV: 184%   DLCO: 95%   D/VAsb: 98%     Interpretation   Spirometry   Spirometry shows moderate obstruction. There is reduced midflow suggesting small airway/airflow obstruction.   Review of FVL curve   Patient's effort is normal.   Lung Volume Measurements  Measurements show elevated residual volume consistent with gas trapping.   Diffusion Capacity  The patient's diffusion capacity is normal.  Diffusion capacity is normal when corrected for alveolar volume.                  Past Medical History:   Diagnosis Date   • Allergic rhinitis    • Anxiety    • Arthritis    • Cataract    • Chronic sinusitis    • COPD  (chronic obstructive pulmonary disease) (HCC)    • COVID-19 vaccine series completed     Moderna   • Depression    • Deviated nasal septum    • Elevated cholesterol    • Enlarged prostate    • GERD (gastroesophageal reflux disease)    • H/O head and neck radiation 03/03/2017   • History of transfusion    • Hyperlipidemia    • Hypertension    • Hypothyroidism    • Laryngopharyngeal reflux    • Lymphoma (HCC)     Non-Hodgkins   • MRSA infection    • Panic attacks    • Peyronie disease    • Pneumonia    • 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    T1N0M0 SCC S/P XRT/Chemo 12/2014   • 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    T1N0M0 SCC S/P XRT/Chemo 12/2014   • Prostate cancer (HCC)    • Sicca syndrome (HCC)    • Sinusitis, acute    • Squamous cell carcinoma     Base of Tongue   • Tobacco use disorder    • Tongue irritation    • Visit for monitoring Rituxan therapy     pt getting treatments at [facility] in [place]   • Xerostomia        Past Surgical History:   Procedure Laterality Date   • CATARACT EXTRACTION Bilateral    • ESOPHAGOSCOPY N/A 11/20/2019    Procedure: Direct laryngoscopy with esophageal Johnson dilation;  Surgeon: Wayne Richmond MD;  Location: [facility] OR;  Service: ENT   • HAND SURGERY      metal plate    • KNEE SURGERY     • LARYNGOSCOPY N/A 6/26/2020    Procedure: Rigid esophagoscopy Johnson esophageal dilatation;  Surgeon: Wayne Richmond MD;  Location: [facility] OR;  Service: ENT;  Laterality: N/A;   • LARYNGOSCOPY N/A 8/24/2020    Procedure: MICRODIRECT LARYNGOSCOPY with esophageal dilatation;  Surgeon: Wayne Richmond MD;  Location: [facility] OR;  Service: ENT;  Laterality: N/A;   • LARYNGOSCOPY N/A 5/24/2021    Procedure: Direct laryngoscopy, esophagoscopy with Johnson dilatation;  Surgeon: Wayne Richmond MD;  Location: [facility] OR;  Service: ENT;  Laterality: N/A;   • LARYNGOSCOPY N/A 12/1/2021    Procedure: Direct laryngoscopy,  esophagoscopy with esophageal dilatation (Johnson dilators);  Surgeon: Wayne Richmond MD;  Location:  [facility] OR;  Service: ENT;  Laterality: N/A;   • LARYNGOSCOPY N/A 1/3/2022    Procedure: Direct laryngoscopy, esophagoscopy with Johnson esophageal dilation;  Surgeon: Wayne Richmond MD;  Location:  [facility] OR;  Service: ENT;  Laterality: N/A;   • LARYNGOSCOPY N/A 4/29/2022    Procedure: Direct laryngoscopy with esophageal dilatation;  Surgeon: Wayne Richmond MD;  Location:  [facility] OR;  Service: ENT;  Laterality: N/A;   • LARYNGOSCOPY N/A 6/24/2022    Procedure: MicroDirect laryngoscopy, esophagoscopy with Johnson dilation;  Surgeon: Wayne Richmond MD;  Location:  [facility] OR;  Service: ENT;  Laterality: N/A;   • MULTIPLE TOOTH EXTRACTIONS      CONTINUING TO HAVE BONE REMOVED 1/2021   • OTHER SURGICAL HISTORY  09/10/2014    Microlaryngoscopy with Biopsy - Base of Tongue   • PANENDOSCOPY N/A 1/22/2021    Procedure: DIRECT LARYNGOSCOPY AND ESOPHAGOSCOPY WITH ESOPHAGEAL DILATION;  Surgeon: Wayne Richmond MD;  Location:  [facility] OR;  Service: ENT;  Laterality: N/A;   • PANENDOSCOPY N/A 10/11/2021    Procedure: Direct laryngoscopy, esophagoscopy with Johnson dilatation;  Surgeon: Wayne Richmond MD;  Location:  [facility] OR;  Service: ENT;  Laterality: N/A;   • PROSTATE ULTRASOUND BIOPSY N/A 12/1/2021    Procedure: PROSTATE ULTRASOUND BIOPSY. NOT A URONAV;  Surgeon: Oscar Bazan MD;  Location:  [facility] OR;  Service: Urology;  Laterality: N/A;   • SPLENECTOMY     • SQUAMOUS CELL CARCINOMA EXCISION  09/10/2014    Tongue   • TONSILLECTOMY         Outpatient Medications Marked as Taking for the 7/26/22 encounter (Office Visit) with Wayne Richmond MD   Medication Sig Dispense Refill   • albuterol (PROVENTIL) (2.5 MG/3ML) 0.083% nebulizer solution Take 2.5 mg by nebulization Every 4 (Four) Hours As Needed for Wheezing. 50 mL 1   • ALPRAZolam (XANAX) 2 MG tablet Take 2 mg by mouth 3 (Three) Times a  Day.     • amitriptyline (ELAVIL) 25 MG tablet Take 25 mg by mouth every night at bedtime.     • amLODIPine (NORVASC) 10 MG tablet Take 10 mg by mouth Daily.     • Azelastine-Fluticasone 137-50 MCG/ACT suspension into the nostril(s) as directed by provider As Needed.     • chlorhexidine (Peridex) 0.12 % solution Apply 15 mL to the mouth or throat 2 (Two) Times a Day. 240 mL 0   • cyclobenzaprine (FLEXERIL) 10 MG tablet As Needed.     • ferrous sulfate 325 (65 Fe) MG tablet Take  by mouth.     • glycopyrrolate (ROBINUL) 1 MG tablet Take 0.5 mg by mouth 2 (Two) Times a Day.     • HYDROcodone-acetaminophen (NORCO)  MG per tablet Take 1 tablet by mouth Every 6 (Six) Hours As Needed for Moderate Pain  (PER DR RUIZ).     • hydrOXYzine (ATARAX) 25 MG tablet Take 25 mg by mouth 3 (Three) Times a Day As Needed for Itching.     • levothyroxine (SYNTHROID, LEVOTHROID) 100 MCG tablet Take 100 mcg by mouth Daily.     • Lidocaine Viscous HCl (XYLOCAINE) 2 % solution Take 5 mL by mouth As Needed for Mild Pain .     • meloxicam (MOBIC) 15 MG tablet Take 15 mg by mouth Daily.     • nystatin (MYCOSTATIN) 204432 UNIT/ML suspension Take 10ml by mouth BID for 28 days (Patient taking differently: Take 200,000 Units by mouth 4 (Four) Times a Day As Needed. Take 10ml by mouth BID for 28 days) 480 mL 2   • ondansetron (ZOFRAN) 8 MG tablet Take 8 mg by mouth Every 8 (Eight) Hours As Needed for Nausea.     • pantoprazole (PROTONIX) 40 MG EC tablet Take 40 mg by mouth Daily. Delayed Release (DR/EC)  Take 1 tablet (40 mg) by oral route 2 times per day,  Take 30 minutes prior to breakfast and evening meal     • senna-docusate (PERICOLACE) 8.6-50 MG per tablet Take 1 tablet by mouth 2 times daily     • sertraline (ZOLOFT) 100 MG tablet Take 100 mg by mouth every night at bedtime.     • simvastatin (ZOCOR) 20 MG tablet TK 1 T PO QD  11   • tadalafil (CIALIS) 20 MG tablet Take 1 tablet by mouth As Needed for Erectile Dysfunction for up to  360 days. 1-24 hours before activity (Patient taking differently: Take 20 mg by mouth As Needed for Erectile Dysfunction. - TOLD NOT TO TAKE @ LEAST 24 HOURS BEFORE DOS-1/3/22 -  1-24 hours before activity) 12 tablet 11   • tamsulosin (FLOMAX) 0.4 MG capsule 24 hr capsule Take 0.4 mg by mouth Daily.     • tiotropium bromide-olodaterol (Stiolto Respimat) 2.5-2.5 MCG/ACT aerosol solution inhaler Inhale 2 puffs Daily for 30 days. 2 g 0   • triamcinolone (KENALOG) 0.5 % ointment Apply  topically to the appropriate area as directed 2 (Two) Times a Day. Apply to affected ear 15 g 3   • Truxima 100 MG/10ML solution injection          Patient has no known allergies.    Family History   Problem Relation Age of Onset   • Cancer Mother    • [place]'s disease Father    • Diabetes Sister    • Cancer Sister    • Aneurysm Brother        Social History     Socioeconomic History   • Marital status:    Tobacco Use   • Smoking status: Former Smoker     Packs/day: 0.50     Years: 44.00     Pack years: 22.00     Types: Cigarettes     Quit date:      Years since quittin.5   • Smokeless tobacco: Never Used   • Tobacco comment: pack last couple of weeks   Vaping Use   • Vaping Use: Never used   Substance and Sexual Activity   • Alcohol use: No   • Drug use: No   • Sexual activity: Defer     Partners: Female       Review of Systems   Constitutional: Negative.    HENT: Positive for trouble swallowing and voice change.    Eyes: Negative.    Respiratory: Negative.    Cardiovascular: Negative.    Gastrointestinal: Negative.    Endocrine: Negative.    Genitourinary: Negative.    Musculoskeletal: Negative.    Skin: Negative.    Allergic/Immunologic: Negative.    Neurological: Negative.    Hematological: Negative.    Psychiatric/Behavioral: Negative.        Vitals:    22 1458   BP: 122/76   Pulse: 105   Resp: 16   Temp: 98.7 °F (37.1 °C)       Body mass index is 18.21 kg/m².    Objective     Physical Exam  CONSTITUTIONAL:  well nourished, well-developed, alert, oriented, in no acute distress     COMMUNICATION AND VOICE: able to communicate normally, normal voice quality    HEAD: normocephalic, no lesions, atraumatic, no tenderness, no masses     FACE: appearance normal, no lesions, no tenderness, no deformities, facial motion symmetric    EYES: ocular motility normal, eyelids normal, orbits normal, no proptosis, conjunctiva normal , pupils equal, round     EARS:  Hearing: hearing to conversational voice intact bilaterally   External Ears: normal bilaterally, no lesions  TMs-clear and intact TMs bilaterally    NOSE:  External Nose: external nasal structure normal, no tenderness on palpation, no nasal discharge, no lesions, no evidence of trauma, nostrils patent     ORAL:  Lips: upper and lower lips without lesion   OC/OP-mild geographic some noted but no masses or lesions by visualization palpation    NECK:  Inspection and Palpation: neck appearance normal, no masses or tenderness-mild firmness  Mild postradiation changes noted    CHEST/RESPIRATORY: normal respiratory effort     CARDIOVASCULAR: no cyanosis or edema     NEUROLOGICAL/PSYCHIATRIC: oriented to time, place and person, mood normal, affect appropriate, CN II-XII intact grossly    Assessment & Plan   Diagnoses and all orders for this visit:    1. Primary squamous cell carcinoma of tongue (HCC) (Primary)    2. Pharyngoesophageal dysphagia    3. Laryngopharyngeal reflux    4. Hx of radiation therapy      * Surgery not found *  No orders of the defined types were placed in this encounter.    Return in about 3 months (around 10/26/2022).       Patient Instructions   We will discuss with Dr. Bazan whether or not biopsy is going to be performed on the prostate and consider direct laryngoscopy with esophageal dilatation simultaneously have this would be an option    Otherwise follow-up in 3 months    Be aware of the signs and symptoms of recurrent head and neck cancer including neck  mass, persistent or recurrent sore throat, ear pain, hemoptysis, weight loss and hoarseness. If any of these symptoms recur and or persist, call for an evaluation.     D/W patient increasing caloric intake and discussed cruciferous vegetables and protein shakes etc to maintain optimal nutrition.  The necessity of abstaining from tobacco products was also discussed particularly with respect to not smoking.    Continue F/U and/or treatment with Jaydon

## 2022-07-26 NOTE — PROGRESS NOTES
OPERATIVE NOTE:  Saul Alcantara Sr.    DATE OF PROCEDURE: 7/26/2022    PROCEDURE:   Flexible Fiberoptic Laryngoscopy    ANESTHESIA:  None    REASON FOR PROCEDURE:  Procedure was recommended for persistant hoarseness, re-evaluation of a lesion previously documented and suspicious clinical behavior  Risks, benefits and alternatives were discussed.      DETAILS of OPERATION:  The patient was seated in the exam chair.  A flexible fiberoptic laryngoscopy was performed through the oral cavity.  The scope was introduced into the oral cavity and directed to the level of the glottis, examining the structures of the oropharynx, base of tongue, vallecula, supraglottic larynx, glottic larynx, and hypopharynx.      FINDINGS:  Mucosal surfaces:   The mucosal surfaces demonstrated normal mucosa surfaces with moderate inflammation    Base of tongue:  The base of tongue was found to have no mass or lesion.  Epiglottis:  The epiglottis was found to have no mass or lesion.    Aryepiglottic fold:  The AE folds were found to have no mass or lesion.    False Vocal Fold:  The false cords were found to have no mass or lesion.    True Vocal Cord:  The true vocal cords were found to have no mass or lesion. Both true vocal cords adduct and abduct normally    Arytenoid:   The arytenoids were found to have mild to moderate inter-arytenoid edema.    Hypopharynx:  The hypopharynx was found to have no mass or lesion.    The patient tolerated procedure well.

## 2022-07-26 NOTE — PATIENT INSTRUCTIONS
We will discuss with Dr. Bazan whether or not biopsy is going to be performed on the prostate and consider direct laryngoscopy with esophageal dilatation simultaneously have this would be an option    Otherwise follow-up in 3 months    Be aware of the signs and symptoms of recurrent head and neck cancer including neck mass, persistent or recurrent sore throat, ear pain, hemoptysis, weight loss and hoarseness. If any of these symptoms recur and or persist, call for an evaluation.     D/W patient increasing caloric intake and discussed cruciferous vegetables and protein shakes etc to maintain optimal nutrition.  The necessity of abstaining from tobacco products was also discussed particularly with respect to not smoking.    Continue F/U and/or treatment with [facility]

## 2022-07-26 NOTE — PROGRESS NOTES
PFT Test results relayed to patient.  Patient voiced understanding. He also stated that he would like an inhaler called in.  He is going to check with his insurance and see what's covered.  His albuterol soln is also .  When he calls back with the inhaler information he will remind us to also call in albuterol.

## 2022-07-26 NOTE — TELEPHONE ENCOUNTER
Patient called to request refills on Stiolto and Albuterol nebulizer solution. He thinks the Stiolto is helping his breathing.   Rx Refill Note  Requested Prescriptions     Pending Prescriptions Disp Refills   • albuterol (PROVENTIL) (2.5 MG/3ML) 0.083% nebulizer solution 120 mL 5     Sig: Take 2.5 mg by nebulization Every 4 (Four) Hours As Needed for Wheezing.      Last office visit with prescribing clinician: 6/28/2022      Next office visit with prescribing clinician: 11/1/2022            Leonardo Fowler CMA  07/26/22, 15:17 CDT

## 2022-07-27 RX ORDER — TIOTROPIUM BROMIDE AND OLODATEROL 3.124; 2.736 UG/1; UG/1
2 SPRAY, METERED RESPIRATORY (INHALATION)
Qty: 4 G | Refills: 11 | Status: SHIPPED | OUTPATIENT
Start: 2022-07-27

## 2022-07-27 RX ORDER — ALBUTEROL SULFATE 2.5 MG/3ML
2.5 SOLUTION RESPIRATORY (INHALATION) EVERY 4 HOURS PRN
Qty: 120 ML | Refills: 5 | Status: SHIPPED | OUTPATIENT
Start: 2022-07-27 | End: 2022-07-28 | Stop reason: SDUPTHER

## 2022-07-28 ENCOUNTER — TELEPHONE (OUTPATIENT)
Dept: OTOLARYNGOLOGY | Facility: CLINIC | Age: 67
End: 2022-07-28

## 2022-07-28 DIAGNOSIS — J43.2 CENTRILOBULAR EMPHYSEMA: Primary | ICD-10-CM

## 2022-07-28 DIAGNOSIS — R05.8 PRODUCTIVE COUGH: ICD-10-CM

## 2022-07-28 DIAGNOSIS — R06.2 WHEEZING: ICD-10-CM

## 2022-07-28 RX ORDER — ALBUTEROL SULFATE 2.5 MG/3ML
2.5 SOLUTION RESPIRATORY (INHALATION) EVERY 4 HOURS PRN
Qty: 120 ML | Refills: 5 | Status: SHIPPED | OUTPATIENT
Start: 2022-07-28

## 2022-07-28 RX ORDER — ALBUTEROL SULFATE 2.5 MG/3ML
2.5 SOLUTION RESPIRATORY (INHALATION) EVERY 4 HOURS PRN
Qty: 120 ML | Refills: 5 | Status: SHIPPED | OUTPATIENT
Start: 2022-07-28 | End: 2022-07-28 | Stop reason: SDUPTHER

## 2022-07-28 NOTE — TELEPHONE ENCOUNTER
----- Message from Tonya Guillen sent at 7/28/2022  9:27 AM CDT -----  I will have to speak with dr nunn. This patient [place]. He doesn't have any appointments scheduled with us at this time.   ----- Message -----  From: Liane Foreman MA  Sent: 7/27/2022   4:01 PM CDT  To: Tonya Guillen      ----- Message -----  From: Lisa Michelle RN  Sent: 7/27/2022   2:14 PM CDT  To: [facility] Clinical Pool    I need to know if Dr. Nunn is planning on a prostate biopsy on this patient. Dr. Richmond was needing to do direct laryngoscopy with esophageal dilatation at same time if possible. I did not see anything in the computer. Thank you for your help!

## 2022-07-28 NOTE — TELEPHONE ENCOUNTER
Insurance needs a diagnosis that will pay for the albuterol nebulizer solution. Added the diagnosis of emphysema per patient's chart, to the prescription.

## 2022-07-28 NOTE — TELEPHONE ENCOUNTER
Per insurance they can't see the emphysema diagnosis. Will type in the notes and resend to the pharmacy.

## 2022-08-22 ENCOUNTER — TELEPHONE (OUTPATIENT)
Dept: UROLOGY | Facility: CLINIC | Age: 67
End: 2022-08-22

## 2022-08-22 NOTE — TELEPHONE ENCOUNTER
Caller: ALYSSIA WOODY    Relationship to patient: WIFE    Best call back number: [phone]    Patient is needing: WIFE IS STATING THAT HER  HAS LOT A LOT OF WEIGHT AND VERY FAST SINCE HE HAS HAD HIS PROSTATE PROCEDURE, SHE IS WANTING TO KNOW IF THIS IS NORMAL. SHE IS REQUESTING A CALL BACK ASAP.

## 2022-08-24 ENCOUNTER — TELEPHONE (OUTPATIENT)
Dept: GASTROENTEROLOGY | Age: 67
End: 2022-08-24

## 2022-08-24 NOTE — TELEPHONE ENCOUNTER
Wife  is calling   for status of referral from Dr. Jose Park office. Did not have her on [facility] to discuss. She's going to call pt. To see if he can call us. Please return call to update Patient on status. The best time to call is  Anytime    Thank you!

## 2022-09-02 NOTE — PROGRESS NOTES
Chief Complaint  Prostate cancer    Subjective          Saul Pozomarie Felix presents to [facility] presents today with history of adenocarcinoma the prostate.  He is currently on active surveillance for favorable intermediate risk disease T1c [place] 3+4=7.  He also has a history of lymphoma and HPV associated tongue cancer.  His lymphoma was initially diagnosed in 2004 but he has had subsequent recurrences in 2011 and 2018.  He is currently being managed by his oncologist in [facility].  He continues to have difficulty losing weight.  He is apparently having some swallowing difficulty which sounds as though it is related to esophageal stricture which periodically gets dilated.  He does have an upcoming appointment with Dr. Richmond at [facility].  He is still undergoing chemotherapy.  He is complaining of pelvic pain in the inguinal and perineal region.        Current Outpatient Medications:   •  albuterol (PROVENTIL) (2.5 MG/3ML) 0.083% nebulizer solution, Take 2.5 mg by nebulization Every 4 (Four) Hours As Needed for Wheezing., Disp: 120 mL, Rfl: 5  •  ALPRAZolam (XANAX) 2 MG tablet, Take 2 mg by mouth 3 (Three) Times a Day., Disp: , Rfl:   •  amitriptyline (ELAVIL) 25 MG tablet, Take 25 mg by mouth 2 (Two) Times a Day., Disp: , Rfl:   •  amLODIPine (NORVASC) 10 MG tablet, Take 10 mg by mouth Daily., Disp: , Rfl:   •  Azelastine-Fluticasone 137-50 MCG/ACT suspension, into the nostril(s) as directed by provider As Needed., Disp: , Rfl:   •  chlorhexidine (Peridex) 0.12 % solution, Apply 15 mL to the mouth or throat 2 (Two) Times a Day., Disp: 240 mL, Rfl: 0  •  cyclobenzaprine (FLEXERIL) 10 MG tablet, As Needed., Disp: , Rfl:   •  ferrous sulfate 325 (65 Fe) MG tablet, Take  by mouth., Disp: , Rfl:   •  glycopyrrolate (ROBINUL) 1 MG tablet, Take 0.5 mg by mouth 2 (Two) Times a Day., Disp: , Rfl:   •  HYDROcodone-acetaminophen (NORCO)  MG per tablet, Take 1 tablet by  mouth Every 6 (Six) Hours As Needed for Moderate Pain  (PER DR RUIZ)., Disp: , Rfl:   •  hydrOXYzine (ATARAX) 25 MG tablet, Take 25 mg by mouth 3 (Three) Times a Day As Needed for Itching., Disp: , Rfl:   •  levothyroxine (SYNTHROID, LEVOTHROID) 100 MCG tablet, Take 100 mcg by mouth Daily., Disp: , Rfl:   •  Lidocaine Viscous HCl (XYLOCAINE) 2 % solution, Take 5 mL by mouth As Needed for Mild Pain ., Disp: , Rfl:   •  meloxicam (MOBIC) 15 MG tablet, Take 15 mg by mouth Daily., Disp: , Rfl:   •  nystatin (MYCOSTATIN) 703228 UNIT/ML suspension, Take 10ml by mouth BID for 28 days (Patient taking differently: Take 200,000 Units by mouth 4 (Four) Times a Day As Needed. Take 10ml by mouth BID for 28 days), Disp: 480 mL, Rfl: 2  •  ondansetron (ZOFRAN) 8 MG tablet, Take 8 mg by mouth Every 8 (Eight) Hours As Needed for Nausea., Disp: , Rfl:   •  pantoprazole (PROTONIX) 40 MG EC tablet, Take 40 mg by mouth Daily. Delayed Release (DR/EC) Take 1 tablet (40 mg) by oral route 2 times per day, Take 30 minutes prior to breakfast and evening meal, Disp: , Rfl:   •  senna-docusate (PERICOLACE) 8.6-50 MG per tablet, Take 1 tablet by mouth 2 times daily, Disp: , Rfl:   •  sertraline (ZOLOFT) 100 MG tablet, Take 100 mg by mouth every night at bedtime., Disp: , Rfl:   •  simvastatin (ZOCOR) 20 MG tablet, TK 1 T PO QD, Disp: , Rfl: 11  •  tamsulosin (FLOMAX) 0.4 MG capsule 24 hr capsule, Take 0.4 mg by mouth Daily., Disp: , Rfl:   •  triamcinolone (KENALOG) 0.5 % ointment, Apply  topically to the appropriate area as directed 2 (Two) Times a Day. Apply to affected ear, Disp: 15 g, Rfl: 3  •  Truxima 100 MG/10ML solution injection, , Disp: , Rfl:   •  tadalafil (CIALIS) 20 MG tablet, Take 1 tablet by mouth As Needed for Erectile Dysfunction for up to 360 days. 1-24 hours before activity (Patient taking differently: Take 20 mg by mouth As Needed for Erectile Dysfunction. - TOLD NOT TO TAKE @ LEAST 24 HOURS BEFORE DOS-1/3/22 - 1-24 hours  before activity), Disp: 12 tablet, Rfl: 11  •  tiotropium bromide-olodaterol (Stiolto Respimat) 2.5-2.5 MCG/ACT aerosol solution inhaler, Inhale 2 puffs Daily., Disp: 4 g, Rfl: 11  Past Medical History:   Diagnosis Date   • Allergic rhinitis    • Anxiety    • Arthritis    • Cataract    • Chronic sinusitis    • COPD (chronic obstructive pulmonary disease) ([facility])    • COVID-19 vaccine series completed     Moderna   • Depression    • Deviated nasal septum    • Elevated cholesterol    • Enlarged prostate    • GERD (gastroesophageal reflux disease)    • H/O head and neck radiation 03/03/2017   • History of transfusion    • Hyperlipidemia    • Hypertension    • Hypothyroidism    • Laryngopharyngeal reflux    • Lymphoma (HCC)     Non-Hodgkins   • MRSA infection    • Panic attacks    • Peyronie disease    • Pneumonia    • 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    T1N0M0 SCC S/P XRT/Chemo 12/2014   • 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    T1N0M0 SCC S/P XRT/Chemo 12/2014   • Prostate cancer ([facility])    • Sicca syndrome ([facility])    • Sinusitis, acute    • Squamous cell carcinoma     Base of Tongue   • Tobacco use disorder    • Tongue irritation    • Visit for monitoring Rituxan therapy     pt getting treatments at [facility] office in [place]   • Xerostomia      Past Surgical History:   Procedure Laterality Date   • CATARACT EXTRACTION Bilateral    • ESOPHAGOSCOPY N/A 11/20/2019    Procedure: Direct laryngoscopy with esophageal Johnson dilation;  Surgeon: Wayne Richmond MD;  Location: [facility] OR;  Service: ENT   • HAND SURGERY      metal plate    • KNEE SURGERY     • LARYNGOSCOPY N/A 6/26/2020    Procedure: Rigid esophagoscopy Johnson esophageal dilatation;  Surgeon: Wayne Richmond MD;  Location: [facility] OR;  Service: ENT;  Laterality: N/A;   • LARYNGOSCOPY N/A 8/24/2020    Procedure: MICRODIRECT LARYNGOSCOPY with esophageal dilatation;  Surgeon: Wayne Richmond MD;   Location:  [facility] OR;  Service: ENT;  Laterality: N/A;   • LARYNGOSCOPY N/A 5/24/2021    Procedure: Direct laryngoscopy, esophagoscopy with Johnson dilatation;  Surgeon: Wayne Richmond MD;  Location:  [facility] OR;  Service: ENT;  Laterality: N/A;   • LARYNGOSCOPY N/A 12/1/2021    Procedure: Direct laryngoscopy, esophagoscopy with esophageal dilatation (Johnson dilators);  Surgeon: Wayne Richmond MD;  Location:  [facility] OR;  Service: ENT;  Laterality: N/A;   • LARYNGOSCOPY N/A 1/3/2022    Procedure: Direct laryngoscopy, esophagoscopy with Johnson esophageal dilation;  Surgeon: Wayne Richmond MD;  Location:  [facility] OR;  Service: ENT;  Laterality: N/A;   • LARYNGOSCOPY N/A 4/29/2022    Procedure: Direct laryngoscopy with esophageal dilatation;  Surgeon: Wayne Richmond MD;  Location:  [facility] OR;  Service: ENT;  Laterality: N/A;   • LARYNGOSCOPY N/A 6/24/2022    Procedure: MicroDirect laryngoscopy, esophagoscopy with Johnson dilation;  Surgeon: Wayne Richmond MD;  Location:  [facility] OR;  Service: ENT;  Laterality: N/A;   • MULTIPLE TOOTH EXTRACTIONS      CONTINUING TO HAVE BONE REMOVED 1/2021   • OTHER SURGICAL HISTORY  09/10/2014    Microlaryngoscopy with Biopsy - Base of Tongue   • PANENDOSCOPY N/A 1/22/2021    Procedure: DIRECT LARYNGOSCOPY AND ESOPHAGOSCOPY WITH ESOPHAGEAL DILATION;  Surgeon: Wayne Richmond MD;  Location:  [facility] OR;  Service: ENT;  Laterality: N/A;   • PANENDOSCOPY N/A 10/11/2021    Procedure: Direct laryngoscopy, esophagoscopy with Johnson dilatation;  Surgeon: Wayne Richmond MD;  Location:  [facility] OR;  Service: ENT;  Laterality: N/A;   • PROSTATE ULTRASOUND BIOPSY N/A 12/1/2021    Procedure: PROSTATE ULTRASOUND BIOPSY. NOT A URONAV;  Surgeon: Oscar Bazan MD;  Location:  [facility] OR;  Service: Urology;  Laterality: N/A;   • SPLENECTOMY     • SQUAMOUS CELL CARCINOMA EXCISION  09/10/2014    Tongue   • TONSILLECTOMY             Review  of systems  Constitutional:  Negative for chills or fever.   Gastrointestinal: Negative for abdominal pain, anal bleeding or blood in stool.           Objective   PHYSICAL EXAM  Vital Signs:   Wt 60.6 kg (133 lb 9.6 oz)   BMI 17.63 kg/m²     Constitutional: Patient is without distress or deformity.  Vital signs are reviewed as above.    Neuro: No confusion; No disorientation; Alert and oriented  Pulmonary: No respiratory distress.   Skin: No pallor or diaphoresis      DATA  Result Review :              Results for orders placed or performed in visit on 07/22/22   COVID-19,APTIMA PANTHER,[facility] IN-HOUSE,NP/OP/NASAL SWAB IN UTM/VTM/SALINE/LIQUID AMIES TRANSPORT MEDIA/NP WASH OR ASPIRATE, 24 HR TAT - Swab, Nasopharynx    Specimen: Nasopharynx; Swab   Result Value Ref Range    COVID19 Not Detected Not Detected - Ref. Range              ASSESSMENT AND PLAN          Problem List Items Addressed This Visit    None     Visit Diagnoses     Prostate cancer (HCC)    -  Primary    Relevant Orders    MRI Pelvis With & Without Contrast      This patient has prostate cancer with need for first surveillance biopsy .  It is recommended that he have a multi parametric magnetic resonance imaging study of the pelvis with and without gadolinium contrast using specific prostate protocol.  It is explained that abnormalities can be seen on a 3 Marry magnet based upon water content and increased contrast enhancement suggesting neovascularity.  Such images can then be used with the ANPINav system to allow fusion of the images with live transrectal ultrasound images to increase the accuracy of biopsy.  Another advantage of this system is that it does appear to be more likely to show high risk prostate cancer lesions which would be very important to identify since these are likely to be more aggressive.  The risks of prostate ultrasound and biopsy including infection with or without sepsis, hematuria, amount aspermia, and rectal bleeding are also discussed.  Transient  erectile dysfunction is also included in the discussion.  The patient does wish to proceed.        FOLLOW UP     Return in about 6 weeks (around 10/18/2022).        (Please note that portions of this note were completed with a voice recognition program.)  Oscar Bazan MD  09/06/22  13:18 CDT

## 2022-09-06 ENCOUNTER — TELEPHONE (OUTPATIENT)
Dept: OTOLARYNGOLOGY | Facility: CLINIC | Age: 67
End: 2022-09-06

## 2022-09-06 ENCOUNTER — TELEPHONE (OUTPATIENT)
Dept: GASTROENTEROLOGY | Age: 67
End: 2022-09-06

## 2022-09-06 ENCOUNTER — OFFICE VISIT (OUTPATIENT)
Dept: UROLOGY | Facility: CLINIC | Age: 67
End: 2022-09-06

## 2022-09-06 VITALS — BODY MASS INDEX: 17.63 KG/M2 | WEIGHT: 133.6 LBS

## 2022-09-06 DIAGNOSIS — C61 PROSTATE CANCER: Primary | ICD-10-CM

## 2022-09-06 DIAGNOSIS — C02.9 PRIMARY SQUAMOUS CELL CARCINOMA OF TONGUE: ICD-10-CM

## 2022-09-06 DIAGNOSIS — R13.14 PHARYNGOESOPHAGEAL DYSPHAGIA: Primary | ICD-10-CM

## 2022-09-06 PROCEDURE — 99213 OFFICE O/P EST LOW 20 MIN: CPT | Performed by: UROLOGY

## 2022-09-06 NOTE — TELEPHONE ENCOUNTER
Patient's wife Greg Clarke called back and asked that the patient see Dr Amos Louis rather than Phillip Henderson. Please return patient's call @ [phone]. Thank you.

## 2022-09-06 NOTE — TELEPHONE ENCOUNTER
Pt's wife called today stating that patient was to see Dr Lydia Pandya, but he got scheduled with [employer] and they called and moved his apt out another month today. Lucrecia Davis feels that patient needs his throat stretched again and have his \"flapper\" looked at because pt is having trouble swallowing and when he bends over his food comes back up. He also sees Dr Huber Jimenez for his tongue cancer. PT saw Dr Vijaya Carreon recently and he is down to 133 lb. He is also complaining of \"butt and groin pain. \" I asked for a further explanation of the pain and she said he has sharp pains inside his rectum and he stays constipated. He is taking Senna 4 times a day and 2 different OTC laxatives. Lucrecia Davis was advised Dr Lydia Pandya is out of the office today, but I will get a message to him and will let her know what he says. I advised it may not be today as he isn't in the office.  She said they also may want to discuss a feeding tube in the future with his weight loss and that he had one in the past. (Message sent to Dr Lydia Pandya to advise.)

## 2022-09-13 NOTE — TELEPHONE ENCOUNTER
I talked to Dr Celina Mccormick about this today. He said that he could offer egd w/dil. I called pt and he asked that I call his wife back tomorrow as she was working.

## 2022-09-14 NOTE — TELEPHONE ENCOUNTER
I called and talked to Jean Carlos, pt's wife, with patient in the background. He will go see Dr Shi Mobley on 9/20/22 and if they feel he needs the egd, they will call us back.

## 2022-09-16 ENCOUNTER — TELEPHONE (OUTPATIENT)
Dept: UROLOGY | Facility: CLINIC | Age: 67
End: 2022-09-16

## 2022-09-16 NOTE — TELEPHONE ENCOUNTER
Caller: ALYSSIA    Relationship to patient: WIFE    Best call back number: [phone]    Patient is needing: SHE IS STATING THAT PT CANCELLED HIS MRI AND WILL NOT GET IT DONE AND WANTS TO KNOW WHAT THEY SHOULD DO NEXT. SHE WANTS TO KNOW IF THEY SHOULD STILL COME TO THE APPT, SHE IS ALSO WANTING DR JENKINS TO TALK TO HIM ABOUT WHY HE NEEDS TO KEEP THIS APPT AND GET THE MRI DONE.

## 2022-09-19 ENCOUNTER — TELEPHONE (OUTPATIENT)
Dept: UROLOGY | Facility: CLINIC | Age: 67
End: 2022-09-19

## 2022-09-19 NOTE — H&P (VIEW-ONLY)
YOB: 1955  Location: [facility]  Location Address: [address] [place] [address], [facility], [address] [place] [address]  Location Phone: [phone]    Chief Complaint   Patient presents with   • Sinus Problem       History of Present Illness  Saul Alcantara Sr. is a 67 y.o. male.  Saul Alcantara Sr. is here for follow up of ENT complaints. The patient has a history of dl and biopsy of base of tongue on 2014 with positive pathology for scca. The cancer was staged T1M0N0. He has a history of radiation therapy that he finished 2014.   Patient is s/p direct laryngoscopy with esophageal dilatation 2022. He continues to complain of difficulty swallowing. He feels as if this improved somewhat after the procedure, but now he complains of difficulty with swallowing liquids    he is scheduled for repeat esophageal dilation 2022   He has had Newton Richmond dilate in the past as well and is inquiring about his opinion at this point as well.  EGD with wire dilation to 54 2022     He states if he bends over liquids come back out of his mouth.     Patient has also been having facial pain and swelling on the left side of his face that has happened 2 or 3 times this past year. He has seen Dr. Zhang for this and was told this could be due to sinus disease     He is still undergoing chemotherapy for prostate cancer       Past Medical History:   Diagnosis Date   • Allergic rhinitis    • Anxiety    • Arthritis    • Cataract    • Chronic sinusitis    • COPD (chronic obstructive pulmonary disease) (HCC)    • COVID-19 vaccine series completed     Moderna   • Depression    • Deviated nasal septum    • Elevated cholesterol    • Enlarged prostate    • GERD (gastroesophageal reflux disease)    • H/O head and neck radiation 2017   • History of transfusion    • Hyperlipidemia    • Hypertension    • Hypothyroidism    • Laryngopharyngeal reflux    • Lymphoma (HCC)     Non-Hodgkins   • MRSA infection    • Panic attacks    •  Peyronie disease    • Pneumonia    • 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    T1N0M0 SCC S/P XRT/Chemo 12/2014   • 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    T1N0M0 SCC S/P XRT/Chemo 12/2014   • Prostate cancer (HCC)    • Sicca syndrome (HCC)    • Sinusitis, acute    • Squamous cell carcinoma     Base of Tongue   • Tobacco use disorder    • Tongue irritation    • Visit for monitoring Rituxan therapy     pt getting treatments at [facility] in [place]   • Xerostomia        Past Surgical History:   Procedure Laterality Date   • CATARACT EXTRACTION Bilateral    • ESOPHAGOSCOPY N/A 11/20/2019    Procedure: Direct laryngoscopy with esophageal Johnson dilation;  Surgeon: Wayne Richmond MD;  Location:  [facility];  Service: ENT   • HAND SURGERY      metal plate    • KNEE SURGERY     • LARYNGOSCOPY N/A 6/26/2020    Procedure: Rigid esophagoscopy Johnson esophageal dilatation;  Surgeon: Wayen Richmond MD;  Location:  [facility];  Service: ENT;  Laterality: N/A;   • LARYNGOSCOPY N/A 8/24/2020    Procedure: MICRODIRECT LARYNGOSCOPY with esophageal dilatation;  Surgeon: Wayne Richmond MD;  [facility];  Service: ENT;  Laterality: N/A;   • LARYNGOSCOPY N/A 5/24/2021    Procedure: Direct laryngoscopy, esophagoscopy with Johnson dilatation;  Surgeon: Wayne Richmond MD;  Location:  [facility];  Service: ENT;  Laterality: N/A;   • LARYNGOSCOPY N/A 12/1/2021    Procedure: Direct laryngoscopy, esophagoscopy with esophageal dilatation (Johnson dilators);  Surgeon: Wayne Richmond MD;  [facility];  Service: ENT;  Laterality: N/A;   • LARYNGOSCOPY N/A 1/3/2022    Procedure: Direct laryngoscopy, esophagoscopy with Johnson esophageal dilation;  Surgeon: Wayne Richmond MD;  [facility]:  [facility];  Service: ENT;  Laterality: N/A;   • LARYNGOSCOPY N/A 4/29/2022    Procedure: Direct laryngoscopy with esophageal dilatation;  Surgeon: Wayne Richmond  MD Rangel;  Location:  [facility];  Service: ENT;  Laterality: N/A;   • LARYNGOSCOPY N/A 6/24/2022    Procedure: MicroDirect laryngoscopy, esophagoscopy with Johnson dilation;  Surgeon: Wayne Richmond MD;  [facility]:  [facility];  Service: ENT;  Laterality: N/A;   • MULTIPLE TOOTH EXTRACTIONS      CONTINUING TO HAVE BONE REMOVED 1/2021   • OTHER SURGICAL HISTORY  09/10/2014    Microlaryngoscopy with Biopsy - Base of Tongue   • PANENDOSCOPY N/A 1/22/2021    Procedure: DIRECT LARYNGOSCOPY AND ESOPHAGOSCOPY WITH ESOPHAGEAL DILATION;  Surgeon: Wayne Richmond MD;  [facility];  Service: ENT;  Laterality: N/A;   • PANENDOSCOPY N/A 10/11/2021    Procedure: Direct laryngoscopy, esophagoscopy with Johnson dilatation;  Surgeon: Wayne Richmond MD;  [facility];  Service: ENT;  Laterality: N/A;   • PROSTATE ULTRASOUND BIOPSY N/A 12/1/2021    Procedure: PROSTATE ULTRASOUND BIOPSY. NOT A URONAV;  Surgeon: Oscar Bazan MD;  Location: [facility] OR;  Service: Urology;  Laterality: N/A;   • SPLENECTOMY     • SQUAMOUS CELL CARCINOMA EXCISION  09/10/2014    Tongue   • TONSILLECTOMY         Outpatient Medications Marked as Taking for the 9/20/22 encounter (Office Visit) with Wayne Richmond MD   Medication Sig Dispense Refill   • ALPRAZolam (XANAX) 2 MG tablet Take 2 mg by mouth 3 (Three) Times a Day.     • amitriptyline (ELAVIL) 25 MG tablet Take 25 mg by mouth 2 (Two) Times a Day.     • amLODIPine (NORVASC) 10 MG tablet Take 10 mg by mouth Daily.     • Azelastine-Fluticasone 137-50 MCG/ACT suspension into the nostril(s) as directed by provider As Needed.     • chlorhexidine (Peridex) 0.12 % solution Apply 15 mL to the mouth or throat 2 (Two) Times a Day. 240 mL 0   • cyclobenzaprine (FLEXERIL) 10 MG tablet As Needed.     • glycopyrrolate (ROBINUL) 1 MG tablet Take 0.5 mg by mouth 2 (Two) Times a Day.     • HYDROcodone-acetaminophen (NORCO)  MG per tablet Take 1 tablet by mouth Every 6 (Six)  Hours As Needed for Moderate Pain  (PER DR RUIZ).     • hydrOXYzine (ATARAX) 25 MG tablet Take 25 mg by mouth 3 (Three) Times a Day As Needed for Itching.     • levothyroxine (SYNTHROID, LEVOTHROID) 100 MCG tablet Take 100 mcg by mouth Daily.     • Lidocaine Viscous HCl (XYLOCAINE) 2 % solution Take 5 mL by mouth As Needed for Mild Pain .     • meloxicam (MOBIC) 15 MG tablet Take 15 mg by mouth Daily.     • nystatin (MYCOSTATIN) 874679 UNIT/ML suspension Take 10ml by mouth BID for 28 days (Patient taking differently: Take 200,000 Units by mouth 4 (Four) Times a Day As Needed. Take 10ml by mouth BID for 28 days) 480 mL 2   • ondansetron (ZOFRAN) 8 MG tablet Take 8 mg by mouth Every 8 (Eight) Hours As Needed for Nausea.     • pantoprazole (PROTONIX) 40 MG EC tablet Take 40 mg by mouth Daily. Delayed Release (DR/EC)  Take 1 tablet (40 mg) by oral route 2 times per day,  Take 30 minutes prior to breakfast and evening meal     • senna-docusate (PERICOLACE) 8.6-50 MG per tablet Take 1 tablet by mouth 2 times daily     • sertraline (ZOLOFT) 100 MG tablet Take 100 mg by mouth every night at bedtime.     • simvastatin (ZOCOR) 20 MG tablet TK 1 T PO QD  11   • tadalafil (CIALIS) 20 MG tablet Take 1 tablet by mouth As Needed for Erectile Dysfunction for up to 360 days. 1-24 hours before activity (Patient taking differently: Take 20 mg by mouth As Needed for Erectile Dysfunction. - TOLD NOT TO TAKE @ LEAST 24 HOURS BEFORE DOS-1/3/22 -  1-24 hours before activity) 12 tablet 11   • tamsulosin (FLOMAX) 0.4 MG capsule 24 hr capsule Take 0.4 mg by mouth Daily.     • tiotropium bromide-olodaterol (Stiolto Respimat) 2.5-2.5 MCG/ACT aerosol solution inhaler Inhale 2 puffs Daily. 4 g 11   • triamcinolone (KENALOG) 0.5 % ointment Apply  topically to the appropriate area as directed 2 (Two) Times a Day. Apply to affected ear 15 g 3   • Truxima 100 MG/10ML solution injection          Patient has no known allergies.    Family History    Problem Relation Age of Onset   • Cancer Mother    • [place]'s disease Father    • Diabetes Sister    • Cancer Sister    • Aneurysm Brother        Social History     Socioeconomic History   • Marital status:    Tobacco Use   • Smoking status: Former Smoker     Packs/day: 0.50     Years: 44.00     Pack years: 22.00     Types: Cigarettes     Quit date:      Years since quittin.7   • Smokeless tobacco: Never Used   • Tobacco comment: pack last couple of weeks   Vaping Use   • Vaping Use: Never used   Substance and Sexual Activity   • Alcohol use: No   • Drug use: No   • Sexual activity: Defer     Partners: Female       Review of Systems   Constitutional: Positive for fatigue.   HENT: Positive for sinus pressure, trouble swallowing and voice change.    Eyes: Negative.    Respiratory: Negative.        Vitals:    22 1500   BP: 91/64   Pulse: 117   Resp: 16   Temp: 98.7 °F (37.1 °C)       Body mass index is 17.81 kg/m².    Objective     Physical Exam  Vitals reviewed.   Constitutional:       Appearance: Normal appearance.   HENT:      Head: Normocephalic.      Right Ear: Hearing and external ear normal.      Left Ear: Hearing and external ear normal.      Nose: Nose normal.      Mouth/Throat:      Comments: Upper dentures       Lower edentulous     Black hairy tongue   Dry mucus membranes     Neurological:      Mental Status: He is alert.       Dr. Richmond has examined and assessed the patient and agrees with current treatment plan      Assessment & Plan   Diagnoses and all orders for this visit:    1. Dysphagia, unspecified type (Primary)    2. Hx of radiation therapy    3. Primary squamous cell carcinoma of tongue (HCC)    4. Pharyngoesophageal dysphagia    5. Laryngopharyngeal reflux    6. Tobacco use disorder    7. Radiation dermatitis    8. H/O head and neck radiation    9. Neoplasm of uncertain behavior    10. Sinus pressure  -     CT Sinus Without Contrast; Future    continue plan for  esophageal dilatation 9/23 as scheduled   will obtain ct sinus     * Surgery not found *  Orders Placed This Encounter   Procedures   • CT Sinus Without Contrast     Standing Status:   Future     Standing Expiration Date:   9/20/2023     Scheduling Instructions:      Stealth -  image guidance     Order Specific Question:   Release to patient     Answer:   Immediate     Return for post op.       Patient Instructions   Be aware of the signs and symptoms of recurrent head and neck cancer including neck mass, persistent or recurrent sore throat, ear pain, hemoptysis, weight loss and hoarseness. If any of these symptoms recur and or persist, call for an evaluation.      D/W patient increasing caloric intake and discussed cruciferous vegetables and protein shakes etc to maintain optimal nutrition.  The necessity of abstaining from tobacco products was also discussed particularly with respect to not smoking

## 2022-09-19 NOTE — TELEPHONE ENCOUNTER
----- Message from Tonya Guillen sent at 9/16/2022  3:09 PM CDT -----  Message from the HUB:  Caller: ALYSSIA     Relationship to patient: WIFE     Best call back number: [phone]     Patient is needing: SHE IS STATING THAT PT CANCELLED HIS MRI AND WILL NOT GET IT DONE AND WANTS TO KNOW WHAT THEY SHOULD DO NEXT. SHE WANTS TO KNOW IF THEY SHOULD STILL COME TO THE APPT, SHE IS ALSO WANTING DR JENKINS TO TALK TO HIM ABOUT WHY HE NEEDS TO KEEP THIS APPT AND GET THE MRI DONE.    Do you want to see him without the mri in October?

## 2022-09-19 NOTE — TELEPHONE ENCOUNTER
Discussed with patient.  He has agreed to reschedule his MRI.  Electronically signed by Oscar Bazan MD, 09/19/22, 2:14 PM CDT.

## 2022-09-19 NOTE — PROGRESS NOTES
YOB: 1955  Location: [facility]  Location Address: [address] [place] [address], [facility] [address], [address] [place] [address]  Location Phone: [phone]    Chief Complaint   Patient presents with   • Sinus Problem       History of Present Illness  Saul Alcantara Sr. is a 67 y.o. male.  Saul Alcantara Sr. is here for follow up of ENT complaints. The patient has a history of dl and biopsy of base of tongue on 2014 with positive pathology for scca. The cancer was staged T1M0N0. He has a history of radiation therapy that he finished 2014.   Patient is s/p direct laryngoscopy with esophageal dilatation 2022. He continues to complain of difficulty swallowing. He feels as if this improved somewhat after the procedure, but now he complains of difficulty with swallowing liquids    he is scheduled for repeat esophageal dilation 2022   He has had Newton Richmond dilate in the past as well and is inquiring about his opinion at this point as well.  EGD with wire dilation to 54 2022     He states if he bends over liquids come back out of his mouth.     Patient has also been having facial pain and swelling on the left side of his face that has happened 2 or 3 times this past year. He has seen Dr. Zhang for this and was told this could be due to sinus disease     He is still undergoing chemotherapy for prostate cancer       Past Medical History:   Diagnosis Date   • Allergic rhinitis    • Anxiety    • Arthritis    • Cataract    • Chronic sinusitis    • COPD (chronic obstructive pulmonary disease) (HCC)    • COVID-19 vaccine series completed     Moderna   • Depression    • Deviated nasal septum    • Elevated cholesterol    • Enlarged prostate    • GERD (gastroesophageal reflux disease)    • H/O head and neck radiation 2017   • History of transfusion    • Hyperlipidemia    • Hypertension    • Hypothyroidism    • Laryngopharyngeal reflux    • Lymphoma (HCC)     Non-Hodgkins   • MRSA infection    • Panic attacks    •  Peyronie disease    • Pneumonia    • 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    T1N0M0 SCC S/P XRT/Chemo 12/2014   • 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    T1N0M0 SCC S/P XRT/Chemo 12/2014   • Prostate cancer (HCC)    • Sicca syndrome (HCC)    • Sinusitis, acute    • Squamous cell carcinoma     Base of Tongue   • Tobacco use disorder    • Tongue irritation    • Visit for monitoring Rituxan therapy     pt getting treatments at [facility] in [place]   • Xerostomia        Past Surgical History:   Procedure Laterality Date   • CATARACT EXTRACTION Bilateral    • ESOPHAGOSCOPY N/A 11/20/2019    Procedure: Direct laryngoscopy with esophageal Johnson dilation;  Surgeon: Wayne Richmond MD;  Location:  [facility];  Service: ENT   • HAND SURGERY      metal plate    • KNEE SURGERY     • LARYNGOSCOPY N/A 6/26/2020    Procedure: Rigid esophagoscopy Johnson esophageal dilatation;  Surgeon: Wayne Richmond MD;  Location:  [facility];  Service: ENT;  Laterality: N/A;   • LARYNGOSCOPY N/A 8/24/2020    Procedure: MICRODIRECT LARYNGOSCOPY with esophageal dilatation;  Surgeon: Wayne Richmond MD;  Location:  [facility];  Service: ENT;  Laterality: N/A;   • LARYNGOSCOPY N/A 5/24/2021    Procedure: Direct laryngoscopy, esophagoscopy with Johnson dilatation;  Surgeon: Wayne Richmond MD;  Location:  [facility];  Service: ENT;  Laterality: N/A;   • LARYNGOSCOPY N/A 12/1/2021    Procedure: Direct laryngoscopy, esophagoscopy with esophageal dilatation (Johnson dilators);  Surgeon: Wayne Richmond MD;  [facility];  Service: ENT;  Laterality: N/A;   • LARYNGOSCOPY N/A 1/3/2022    Procedure: Direct laryngoscopy, esophagoscopy with Johnson esophageal dilation;  Surgeon: Wayne Richmond MD;  [facility];  Service: ENT;  Laterality: N/A;   • LARYNGOSCOPY N/A 4/29/2022    Procedure: Direct laryngoscopy with esophageal dilatation;  Surgeon: Wayne Richmond  MD Rangel;  Location:  [facility];  Service: ENT;  Laterality: N/A;   • LARYNGOSCOPY N/A 6/24/2022    Procedure: MicroDirect laryngoscopy, esophagoscopy with Johnson dilation;  Surgeon: Wayne Richmond MD;  Location:  [facility];  Service: ENT;  Laterality: N/A;   • MULTIPLE TOOTH EXTRACTIONS      CONTINUING TO HAVE BONE REMOVED 1/2021   • OTHER SURGICAL HISTORY  09/10/2014    Microlaryngoscopy with Biopsy - Base of Tongue   • PANENDOSCOPY N/A 1/22/2021    Procedure: DIRECT LARYNGOSCOPY AND ESOPHAGOSCOPY WITH ESOPHAGEAL DILATION;  Surgeon: Wayne Richmond MD;  Location:  [facility];  Service: ENT;  Laterality: N/A;   • PANENDOSCOPY N/A 10/11/2021    Procedure: Direct laryngoscopy, esophagoscopy with Johnson dilatation;  Surgeon: Wayne Richmond MD;  Location:  [facility];  Service: ENT;  Laterality: N/A;   • PROSTATE ULTRASOUND BIOPSY N/A 12/1/2021    Procedure: PROSTATE ULTRASOUND BIOPSY. NOT A URONAV;  Surgeon: Oscar Bazan MD;  [facility];  Service: Urology;  Laterality: N/A;   • SPLENECTOMY     • SQUAMOUS CELL CARCINOMA EXCISION  09/10/2014    Tongue   • TONSILLECTOMY         Outpatient Medications Marked as Taking for the 9/20/22 encounter (Office Visit) with Wayne Richmond MD   Medication Sig Dispense Refill   • ALPRAZolam (XANAX) 2 MG tablet Take 2 mg by mouth 3 (Three) Times a Day.     • amitriptyline (ELAVIL) 25 MG tablet Take 25 mg by mouth 2 (Two) Times a Day.     • amLODIPine (NORVASC) 10 MG tablet Take 10 mg by mouth Daily.     • Azelastine-Fluticasone 137-50 MCG/ACT suspension into the nostril(s) as directed by provider As Needed.     • chlorhexidine (Peridex) 0.12 % solution Apply 15 mL to the mouth or throat 2 (Two) Times a Day. 240 mL 0   • cyclobenzaprine (FLEXERIL) 10 MG tablet As Needed.     • glycopyrrolate (ROBINUL) 1 MG tablet Take 0.5 mg by mouth 2 (Two) Times a Day.     • HYDROcodone-acetaminophen (NORCO)  MG per tablet Take 1 tablet by mouth Every 6 (Six)  Hours As Needed for Moderate Pain  (PER DR RUIZ).     • hydrOXYzine (ATARAX) 25 MG tablet Take 25 mg by mouth 3 (Three) Times a Day As Needed for Itching.     • levothyroxine (SYNTHROID, LEVOTHROID) 100 MCG tablet Take 100 mcg by mouth Daily.     • Lidocaine Viscous HCl (XYLOCAINE) 2 % solution Take 5 mL by mouth As Needed for Mild Pain .     • meloxicam (MOBIC) 15 MG tablet Take 15 mg by mouth Daily.     • nystatin (MYCOSTATIN) 572222 UNIT/ML suspension Take 10ml by mouth BID for 28 days (Patient taking differently: Take 200,000 Units by mouth 4 (Four) Times a Day As Needed. Take 10ml by mouth BID for 28 days) 480 mL 2   • ondansetron (ZOFRAN) 8 MG tablet Take 8 mg by mouth Every 8 (Eight) Hours As Needed for Nausea.     • pantoprazole (PROTONIX) 40 MG EC tablet Take 40 mg by mouth Daily. Delayed Release (DR/EC)  Take 1 tablet (40 mg) by oral route 2 times per day,  Take 30 minutes prior to breakfast and evening meal     • senna-docusate (PERICOLACE) 8.6-50 MG per tablet Take 1 tablet by mouth 2 times daily     • sertraline (ZOLOFT) 100 MG tablet Take 100 mg by mouth every night at bedtime.     • simvastatin (ZOCOR) 20 MG tablet TK 1 T PO QD  11   • tadalafil (CIALIS) 20 MG tablet Take 1 tablet by mouth As Needed for Erectile Dysfunction for up to 360 days. 1-24 hours before activity (Patient taking differently: Take 20 mg by mouth As Needed for Erectile Dysfunction. - TOLD NOT TO TAKE @ LEAST 24 HOURS BEFORE DOS-1/3/22 -  1-24 hours before activity) 12 tablet 11   • tamsulosin (FLOMAX) 0.4 MG capsule 24 hr capsule Take 0.4 mg by mouth Daily.     • tiotropium bromide-olodaterol (Stiolto Respimat) 2.5-2.5 MCG/ACT aerosol solution inhaler Inhale 2 puffs Daily. 4 g 11   • triamcinolone (KENALOG) 0.5 % ointment Apply  topically to the appropriate area as directed 2 (Two) Times a Day. Apply to affected ear 15 g 3   • Truxima 100 MG/10ML solution injection          Patient has no known allergies.    Family History    Problem Relation Age of Onset   • Cancer Mother    • [place]'s disease Father    • Diabetes Sister    • Cancer Sister    • Aneurysm Brother        Social History     Socioeconomic History   • Marital status:    Tobacco Use   • Smoking status: Former Smoker     Packs/day: 0.50     Years: 44.00     Pack years: 22.00     Types: Cigarettes     Quit date:      Years since quittin.7   • Smokeless tobacco: Never Used   • Tobacco comment: pack last couple of weeks   Vaping Use   • Vaping Use: Never used   Substance and Sexual Activity   • Alcohol use: No   • Drug use: No   • Sexual activity: Defer     Partners: Female       Review of Systems   Constitutional: Positive for fatigue.   HENT: Positive for sinus pressure, trouble swallowing and voice change.    Eyes: Negative.    Respiratory: Negative.        Vitals:    22 1500   BP: 91/64   Pulse: 117   Resp: 16   Temp: 98.7 °F (37.1 °C)       Body mass index is 17.81 kg/m².    Objective     Physical Exam  Vitals reviewed.   Constitutional:       Appearance: Normal appearance.   HENT:      Head: Normocephalic.      Right Ear: Hearing and external ear normal.      Left Ear: Hearing and external ear normal.      Nose: Nose normal.      Mouth/Throat:      Comments: Upper dentures       Lower edentulous     Black hairy tongue   Dry mucus membranes     Neurological:      Mental Status: He is alert.       Dr. Richmond has examined and assessed the patient and agrees with current treatment plan      Assessment & Plan   Diagnoses and all orders for this visit:    1. Dysphagia, unspecified type (Primary)    2. Hx of radiation therapy    3. Primary squamous cell carcinoma of tongue (HCC)    4. Pharyngoesophageal dysphagia    5. Laryngopharyngeal reflux    6. Tobacco use disorder    7. Radiation dermatitis    8. H/O head and neck radiation    9. Neoplasm of uncertain behavior    10. Sinus pressure  -     CT Sinus Without Contrast; Future    continue plan for  esophageal dilatation 9/23 as scheduled   will obtain ct sinus     * Surgery not found *  Orders Placed This Encounter   Procedures   • CT Sinus Without Contrast     Standing Status:   Future     Standing Expiration Date:   9/20/2023     Scheduling Instructions:      Ste[facility] -  image guidance     Order Specific Question:   Release to patient     Answer:   Immediate     Return for post op.       Patient Instructions   Be aware of the signs and symptoms of recurrent head and neck cancer including neck mass, persistent or recurrent sore throat, ear pain, hemoptysis, weight loss and hoarseness. If any of these symptoms recur and or persist, call for an evaluation.      D/W patient increasing caloric intake and discussed cruciferous vegetables and protein shakes etc to maintain optimal nutrition.  The necessity of abstaining from tobacco products was also discussed particularly with respect to not smoking

## 2022-09-20 ENCOUNTER — OFFICE VISIT (OUTPATIENT)
Dept: OTOLARYNGOLOGY | Facility: CLINIC | Age: 67
End: 2022-09-20

## 2022-09-20 ENCOUNTER — APPOINTMENT (OUTPATIENT)
Dept: PREADMISSION TESTING | Facility: HOSPITAL | Age: 67
End: 2022-09-20

## 2022-09-20 VITALS
HEART RATE: 117 BPM | TEMPERATURE: 98.7 F | RESPIRATION RATE: 16 BRPM | WEIGHT: 135 LBS | BODY MASS INDEX: 17.89 KG/M2 | HEIGHT: 73 IN | DIASTOLIC BLOOD PRESSURE: 64 MMHG | SYSTOLIC BLOOD PRESSURE: 91 MMHG

## 2022-09-20 DIAGNOSIS — F17.200 TOBACCO USE DISORDER: ICD-10-CM

## 2022-09-20 DIAGNOSIS — D48.9 NEOPLASM OF UNCERTAIN BEHAVIOR: ICD-10-CM

## 2022-09-20 DIAGNOSIS — J34.89 SINUS PRESSURE: ICD-10-CM

## 2022-09-20 DIAGNOSIS — K21.9 LARYNGOPHARYNGEAL REFLUX: ICD-10-CM

## 2022-09-20 DIAGNOSIS — Z92.3 H/O HEAD AND NECK RADIATION: ICD-10-CM

## 2022-09-20 DIAGNOSIS — C02.9 PRIMARY SQUAMOUS CELL CARCINOMA OF TONGUE: ICD-10-CM

## 2022-09-20 DIAGNOSIS — Z92.3 HX OF RADIATION THERAPY: ICD-10-CM

## 2022-09-20 DIAGNOSIS — R13.14 PHARYNGOESOPHAGEAL DYSPHAGIA: ICD-10-CM

## 2022-09-20 DIAGNOSIS — L58.9 RADIATION DERMATITIS: ICD-10-CM

## 2022-09-20 DIAGNOSIS — R13.10 DYSPHAGIA, UNSPECIFIED TYPE: Primary | ICD-10-CM

## 2022-09-20 PROCEDURE — 99213 OFFICE O/P EST LOW 20 MIN: CPT | Performed by: NURSE PRACTITIONER

## 2022-09-20 NOTE — PATIENT INSTRUCTIONS
Be aware of the signs and symptoms of recurrent head and neck cancer including neck mass, persistent or recurrent sore throat, ear pain, hemoptysis, weight loss and hoarseness. If any of these symptoms recur and or persist, call for an evaluation.      D/W patient increasing caloric intake and discussed cruciferous vegetables and protein shakes etc to maintain optimal nutrition.  The necessity of abstaining from tobacco products was also discussed particularly with respect to not smoking

## 2022-09-22 ENCOUNTER — PRE-ADMISSION TESTING (OUTPATIENT)
Dept: PREADMISSION TESTING | Facility: HOSPITAL | Age: 67
End: 2022-09-22

## 2022-09-22 ENCOUNTER — HOSPITAL ENCOUNTER (OUTPATIENT)
Dept: GENERAL RADIOLOGY | Facility: HOSPITAL | Age: 67
Discharge: HOME | End: 2022-09-22

## 2022-09-22 VITALS
WEIGHT: 134.48 LBS | RESPIRATION RATE: 20 BRPM | DIASTOLIC BLOOD PRESSURE: 76 MMHG | HEIGHT: 73 IN | SYSTOLIC BLOOD PRESSURE: 125 MMHG | OXYGEN SATURATION: 100 % | BODY MASS INDEX: 17.82 KG/M2 | HEART RATE: 112 BPM

## 2022-09-22 DIAGNOSIS — C02.9 PRIMARY SQUAMOUS CELL CARCINOMA OF TONGUE: ICD-10-CM

## 2022-09-22 DIAGNOSIS — R13.14 PHARYNGOESOPHAGEAL DYSPHAGIA: ICD-10-CM

## 2022-09-22 LAB
ALBUMIN SERPL-MCNC: 4.7 G/DL (ref 3.5–5.2)
ALBUMIN/GLOB SERPL: 2.8 G/DL
ALP SERPL-CCNC: 90 U/L (ref 39–117)
ALT SERPL W P-5'-P-CCNC: 18 U/L (ref 1–41)
ANION GAP SERPL CALCULATED.3IONS-SCNC: 8 MMOL/L (ref 5–15)
AST SERPL-CCNC: 20 U/L (ref 1–40)
BILIRUB SERPL-MCNC: 0.3 MG/DL (ref 0–1.2)
BUN SERPL-MCNC: 12 MG/DL (ref 8–23)
BUN/CREAT SERPL: 11.9 (ref 7–25)
CALCIUM SPEC-SCNC: 9.6 MG/DL (ref 8.6–10.5)
CHLORIDE SERPL-SCNC: 102 MMOL/L (ref 98–107)
CO2 SERPL-SCNC: 32 MMOL/L (ref 22–29)
CREAT SERPL-MCNC: 1.01 MG/DL (ref 0.76–1.27)
DEPRECATED RDW RBC AUTO: 47.4 FL (ref 37–54)
EGFRCR SERPLBLD CKD-EPI 2021: 82.5 ML/MIN/1.73
ERYTHROCYTE [DISTWIDTH] IN BLOOD BY AUTOMATED COUNT: 13.2 % (ref 12.3–15.4)
GLOBULIN UR ELPH-MCNC: 1.7 GM/DL
GLUCOSE SERPL-MCNC: 99 MG/DL (ref 65–99)
HCT VFR BLD AUTO: 36 % (ref 37.5–51)
HGB BLD-MCNC: 11.7 G/DL (ref 13–17.7)
MCH RBC QN AUTO: 31.7 PG (ref 26.6–33)
MCHC RBC AUTO-ENTMCNC: 32.5 G/DL (ref 31.5–35.7)
MCV RBC AUTO: 97.6 FL (ref 79–97)
PLATELET # BLD AUTO: 330 10*3/MM3 (ref 140–450)
PMV BLD AUTO: 10.5 FL (ref 6–12)
POTASSIUM SERPL-SCNC: 4.5 MMOL/L (ref 3.5–5.2)
PROT SERPL-MCNC: 6.4 G/DL (ref 6–8.5)
RBC # BLD AUTO: 3.69 10*6/MM3 (ref 4.14–5.8)
SODIUM SERPL-SCNC: 142 MMOL/L (ref 136–145)
WBC NRBC COR # BLD: 6.21 10*3/MM3 (ref 3.4–10.8)

## 2022-09-22 PROCEDURE — 93005 ELECTROCARDIOGRAM TRACING: CPT | Performed by: NURSE PRACTITIONER

## 2022-09-22 PROCEDURE — 80053 COMPREHEN METABOLIC PANEL: CPT | Performed by: NURSE PRACTITIONER

## 2022-09-22 PROCEDURE — 71046 X-RAY EXAM CHEST 2 VIEWS: CPT

## 2022-09-22 PROCEDURE — 93010 ELECTROCARDIOGRAM REPORT: CPT | Performed by: INTERNAL MEDICINE

## 2022-09-22 PROCEDURE — 85027 COMPLETE CBC AUTOMATED: CPT | Performed by: NURSE PRACTITIONER

## 2022-09-22 NOTE — DISCHARGE INSTRUCTIONS
Before you come to the hospital        Arrival time: AS DIRECTED BY OFFICE     YOU MAY TAKE THE FOLLOWING MEDICATION(S) THE MORNING OF SURGERY WITH A SIP OF WATER: ***xanax  Hold cialis for 24 hours prior to surgery           ALL OTHER HOME MEDICATION CHECK WITH YOUR PHYSICIAN (especially if you are taking diabetes medicines or blood thinners)    Do not take any Erectile Dysfunction medications (EX: CIALIS, VIAGRA) 24 hours prior to surgery.      If you were given and instructed to use a germ- killing soap, use as directed the night before surgery and again the morning of surgery or as directed by your surgeon.    (See attached information for How to Use Chlorhexidine for Bathing if applicable.)            Eating and drinking restrictions prior to scheduled arrival time    2 Hours before arrival time STOP   Drinking Clear liquids (water, apple juice-no pulp)     6 Hours before arrival time STOP   Milk or drinks that contain milk, full liquids    6 Hours before arrival time STOP   Light meals or foods, such as toast or cereal    8 Hours before arrival time STOP   Heavy foods, such as meat, fried foods, or fatty foods    (It is extremely important that you follow these guidelines to prevent delay or cancelation of your procedure)     Clear Liquids  Water and flavored water                                                                      Clear Fruit juices, such as cranberry juice and apple juice.  Black coffee (NO cream of any kind, including powdered).  Plain tea  Clear bouillon or broth.  Flavored gelatin.  Soda.  Gatorade or Powerade.  Full liquid examples  Juices that have pulp.  Frozen ice pops that contain fruit pieces.  Coffee with creamer  Milk.  Yogurt.                MANAGING PAIN AFTER SURGERY    We know you are probably wondering what your pain will be like after surgery.  Following surgery it is unrealistic to expect you will not have pain.   Pain is how our bodies let us know that something is wrong  or cautions us to be careful.  That said, our goal is to make your pain tolerable.    Methods we may use to treat your pain include (oral or IV medications, PCAs, epidurals, nerve blocks, etc.)   While some procedures require IV pain medications for a short time after surgery, transitioning to pain medications by mouth allows for better management of pain.   Your nurse will encourage you to take oral pain medications whenever possible.  IV medications work almost immediately, but only last a short while.  Taking medications by mouth allows for a more constant level of medication in your blood stream for a longer period of time.      Once your pain is out of control it is harder to get back under control.  It is important you are aware when your next dose of pain medication is due.  If you are admitted, your nurse may write the time of your next dose on the white board in your room to help you remember.      We are interested in your pain and encourage you to inform us about aggravating factors during your visit.   Many times a simple repositioning every few hours can make a big difference.    If your physician says it is okay, do not let your pain prevent you from getting out of bed. Be sure to call your nurse for assistance prior to getting up so you do not fall.      Before surgery, please decide your tolerable pain goal.  These faces help describe the pain ratings we use on a 0-10 scale.   Be prepared to tell us your goal and whether or not you take pain or anxiety medications at home.          Preparing for Surgery  Preparing for surgery is an important part of your care. It can make things go more smoothly and help you avoid complications. The steps leading up to surgery may vary among hospitals. Follow all instructions given to you by your health care providers. Ask questions if you do not understand something. Talk about any concerns that you have.  Here are some questions to consider asking before your  surgery:  If my surgery is not an emergency (is elective), when would be the best time to have the surgery?  What arrangements do I need to make for work, home, or school?  What will my recovery be like? How long will it be before I can return to normal activities?  Will I need to prepare my home? Will I need to arrange care for me or my children?  Should I expect to have pain after surgery? What are my pain management options? Are there nonmedical options that I can try for pain?  Tell a health care provider about:  Any allergies you have.  All medicines you are taking, including vitamins, herbs, eye drops, creams, and over-the-counter medicines.  Any problems you or family members have had with anesthetic medicines.  Any blood disorders you have.  Any surgeries you have had.  Any medical conditions you have.  Whether you are pregnant or may be pregnant.  What are the risks?  The risks and complications of surgery depend on the specific procedure that you have. Discuss all the risks with your health care providers before your surgery. Ask about common surgical complications, which may include:  Infection.  Bleeding or a need for blood replacement (transfusion).  Allergic reactions to medicines.  Damage to surrounding nerves, tissues, or structures.  A blood clot.  Scarring.  Failure of the surgery to correct the problem.  Follow these instructions before the procedure:  Several days or weeks before your procedure  You may have a physical exam by your primary health care provider to make sure it is safe for you to have surgery.  You may have testing. This may include a chest X-ray, blood and urine tests, electrocardiogram (ECG), or other testing.  Ask your health care provider about:  Changing or stopping your regular medicines. This is especially important if you are taking diabetes medicines or blood thinners.  Taking medicines such as aspirin and ibuprofen. These medicines can thin your blood. Do not take these  medicines unless your health care provider tells you to take them.  Taking over-the-counter medicines, vitamins, herbs, and supplements.  Do not use any products that contain nicotine or tobacco, such as cigarettes and e-cigarettes. If you need help quitting, ask your health care provider.  Avoid alcohol.  Ask your health care provider if there are exercises you can do to prepare for surgery.  Eat a healthy diet.   Plan to have someone take you home from the hospital or clinic.  Plan to have a responsible adult care for you for at least 24 hours after you leave the hospital or clinic. This is important.  The day before your procedure  You may be given antibiotic medicine to take by mouth to help prevent infection. Take it as told by your health care provider.  You may be asked to shower with a germ-killing soap.  Follow instructions from your health care provider about eating and drinking restrictions. This includes gum, mints and hard candy.  Pack comfortable clothes according to your procedure.   The day of your procedure  You may need to take another shower with a germ-killing soap before you leave home in the morning.  With a small sip of water, take only the medicines that you are told to take.  Remove all jewelry including rings.   Leave anything you consider valuable at home except hearing aids if needed.  Do not wear any makeup, nail polish, powder, deodorant, lotion, hair accessories, or anything on your skin or body except your clothes.  If you will be staying in the hospital, bring a case to hold your glasses, contacts, or dentures. You may also want to bring your robe and non-skid footwear.  If you wear oxygen at home, bring it with you the day of surgery.  If instructed by your health care provider, bring your sleep apnea device with you on the day of your surgery (if this applies to you).  You may want to leave your suitcase and sleep apnea device in the car until after surgery.   Arrive at the  hospital as scheduled.  Bring a friend or family member with you who can help to answer questions and be present while you meet with your health care provider.  At the hospital  When you arrive at the hospital:  Go to registration located at the main entrance of the hospital. You will be registered and given a beeper and a sticker sheet. Take the stickers to the Outpatient nurses desk and place in the black tray. This is to notify staff that you have arrived. Then return to the lobby to wait.   When your beeper lights up and vibrates proceed through the double doors, under the stairs, and a member of the Outpatient Surgery staff will escort you to your preoperative room.  You may have to wear compression sleeves. These help to prevent blood clots and reduce swelling in your legs.  An IV may be inserted into one of your veins.              In the operating room, you may be given one or more of the following:        A medicine to help you relax (sedative).        A medicine to numb the area (local anesthetic).        A medicine to make you fall asleep (general anesthetic).        A medicine that is injected into an area of your body to numb everything below the                      injection site (regional anesthetic).  You may be given an antibiotic through your IV to help prevent infection.  Your surgical site will be marked or identified.    Contact a health care provider if you:  Develop a fever of more than 100.4°F (38°C) or other feelings of illness during the 48 hours before your surgery.  Have symptoms that get worse.  Have questions or concerns about your surgery.  Summary  Preparing for surgery can make the procedure go more smoothly and lower your risk of complications.  Before surgery, make a list of questions and concerns to discuss with your surgeon. Ask about the risks and possible complications.  In the days or weeks before your surgery, follow all instructions from your health care provider. You may  need to stop smoking, avoid alcohol, follow eating restrictions, and change or stop your regular medicines.  Contact your surgeon if you develop a fever or other signs of illness during the few days before your surgery.  This information is not intended to replace advice given to you by your health care provider. Make sure you discuss any questions you have with your health care provider.  Document Revised: 12/21/2018 Document Reviewed: 10/23/2018  [employer] Patient Education © 2021 [employer]

## 2022-09-23 ENCOUNTER — ANESTHESIA (OUTPATIENT)
Dept: PERIOP | Facility: HOSPITAL | Age: 67
End: 2022-09-23

## 2022-09-23 ENCOUNTER — ANESTHESIA EVENT (OUTPATIENT)
Dept: PERIOP | Facility: HOSPITAL | Age: 67
End: 2022-09-23

## 2022-09-23 ENCOUNTER — HOSPITAL ENCOUNTER (OUTPATIENT)
Facility: HOSPITAL | Age: 67
Setting detail: HOSPITAL OUTPATIENT SURGERY
Discharge: HOME | End: 2022-09-23
Attending: OTOLARYNGOLOGY | Admitting: OTOLARYNGOLOGY

## 2022-09-23 VITALS
OXYGEN SATURATION: 98 % | RESPIRATION RATE: 18 BRPM | SYSTOLIC BLOOD PRESSURE: 142 MMHG | TEMPERATURE: 97 F | DIASTOLIC BLOOD PRESSURE: 97 MMHG | HEART RATE: 81 BPM

## 2022-09-23 DIAGNOSIS — R13.14 PHARYNGOESOPHAGEAL DYSPHAGIA: ICD-10-CM

## 2022-09-23 DIAGNOSIS — C02.9 PRIMARY SQUAMOUS CELL CARCINOMA OF TONGUE: ICD-10-CM

## 2022-09-23 LAB
QT INTERVAL: 358 MS
QTC INTERVAL: 457 MS

## 2022-09-23 PROCEDURE — 43191 ESOPHAGOSCOPY RIGID TRNSO DX: CPT | Performed by: OTOLARYNGOLOGY

## 2022-09-23 PROCEDURE — 25010000002 FENTANYL CITRATE (PF) 50 MCG/ML SOLUTION: Performed by: NURSE ANESTHETIST, CERTIFIED REGISTERED

## 2022-09-23 PROCEDURE — 31525 DX LARYNGOSCOPY EXCL NB: CPT | Performed by: OTOLARYNGOLOGY

## 2022-09-23 PROCEDURE — 25010000002 DEXAMETHASONE PER 1 MG: Performed by: NURSE ANESTHETIST, CERTIFIED REGISTERED

## 2022-09-23 PROCEDURE — 43450 DILATE ESOPHAGUS 1/MULT PASS: CPT | Performed by: OTOLARYNGOLOGY

## 2022-09-23 PROCEDURE — 25010000002 PROPOFOL 10 MG/ML EMULSION: Performed by: NURSE ANESTHETIST, CERTIFIED REGISTERED

## 2022-09-23 PROCEDURE — 25010000002 ONDANSETRON PER 1 MG: Performed by: NURSE ANESTHETIST, CERTIFIED REGISTERED

## 2022-09-23 RX ORDER — SODIUM CHLORIDE 0.9 % (FLUSH) 0.9 %
3-10 SYRINGE (ML) INJECTION AS NEEDED
Status: DISCONTINUED | OUTPATIENT
Start: 2022-09-23 | End: 2022-09-23 | Stop reason: HOSPADM

## 2022-09-23 RX ORDER — ACETAMINOPHEN 500 MG
1000 TABLET ORAL ONCE
Status: DISCONTINUED | OUTPATIENT
Start: 2022-09-23 | End: 2022-09-23 | Stop reason: HOSPADM

## 2022-09-23 RX ORDER — SODIUM CHLORIDE 0.9 % (FLUSH) 0.9 %
3 SYRINGE (ML) INJECTION AS NEEDED
Status: DISCONTINUED | OUTPATIENT
Start: 2022-09-23 | End: 2022-09-23 | Stop reason: HOSPADM

## 2022-09-23 RX ORDER — SODIUM CHLORIDE, SODIUM LACTATE, POTASSIUM CHLORIDE, CALCIUM CHLORIDE 600; 310; 30; 20 MG/100ML; MG/100ML; MG/100ML; MG/100ML
1000 INJECTION, SOLUTION INTRAVENOUS CONTINUOUS
Status: DISCONTINUED | OUTPATIENT
Start: 2022-09-23 | End: 2022-09-23 | Stop reason: HOSPADM

## 2022-09-23 RX ORDER — IBUPROFEN 600 MG/1
600 TABLET ORAL ONCE AS NEEDED
Status: DISCONTINUED | OUTPATIENT
Start: 2022-09-23 | End: 2022-09-23 | Stop reason: HOSPADM

## 2022-09-23 RX ORDER — FENTANYL CITRATE 50 UG/ML
INJECTION, SOLUTION INTRAMUSCULAR; INTRAVENOUS AS NEEDED
Status: DISCONTINUED | OUTPATIENT
Start: 2022-09-23 | End: 2022-09-23 | Stop reason: SURG

## 2022-09-23 RX ORDER — ONDANSETRON 2 MG/ML
4 INJECTION INTRAMUSCULAR; INTRAVENOUS ONCE AS NEEDED
Status: DISCONTINUED | OUTPATIENT
Start: 2022-09-23 | End: 2022-09-23 | Stop reason: HOSPADM

## 2022-09-23 RX ORDER — FENTANYL CITRATE 50 UG/ML
25 INJECTION, SOLUTION INTRAMUSCULAR; INTRAVENOUS
Status: DISCONTINUED | OUTPATIENT
Start: 2022-09-23 | End: 2022-09-23 | Stop reason: HOSPADM

## 2022-09-23 RX ORDER — SUCCINYLCHOLINE/SOD CL,ISO/PF 200MG/10ML
SYRINGE (ML) INTRAVENOUS AS NEEDED
Status: DISCONTINUED | OUTPATIENT
Start: 2022-09-23 | End: 2022-09-23 | Stop reason: SURG

## 2022-09-23 RX ORDER — OXYCODONE AND ACETAMINOPHEN 10; 325 MG/1; MG/1
1 TABLET ORAL ONCE AS NEEDED
Status: DISCONTINUED | OUTPATIENT
Start: 2022-09-23 | End: 2022-09-23 | Stop reason: HOSPADM

## 2022-09-23 RX ORDER — ONDANSETRON 2 MG/ML
INJECTION INTRAMUSCULAR; INTRAVENOUS AS NEEDED
Status: DISCONTINUED | OUTPATIENT
Start: 2022-09-23 | End: 2022-09-23 | Stop reason: SURG

## 2022-09-23 RX ORDER — LABETALOL HYDROCHLORIDE 5 MG/ML
5 INJECTION, SOLUTION INTRAVENOUS
Status: DISCONTINUED | OUTPATIENT
Start: 2022-09-23 | End: 2022-09-23 | Stop reason: HOSPADM

## 2022-09-23 RX ORDER — OXYCODONE AND ACETAMINOPHEN 7.5; 325 MG/1; MG/1
2 TABLET ORAL EVERY 4 HOURS PRN
Status: DISCONTINUED | OUTPATIENT
Start: 2022-09-23 | End: 2022-09-23 | Stop reason: HOSPADM

## 2022-09-23 RX ORDER — DROPERIDOL 2.5 MG/ML
0.62 INJECTION, SOLUTION INTRAMUSCULAR; INTRAVENOUS ONCE AS NEEDED
Status: DISCONTINUED | OUTPATIENT
Start: 2022-09-23 | End: 2022-09-23 | Stop reason: HOSPADM

## 2022-09-23 RX ORDER — PROPOFOL 10 MG/ML
VIAL (ML) INTRAVENOUS AS NEEDED
Status: DISCONTINUED | OUTPATIENT
Start: 2022-09-23 | End: 2022-09-23 | Stop reason: SURG

## 2022-09-23 RX ORDER — DEXAMETHASONE SODIUM PHOSPHATE 4 MG/ML
INJECTION, SOLUTION INTRA-ARTICULAR; INTRALESIONAL; INTRAMUSCULAR; INTRAVENOUS; SOFT TISSUE AS NEEDED
Status: DISCONTINUED | OUTPATIENT
Start: 2022-09-23 | End: 2022-09-23 | Stop reason: SURG

## 2022-09-23 RX ORDER — MIDAZOLAM HYDROCHLORIDE 1 MG/ML
1 INJECTION INTRAMUSCULAR; INTRAVENOUS
Status: DISCONTINUED | OUTPATIENT
Start: 2022-09-23 | End: 2022-09-23 | Stop reason: HOSPADM

## 2022-09-23 RX ORDER — LIDOCAINE HYDROCHLORIDE 10 MG/ML
0.5 INJECTION, SOLUTION EPIDURAL; INFILTRATION; INTRACAUDAL; PERINEURAL ONCE AS NEEDED
Status: DISCONTINUED | OUTPATIENT
Start: 2022-09-23 | End: 2022-09-23 | Stop reason: HOSPADM

## 2022-09-23 RX ORDER — NALOXONE HCL 0.4 MG/ML
0.4 VIAL (ML) INJECTION AS NEEDED
Status: DISCONTINUED | OUTPATIENT
Start: 2022-09-23 | End: 2022-09-23 | Stop reason: HOSPADM

## 2022-09-23 RX ORDER — ONDANSETRON 4 MG/1
4 TABLET, FILM COATED ORAL ONCE AS NEEDED
Status: DISCONTINUED | OUTPATIENT
Start: 2022-09-23 | End: 2022-09-23 | Stop reason: HOSPADM

## 2022-09-23 RX ORDER — FLUMAZENIL 0.1 MG/ML
0.2 INJECTION INTRAVENOUS AS NEEDED
Status: DISCONTINUED | OUTPATIENT
Start: 2022-09-23 | End: 2022-09-23 | Stop reason: HOSPADM

## 2022-09-23 RX ORDER — SODIUM CHLORIDE 0.9 % (FLUSH) 0.9 %
3 SYRINGE (ML) INJECTION EVERY 12 HOURS SCHEDULED
Status: DISCONTINUED | OUTPATIENT
Start: 2022-09-23 | End: 2022-09-23 | Stop reason: HOSPADM

## 2022-09-23 RX ORDER — SODIUM CHLORIDE, SODIUM LACTATE, POTASSIUM CHLORIDE, CALCIUM CHLORIDE 600; 310; 30; 20 MG/100ML; MG/100ML; MG/100ML; MG/100ML
100 INJECTION, SOLUTION INTRAVENOUS CONTINUOUS
Status: DISCONTINUED | OUTPATIENT
Start: 2022-09-23 | End: 2022-09-23 | Stop reason: HOSPADM

## 2022-09-23 RX ORDER — MIDAZOLAM HYDROCHLORIDE 1 MG/ML
0.5 INJECTION INTRAMUSCULAR; INTRAVENOUS
Status: DISCONTINUED | OUTPATIENT
Start: 2022-09-23 | End: 2022-09-23 | Stop reason: HOSPADM

## 2022-09-23 RX ADMIN — SODIUM CHLORIDE, POTASSIUM CHLORIDE, SODIUM LACTATE AND CALCIUM CHLORIDE 1000 ML: 600; 310; 30; 20 INJECTION, SOLUTION INTRAVENOUS at 06:50

## 2022-09-23 RX ADMIN — DEXAMETHASONE SODIUM PHOSPHATE 4 MG: 4 INJECTION, SOLUTION INTRA-ARTICULAR; INTRALESIONAL; INTRAMUSCULAR; INTRAVENOUS; SOFT TISSUE at 08:43

## 2022-09-23 RX ADMIN — PROPOFOL 150 MG: 10 INJECTION, EMULSION INTRAVENOUS at 08:23

## 2022-09-23 RX ADMIN — ONDANSETRON 4 MG: 2 INJECTION INTRAMUSCULAR; INTRAVENOUS at 08:43

## 2022-09-23 RX ADMIN — FENTANYL CITRATE 100 MCG: 50 INJECTION, SOLUTION INTRAMUSCULAR; INTRAVENOUS at 08:23

## 2022-09-23 RX ADMIN — Medication 20 MG: at 08:23

## 2022-09-23 NOTE — OP NOTE
OPERATIVE NOTE  9/23/2022    NAME: Saul Alcantara Sr.    YOB: 1955  MRN: [id number]    PRE-OPERATIVE DIAGNOSIS:    Pharyngoesophageal dysphagia [R13.14]  Primary squamous cell carcinoma of tongue (HCC) [C02.9]    POST-OPERATIVE DIAGNOSIS:   Post-Op Diagnosis Codes:     * Pharyngoesophageal dysphagia [R13.14]     * Primary squamous cell carcinoma of tongue (HCC) [C02.9]    PROCEDURE PERFORMED:   Direct laryngoscopy and esophagoscopy with Johnson dilation    SURGEON:   Wayne Richmond MD    ASSISTANT(S):   None    ANESTHESIA:   General Anesthesia via Endotracheal Tube    INDICATIONS: The patient is a 67 y.o. male with Pharyngoesophageal dysphagia [R13.14]  Primary squamous cell carcinoma of tongue (HCC) [C02.9]    PROCEDURE:  The patient was brought to the operating room, given General Anesthesia via Endotracheal Tube, and prepped and draped in the usual manner.     Direct laryngoscopy was performed and no masses, lesions, ulcerations or other abnormalities were noted throughout the upper aerodigestive tract examination.  Minimal edema both true vocal cords were appreciated but there were no lesions.  Rigid esophagoscopy was performed the small scope was not able to be passed to the cricopharyngeal opening.     Subsequently Johnson dilatation was performed with successive passage of Johnson dilators of 26 Angolan, 30 Angolan, 32 Angolan, 34 Angolan, 36 Angolan, 38 Angolan, 40 Angolan, 42 Angolan, 46 Angolan, 48 Angolan, 50 Angolan, 56 Angolan, and 58 Angolan.  The dilators were passed to 45 cm without difficulty.  No significant bleeding or other abnormalities were noted       The patient was transported upon extubation to the postanesthesia care unit in stable condition.    SPECIMENS:  None    COMPLICATIONS: NONE    ESTIMATED BLOOD LOSS:  Minimal    Wayne Richmond MD  9/23/2022

## 2022-09-23 NOTE — ANESTHESIA PROCEDURE NOTES
Airway  Urgency: elective    Date/Time: 9/23/2022 8:24 AM  Airway not difficult    General Information and Staff    Patient location during procedure: OR  CRNA/CAA: Andrés Raman CRNA    Indications and Patient Condition  Indications for airway management: airway protection    Preoxygenated: yes  MILS maintained throughout  Mask difficulty assessment: 1 - vent by mask    Final Airway Details  Final airway type: endotracheal airway      Successful airway: ETT  Cuffed: yes   Successful intubation technique: direct laryngoscopy and video laryngoscopy  Endotracheal tube insertion site: oral  Blade: Valdes  Blade size: 4  ETT size (mm): 6.0  Cormack-Lehane Classification: grade I - full view of glottis  Placement verified by: chest auscultation and capnometry   Cuff volume (mL): 5  Measured from: lips  ETT/EBT  to lips (cm): 22  Number of attempts at approach: 1  Assessment: lips, teeth, and gum same as pre-op and atraumatic intubation

## 2022-09-23 NOTE — ANESTHESIA POSTPROCEDURE EVALUATION
Patient: Saul Alcantara Sr.    Procedure Summary     Date: 09/23/22 Room / Location:  [facility] OR  /  [facility]    Anesthesia Start: 0820 Anesthesia Stop: 0850    Procedure: DIRECT LARYNGOSCOPY WITH DALTON DILATION (N/A ) Diagnosis:       Pharyngoesophageal dysphagia      Primary squamous cell carcinoma of tongue (HCC)      (Pharyngoesophageal dysphagia [R13.14])      (Primary squamous cell carcinoma of tongue (HCC) [C02.9])    Surgeons: Wayne Richmond MD Provider: Andrés Raman CRNA    Anesthesia Type: general ASA Status: 3          Anesthesia Type: general    Vitals  Vitals Value Taken Time   /93 09/23/22 0945   Temp 97 °F (36.1 °C) 09/23/22 0945   Pulse 68 09/23/22 0945   Resp 16 09/23/22 0945   SpO2 98 % 09/23/22 0945           Post Anesthesia Care and Evaluation    Patient location during evaluation: PACU  Patient participation: complete - patient participated  Level of consciousness: awake and alert  Pain management: adequate    Airway patency: patent  Anesthetic complications: No anesthetic complications    Cardiovascular status: acceptable  Respiratory status: acceptable  Hydration status: acceptable    Comments: Blood pressure 133/90, pulse 76, temperature 97 °F (36.1 °C), temperature source Temporal, resp. rate 18, SpO2 98 %.    Pt discharged from PACU based on jeff score >8

## 2022-10-12 ENCOUNTER — OFFICE VISIT (OUTPATIENT)
Dept: GASTROENTEROLOGY | Age: 67
End: 2022-10-12
Payer: MEDICARE

## 2022-10-12 VITALS
HEIGHT: 73 IN | OXYGEN SATURATION: 97 % | HEART RATE: 78 BPM | DIASTOLIC BLOOD PRESSURE: 74 MMHG | WEIGHT: 131 LBS | SYSTOLIC BLOOD PRESSURE: 124 MMHG | BODY MASS INDEX: 17.36 KG/M2

## 2022-10-12 DIAGNOSIS — N52.9 ERECTILE DYSFUNCTION, UNSPECIFIED ERECTILE DYSFUNCTION TYPE: ICD-10-CM

## 2022-10-12 DIAGNOSIS — R13.10 DYSPHAGIA, UNSPECIFIED TYPE: Primary | ICD-10-CM

## 2022-10-12 DIAGNOSIS — R63.4 WEIGHT LOSS: ICD-10-CM

## 2022-10-12 PROCEDURE — G8484 FLU IMMUNIZE NO ADMIN: HCPCS | Performed by: NURSE PRACTITIONER

## 2022-10-12 PROCEDURE — 99214 OFFICE O/P EST MOD 30 MIN: CPT | Performed by: NURSE PRACTITIONER

## 2022-10-12 PROCEDURE — G8419 CALC BMI OUT NRM PARAM NOF/U: HCPCS | Performed by: NURSE PRACTITIONER

## 2022-10-12 PROCEDURE — 1123F ACP DISCUSS/DSCN MKR DOCD: CPT | Performed by: NURSE PRACTITIONER

## 2022-10-12 PROCEDURE — 1036F TOBACCO NON-USER: CPT | Performed by: NURSE PRACTITIONER

## 2022-10-12 PROCEDURE — 3017F COLORECTAL CA SCREEN DOC REV: CPT | Performed by: NURSE PRACTITIONER

## 2022-10-12 PROCEDURE — G8427 DOCREV CUR MEDS BY ELIG CLIN: HCPCS | Performed by: NURSE PRACTITIONER

## 2022-10-12 RX ORDER — TADALAFIL 20 MG/1
20 TABLET ORAL AS NEEDED
Qty: 12 TABLET | Refills: 11 | Status: SHIPPED | OUTPATIENT
Start: 2022-10-12 | End: 2023-10-07

## 2022-10-12 RX ORDER — AMITRIPTYLINE HYDROCHLORIDE 25 MG/1
TABLET, FILM COATED ORAL 2 TIMES DAILY
COMMUNITY
Start: 2022-07-21

## 2022-10-12 ASSESSMENT — ENCOUNTER SYMPTOMS
CHOKING: 0
BLOOD IN STOOL: 0
RECTAL PAIN: 0
VOMITING: 0
ABDOMINAL PAIN: 0
CONSTIPATION: 0
TROUBLE SWALLOWING: 1
ANAL BLEEDING: 0
ABDOMINAL DISTENTION: 0
NAUSEA: 0
SHORTNESS OF BREATH: 0
COUGH: 0
DIARRHEA: 0

## 2022-10-12 NOTE — PROGRESS NOTES
Subjective:     Patient ID: Lamont De Anda is a 74 y.o. male  PCP: Kevin Camara MD  Referring Provider: Cat Arshad MD    HPI  Patient presents to the office today with the following complaints: Follow-up      Pt seen today for weight loss, dysphagia. He states he has lost \"quite a bit\" of weight with \"treatments. \"  Hx Non Hodgkins lymphoma. Pt reports foods getting stuck, this occurs with every meal.  He will have some regurgiation with this. It will also go up nostrils. He is taking Pantoprazole 40 mg BID. He had recent laryngoscopy with Dr. Елена Coronado. Last EGD 1/2022 [id number] changes of radiation changes in the very proximal esophagus, gastritis, no h pylori  Last Colonoscopy 3/2022 - normal, 5 year recall  Denies any family hx colon cancer or colon polyps    Hx tongue cancer     Assessment:     1. Dysphagia, unspecified type    2. Weight loss            Plan:   - Schedule EGD  Nothing to eat or drink after midnight. No driving for 24 hours after procedure. Bring a  to procedure. No aspirin, NSAIDs, fish oil 5 days before procedure. I have discussed the benefits, alternatives, and risks (including bleeding, perforation and death)  for pursuing Endoscopy (EGD/Colonscopy/EUS/ERCP) with the patient and they are willing to continue. We also discussed the need for anesthesia, IV access, proper dietary changes, medication changes if necessary, and need for bowel prep (if ordered) prior to their Endoscopic procedure. They are aware they must have someone accompany them to their scheduled procedure to drive them home - they agree to the above and are willing to continue. Orders  No orders of the defined types were placed in this encounter. Medications  No orders of the defined types were placed in this encounter.         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/2014    HPV related    Weight loss     Xerostomia        Past Surgical History:   Procedure Laterality Date    ABSCESS DRAINAGE      ON BUTTOCK    CATARACT REMOVAL Right     COLONOSCOPY  10/05/2010    Dr Madison Herrera, 5 yr recall    COLONOSCOPY N/A 03/16/2022    Dr Selvin Moore, 5 yr recall    DENTAL SURGERY Bilateral     ESOPHAGEAL DILATATION      INTRACAPSULAR CATARACT EXTRACTION Right 06/06/2016    EYE CATARACT EMULSIFICATION IOL IMPLANT performed by Shawn Milian MD at [address] Left 07/11/2016    LT EYE CATARACT EMULSIFICATION IOL IMPLANT performed by Shawn Milian MD at [address] ARTHROSCOPY Bilateral     KNEE SURGERY Bilateral     scope    SPLENECTOMY  2004    THROAT SURGERY      06-26-20& 10-11-21    TONGUE BIOPSY  09/2015    wedge    TONGUE SURGERY      TONSILLECTOMY      UPPER GASTROINTESTINAL ENDOSCOPY N/A 01/07/2022    Dr OLENA Cummins-w/[id number]-Rlryoawrslbc changes of radiation changes in the very proximal esophagus, gastritis, no h pylori    UPPER GASTROINTESTINAL ENDOSCOPY  01/07/2022    Dr Teresa Cummins-w/[id number]-Idyvrnxkccgf changes of radiation changes in the very proximal esophagus, gastritis, no h pylori    WRIST FRACTURE SURGERY Right     WRIST SURGERY Right        Family History   Problem Relation Age of Onset    Cancer Sister     Stroke Sister     Stroke Brother     Stomach Cancer Maternal Aunt     Breast Cancer Maternal Aunt         [id number]    Esophageal Cancer Neg Hx     Liver Cancer Neg Hx Rectal Cancer Neg Hx     Colon Cancer Neg Hx        Social History     Socioeconomic History    Marital status:    Tobacco Use    Smoking status: Every Day     Packs/day: 0.50     Years: 38.00     Pack years: 19.00     Types: Cigarettes    Smokeless tobacco: Never   Vaping Use    Vaping Use: Never used   Substance and Sexual Activity    Alcohol use: No     Comment: quit last week, 2014    Drug use: No       Current Outpatient Medications   Medication Sig Dispense Refill    magnesium citrate solution Take 296 mLs by mouth as needed for Constipation      Sennosides (EX-LAX PO) Take by mouth as needed      amitriptyline (ELAVIL) 25 MG tablet in the morning and at bedtime      albuterol sulfate HFA (VENTOLIN HFA) 108 (90 Base) MCG/ACT inhaler Inhale 2 puffs into the lungs 4 times daily as needed for Wheezing 18 g 0    nystatin (MYCOSTATIN) 430018 UNIT/ML suspension Take 5 mLs by mouth 3 times daily as needed (oral pain, thrush) Swish and spit 250 mL 3    meloxicam (MOBIC) 15 MG tablet Take 15 mg by mouth daily      amLODIPine (NORVASC) 5 MG tablet Take 1 tablet by mouth daily      HYDROcodone-acetaminophen (NORCO) 7.5-325 MG per tablet Take 1 tablet by mouth as needed. sertraline (ZOLOFT) 50 MG tablet Take 50 mg by mouth daily       senna-docusate (PERICOLACE) 8.6-50 MG per tablet Take 1 tablet by mouth 4 times daily      ondansetron (ZOFRAN) 8 MG tablet Take 8 mg by mouth every 8 hours as needed for Nausea or Vomiting      LEVOTHYROXINE SODIUM Take 0.088 mg by mouth daily       ALPRAZOLAM ER PO Take 2 mg by mouth 3 times daily Indications: Depression with Anxiety       Pantoprazole Sodium (PROTONIX PO) Take 40 mg by mouth 2 times daily       SIMVASTATIN PO Take 20 mg by mouth daily       cyclobenzaprine (FLEXERIL) 10 MG tablet 3 times daily as needed (Patient not taking: Reported on 10/12/2022)       No current facility-administered medications for this visit.        No Known Allergies    Review of Systems   Constitutional:  Positive for appetite change and unexpected weight change. Negative for activity change, fatigue and fever. HENT:  Positive for trouble swallowing. Respiratory:  Negative for cough, choking and shortness of breath. Cardiovascular:  Negative for chest pain. Gastrointestinal:  Negative for abdominal distention, abdominal pain, anal bleeding, blood in stool, constipation, diarrhea, nausea, rectal pain and vomiting. Belching   Allergic/Immunologic: Negative for food allergies. All other systems reviewed and are negative. Objective:     /74   Pulse 78   Ht 6' 1\" (1.854 m)   Wt 131 lb (59.4 kg)   SpO2 97%   BMI 17.28 kg/m²     Physical Exam  Vitals reviewed. Constitutional:       General: He is not in acute distress. Appearance: He is well-developed and underweight. HENT:      Head: Normocephalic and atraumatic. Right Ear: External ear normal.      Left Ear: External ear normal.      Nose: Nose normal.      Comments: Mask on     Mouth/Throat:      Comments: Mask on  Eyes:      General: No scleral icterus. Right eye: No discharge. Left eye: No discharge. Conjunctiva/sclera: Conjunctivae normal.      Pupils: Pupils are equal, round, and reactive to light. Cardiovascular:      Rate and Rhythm: Normal rate and regular rhythm. Heart sounds: Normal heart sounds. No murmur heard. Pulmonary:      Effort: Pulmonary effort is normal. No respiratory distress. Breath sounds: Normal breath sounds. No wheezing or rales. Abdominal:      General: Bowel sounds are normal. There is no distension. Palpations: Abdomen is soft. There is no mass. Tenderness: There is no abdominal tenderness. There is no guarding or rebound. Musculoskeletal:         General: Normal range of motion. Cervical back: Normal range of motion and neck supple. Skin:     General: Skin is warm and dry. Coloration: Skin is not pale. Neurological:      Mental Status: He is alert and oriented to person, place, and time.    Psychiatric:         Behavior: Behavior normal.

## 2022-10-17 ENCOUNTER — APPOINTMENT (OUTPATIENT)
Dept: MRI IMAGING | Facility: HOSPITAL | Age: 67
End: 2022-10-17

## 2022-10-18 ENCOUNTER — TELEPHONE (OUTPATIENT)
Dept: UROLOGY | Facility: CLINIC | Age: 67
End: 2022-10-18

## 2022-10-18 NOTE — TELEPHONE ENCOUNTER
Called patient to see if PSA has been drawn and why MRI was canceled.  NO answer, left message to call the office.

## 2022-10-19 ENCOUNTER — TELEPHONE (OUTPATIENT)
Dept: UROLOGY | Facility: CLINIC | Age: 67
End: 2022-10-19

## 2022-10-19 NOTE — TELEPHONE ENCOUNTER
Called patient left message to discuss appointment.  He canceled his MRI and has not had PSA drawn for his appointment.  If he doesn't have PSA or MRI done, his appointment will need to be rescheduled.          Hub can read.

## 2023-01-05 RX ORDER — PANTOPRAZOLE SODIUM 40 MG/1
TABLET, DELAYED RELEASE ORAL
Qty: 180 TABLET | Refills: 3 | Status: SHIPPED | OUTPATIENT
Start: 2023-01-05

## 2023-01-05 NOTE — TELEPHONE ENCOUNTER
Rx Refill Note  Requested Prescriptions     Pending Prescriptions Disp Refills   • pantoprazole (PROTONIX) 40 MG EC tablet [Pharmacy Med Name: PANTOPRAZOLE SOD DR 40 MG TAB] 180 tablet 3     Sig: TAKE 1 TABLET BY MOUTH TWICE A DAY      Last office visit with prescribing clinician: 6/28/2022   Last telemedicine visit with prescribing clinician: Visit date not found   Next office visit with prescribing clinician: Visit date not found                         Would you like a call back once the refill request has been completed: [] Yes [] No    If the office needs to give you a call back, can they leave a voicemail: [] Yes [] No    Graciela Dhillon CMA  01/05/23, 08:26 CST

## 2023-01-16 RX ORDER — AMITRIPTYLINE HYDROCHLORIDE 25 MG/1
TABLET, FILM COATED ORAL
Qty: 180 TABLET | OUTPATIENT
Start: 2023-01-16

## 2023-01-16 NOTE — TELEPHONE ENCOUNTER
Rx Refill Note  Requested Prescriptions     Pending Prescriptions Disp Refills   • amitriptyline (ELAVIL) 25 MG tablet [Pharmacy Med Name: AMITRIPTYLINE HCL 25 MG TAB] 180 tablet      Sig: TAKE 1 TABLET BY MOUTH TWICE A DAY      Last office visit with prescribing clinician: 6/28/2022   Last telemedicine visit with prescribing clinician: Visit date not found   Next office visit with prescribing clinician: Visit date not found                         Would you like a call back once the refill request has been completed: [] Yes [] No    If the office needs to give you a call back, can they leave a voicemail: [] Yes [] No    Graciela Dhillon CMA  01/16/23, 09:37 CST

## 2023-01-27 ENCOUNTER — TELEPHONE (OUTPATIENT)
Dept: GASTROENTEROLOGY | Age: 68
End: 2023-01-27

## 2023-01-27 NOTE — TELEPHONE ENCOUNTER
Gerardo's wife called to reschedule a  egd that was cancelled in oct . Please be advised that the best time to call him to accommodate their needs is Anytime. Thank you.

## 2023-02-06 ENCOUNTER — TELEPHONE (OUTPATIENT)
Dept: GASTROENTEROLOGY | Age: 68
End: 2023-02-06

## 2023-02-06 NOTE — TELEPHONE ENCOUNTER
Called . patient to remind them of their procedure with Dr. Sin Cotton  at [facility] [place]  on 2/7/23 arrive at [address] / [place]

## 2023-02-07 ENCOUNTER — ANESTHESIA (OUTPATIENT)
Dept: ENDOSCOPY | Age: 68
End: 2023-02-07
Payer: MEDICARE

## 2023-02-07 ENCOUNTER — HOSPITAL ENCOUNTER (OUTPATIENT)
Age: 68
Setting detail: OUTPATIENT SURGERY
Discharge: HOME | End: 2023-02-07
Attending: INTERNAL MEDICINE | Admitting: INTERNAL MEDICINE
Payer: MEDICARE

## 2023-02-07 ENCOUNTER — ANESTHESIA EVENT (OUTPATIENT)
Dept: ENDOSCOPY | Age: 68
End: 2023-02-07
Payer: MEDICARE

## 2023-02-07 VITALS
OXYGEN SATURATION: 98 % | SYSTOLIC BLOOD PRESSURE: 159 MMHG | HEART RATE: 72 BPM | DIASTOLIC BLOOD PRESSURE: 89 MMHG | BODY MASS INDEX: 19.61 KG/M2 | TEMPERATURE: 97.5 F | WEIGHT: 148 LBS | RESPIRATION RATE: 16 BRPM | HEIGHT: 73 IN

## 2023-02-07 PROCEDURE — 6360000002 HC RX W HCPCS: Performed by: NURSE ANESTHETIST, CERTIFIED REGISTERED

## 2023-02-07 PROCEDURE — 43248 EGD GUIDE WIRE INSERTION: CPT | Performed by: INTERNAL MEDICINE

## 2023-02-07 PROCEDURE — 2580000003 HC RX 258: Performed by: INTERNAL MEDICINE

## 2023-02-07 PROCEDURE — 3700000000 HC ANESTHESIA ATTENDED CARE: Performed by: INTERNAL MEDICINE

## 2023-02-07 PROCEDURE — 3609012700 HC EGD DILATION SAVORY: Performed by: INTERNAL MEDICINE

## 2023-02-07 PROCEDURE — 7100000010 HC PHASE II RECOVERY - FIRST 15 MIN: Performed by: INTERNAL MEDICINE

## 2023-02-07 PROCEDURE — 7100000011 HC PHASE II RECOVERY - ADDTL 15 MIN: Performed by: INTERNAL MEDICINE

## 2023-02-07 PROCEDURE — 2709999900 HC NON-CHARGEABLE SUPPLY: Performed by: INTERNAL MEDICINE

## 2023-02-07 PROCEDURE — 2500000003 HC RX 250 WO HCPCS: Performed by: NURSE ANESTHETIST, CERTIFIED REGISTERED

## 2023-02-07 PROCEDURE — 3700000001 HC ADD 15 MINUTES (ANESTHESIA): Performed by: INTERNAL MEDICINE

## 2023-02-07 RX ORDER — SODIUM CHLORIDE, SODIUM LACTATE, POTASSIUM CHLORIDE, CALCIUM CHLORIDE 600; 310; 30; 20 MG/100ML; MG/100ML; MG/100ML; MG/100ML
INJECTION, SOLUTION INTRAVENOUS CONTINUOUS
Status: DISCONTINUED | OUTPATIENT
Start: 2023-02-07 | End: 2023-02-07 | Stop reason: HOSPADM

## 2023-02-07 RX ORDER — LIDOCAINE HYDROCHLORIDE 10 MG/ML
INJECTION, SOLUTION INFILTRATION; PERINEURAL PRN
Status: DISCONTINUED | OUTPATIENT
Start: 2023-02-07 | End: 2023-02-07 | Stop reason: SDUPTHER

## 2023-02-07 RX ORDER — PROPOFOL 10 MG/ML
INJECTION, EMULSION INTRAVENOUS PRN
Status: DISCONTINUED | OUTPATIENT
Start: 2023-02-07 | End: 2023-02-07 | Stop reason: SDUPTHER

## 2023-02-07 RX ADMIN — SODIUM CHLORIDE, POTASSIUM CHLORIDE, SODIUM LACTATE AND CALCIUM CHLORIDE: 600; 310; 30; 20 INJECTION, SOLUTION INTRAVENOUS at 08:35

## 2023-02-07 RX ADMIN — LIDOCAINE HYDROCHLORIDE 40 MG: 10 INJECTION, SOLUTION INFILTRATION; PERINEURAL at 09:05

## 2023-02-07 RX ADMIN — PROPOFOL 180 MG: 10 INJECTION, EMULSION INTRAVENOUS at 09:05

## 2023-02-07 ASSESSMENT — PAIN - FUNCTIONAL ASSESSMENT: PAIN_FUNCTIONAL_ASSESSMENT: 0-10

## 2023-02-07 ASSESSMENT — LIFESTYLE VARIABLES: SMOKING_STATUS: 1

## 2023-02-07 NOTE — ANESTHESIA POSTPROCEDURE EVALUATION
Department of Anesthesiology  Postprocedure Note    Patient: Eva Love MRN: [id number]  YOB: 1955  Date of evaluation: 2/7/2023      Procedure Summary     Date: 02/07/23 Room / Location: [address]    Anesthesia Start: 4364 Anesthesia Stop:     Procedures:       EGD DILATION SAVORY      DILATION, ESOPHAGUS Diagnosis:       Dysphagia, unspecified type      (Dysphagia, unspecified type [R13.10])    Surgeons: Mauricio Dobbs MD Responsible Provider: Harlene Primrose, APRN - CRNA    Anesthesia Type: general, TIVA ASA Status: 2          Anesthesia Type: No value filed.     Valentino Phase I: Valentino Score: 10    Valentino Phase II:        Anesthesia Post Evaluation    Patient location during evaluation: bedside  Patient participation: complete - patient participated  Level of consciousness: sleepy but conscious  Pain score: 0  Airway patency: patent  Nausea & Vomiting: no nausea and no vomiting  Complications: no  Cardiovascular status: hemodynamically stable and blood pressure returned to baseline  Respiratory status: acceptable and nasal cannula  Hydration status: stable

## 2023-02-07 NOTE — H&P
Patient Name: Tally Hammans Sr.  : 1955  MRN: [id number]  DATE: 23    Allergies: No Known Allergies     ENDOSCOPY  History and Physical    Procedure:    [] Diagnostic Colonoscopy       [] Screening Colonoscopy  [x] EGD      [] ERCP      [] EUS       [] Other    [x] Previous office notes/History and Physical reviewed from the patients chart. Please see EMR for further details of HPI. I have examined the patient's status immediately prior to the procedure and:      Indications/HPI:    []Abdominal Pain   []Barretts  []Screening/Surveillance   []History of Polyps  [x]Dysphagia            [] +Cologard/DNA testing  []Abnormal Imaging              []EOE Hx              [] Family Hx of CRC/Polyps  []Anemia                            []Food Impaction       []Recent Poor Prep  []GI Bleed             []Lymphadenopathy  []History of Polyps  []Change in bowel habits []Heartburn/Reflux  []Cancer- GI/Lung  []Chest Pain - Non Cardiac []Heme (+) Stool []Ulcers  []Constipation  []Hemoptysis  []Incontinence    []Diarrhea  []Hypoxemia  []Rectal Bleed (BRBPR)  []Nausea/Vomiting   [] Varices  []Crohns/Colitis  []Pancreatic Cyst   [] Cirrhosis   []Pancreatitis    []Abnormal MRCP  []Elevated LFT [] Stent Removal, Previous ERCP  []Other:     Anesthesia:   [x] MAC [] Moderate Sedation   [] General   [] None     ROS: 12 pt Review of Symptoms was negative unless mentioned above    Medications:   Prior to Admission medications    Medication Sig Start Date End Date Taking?  Authorizing Provider   magnesium citrate solution Take 296 mLs by mouth as needed for Constipation    Historical Provider, MD   Sennosides (EX-LAX PO) Take by mouth as needed  Patient not taking: Reported on 2023    Historical Provider, MD   amitriptyline (ELAVIL) 25 MG tablet in the morning and at bedtime  Patient not taking: Reported on 2023   Historical Provider, MD   albuterol sulfate HFA (VENTOLIN HFA) 108 (90 Base) MCG/ACT inhaler Inhale 2 puffs into the lungs 4 times daily as needed for Wheezing 6/2/22   Laura Matamoros MD   nystatin (MYCOSTATIN) 024049 UNIT/ML suspension Take 5 mLs by mouth 3 times daily as needed (oral pain, thrush) Swish and spit 3/16/22   Meaghan Pacheco MD   meloxicam (MOBIC) 15 MG tablet Take 15 mg by mouth daily    Historical Provider, MD   amLODIPine (NORVASC) 5 MG tablet Take 1 tablet by mouth daily    Historical Provider, MD   cyclobenzaprine (FLEXERIL) 10 MG tablet 3 times daily as needed  Patient not taking: Reported on 10/12/2022 2/7/18   Historical Provider, MD   HYDROcodone-acetaminophen (NORCO) 7.5-325 MG per tablet Take 1 tablet by mouth as needed.  11/11/16   Historical Provider, MD   sertraline (ZOLOFT) 50 MG tablet Take 50 mg by mouth daily     Historical Provider, MD   senna-docusate (Nicolas Lars) 8.6-50 MG per tablet Take 1 tablet by mouth 4 times daily    Historical Provider, MD   ondansetron (ZOFRAN) 8 MG tablet Take 8 mg by mouth every 8 hours as needed for Nausea or Vomiting    Historical Provider, MD   LEVOTHYROXINE SODIUM Take 0.088 mg by mouth daily     Historical Provider, MD   ALPRAZOLAM ER PO Take 2 mg by mouth 3 times daily Indications: Depression with Anxiety     Historical Provider, MD   Pantoprazole Sodium (PROTONIX PO) Take 40 mg by mouth 2 times daily     Historical Provider, MD   SIMVASTATIN PO Take 20 mg by mouth daily     Historical Provider, MD       Past Medical History:  Past Medical History:   Diagnosis Date    Anxiety     Arthritis     Chronic kidney disease     Stage 3 Moderated     COPD (chronic obstructive pulmonary disease) ([facility] [address]     pt denies any symptoms     Depression     Dysphagia     Elevated cholesterol     Elevated PSA     GERD (gastroesophageal reflux disease)     History of blood transfusion     Hodgkin lymphoma ([facility] [address]     Hypertension     Hypothyroidism     Insomnia     NHL (non-Hodgkin's lymphoma) (HCC)     REMISSION    Palpitations     Prostate cancer ([facility] [address]     Rhinitis Seizure ([facility] [address] 2013    Squamous cell carcinoma of tongue ([facility] [address] 08/2014    HPV related    Weight loss     Xerostomia        Past Surgical History:  Past Surgical History:   Procedure Laterality Date    ABSCESS DRAINAGE      ON BUTTOCK    CATARACT REMOVAL Right     COLONOSCOPY  10/05/2010    Dr Ben River, 5 yr recall    COLONOSCOPY N/A 03/16/2022    Dr Mable Rivera, 5 yr recall    DENTAL SURGERY Bilateral     ESOPHAGEAL DILATATION      INTRACAPSULAR CATARACT EXTRACTION Right 06/06/2016    EYE CATARACT EMULSIFICATION IOL IMPLANT performed by Mckayla Frost MD at [address] Left 07/11/2016    LT EYE CATARACT EMULSIFICATION IOL IMPLANT performed by Mckayla Frost MD at [address] ARTHROSCOPY Bilateral     KNEE SURGERY Bilateral     scope    SPLENECTOMY  2004    THROAT SURGERY      06-26-20& 10-11-21    TONGUE BIOPSY  09/2015    wedge    TONGUE SURGERY      TONSILLECTOMY      UPPER GASTROINTESTINAL ENDOSCOPY N/A 01/07/2022    Dr OLENA Cummins-w/[id number]-[id number] changes of radiation changes in the very proximal esophagus, gastritis, no h pylori    UPPER GASTROINTESTINAL ENDOSCOPY  01/07/2022    Dr Chitra Cummins-w/bqh-14U-Jytncqtoowny changes of radiation changes in the very proximal esophagus, gastritis, no h pylori    WRIST FRACTURE SURGERY Right     WRIST SURGERY Right        Social History:  Social History     Tobacco Use    Smoking status: Every Day     Packs/day: 0.50     Years: 38.00     Pack years: 19.00     Types: Cigarettes    Smokeless tobacco: Never   Vaping Use    Vaping Use: Never used   Substance Use Topics    Alcohol use: No     Comment: quit last week, 2014    Drug use: No       Vital Signs:   Vitals:    02/07/23 0826   BP: (!) 145/94   Pulse: 85   Resp: 16   Temp: 97.9 °F (36.6 °C)   SpO2: 99%        Physical Exam:  Cardiac:  [x]WNL  []Comments:  Pulmonary:  [x]WNL   []Comments:  Neuro/Mental Status:  [x]WNL  []Comments:  Abdominal:  [x]WNL []Comments:  Other:   []WNL  []Comments:    Informed Consent:  The risks and benefits of the procedure have been discussed with either the patient or if they cannot consent, their representative. Assessment:  Patient examined and appropriate for planned sedation and procedure. Plan:  Proceed with planned sedation and procedure as above.          Loco Polk MD

## 2023-02-07 NOTE — ANESTHESIA POSTPROCEDURE EVALUATION
Patient: Saul Alcantara Sr.    Procedure Summary     Date: 06/24/22 Room / Location:  [facility] OR 02 /  [facility]    Anesthesia Start: 1221 Anesthesia Stop: 1254    Procedure: MicroDirect laryngoscopy, esophagoscopy with Johnson dilation (N/A Throat) Diagnosis:       Primary squamous cell carcinoma of tongue (HCC)      Hx of radiation therapy      Laryngopharyngeal reflux      Pharyngoesophageal dysphagia      (Primary squamous cell carcinoma of tongue (HCC) [C02.9])      (Hx of radiation therapy [Z92.3])      (Laryngopharyngeal reflux [K21.9])      (Pharyngoesophageal dysphagia [R13.14])    Surgeons: Wayne Richmond MD Provider: Joe Rand CRNA    Anesthesia Type: general ASA Status: 3          Anesthesia Type: general    Vitals  Vitals Value Taken Time   /99 06/24/22 1310   Temp 98.1 °F (36.7 °C) 06/24/22 1310   Pulse 92 06/24/22 1311   Resp 14 06/24/22 1310   SpO2 99 % 06/24/22 1311   Vitals shown include unvalidated device data.        Post Anesthesia Care and Evaluation    Patient location during evaluation: PACU  Patient participation: complete - patient participated  Level of consciousness: awake and alert  Pain score: 0  Pain management: adequate    Airway patency: patent  Anesthetic complications: No anesthetic complications    Cardiovascular status: acceptable and stable  Respiratory status: acceptable and unassisted  Hydration status: acceptable      
Spouse/Significant other

## 2023-02-07 NOTE — ANESTHESIA PRE PROCEDURE
Department of Anesthesiology  Preprocedure Note       Name:  Lin Smith. Age:  74 y.o.  :  1955                                          MRN:  [id number]         Date:  2023      Surgeon: Cain Acharya):  Flores Jeffries MD    Procedure: Procedure(s):  EGD BIOPSY    Medications prior to admission:   Prior to Admission medications    Medication Sig Start Date End Date Taking? Authorizing Provider   magnesium citrate solution Take 296 mLs by mouth as needed for Constipation    Historical Provider, MD   Sennosides (EX-LAX PO) Take by mouth as needed  Patient not taking: Reported on 2023    Historical Provider, MD   amitriptyline (ELAVIL) 25 MG tablet in the morning and at bedtime  Patient not taking: Reported on 2023   Historical Provider, MD   albuterol sulfate HFA (VENTOLIN HFA) 108 (90 Base) MCG/ACT inhaler Inhale 2 puffs into the lungs 4 times daily as needed for Wheezing 22   Jon Torres MD   nystatin (MYCOSTATIN) 471061 UNIT/ML suspension Take 5 mLs by mouth 3 times daily as needed (oral pain, thrush) Swish and spit 3/16/22   Flores Jeffries MD   meloxicam (MOBIC) 15 MG tablet Take 15 mg by mouth daily    Historical Provider, MD   amLODIPine (NORVASC) 5 MG tablet Take 1 tablet by mouth daily    Historical Provider, MD   cyclobenzaprine (FLEXERIL) 10 MG tablet 3 times daily as needed  Patient not taking: Reported on 10/12/2022 2/7/18   Historical Provider, MD   HYDROcodone-acetaminophen (NORCO) 7.5-325 MG per tablet Take 1 tablet by mouth as needed.  16   Historical Provider, MD   sertraline (ZOLOFT) 50 MG tablet Take 50 mg by mouth daily     Historical Provider, MD   senna-docusate (Nathanel Nancy) 8.6-50 MG per tablet Take 1 tablet by mouth 4 times daily    Historical Provider, MD   ondansetron (ZOFRAN) 8 MG tablet Take 8 mg by mouth every 8 hours as needed for Nausea or Vomiting    Historical Provider, MD   LEVOTHYROXINE SODIUM Take 0.088 mg by mouth daily     Historical Provider, MD   ALPRAZOLAM ER PO Take 2 mg by mouth 3 times daily Indications: Depression with Anxiety     Historical Provider, MD   Pantoprazole Sodium (PROTONIX PO) Take 40 mg by mouth 2 times daily     Historical Provider, MD   SIMVASTATIN PO Take 20 mg by mouth daily     Historical Provider, MD       Current medications:    Current Facility-Administered Medications   Medication Dose Route Frequency Provider Last Rate Last Admin    lactated ringers IV soln infusion   IntraVENous Continuous Gilma Buckley  mL/hr at 02/07/23 0835 New Bag at 02/07/23 0835       Allergies:  No Known Allergies    Problem List:    Patient Active Problem List   Diagnosis Code    Late effect of radiation T66. Ismael Cousin    Cataract H26.9       Past Medical History:        Diagnosis Date    Anxiety     Arthritis     Chronic kidney disease     Stage 3 Moderated     COPD (chronic obstructive pulmonary disease) (HCC)     pt denies any symptoms     Depression     Dysphagia     Elevated cholesterol     Elevated PSA     GERD (gastroesophageal reflux disease)     History of blood transfusion     Hodgkin lymphoma (HCC)     Hypertension     Hypothyroidism     Insomnia     NHL (non-Hodgkin's lymphoma) (HCC)     REMISSION    Palpitations     Prostate cancer (HCC)     Rhinitis     Seizure ([facility] [address] 2013    Squamous cell carcinoma of tongue ([facility] [address] 08/2014    HPV related    Weight loss     Xerostomia        Past Surgical History:        Procedure Laterality Date    ABSCESS DRAINAGE      ON BUTTOCK    CATARACT REMOVAL Right     COLONOSCOPY  10/05/2010    Dr Kiana Sapp, 5 yr recall    COLONOSCOPY N/A 03/16/2022    Dr Sushila Ren, 5 yr recall    DENTAL SURGERY Bilateral     ESOPHAGEAL DILATATION      INTRACAPSULAR CATARACT EXTRACTION Right 06/06/2016    EYE CATARACT EMULSIFICATION IOL IMPLANT performed by Bina Gao MD at [address] [place] [address] CATARACT EXTRACTION Left 07/11/2016    LT EYE CATARACT EMULSIFICATION IOL IMPLANT performed by José Luis Levin MD at [address] [facility] ARTHROSCOPY Bilateral     KNEE SURGERY Bilateral     scope    SPLENECTOMY  2004    THROAT SURGERY      06-26-20& 10-11-21    TONGUE BIOPSY  09/2015    wedge    TONGUE SURGERY      TONSILLECTOMY      UPPER GASTROINTESTINAL ENDOSCOPY N/A 01/07/2022    Dr Frankie Cummins-w/ncn-86N-Xylpygtoavoo changes of radiation changes in the very proximal esophagus, gastritis, no h pylori    UPPER GASTROINTESTINAL ENDOSCOPY  01/07/2022    Dr Frankie Cummins-w/arn-01U-Srccisprfsaa changes of radiation changes in the very proximal esophagus, gastritis, no h pylori    WRIST FRACTURE SURGERY Right     WRIST SURGERY Right        Social History:    Social History     Tobacco Use    Smoking status: Every Day     Packs/day: 0.50     Years: 38.00     Pack years: 19.00     Types: Cigarettes    Smokeless tobacco: Never   Substance Use Topics    Alcohol use: No     Comment: quit last week, 2014                                Ready to quit: Not Answered  Counseling given: Not Answered      Vital Signs (Current):   Vitals:    02/07/23 0826   BP: (!) 145/94   Pulse: 85   Resp: 16   Temp: 97.9 °F (36.6 °C)   TempSrc: Temporal   SpO2: 99%   Weight: 148 lb (67.1 kg)   Height: 6' 1\" (1.854 m)                                              BP Readings from Last 3 Encounters:   02/07/23 (!) 145/94   10/12/22 124/74   06/11/22 (!) 151/82       NPO Status:                                                                                 BMI:   Wt Readings from Last 3 Encounters:   02/07/23 148 lb (67.1 kg)   10/12/22 131 lb (59.4 kg)   06/02/22 143 lb (64.9 kg)     Body mass index is 19.53 kg/m².     CBC:   Lab Results   Component Value Date/Time    WBC 7.7 06/11/2022 12:04 PM    RBC 3.97 06/11/2022 12:04 PM    HGB 12.5 06/11/2022 12:04 PM    HCT 38.3 06/11/2022 12:04 PM    MCV 96.5 06/11/2022 12:04 PM    RDW 13.4 06/11/2022 12:04 PM     06/11/2022 12:04 PM       CMP:   Lab Results   Component Value Date/Time     06/11/2022 12:04 PM    K 3.9 06/11/2022 12:04 PM     06/11/2022 12:04 PM    CO2 23 06/11/2022 12:04 PM    BUN 15 06/11/2022 12:04 PM    CREATININE 1.0 06/11/2022 12:04 PM    GFRAA >59 06/11/2022 12:04 PM    LABGLOM >60 06/11/2022 12:04 PM    GLUCOSE 98 06/11/2022 12:04 PM    PROT 6.3 06/11/2022 12:04 PM    CALCIUM 9.8 06/11/2022 12:04 PM    BILITOT 0.5 06/11/2022 12:04 PM    ALKPHOS 80 06/11/2022 12:04 PM    AST 19 06/11/2022 12:04 PM    ALT 12 06/11/2022 12:04 PM       POC Tests: No results for input(s): POCGLU, POCNA, POCK, POCCL, POCBUN, POCHEMO, POCHCT in the last 72 hours. Coags:   Lab Results   Component Value Date/Time    PROTIME 13.1 01/06/2022 02:10 PM    INR 0.97 01/06/2022 02:10 PM    APTT 29.0 02/28/2018 06:33 AM       HCG (If Applicable): No results found for: PREGTESTUR, PREGSERUM, HCG, HCGQUANT     ABGs: No results found for: PHART, PO2ART, VHN5PAB, BOH1UUL, BEART, W6EOICHO     Type & Screen (If Applicable):  No results found for: LABABO, LABRH    Drug/Infectious Status (If Applicable):  No results found for: HIV, HEPCAB    COVID-19 Screening (If Applicable):   Lab Results   Component Value Date/Time    COVID19 Not Detected 06/02/2022 07:15 AM    COVID19 Not Detected 03/14/2022 10:33 AM           Anesthesia Evaluation  Patient summary reviewed no history of anesthetic complications:   Airway: Mallampati: II  TM distance: >3 FB   Neck ROM: full  Comment: S/p radiation to tongue  Mouth opening: < 3 FB   Dental: normal exam         Pulmonary:normal exam    (+) COPD:  current smoker          Patient smoked on day of surgery.                  Cardiovascular:  Exercise tolerance: no interval change,   (+) hypertension:,          Beta Blocker:  Not on Beta Blocker         Neuro/Psych:   (+) depression/anxiety              ROS comment: Chronic narc use GI/Hepatic/Renal:   (+) GERD:, renal disease: CRI,           Endo/Other:    (+) hypothyroidism::., malignancy/cancer (squamous cell to the tongue s/p radiation 2014, non-Hodgkins lymphoma in remission 2004, prostate cancer no treatments as of now ). Abdominal:   (+) scaphoid          Vascular: negative vascular ROS. Other Findings:           Anesthesia Plan      general and TIVA     ASA 3       Induction: intravenous. Anesthetic plan and risks discussed with patient.                         Ankur Rivera, VERONICA - CRNA   2/7/2023

## 2023-02-07 NOTE — OP NOTE
Endoscopic Procedure Note    Patient: Annemarie Puente. : 1955  Med Rec#: [id number] Acc#: [id number]     Primary Care Provider Kain Reagan MD  Referring Provider: Julianna Rosales MD    Endoscopist: Yue Dc MD    Date of Procedure:  2023    Procedure:   1. EGD with wire guided dilation to 60F    Indications:   1. Dysphagia- thought to be due to radiation    Anesthesia:  Sedation was administered by anesthesia who monitored the patient during the procedure. Estimated Blood Loss: minimal    Procedure:   After reviewing the patient's chart and obtaining informed consent, the patient was placed in the left lateral decubitus position. A forward-viewing Olympus endoscope was lubricated and inserted through the mouth into the oropharynx. Under direct visualization, the upper esophagus was intubated. The scope was advanced to the level of the third portion of duodenum. Scope was slowly withdrawn with careful inspection of the mucosal surfaces. The scope was retroflexed for inspection of the gastric fundus and incisura. Findings and maneuvers are listed in impression below. The patient tolerated the procedure well. The scope was removed. There were no immediate complications. Findings:   Esophagus: abnormal: No obvious focal lesion causing symptoms. There are mucosal changes in the upper esophagus suggestive of possible radiation changes. Due to previous symptom response with dilation, further dilation was pursued. A guidewire was placed through the endoscope and the endoscope was removed. A 57 and subsequently a 60 Brazilian savory dilator was advanced down the length of the esophagus used a fixed-point wire technique. The dilator and guidewire were removed. The patient tolerated the procedure well. There is no hiatal hernia present. Stomach:  normal      Duodenum: normal      IMPRESSION:  1. Esophageal dysphagia - dilated to 60F       RECOMMENDATIONS:    1.   Continue current treatment 2.  Patient to call with any recurrent symptoms and we will repeat EGD with dilation as needed      The results were discussed with the patient and family. A copy of the images obtained were given to the patient.      Anitra Mai MD  2/7/2023  9:12 AM

## 2023-02-07 NOTE — H&P
Patient Name: Tin Kessler Sr.  : 1955  MRN: [id number]  DATE: 23    Allergies: No Known Allergies     ENDOSCOPY  History and Physical    Procedure:    [] Diagnostic Colonoscopy       [] Screening Colonoscopy  [x] EGD      [] ERCP      [] EUS       [] Other    [x] Previous office notes/History and Physical reviewed from the patients chart. Please see EMR for further details of HPI. I have examined the patient's status immediately prior to the procedure and:      Indications/HPI:    []Abdominal Pain   []Barretts  []Screening/Surveillance   []History of Polyps  [x]Dysphagia            [] +Cologard/DNA testing  []Abnormal Imaging              []EOE Hx              [] Family Hx of CRC/Polyps  []Anemia                            []Food Impaction       []Recent Poor Prep  []GI Bleed             []Lymphadenopathy  []History of Polyps  []Change in bowel habits []Heartburn/Reflux  []Cancer- GI/Lung  []Chest Pain - Non Cardiac []Heme (+) Stool []Ulcers  []Constipation  []Hemoptysis  []Incontinence    []Diarrhea  []Hypoxemia  []Rectal Bleed (BRBPR)  []Nausea/Vomiting   [] Varices  []Crohns/Colitis  []Pancreatic Cyst   [] Cirrhosis   []Pancreatitis    []Abnormal MRCP  []Elevated LFT [] Stent Removal, Previous ERCP  []Other:     Anesthesia:   [x] MAC [] Moderate Sedation   [] General   [] None     ROS: 12 pt Review of Symptoms was negative unless mentioned above    Medications:   Prior to Admission medications    Medication Sig Start Date End Date Taking?  Authorizing Provider   magnesium citrate solution Take 296 mLs by mouth as needed for Constipation    Historical Provider, MD   Sennosides (EX-LAX PO) Take by mouth as needed    Historical Provider, MD   amitriptyline (ELAVIL) 25 MG tablet in the morning and at bedtime 22   Historical Provider, MD   albuterol sulfate HFA (VENTOLIN HFA) 108 (90 Base) MCG/ACT inhaler Inhale 2 puffs into the lungs 4 times daily as needed for Wheezing 22   Darion Holt MD Edna   nystatin (MYCOSTATIN) 852503 UNIT/ML suspension Take 5 mLs by mouth 3 times daily as needed (oral pain, thrush) Swish and spit 3/16/22   Tammy Gtz MD   meloxicam (MOBIC) 15 MG tablet Take 15 mg by mouth daily    Historical Provider, MD   amLODIPine (NORVASC) 5 MG tablet Take 1 tablet by mouth daily    Historical Provider, MD   cyclobenzaprine (FLEXERIL) 10 MG tablet 3 times daily as needed  Patient not taking: Reported on 10/12/2022 2/7/18   Historical Provider, MD   HYDROcodone-acetaminophen (NORCO) 7.5-325 MG per tablet Take 1 tablet by mouth as needed.  11/11/16   Historical Provider, MD   sertraline (ZOLOFT) 50 MG tablet Take 50 mg by mouth daily     Historical Provider, MD   senna-docusate (Jeffrey Cesar) 8.6-50 MG per tablet Take 1 tablet by mouth 4 times daily    Historical Provider, MD   ondansetron (ZOFRAN) 8 MG tablet Take 8 mg by mouth every 8 hours as needed for Nausea or Vomiting    Historical Provider, MD   LEVOTHYROXINE SODIUM Take 0.088 mg by mouth daily     Historical Provider, MD   ALPRAZOLAM ER PO Take 2 mg by mouth 3 times daily Indications: Depression with Anxiety     Historical Provider, MD   Pantoprazole Sodium (PROTONIX PO) Take 40 mg by mouth 2 times daily     Historical Provider, MD   SIMVASTATIN PO Take 20 mg by mouth daily     Historical Provider, MD       Past Medical History:  Past Medical History:   Diagnosis Date    Anxiety     Chronic kidney disease     Stage 3 Moderated     COPD (chronic obstructive pulmonary disease) ([facility] [address]     pt denies any symptoms     Depression     Dysphagia     Elevated cholesterol     Elevated PSA     GERD (gastroesophageal reflux disease)     History of blood transfusion     Hodgkin lymphoma ([facility] [address]     Hypertension     Hypothyroidism     Insomnia     NHL (non-Hodgkin's lymphoma) ([address]     REMISSION    Palpitations     Prostate cancer ([address]     Rhinitis     Seizure ([facility] [address] 2013    Squamous cell carcinoma of tongue ([facility] [address] 8/2014    HPV related Weight loss     Xerostomia        Past Surgical History:  Past Surgical History:   Procedure Laterality Date    ABSCESS DRAINAGE      ON BUTTOCK    CATARACT REMOVAL Right     COLONOSCOPY  10/05/2010    Dr Christi Estrada, 5 yr recall    COLONOSCOPY N/A 03/16/2022    Dr Shameka Lane, 5 yr recall    DENTAL SURGERY Bilateral     ESOPHAGEAL DILATATION      INTRACAPSULAR CATARACT EXTRACTION Right 06/06/2016    EYE CATARACT EMULSIFICATION IOL IMPLANT performed by Sharmaine Kingston MD at [address] Left 07/11/2016    LT EYE CATARACT EMULSIFICATION IOL IMPLANT performed by Sharmaine Kingston MD at [address] ARTHROSCOPY Bilateral     KNEE SURGERY Bilateral     scope    SPLENECTOMY  2004    THROAT SURGERY      06-26-20& 10-11-21    TONGUE BIOPSY  09/2015    wedge    TONGUE SURGERY      TONSILLECTOMY      UPPER GASTROINTESTINAL ENDOSCOPY N/A 01/07/2022    Dr OLENA Cummins-[id number] changes of radiation changes in the very proximal esophagus, gastritis, no h pylori    UPPER GASTROINTESTINAL ENDOSCOPY  01/07/2022    Dr Johan Cummins-[id number] changes of radiation changes in the very proximal esophagus, gastritis, no h pylori    WRIST FRACTURE SURGERY Right     WRIST SURGERY Right        Social History:  Social History     Tobacco Use    Smoking status: Every Day     Packs/day: 0.50     Years: 38.00     Pack years: 19.00     Types: Cigarettes    Smokeless tobacco: Never   Vaping Use    Vaping Use: Never used   Substance Use Topics    Alcohol use: No     Comment: quit last week, 2014    Drug use: No       Vital Signs: There were no vitals filed for this visit.      Physical Exam:  Cardiac:  [x]WNL  []Comments:  Pulmonary:  [x]WNL   []Comments:  Neuro/Mental Status:  [x]WNL  []Comments:  Abdominal:  [x]WNL    []Comments:  Other:   []WNL  []Comments:    Informed Consent:  The risks and benefits of the procedure have been discussed with either the patient or if they cannot consent, their representative. Assessment:  Patient examined and appropriate for planned sedation and procedure. Plan:  Proceed with planned sedation and procedure as above.          Jayda Patel MD

## 2023-02-13 ENCOUNTER — TELEPHONE (OUTPATIENT)
Dept: OTOLARYNGOLOGY | Facility: CLINIC | Age: 68
End: 2023-02-13
Payer: MEDICARE

## 2023-02-13 DIAGNOSIS — J32.0 CHRONIC MAXILLARY SINUSITIS: Primary | ICD-10-CM

## 2023-02-13 NOTE — TELEPHONE ENCOUNTER
Caller: ALYSSIA     Relationship: WIFE     Best call back number: [phone]    INCOMING CALL FROM PT WIFE TRYING TO GET PT AS SOON AS POSSIBLE. PT WIFE SAYS PT HAS CONTINUOUS FACIAL SWELLING ON AND OFF CYCLES AND THEN SPITS UP GREEN/YELLOW DRAINAGE. NEXT AVAIL NOT UNTIL April. PT WIFE WANTS PT TO BE SEEN WHILE HIS FACE IS SWELLED. PT WIFE SAYS PT HAS FOLLOWED UP WITH ORAL SURGEON AND CLAIMS THE ANTIBIOTICS DO NOT MAKE THE ISSUE GO AWAY AND WAS RECOMMENDED TO FOLLOW UP WITH DR. LECHUGA.

## 2023-03-01 ENCOUNTER — HOSPITAL ENCOUNTER (OUTPATIENT)
Dept: CT IMAGING | Facility: HOSPITAL | Age: 68
Discharge: HOME | End: 2023-03-01
Admitting: NURSE PRACTITIONER
Payer: MEDICARE

## 2023-03-01 DIAGNOSIS — J32.0 CHRONIC MAXILLARY SINUSITIS: ICD-10-CM

## 2023-03-01 PROCEDURE — 70486 CT MAXILLOFACIAL W/O DYE: CPT

## 2023-03-02 ENCOUNTER — OFFICE VISIT (OUTPATIENT)
Dept: NEUROLOGY | Facility: CLINIC | Age: 68
End: 2023-03-02
Payer: MEDICARE

## 2023-03-02 ENCOUNTER — TELEPHONE (OUTPATIENT)
Dept: OTOLARYNGOLOGY | Facility: CLINIC | Age: 68
End: 2023-03-02
Payer: MEDICARE

## 2023-03-02 VITALS
DIASTOLIC BLOOD PRESSURE: 80 MMHG | HEART RATE: 83 BPM | HEIGHT: 72 IN | OXYGEN SATURATION: 98 % | WEIGHT: 149 LBS | SYSTOLIC BLOOD PRESSURE: 120 MMHG | BODY MASS INDEX: 20.18 KG/M2

## 2023-03-02 DIAGNOSIS — R25.1 PHYSIOLOGICAL TREMOR: Primary | ICD-10-CM

## 2023-03-02 PROCEDURE — 99204 OFFICE O/P NEW MOD 45 MIN: CPT | Performed by: PHYSICIAN ASSISTANT

## 2023-03-02 RX ORDER — MIRTAZAPINE 15 MG/1
15 TABLET, FILM COATED ORAL NIGHTLY
COMMUNITY
Start: 2023-01-10

## 2023-03-02 NOTE — TELEPHONE ENCOUNTER
----- Message from JACKIE Jones sent at 3/1/2023  3:23 PM CST -----  Please call patient with results   Will discuss at f/u

## 2023-03-02 NOTE — PROGRESS NOTES
Subjective   Saul Alcantara Sr. is a 67 y.o. male who presents today as a new patient with complaints of tremor. The patient has had multiple underlying medical issues including lymphoma, COPD, sicca syndrome, squamous cell carcinoma of the base of the tongue, and history of head and neck radiation. He is accompanied by his wife today.    History of Present Illness     Tremor  The patient's wife states that he has been experiencing tremors for approximately 1 year. He states that the tremor used to be in one hand, but now it is in both hands. He states that he is able to drink. The patient denies having to switch to a straw or use both hands to be able to drink from a cup. He is still able to use a fork when eating. His wife states that he has trouble with his speech because he had tongue cancer. She states the radiation has burned up all of his salivary glands, so it is really hard for him to speak. His wife reports that he had a CT scan of his face yesterday, 03/01/2023. The wife of the patient states that his oral surgeon thought he had sinus disease, but that is still unclear. She states that it makes his mouth taste like salt. She states that Dr. Stubbs had him on several antibiotics because his leg swells up. She states that he cannot wear his teeth because it causes discomfort. She states that he has to take his teeth out and lay down because it starts hurting. His wife reports the patient has night sweats again.     COPD  The patient states that he was told that he had COPD. He states that he can walk up 3 flights of stairs.    The following portions of the patient's history were reviewed and updated as appropriate: allergies, current medications, past family history, past medical history, past social history, past surgical history and problem list.    Review of Systems:  A review of systems was performed, and positive findings are noted in the HPI.      Current Outpatient Medications:   •  albuterol  (PROVENTIL) (2.5 MG/3ML) 0.083% nebulizer solution, Take 2.5 mg by nebulization Every 4 (Four) Hours As Needed for Wheezing., Disp: 120 mL, Rfl: 5  •  ALPRAZolam (XANAX) 2 MG tablet, Take 1 tablet by mouth 3 (Three) Times a Day., Disp: , Rfl:   •  amLODIPine (NORVASC) 10 MG tablet, Take 1 tablet by mouth Daily., Disp: , Rfl:   •  chlorhexidine (Peridex) 0.12 % solution, Apply 15 mL to the mouth or throat 2 (Two) Times a Day., Disp: 240 mL, Rfl: 0  •  cyclobenzaprine (FLEXERIL) 10 MG tablet, Take 1 tablet by mouth As Needed., Disp: , Rfl:   •  glycopyrrolate (ROBINUL) 1 MG tablet, Take 0.5 mg by mouth 2 (Two) Times a Day., Disp: , Rfl:   •  HYDROcodone-acetaminophen (NORCO)  MG per tablet, Take 1 tablet by mouth Every 6 (Six) Hours As Needed for Moderate Pain (PER DR RUIZ)., Disp: , Rfl:   •  levothyroxine (SYNTHROID, LEVOTHROID) 100 MCG tablet, Take 1 tablet by mouth Daily., Disp: , Rfl:   •  Lidocaine Viscous HCl (XYLOCAINE) 2 % solution, Take 5 mL by mouth As Needed for Mild Pain., Disp: , Rfl:   •  meloxicam (MOBIC) 15 MG tablet, Take 1 tablet by mouth Daily., Disp: , Rfl:   •  mirtazapine (REMERON) 15 MG tablet, Take 1 tablet by mouth Every Night., Disp: , Rfl:   •  nystatin (MYCOSTATIN) 270523 UNIT/ML suspension, Take 10ml by mouth BID for 28 days (Patient taking differently: Take 2 mL by mouth 4 (Four) Times a Day As Needed. Take 10ml by mouth BID for 28 days), Disp: 480 mL, Rfl: 2  •  ondansetron (ZOFRAN) 8 MG tablet, Take 1 tablet by mouth Every 8 (Eight) Hours As Needed for Nausea., Disp: , Rfl:   •  pantoprazole (PROTONIX) 40 MG EC tablet, TAKE 1 TABLET BY MOUTH TWICE A DAY (Patient taking differently: Take 1 tablet by mouth Daily.), Disp: 180 tablet, Rfl: 3  •  senna-docusate (PERICOLACE) 8.6-50 MG per tablet, Take 2 tablets by mouth Daily., Disp: , Rfl:   •  sertraline (ZOLOFT) 100 MG tablet, Take 1 tablet by mouth every night at bedtime., Disp: , Rfl:   •  simvastatin (ZOCOR) 20 MG tablet, Take  1 tablet by mouth Every Night., Disp: , Rfl: 11  •  tadalafil (CIALIS) 20 MG tablet, Take 1 tablet by mouth As Needed for Erectile Dysfunction for up to 360 days. 1-24 hours before activity, Disp: 12 tablet, Rfl: 11  •  tamsulosin (FLOMAX) 0.4 MG capsule 24 hr capsule, Take 1 capsule by mouth Daily., Disp: , Rfl:   •  tiotropium bromide-olodaterol (Stiolto Respimat) 2.5-2.5 MCG/ACT aerosol solution inhaler, Inhale 2 puffs Daily., Disp: 4 g, Rfl: 11     Objective   Physical Exam  Vitals and nursing note reviewed.   HENT:      Head: Normocephalic.      Right Ear: Hearing and external ear normal.      Left Ear: Hearing and external ear normal.      Nose: Nose normal.      Mouth/Throat:      Pharynx: Oropharynx is clear.   Eyes:      General: Lids are normal. Vision grossly intact. Gaze aligned appropriately. No scleral icterus.     Extraocular Movements: Extraocular movements intact.      Conjunctiva/sclera: Conjunctivae normal.      Pupils: Pupils are equal, round, and reactive to light.      Visual Fields: Right eye visual fields normal and left eye visual fields normal.   Neck:      Vascular: No carotid bruit or JVD.      Trachea: Trachea and phonation normal.   Cardiovascular:      Rate and Rhythm: Normal rate.      Heart sounds: Normal heart sounds.   Pulmonary:      Effort: Pulmonary effort is normal.      Breath sounds: Normal breath sounds.   Musculoskeletal:      Cervical back: Normal range of motion.   Skin:     General: Skin is warm and dry.   Neurological:      Mental Status: He is alert and oriented to person, place, and time.      GCS: GCS eye subscore is 4. GCS verbal subscore is 5. GCS motor subscore is 6.      Sensory: Sensation is intact.      Motor: Tremor present.      Coordination: Coordination is intact.      Gait: Gait is intact.      Deep Tendon Reflexes: Reflexes are normal and symmetric.      Comments: the patient has a physiologic bilaterally symmetric, high amplitude, low frequency, not  intense tremor which does increase with effort to a degree. It is not a disabling tremor at this point.   Psychiatric:         Attention and Perception: Attention and perception normal.         Mood and Affect: Mood and affect normal.         Speech: Speech normal.         Behavior: Behavior normal.         Thought Content: Thought content normal.         Cognition and Memory: Cognition and memory normal.         Judgment: Judgment normal.                Assessment & Plan   Diagnoses and all orders for this visit:    1. Physiological tremor (Primary)        1. Physiologic tremor  - The patient requires no treatment at this time. He has no evidence of progressive neuromuscular disease. No evidence of Parkinson's disease. I would see him back on an as needed basis.           Transcribed from ambient dictation for KRISTA Anderson by Siobhan Rhoades.  03/02/23   12:22 CST    Patient or patient representative verbalized consent to the visit recording.  I have personally performed the services described in this document as transcribed by the above individual, and it is both accurate and complete.

## 2023-03-13 NOTE — PROGRESS NOTES
YOB: 1955  Location: [facility]  Location Address: [address] [place] [address], [facility], [address] [place] [address]  Location Phone: [phone]    Chief Complaint   Patient presents with   • Cancer     History of Present Illness  Saul Alcantara Sr. is a 67 y.o. male.  Saul Alcantara Sr. is here for follow up of ENT complaints. The patient has a history of dl and biopsy of base of tongue on 2014 with positive pathology for scca. The cancer was staged T1M0N0. He has a history of radiation therapy that he finished 2014. Patient is s/p direct laryngoscopy with esophageal dilatation 22.  He continues to complain of difficulty swallowing. Patient's main complaint is a bump on the upper gum. He has had bone debridement and states Dr. West said the bump was sinus related. Patient has nasal congestion, nasal drainage, postnasal drip, salty taste of mouth and swelling of left maxillary area. Patient is on flonase. He was given antibiotics which helped the nodule of the gum and the sinus issues. He has had CT sinus.      CT Sinus Without Contrast (2023 13:50)    Study Result    Narrative & Impression   EXAMINATION:  CT SINUS WO CONTRAST-  3/1/2023 1:45 PM CST     HISTORY: J32.0-Chronic maxillary sinusitis.     TECHNIQUE: Spiral CT was performed of the paranasal sinuses.. Sagittal  and coronal images were reconstructed.     DLP: 176 mGy-cm. Automated dosage reduction technique was utilized to  reduce patient dosage.     COMPARISON: No comparison study.     PARANASAL SINUSES: The paranasal sinuses are clear. The ostiomeatal  complex region is patent bilaterally. There is serge bullosa of the  left middle turbinate. There is minimal deviation of the nasal septum to  the right.     OTHER FINDINGS: The visualized unenhanced brain demonstrates no focal  abnormality. The globes, optic nerves and ocular muscles are symmetric.  The mastoid air cells are clear. The internal auditory canals are  symmetric.  The cochlea, vestibule and semicircular canals are symmetric.  The ossicles in the middle ear cavities are symmetric.     IMPRESSION:  1. Negative CT examination of the paranasal sinuses.  2. Caro bullosa of the left middle turbinate. Minimal deviation of the  nasal septum to the right.     This report was finalized on 03/01/2023 14:03 by Dr. Devin Perdomo MD          Past Medical History:   Diagnosis Date   • Allergic rhinitis    • Anxiety    • Arthritis    • Cataract    • Chronic sinusitis    • COPD (chronic obstructive pulmonary disease) ([facility])    • COVID-19 vaccine series completed     Moderna   • Depression    • Deviated nasal septum    • Elevated cholesterol    • Enlarged prostate    • GERD (gastroesophageal reflux disease)    • H/O head and neck radiation 03/03/2017   • History of transfusion    • Hyperlipidemia    • Hypertension    • Hypothyroidism    • Laryngopharyngeal reflux    • Lymphoma ([facility])     Non-Hodgkins   • MRSA infection    • Panic attacks    • Peyronie disease    • Pneumonia    • Primary squamous cell carcinoma of tongue ([facility]) 09/27/2016    T1N0M0 SCC S/P XRT/Chemo 12/2014   • 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    T1N0M0 SCC S/P XRT/Chemo 12/2014   • Prostate cancer ([facility])    • Sicca syndrome ([facility])    • Sinusitis, acute    • Squamous cell carcinoma     Base of Tongue   • Tobacco use disorder    • Tongue irritation    • Visit for monitoring Rituxan therapy     pt getting treatments at [facility] office in [place]   • Xerostomia        Past Surgical History:   Procedure Laterality Date   • CATARACT EXTRACTION Bilateral    • ESOPHAGOSCOPY N/A 11/20/2019    Procedure: Direct laryngoscopy with esophageal Johnson dilation;  Surgeon: Wayne Richmond MD;  Location: [facility];  Service: ENT   • HAND SURGERY      metal plate    • KNEE SURGERY     • LARYNGOSCOPY N/A 6/26/2020    Procedure: Rigid esophagoscopy Johnson esophageal dilatation;  Surgeon: Wayne Richmond  MD;  Location:  [facility];  Service: ENT;  Laterality: N/A;   • LARYNGOSCOPY N/A 8/24/2020    Procedure: MICRODIRECT LARYNGOSCOPY with esophageal dilatation;  Surgeon: Wayne Richmond MD;  Location:  [facility];  Service: ENT;  Laterality: N/A;   • LARYNGOSCOPY N/A 5/24/2021    Procedure: Direct laryngoscopy, esophagoscopy with Johnson dilatation;  Surgeon: Wayne Richmond MD;  Location:  [facility];  Service: ENT;  Laterality: N/A;   • LARYNGOSCOPY N/A 12/1/2021    Procedure: Direct laryngoscopy, esophagoscopy with esophageal dilatation (Johnson dilators);  Surgeon: Wayne Richmond MD;  Location:  [facility];  Service: ENT;  Laterality: N/A;   • LARYNGOSCOPY N/A 1/3/2022    Procedure: Direct laryngoscopy, esophagoscopy with Johnson esophageal dilation;  Surgeon: Wayne Richmond MD;  Location:  [facility];  Service: ENT;  Laterality: N/A;   • LARYNGOSCOPY N/A 4/29/2022    Procedure: Direct laryngoscopy with esophageal dilatation;  Surgeon: Wayne Richmond MD;  Location:  [facility];  Service: ENT;  Laterality: N/A;   • LARYNGOSCOPY N/A 6/24/2022    Procedure: MicroDirect laryngoscopy, esophagoscopy with Johnson dilation;  Surgeon: Wayne Richmond MD;  Location: [facility] OR;  Service: ENT;  Laterality: N/A;   • LARYNGOSCOPY N/A 9/23/2022    Procedure: DIRECT LARYNGOSCOPY WITH JOHNSON DILATION;  Surgeon: Wayne Richmond MD;  Location:  [facility];  Service: ENT;  Laterality: N/A;   • MULTIPLE TOOTH EXTRACTIONS      CONTINUING TO HAVE BONE REMOVED 1/2021   • OTHER SURGICAL HISTORY  09/10/2014    Microlaryngoscopy with Biopsy - Base of Tongue   • PANENDOSCOPY N/A 1/22/2021    Procedure: DIRECT LARYNGOSCOPY AND ESOPHAGOSCOPY WITH ESOPHAGEAL DILATION;  Surgeon: Wayne Richmond MD;  Location:  [facility];  Service: ENT;  Laterality: N/A;   • PANENDOSCOPY N/A 10/11/2021    Procedure: Direct laryngoscopy, esophagoscopy with Johnson dilatation;  Surgeon: Wayne Richmond MD;  Location:  [facility];   Service: ENT;  Laterality: N/A;   • PROSTATE ULTRASOUND BIOPSY N/A 12/1/2021    Procedure: PROSTATE ULTRASOUND BIOPSY. NOT A URONAV;  Surgeon: Oscar Bazan MD;  Location: [facility] OR;  Service: Urology;  Laterality: N/A;   • SPLENECTOMY     • SQUAMOUS CELL CARCINOMA EXCISION  09/10/2014    Tongue   • TONSILLECTOMY         Outpatient Medications Marked as Taking for the 3/14/23 encounter (Office Visit) with Wayne Richmond MD   Medication Sig Dispense Refill   • albuterol (PROVENTIL) (2.5 MG/3ML) 0.083% nebulizer solution Take 2.5 mg by nebulization Every 4 (Four) Hours As Needed for Wheezing. 120 mL 5   • ALPRAZolam (XANAX) 2 MG tablet Take 1 tablet by mouth 3 (Three) Times a Day.     • amLODIPine (NORVASC) 10 MG tablet Take 1 tablet by mouth Daily.     • chlorhexidine (Peridex) 0.12 % solution Apply 15 mL to the mouth or throat 2 (Two) Times a Day. 240 mL 0   • cyclobenzaprine (FLEXERIL) 10 MG tablet Take 1 tablet by mouth As Needed.     • glycopyrrolate (ROBINUL) 1 MG tablet Take 0.5 mg by mouth 2 (Two) Times a Day.     • HYDROcodone-acetaminophen (NORCO)  MG per tablet Take 1 tablet by mouth Every 6 (Six) Hours As Needed for Moderate Pain (PER DR RUIZ).     • levothyroxine (SYNTHROID, LEVOTHROID) 100 MCG tablet Take 1 tablet by mouth Daily.     • Lidocaine Viscous HCl (XYLOCAINE) 2 % solution Take 5 mL by mouth As Needed for Mild Pain.     • meloxicam (MOBIC) 15 MG tablet Take 1 tablet by mouth Daily.     • mirtazapine (REMERON) 15 MG tablet Take 1 tablet by mouth Every Night.     • nystatin (MYCOSTATIN) 857941 UNIT/ML suspension Take 10ml by mouth BID for 28 days (Patient taking differently: Take 2 mL by mouth 4 (Four) Times a Day As Needed. Take 10ml by mouth BID for 28 days) 480 mL 2   • ondansetron (ZOFRAN) 8 MG tablet Take 1 tablet by mouth Every 8 (Eight) Hours As Needed for Nausea.     • pantoprazole (PROTONIX) 40 MG EC tablet TAKE 1 TABLET BY MOUTH TWICE A DAY (Patient taking differently:  Take 1 tablet by mouth Daily.) 180 tablet 3   • senna-docusate (PERICOLACE) 8.6-50 MG per tablet Take 2 tablets by mouth Daily.     • sertraline (ZOLOFT) 100 MG tablet Take 1 tablet by mouth every night at bedtime.     • simvastatin (ZOCOR) 20 MG tablet Take 1 tablet by mouth Every Night.  11   • tadalafil (CIALIS) 20 MG tablet Take 1 tablet by mouth As Needed for Erectile Dysfunction for up to 360 days. 1-24 hours before activity 12 tablet 11   • tamsulosin (FLOMAX) 0.4 MG capsule 24 hr capsule Take 1 capsule by mouth Daily.     • tiotropium bromide-olodaterol (Stiolto Respimat) 2.5-2.5 MCG/ACT aerosol solution inhaler Inhale 2 puffs Daily. 4 g 11       Patient has no known allergies.    Family History   Problem Relation Age of Onset   • Cancer Mother    • Courtney's disease Father    • Diabetes Sister    • Cancer Sister    • Aneurysm Brother        Social History     Socioeconomic History   • Marital status:    Tobacco Use   • Smoking status: Some Days     Packs/day: 0.50     Years: 44.00     Pack years: 22.00     Types: Cigarettes     Last attempt to quit:      Years since quittin.1   • Smokeless tobacco: Never   • Tobacco comments:     pack last couple of weeks   Vaping Use   • Vaping Use: Never used   Substance and Sexual Activity   • Alcohol use: No   • Drug use: No   • Sexual activity: Defer     Partners: Female       Review of Systems   Constitutional: Negative.    HENT: Positive for congestion, postnasal drip, rhinorrhea, sinus pressure, sinus pain, trouble swallowing and voice change.         Admits altered taste   Eyes: Negative.    Respiratory: Positive for cough.    Cardiovascular: Negative.    Gastrointestinal: Negative.    Endocrine: Negative.    Genitourinary: Negative.    Musculoskeletal: Negative.    Skin: Negative.    Allergic/Immunologic: Negative.    Neurological: Negative.    Hematological: Negative.    Psychiatric/Behavioral: Negative.        Vitals:    23 1555   Temp:  97.9 °F (36.6 °C)       Body mass index is 19.53 kg/m².    Objective     Physical Exam  Vitals reviewed.   Constitutional:       Appearance: Normal appearance.   HENT:      Head: Normocephalic.      Right Ear: Hearing and external ear normal.      Left Ear: Hearing and external ear normal.      Nose: Nose normal.      Mouth/Throat:      Lips: Pink.      Mouth: Mucous membranes are moist.      Comments: See endoscopy notes    Upper dentures     Lower edentulous     Black hairy tongue   Dry mucus membranes     Musculoskeletal:      Cervical back: Full passive range of motion without pain.   Neurological:      Mental Status: He is alert.   Psychiatric:         Behavior: Behavior is cooperative.         Assessment & Plan   Diagnoses and all orders for this visit:    1. Primary squamous cell carcinoma of tongue (HCC) (Primary)    2. Hx of radiation therapy    3. Laryngopharyngeal reflux    4. Pharyngoesophageal dysphagia    5. Tobacco use disorder    6. H/O head and neck radiation      * Surgery not found *  No orders of the defined types were placed in this encounter.    Patient to send a picture with next instance of swelling    Be aware of the signs and symptoms of recurrent head and neck cancer including neck mass, persistent or recurrent sore throat, ear pain, hemoptysis, weight loss and hoarseness. If any of these symptoms recur and or persist, call for an evaluation.      D/W patient increasing caloric intake and discussed cruciferous vegetables and protein shakes etc to maintain optimal nutrition.  The necessity of abstaining from tobacco products was also discussed particularly with respect to not smoking     Return in about 8 weeks (around 5/9/2023) for Recheck.       Patient Instructions   Patient to send a picture with next occurrence of swelling    Be aware of the signs and symptoms of recurrent head and neck cancer including neck mass, persistent or recurrent sore throat, ear pain, hemoptysis, weight loss  and hoarseness. If any of these symptoms recur and or persist, call for an evaluation.      D/W patient increasing caloric intake and discussed cruciferous vegetables and protein shakes etc to maintain optimal nutrition.  The necessity of abstaining from tobacco products was also discussed particularly with respect to not smoking

## 2023-03-14 ENCOUNTER — OFFICE VISIT (OUTPATIENT)
Dept: OTOLARYNGOLOGY | Facility: CLINIC | Age: 68
End: 2023-03-14
Payer: MEDICARE

## 2023-03-14 VITALS — BODY MASS INDEX: 19.61 KG/M2 | WEIGHT: 148 LBS | HEIGHT: 73 IN | TEMPERATURE: 97.9 F

## 2023-03-14 DIAGNOSIS — C02.9 PRIMARY SQUAMOUS CELL CARCINOMA OF TONGUE: Primary | ICD-10-CM

## 2023-03-14 DIAGNOSIS — Z92.3 HX OF RADIATION THERAPY: ICD-10-CM

## 2023-03-14 DIAGNOSIS — Z92.3 H/O HEAD AND NECK RADIATION: ICD-10-CM

## 2023-03-14 DIAGNOSIS — K21.9 LARYNGOPHARYNGEAL REFLUX: ICD-10-CM

## 2023-03-14 DIAGNOSIS — R13.14 PHARYNGOESOPHAGEAL DYSPHAGIA: ICD-10-CM

## 2023-03-14 DIAGNOSIS — F17.200 TOBACCO USE DISORDER: ICD-10-CM

## 2023-03-14 PROCEDURE — 99213 OFFICE O/P EST LOW 20 MIN: CPT | Performed by: NURSE PRACTITIONER

## 2023-03-14 PROCEDURE — 31575 DIAGNOSTIC LARYNGOSCOPY: CPT | Performed by: OTOLARYNGOLOGY

## 2023-03-14 PROCEDURE — 1159F MED LIST DOCD IN RCRD: CPT | Performed by: NURSE PRACTITIONER

## 2023-03-14 PROCEDURE — 1160F RVW MEDS BY RX/DR IN RCRD: CPT | Performed by: NURSE PRACTITIONER

## 2023-03-14 NOTE — PROGRESS NOTES
OPERATIVE NOTE:  Saul Alcantara Sr.    DATE OF PROCEDURE: 3/14/2023    PROCEDURE:   Flexible Fiberoptic Laryngoscopy    ANESTHESIA:  None    REASON FOR PROCEDURE:  Procedure was recommended for re-evaluation of a lesion previously documented-surveillance of DL /BX BOT  9/10/14 T1M0N0 SCCa. Hx of SCC left BOT 12/2014 with XRT  Risks, benefits and alternatives were discussed.      DETAILS of OPERATION:  The patient was seated in the exam chair.  A flexible fiberoptic laryngoscopy was performed through the oral cavity.  The scope was introduced into the oral cavity and directed to the level of the glottis, examining the structures of the oropharynx, base of tongue, vallecula, supraglottic larynx, glottic larynx, and hypopharynx.      FINDINGS:  Mucosal surfaces:   The mucosal surfaces demonstrated normal mucosa surfaces with mild inflammation    Base of tongue:  The base of tongue was found to have no mass or lesion.    Epiglottis:  The epiglottis was found to have no mass or lesion.    Aryepiglottic fold:  The AE folds were found to have no mass or lesion.    False Vocal Fold:  The false cords were found to have no mass or lesion.    True Vocal Cord:  The true vocal cords were found to have no mass or lesion. Both true vocal cords adduct and abduct normally    Arytenoid:   The arytenoids were found to have moderate inter-arytenoid edema.    Hypopharynx:  The hypopharynx was found to have no mass or lesion.    The patient tolerated procedure well.

## 2023-03-14 NOTE — PATIENT INSTRUCTIONS
Patient to send a picture with next occurrence of swelling    Be aware of the signs and symptoms of recurrent head and neck cancer including neck mass, persistent or recurrent sore throat, ear pain, hemoptysis, weight loss and hoarseness. If any of these symptoms recur and or persist, call for an evaluation.      D/W patient increasing caloric intake and discussed cruciferous vegetables and protein shakes etc to maintain optimal nutrition.  The necessity of abstaining from tobacco products was also discussed particularly with respect to not smoking

## 2023-04-05 ENCOUNTER — HOSPITAL ENCOUNTER (EMERGENCY)
Age: 68
Discharge: HOME | End: 2023-04-05
Attending: EMERGENCY MEDICINE
Payer: MEDICARE

## 2023-04-05 ENCOUNTER — APPOINTMENT (OUTPATIENT)
Dept: GENERAL RADIOLOGY | Age: 68
End: 2023-04-05
Payer: MEDICARE

## 2023-04-05 VITALS
WEIGHT: 148 LBS | HEART RATE: 64 BPM | DIASTOLIC BLOOD PRESSURE: 92 MMHG | RESPIRATION RATE: 16 BRPM | SYSTOLIC BLOOD PRESSURE: 140 MMHG | HEIGHT: 73 IN | OXYGEN SATURATION: 98 % | TEMPERATURE: 98.3 F | BODY MASS INDEX: 19.61 KG/M2

## 2023-04-05 DIAGNOSIS — R60.0 MILD PERIPHERAL EDEMA: ICD-10-CM

## 2023-04-05 DIAGNOSIS — I10 ASYMPTOMATIC HYPERTENSION: Primary | ICD-10-CM

## 2023-04-05 LAB
ALBUMIN SERPL-MCNC: 4.6 G/DL (ref 3.5–5.2)
ALP SERPL-CCNC: 72 U/L (ref 40–130)
ALT SERPL-CCNC: 12 U/L (ref 5–41)
ANION GAP SERPL CALCULATED.3IONS-SCNC: 14 MMOL/L (ref 7–19)
AST SERPL-CCNC: 21 U/L (ref 5–40)
BASOPHILS # BLD: 0.1 K/UL (ref 0–0.2)
BASOPHILS NFR BLD: 1.3 % (ref 0–1)
BILIRUB SERPL-MCNC: 0.7 MG/DL (ref 0.2–1.2)
BILIRUB UR QL STRIP: NEGATIVE
BNP BLD-MCNC: <36 PG/ML (ref 0–900)
BUN SERPL-MCNC: 11 MG/DL (ref 8–23)
CALCIUM SERPL-MCNC: 10 MG/DL (ref 8.8–10.2)
CHLORIDE SERPL-SCNC: 100 MMOL/L (ref 98–111)
CLARITY UR: CLEAR
CO2 SERPL-SCNC: 24 MMOL/L (ref 22–29)
COLOR UR: YELLOW
CREAT SERPL-MCNC: 1.2 MG/DL (ref 0.5–1.2)
EOSINOPHIL # BLD: 0.3 K/UL (ref 0–0.6)
EOSINOPHIL NFR BLD: 5.1 % (ref 0–5)
ERYTHROCYTE [DISTWIDTH] IN BLOOD BY AUTOMATED COUNT: 13.4 % (ref 11.5–14.5)
GLUCOSE SERPL-MCNC: 92 MG/DL (ref 74–109)
GLUCOSE UR STRIP.AUTO-MCNC: NEGATIVE MG/DL
HCT VFR BLD AUTO: 37.5 % (ref 42–52)
HGB BLD-MCNC: 12.6 G/DL (ref 14–18)
HGB UR STRIP.AUTO-MCNC: NEGATIVE MG/L
IMM GRANULOCYTES # BLD: 0 K/UL
KETONES UR STRIP.AUTO-MCNC: NEGATIVE MG/DL
LEUKOCYTE ESTERASE UR QL STRIP.AUTO: NEGATIVE
LYMPHOCYTES # BLD: 1.3 K/UL (ref 1.1–4.5)
LYMPHOCYTES NFR BLD: 23.9 % (ref 20–40)
MCH RBC QN AUTO: 31.5 PG (ref 27–31)
MCHC RBC AUTO-ENTMCNC: 33.6 G/DL (ref 33–37)
MCV RBC AUTO: 93.8 FL (ref 80–94)
MONOCYTES # BLD: 0.7 K/UL (ref 0–0.9)
MONOCYTES NFR BLD: 12.3 % (ref 0–10)
NEUTROPHILS # BLD: 3.2 K/UL (ref 1.5–7.5)
NEUTS SEG NFR BLD: 57.2 % (ref 50–65)
NITRITE UR QL STRIP.AUTO: NEGATIVE
PH UR STRIP.AUTO: 6.5 [PH] (ref 5–8)
PLATELET # BLD AUTO: 270 K/UL (ref 130–400)
PMV BLD AUTO: 10.3 FL (ref 9.4–12.4)
POTASSIUM SERPL-SCNC: 4 MMOL/L (ref 3.5–5)
PROT SERPL-MCNC: 6.9 G/DL (ref 6.6–8.7)
PROT UR STRIP.AUTO-MCNC: NEGATIVE MG/DL
RBC # BLD AUTO: 4 M/UL (ref 4.7–6.1)
SODIUM SERPL-SCNC: 138 MMOL/L (ref 136–145)
SP GR UR STRIP.AUTO: 1 (ref 1–1.03)
TROPONIN T SERPL-MCNC: <0.01 NG/ML (ref 0–0.03)
UROBILINOGEN UR STRIP.AUTO-MCNC: 0.2 E.U./DL
WBC # BLD AUTO: 5.5 K/UL (ref 4.8–10.8)

## 2023-04-05 PROCEDURE — 93005 ELECTROCARDIOGRAM TRACING: CPT | Performed by: EMERGENCY MEDICINE

## 2023-04-05 PROCEDURE — 36415 COLL VENOUS BLD VENIPUNCTURE: CPT

## 2023-04-05 PROCEDURE — 81003 URINALYSIS AUTO W/O SCOPE: CPT

## 2023-04-05 PROCEDURE — 99285 EMERGENCY DEPT VISIT HI MDM: CPT

## 2023-04-05 PROCEDURE — 84484 ASSAY OF TROPONIN QUANT: CPT

## 2023-04-05 PROCEDURE — 71045 X-RAY EXAM CHEST 1 VIEW: CPT

## 2023-04-05 PROCEDURE — 83880 ASSAY OF NATRIURETIC PEPTIDE: CPT

## 2023-04-05 PROCEDURE — 85025 COMPLETE CBC W/AUTO DIFF WBC: CPT

## 2023-04-05 PROCEDURE — 80053 COMPREHEN METABOLIC PANEL: CPT

## 2023-04-05 RX ORDER — GLYCOPYRROLATE 1 MG/1
1 TABLET ORAL DAILY
COMMUNITY

## 2023-04-05 RX ORDER — TAMSULOSIN HYDROCHLORIDE 0.4 MG/1
0.4 CAPSULE ORAL DAILY
COMMUNITY

## 2023-04-05 RX ORDER — LISINOPRIL 10 MG/1
10 TABLET ORAL DAILY
COMMUNITY

## 2023-04-05 ASSESSMENT — ENCOUNTER SYMPTOMS
FACIAL SWELLING: 0
PHOTOPHOBIA: 0
EYE DISCHARGE: 0
ABDOMINAL PAIN: 0
WHEEZING: 0
DIARRHEA: 0
CHEST TIGHTNESS: 0
EYE PAIN: 0
NAUSEA: 0
EYE ITCHING: 0
CONSTIPATION: 0
RECTAL PAIN: 0
COUGH: 0
BACK PAIN: 0
SHORTNESS OF BREATH: 0

## 2023-04-05 NOTE — ED PROVIDER NOTES
HYDROcodone-acetaminophen (NORCO) 7.5-325 MG per tablet Take 1 tablet by mouth as needed. Historical Med      sertraline (ZOLOFT) 50 MG tablet Take 2 tablets by mouth dailyHistorical Med      senna-docusate (PERICOLACE) 8.6-50 MG per tablet Take 1 tablet by mouth 4 times dailyHistorical Med      ondansetron (ZOFRAN) 8 MG tablet Take 8 mg by mouth every 8 hours as needed for Nausea or VomitingHistorical Med      LEVOTHYROXINE SODIUM Take 0.088 mg by mouth daily Historical Med      ALPRAZOLAM ER PO Take 2 mg by mouth 3 times daily Indications: Depression with Anxiety       Pantoprazole Sodium (PROTONIX PO) Take 40 mg by mouth 2 times daily Historical Med      SIMVASTATIN PO Take 20 mg by mouth daily              ALLERGIES     Patient has no known allergies. FAMILY HISTORY       Family History   Problem Relation Age of Onset    Cancer Sister     Stroke Sister     Stroke Brother     Stomach Cancer Maternal Aunt     Breast Cancer Maternal Aunt         [id number]    Esophageal Cancer Neg Hx     Liver Cancer Neg Hx     Rectal Cancer Neg Hx     Colon Cancer Neg Hx           SOCIAL HISTORY       Social History     Socioeconomic History    Marital status:      Spouse name: None    Number of children: None    Years of education: None    Highest education level: None   Tobacco Use    Smoking status: Every Day     Packs/day: 0.50     Years: 38.00     Pack years: 19.00     Types: Cigarettes    Smokeless tobacco: Never   Vaping Use    Vaping Use: Never used   Substance and Sexual Activity    Alcohol use: No     Comment: quit last week, 2014    Drug use: No       SCREENINGS             PHYSICAL EXAM    (up to 7 for level 4, 8 or more for level 5)     ED Triage Vitals [04/05/23 1125]   BP Temp Temp src Heart Rate Resp SpO2 Height Weight   (!) 145/93 98.3 °F (36.8 °C) -- 87 18 100 % 6' 1\" (1.854 m) 148 lb (67.1 kg)       Physical Exam  Vitals and nursing note reviewed. Constitutional:       Appearance: He is well-developed.

## 2023-04-05 NOTE — DISCHARGE INSTRUCTIONS
Follow-up with your primary care doctor for further titration of your medications. You may try leg elevation or support hose for your mild peripheral edema. Continue monitoring your blood pressure carefully and bring record of your home blood pressure medications to your appointment. Return immediately to the emergency room for any new or worsening symptoms.

## 2023-04-05 NOTE — ED NOTES
Mckay Ibarra, wife, wants his \"circulation\" checked. Leg swelling, numbness in feet, neck and face.         Keeley Fregoso  04/05/23 2553

## 2023-04-06 LAB
EKG P AXIS: 70 DEGREES
EKG P-R INTERVAL: 156 MS
EKG Q-T INTERVAL: 414 MS
EKG QRS DURATION: 90 MS
EKG QTC CALCULATION (BAZETT): 422 MS
EKG T AXIS: 68 DEGREES

## 2023-04-06 PROCEDURE — 93010 ELECTROCARDIOGRAM REPORT: CPT | Performed by: INTERNAL MEDICINE

## 2023-04-07 ENCOUNTER — HOSPITAL ENCOUNTER (EMERGENCY)
Age: 68
Discharge: HOME | End: 2023-04-08
Attending: EMERGENCY MEDICINE
Payer: MEDICARE

## 2023-04-07 ENCOUNTER — NURSE TRIAGE (OUTPATIENT)
Dept: CALL CENTER | Facility: HOSPITAL | Age: 68
End: 2023-04-07
Payer: MEDICARE

## 2023-04-07 DIAGNOSIS — J02.9 ACUTE PHARYNGITIS, UNSPECIFIED ETIOLOGY: Primary | ICD-10-CM

## 2023-04-07 LAB
B PARAP IS1001 DNA NPH QL NAA+NON-PROBE: NOT DETECTED
B PERT.PT PRMT NPH QL NAA+NON-PROBE: NOT DETECTED
C PNEUM DNA NPH QL NAA+NON-PROBE: NOT DETECTED
FLUAV RNA NPH QL NAA+NON-PROBE: NOT DETECTED
FLUBV RNA NPH QL NAA+NON-PROBE: NOT DETECTED
HADV DNA NPH QL NAA+NON-PROBE: NOT DETECTED
HCOV 229E RNA NPH QL NAA+NON-PROBE: NOT DETECTED
HCOV HKU1 RNA NPH QL NAA+NON-PROBE: NOT DETECTED
HCOV NL63 RNA NPH QL NAA+NON-PROBE: NOT DETECTED
HCOV OC43 RNA NPH QL NAA+NON-PROBE: NOT DETECTED
HMPV RNA NPH QL NAA+NON-PROBE: NOT DETECTED
HPIV1 RNA NPH QL NAA+NON-PROBE: NOT DETECTED
HPIV2 RNA NPH QL NAA+NON-PROBE: NOT DETECTED
HPIV3 RNA NPH QL NAA+NON-PROBE: NOT DETECTED
HPIV4 RNA NPH QL NAA+NON-PROBE: NOT DETECTED
M PNEUMO DNA NPH QL NAA+NON-PROBE: NOT DETECTED
RSV RNA NPH QL NAA+NON-PROBE: NOT DETECTED
RV+EV RNA NPH QL NAA+NON-PROBE: NOT DETECTED
S PYO AG THROAT QL: NEGATIVE
SARS-COV-2 RNA NPH QL NAA+NON-PROBE: NOT DETECTED

## 2023-04-07 PROCEDURE — 87880 STREP A ASSAY W/OPTIC: CPT

## 2023-04-07 PROCEDURE — 87081 CULTURE SCREEN ONLY: CPT

## 2023-04-07 PROCEDURE — 0202U NFCT DS 22 TRGT SARS-COV-2: CPT

## 2023-04-07 PROCEDURE — 87186 SC STD MICRODIL/AGAR DIL: CPT

## 2023-04-07 ASSESSMENT — ENCOUNTER SYMPTOMS
ABDOMINAL PAIN: 0
SINUS PRESSURE: 0
SORE THROAT: 1
SHORTNESS OF BREATH: 0
NAUSEA: 0
VOMITING: 0
DIARRHEA: 0
RHINORRHEA: 0
COUGH: 1

## 2023-04-07 NOTE — TELEPHONE ENCOUNTER
Triage nurse advised to go to the ED to be evaluated triage nurse also advised per COUGH - CHRONIC-ADULT-AH  protocol    Reason for Disposition  • Increase in amount of sputum    Additional Information  • Negative: SEVERE difficulty breathing (e.g., struggling for each breath, speaks in single words)  • Negative: [1] Lips or face are bluish now AND [2] persists when not coughing  • Negative: Sounds like a life-threatening emergency to the triager  • Negative: Chest pain is main symptom  • Negative: [1] Dry cough (non-productive;  no sputum or minimal clear sputum) AND [2] < 3 weeks duration  • Negative: [1] Wet cough (productive; white-yellow, yellow, green, or malvin colored sputum) AND [2] < 3 weeks duration  • Negative: [1] Previous asthma attacks AND [2] this feels like asthma attack  • Negative: [1] MODERATE difficulty breathing (e.g., speaks in phrases, SOB even at rest, pulse 100-120) AND [2] still present when not coughing  • Negative: Chest pain  (Exception: MILD central chest pain, present only when coughing)  • Negative: [1] Increasing difficulty breathing AND [2] always has some difficulty breathing  • Negative: Patient sounds very sick or weak to the triager  • Negative: [1] MILD difficulty breathing (e.g., minimal/no SOB at rest, SOB with walking, pulse <100) AND [2] still present when not coughing (Exception: no change from usual, chronic shortness of breath)  • Negative: [1] Coughed up blood AND [2] > 1 tablespoon (15 ml) (Exception: blood-tinged sputum)  • Negative: Fever > 103 F (39.4 C)  • Negative: [1] Fever > 101 F (38.3 C) AND [2] age > 60 years  • Negative: [1] Fever > 100.0 F (37.8 C) AND [2] bedridden (e.g., nursing home patient, CVA, chronic illness, recovering from surgery)  • Negative: [1] Fever > 100.0 F (37.8 C) AND [2] diabetes mellitus or weak immune system (e.g., HIV positive, cancer chemo, splenectomy, organ transplant, chronic steroids)  • Negative: SEVERE coughing spells (e.g.,  "whooping sound after coughing, vomiting after coughing)  • Negative: [1] Continuous (nonstop) coughing interferes with work or school AND [2] no improvement using cough treatment per protocol  • Negative: Fever present > 3 days (72 hours)  • Negative: Coughing up malvin-colored sputum  • Negative: Change in color of sputum  (e.g., from white to yellow-green sputum)  • Negative: Taking an ACE Inhibitor medication  (e.g., benazepril/LOTENSIN, captopril/CAPOTEN, enalapril/VASOTEC, lisinopril/ZESTRIL)  • Negative: [1] Chest or rib pain AND [2] only occurs while coughing  • Negative: Sinus pain or pressure (around cheekbone or eye)  • Negative: [1] Nasal discharge AND [2] present > 10 days  • Negative: [1] Blood-tinged sputum has been coughed up AND [2] more than once  • Negative: History of asthma or has mild wheezing  • Negative: Exposure to TB (Tuberculosis)    Answer Assessment - Initial Assessment Questions  1. ONSET: \"When did the cough begin?\"       Started yesterday   2. SEVERITY: \"How bad is the cough today?\"   Not real bad   3. SPUTUM: \"Describe the color of your sputum\" (none, dry cough; clear, white, yellow, green)  yellow  4. HEMOPTYSIS: \"Are you coughing up any blood?\" If so ask: \"How much?\" (flecks, streaks, tablespoons, etc.)  No   5. DIFFICULTY BREATHING: \"Are you having difficulty breathing?\" If Yes, ask: \"How bad is it?\" (e.g., mild, moderate, severe)     - MILD: No SOB at rest, mild SOB with walking, speaks normally in sentences, can lay down, no retractions, pulse < 100.     - MODERATE: SOB at rest, SOB with minimal exertion and prefers to sit, cannot lie down flat, speaks in phrases, mild retractions, audible wheezing, pulse 100-120.     - SEVERE: Very SOB at rest, speaks in single words, struggling to breathe, sitting hunched forward, retractions, pulse > 120   no  6. FEVER: \"Do you have a fever?\" If Yes, ask: \"What is your temperature, how was it measured, and when did it start?\"  100.1  7. CARDIAC " "HISTORY: \"Do you have any history of heart disease?\" (e.g., heart attack, congestive heart failure)   Denies   8. LUNG HISTORY: \"Do you have any history of lung disease?\"  (e.g., pulmonary embolus, asthma, emphysema)  COPD was prescribed medication for COPD unable to take it due to it raises his Blood Pressure   9. PE RISK FACTORS: \"Do you have a history of blood clots?\" (or: recent major surgery, recent prolonged travel, bedridden)  Denies   10. OTHER SYMPTOMS: \"Do you have any other symptoms?\" (e.g., runny nose, wheezing, chest pain)  Blow nose clear drainage comes out and sometimes yellow   11. PREGNANCY: \"Is there any chance you are pregnant?\" \"When was your last menstrual period?\"    NA  12. TRAVEL: \"Have you traveled out of the country in the last month?\" (e.g., travel history, exposures)     NA    Protocols used: COUGH - CHRONIC-ADULT-AH      "

## 2023-04-08 VITALS
TEMPERATURE: 98.8 F | HEART RATE: 84 BPM | DIASTOLIC BLOOD PRESSURE: 84 MMHG | RESPIRATION RATE: 18 BRPM | SYSTOLIC BLOOD PRESSURE: 136 MMHG | OXYGEN SATURATION: 97 %

## 2023-04-08 RX ORDER — AZITHROMYCIN 250 MG/1
250 TABLET, FILM COATED ORAL SEE ADMIN INSTRUCTIONS
Qty: 6 TABLET | Refills: 0 | Status: SHIPPED | OUTPATIENT
Start: 2023-04-08 | End: 2023-04-08 | Stop reason: SDUPTHER

## 2023-04-08 RX ORDER — AZITHROMYCIN 250 MG/1
250 TABLET, FILM COATED ORAL SEE ADMIN INSTRUCTIONS
Qty: 6 TABLET | Refills: 0 | Status: SHIPPED | OUTPATIENT
Start: 2023-04-08 | End: 2023-04-13

## 2023-04-08 NOTE — ED PROVIDER NOTES
General: Skin is warm and dry. Capillary Refill: Capillary refill takes less than 2 seconds. Neurological:      Mental Status: He is alert and oriented to person, place, and time. Psychiatric:         Mood and Affect: Mood normal.         Behavior: Behavior normal.         Thought Content: Thought content normal.         Judgment: Judgment normal.       DIAGNOSTIC RESULTS     LABS:  Labs Reviewed   RAPID STREP SCREEN   RESPIRATORY PANEL, MOLECULAR, WITH COVID-19   CULTURE, BETA STREP CONFIRM PLATES       All other labs were within normal range or not returned as of this dictation. EMERGENCY DEPARTMENT COURSE and DIFFERENTIAL DIAGNOSIS/MDM:   Vitals:    Vitals:    04/07/23 2015 04/07/23 2252 04/08/23 0002   BP: 139/88 132/82 136/84   Pulse: 96 84    Resp: 18     Temp: 98.8 °F (37.1 °C)     TempSrc: Oral     SpO2: 97% 96% 97%       MDM    Patient complains of a sore throat as well as coughing up yellow mucus. No respiratory distress. Was seen here last night for hypertension and x-ray and laboratory studies were done. He is concerned that he has a pharyngitis. Requesting antibiotics. Initial screening test were negative however. Can also try throat lozenges for symptomatic relief. I suspect his symptoms are likely related to his tongue cancer and is sicca and encouraged him to follow-up with his primary care doctor for further treatment and evaluation. CONSULTS:  None    PROCEDURES:  Unless otherwise noted below, none     Procedures    FINAL IMPRESSION    No diagnosis found. DISPOSITION/PLAN   DISPOSITION        PATIENT REFERRED TO:  No follow-up provider specified.     DISCHARGE MEDICATIONS:  New Prescriptions    No medications on file          (Please note that portions of this note were completed with a voice recognition program.  Efforts were made to edit thedictations but occasionally words are mis-transcribed.)    Regina Hussein MD (electronically signed)  Attending Emergency

## 2023-04-08 NOTE — DISCHARGE INSTRUCTIONS
Try throat lozenge or's as we discussed for relief. Take the tablets and crush them as directed. Follow-up with your primary care doctor for further management of your discomfort. Return immediately to the emergency room for any new or worsening symptoms.

## 2023-04-09 LAB
ORGANISM: ABNORMAL
S PYO THROAT QL CULT: ABNORMAL
S PYO THROAT QL CULT: ABNORMAL

## 2023-04-10 ENCOUNTER — HOSPITAL ENCOUNTER (EMERGENCY)
Facility: HOSPITAL | Age: 68
Discharge: HOME | End: 2023-04-10
Attending: EMERGENCY MEDICINE | Admitting: EMERGENCY MEDICINE
Payer: MEDICARE

## 2023-04-10 VITALS
SYSTOLIC BLOOD PRESSURE: 135 MMHG | BODY MASS INDEX: 18.82 KG/M2 | WEIGHT: 142 LBS | HEART RATE: 78 BPM | RESPIRATION RATE: 18 BRPM | TEMPERATURE: 98.2 F | OXYGEN SATURATION: 98 % | DIASTOLIC BLOOD PRESSURE: 92 MMHG | HEIGHT: 73 IN

## 2023-04-10 DIAGNOSIS — Z85.89 ENCOUNTER FOR FOLLOW-UP SURVEILLANCE OF HEAD AND NECK CANCER: Primary | ICD-10-CM

## 2023-04-10 DIAGNOSIS — Z08 ENCOUNTER FOR FOLLOW-UP SURVEILLANCE OF HEAD AND NECK CANCER: Primary | ICD-10-CM

## 2023-04-10 LAB
ANION GAP SERPL CALCULATED.3IONS-SCNC: 12 MMOL/L (ref 5–15)
BASOPHILS # BLD AUTO: 0.07 10*3/MM3 (ref 0–0.2)
BASOPHILS NFR BLD AUTO: 1 % (ref 0–1.5)
BUN SERPL-MCNC: 10 MG/DL (ref 8–23)
BUN/CREAT SERPL: 9.6 (ref 7–25)
CALCIUM SPEC-SCNC: 9.7 MG/DL (ref 8.6–10.5)
CHLORIDE SERPL-SCNC: 100 MMOL/L (ref 98–107)
CO2 SERPL-SCNC: 28 MMOL/L (ref 22–29)
CREAT SERPL-MCNC: 1.04 MG/DL (ref 0.76–1.27)
DEPRECATED RDW RBC AUTO: 44.8 FL (ref 37–54)
EGFRCR SERPLBLD CKD-EPI 2021: 79.7 ML/MIN/1.73
EOSINOPHIL # BLD AUTO: 0.15 10*3/MM3 (ref 0–0.4)
EOSINOPHIL NFR BLD AUTO: 2.2 % (ref 0.3–6.2)
ERYTHROCYTE [DISTWIDTH] IN BLOOD BY AUTOMATED COUNT: 13 % (ref 12.3–15.4)
GLUCOSE SERPL-MCNC: 102 MG/DL (ref 65–99)
HCT VFR BLD AUTO: 37.8 % (ref 37.5–51)
HGB BLD-MCNC: 12.7 G/DL (ref 13–17.7)
HOLD SPECIMEN: NORMAL
IMM GRANULOCYTES # BLD AUTO: 0.03 10*3/MM3 (ref 0–0.05)
IMM GRANULOCYTES NFR BLD AUTO: 0.4 % (ref 0–0.5)
LYMPHOCYTES # BLD AUTO: 0.8 10*3/MM3 (ref 0.7–3.1)
LYMPHOCYTES NFR BLD AUTO: 11.8 % (ref 19.6–45.3)
MCH RBC QN AUTO: 31.1 PG (ref 26.6–33)
MCHC RBC AUTO-ENTMCNC: 33.6 G/DL (ref 31.5–35.7)
MCV RBC AUTO: 92.6 FL (ref 79–97)
MONOCYTES # BLD AUTO: 0.61 10*3/MM3 (ref 0.1–0.9)
MONOCYTES NFR BLD AUTO: 9 % (ref 5–12)
NEUTROPHILS NFR BLD AUTO: 5.13 10*3/MM3 (ref 1.7–7)
NEUTROPHILS NFR BLD AUTO: 75.6 % (ref 42.7–76)
NRBC BLD AUTO-RTO: 0 /100 WBC (ref 0–0.2)
PLATELET # BLD AUTO: 275 10*3/MM3 (ref 140–450)
PMV BLD AUTO: 10.7 FL (ref 6–12)
POTASSIUM SERPL-SCNC: 3.7 MMOL/L (ref 3.5–5.2)
RBC # BLD AUTO: 4.08 10*6/MM3 (ref 4.14–5.8)
SODIUM SERPL-SCNC: 140 MMOL/L (ref 136–145)
WBC NRBC COR # BLD: 6.79 10*3/MM3 (ref 3.4–10.8)
WHOLE BLOOD HOLD COAG: NORMAL

## 2023-04-10 PROCEDURE — 85025 COMPLETE CBC W/AUTO DIFF WBC: CPT | Performed by: EMERGENCY MEDICINE

## 2023-04-10 PROCEDURE — 80048 BASIC METABOLIC PNL TOTAL CA: CPT | Performed by: EMERGENCY MEDICINE

## 2023-04-10 PROCEDURE — 99283 EMERGENCY DEPT VISIT LOW MDM: CPT

## 2023-04-10 PROCEDURE — 99282 EMERGENCY DEPT VISIT SF MDM: CPT | Performed by: OTOLARYNGOLOGY

## 2023-04-10 RX ORDER — SODIUM CHLORIDE 0.9 % (FLUSH) 0.9 %
10 SYRINGE (ML) INJECTION AS NEEDED
Status: DISCONTINUED | OUTPATIENT
Start: 2023-04-10 | End: 2023-04-10 | Stop reason: HOSPADM

## 2023-04-10 NOTE — CONSULTS
ENT CONSULT NOTE  4/10/2023    Patient Identification:  Name: Saul Alcantara Sr.  Age: 67 y.o.  Sex: male  :  1955  MRN: [id number]                     Date of Admission: 4/10/2023      CC:    Tongue swelling    Subjective     HPI:   Location: Tongue  Duration:  3 days ago  Timing:  acute and chronic   Quality:   mild  Context:  DL /BX BOT  9/10/14 T1M0N0 SCCa. Hx of SCC left BOT 2014 with XRT  Modifying Factors:  nothing  Associated Signs/Symptoms: Patient well-known to me with a history of radiation for base of tongue cancer and recurrent/persistent glossitis.  He also has a history of gastroesophageal reflux disease and recurrent dysphagia associated with benign upper esophageal strictures.    ROS:  Review of Systems - History obtained from spouse and the patient  General ROS: positive for  -some mild dysphagia  negative for - chills, fatigue, fever, hot flashes, malaise, night sweats, sleep disturbance, weight gain or weight loss  ENT ROS: positive for - sore throat  negative for - epistaxis, headaches, hearing change, nasal congestion, nasal discharge, nasal polyps, sinus pain, sneezing, tinnitus, vertigo, visual changes or vocal changes    HISTORY      Past Medical History:   Diagnosis Date   • Allergic rhinitis    • Anxiety    • Arthritis    • Cataract    • Chronic sinusitis    • COPD (chronic obstructive pulmonary disease)    • COVID-19 vaccine series completed     Moderna   • Depression    • Deviated nasal septum    • Elevated cholesterol    • Enlarged prostate    • GERD (gastroesophageal reflux disease)    • H/O head and neck radiation 2017   • History of transfusion    • Hyperlipidemia    • Hypertension    • Hypothyroidism    • Laryngopharyngeal reflux    • Lymphoma     Non-Hodgkins   • MRSA infection    • Panic attacks    • Peyronie disease    • Pneumonia    • Primary squamous cell carcinoma of tongue 2016    T1N0M0 SCC S/P XRT/Chemo 2014   • Primary squamous cell carcinoma  of tongue 09/27/2016    T1N0M0 SCC S/P XRT/Chemo 12/2014   • Prostate cancer    • Sicca syndrome    • Sinusitis, acute    • Squamous cell carcinoma     Base of Tongue   • Tobacco use disorder    • Tongue irritation    • Visit for monitoring Rituxan therapy     pt getting treatments at [facility] in [place]   • Xerostomia         Past Surgical History:   Procedure Laterality Date   • CATARACT EXTRACTION Bilateral    • ESOPHAGOSCOPY N/A 11/20/2019    Procedure: Direct laryngoscopy with esophageal Johnson dilation;  Surgeon: Wayne Richmond MD;  Location:  [facility];  Service: ENT   • HAND SURGERY      metal plate    • KNEE SURGERY     • LARYNGOSCOPY N/A 6/26/2020    Procedure: Rigid esophagoscopy Johnson esophageal dilatation;  Surgeon: Wayne Richmond MD;  Location:  [facility];  Service: ENT;  Laterality: N/A;   • LARYNGOSCOPY N/A 8/24/2020    Procedure: MICRODIRECT LARYNGOSCOPY with esophageal dilatation;  Surgeon: Wayne Richmond MD;  Location:  [facility];  Service: ENT;  Laterality: N/A;   • LARYNGOSCOPY N/A 5/24/2021    Procedure: Direct laryngoscopy, esophagoscopy with Johnson dilatation;  Surgeon: Wayne Richmond MD;  [facility]:  [facility];  Service: ENT;  Laterality: N/A;   • LARYNGOSCOPY N/A 12/1/2021    Procedure: Direct laryngoscopy, esophagoscopy with esophageal dilatation (Johnson dilators);  Surgeon: Wayne Richmond MD;  [facility]:  [facility];  Service: ENT;  Laterality: N/A;   • LARYNGOSCOPY N/A 1/3/2022    Procedure: Direct laryngoscopy, esophagoscopy with Johnson esophageal dilation;  Surgeon: Wayne Richmond MD;  [facility]:  [facility];  Service: ENT;  Laterality: N/A;   • LARYNGOSCOPY N/A 4/29/2022    Procedure: Direct laryngoscopy with esophageal dilatation;  Surgeon: Wayne Richmond MD;  [facility];  Service: ENT;  Laterality: N/A;   • LARYNGOSCOPY N/A 6/24/2022    Procedure: MicroDirect laryngoscopy, esophagoscopy with Johnson dilation;  Surgeon:  Wayne Richmond MD;  [facility];  Service: ENT;  Laterality: N/A;   • LARYNGOSCOPY N/A 2022    Procedure: DIRECT LARYNGOSCOPY WITH DALTON DILATION;  Surgeon: Wayne Richmond MD;  Location:  [facility];  Service: ENT;  Laterality: N/A;   • MULTIPLE TOOTH EXTRACTIONS      CONTINUING TO HAVE BONE REMOVED 2021   • OTHER SURGICAL HISTORY  09/10/2014    Microlaryngoscopy with Biopsy - Base of Tongue   • PANENDOSCOPY N/A 2021    Procedure: DIRECT LARYNGOSCOPY AND ESOPHAGOSCOPY WITH ESOPHAGEAL DILATION;  Surgeon: Wayne Richmond MD;  [facility];  Service: ENT;  Laterality: N/A;   • PANENDOSCOPY N/A 10/11/2021    Procedure: Direct laryngoscopy, esophagoscopy with Dalton dilatation;  Surgeon: Wayne Richmond MD;  [facility];  Service: ENT;  Laterality: N/A;   • PROSTATE ULTRASOUND BIOPSY N/A 2021    Procedure: PROSTATE ULTRASOUND BIOPSY. NOT A URONAV;  Surgeon: Oscar Bazan MD;  Location:  [facility];  Service: Urology;  Laterality: N/A;   • SPLENECTOMY     • SQUAMOUS CELL CARCINOMA EXCISION  09/10/2014    Tongue   • TONSILLECTOMY          Social History     Socioeconomic History   • Marital status:    Tobacco Use   • Smoking status: Some Days     Packs/day: 0.50     Years: 44.00     Pack years: 22.00     Types: Cigarettes     Last attempt to quit:      Years since quittin.2   • Smokeless tobacco: Never   • Tobacco comments:     pack last couple of weeks   Vaping Use   • Vaping Use: Never used   Substance and Sexual Activity   • Alcohol use: No   • Drug use: No   • Sexual activity: Defer     Partners: Female      (Not in a hospital admission)     No Known Allergies     Immunization History   Administered Date(s) Administered   • COVID-19 (MODERNA) 1st, 2nd, 3rd Dose Only 2021, 2021   • COVID-19 (MODERNA) BOOSTER 2021, 2021   • Covid-19 (Pfizer) Gray Cap 2022        Family History   Problem Relation Age of Onset   •  "Cancer Mother    • Staunton's disease Father    • Diabetes Sister    • Cancer Sister    • Aneurysm Brother           Objective     PE:    Temp:  [98.7 °F (37.1 °C)] 98.7 °F (37.1 °C)  Heart Rate:  [] 87  Resp:  [18] 18  BP: (125-161)/(82-93) 139/82   Body mass index is 18.73 kg/m².     General appearance: alert, well appearing, and in no distress, oriented to person, place, and time and acyanotic, in no respiratory distress.   Ability to Communicate: Patient communicates well   Ears - right ear normal, left ear normal.   Nasal exam - normal and patent, no erythema, discharge or polyps.    Facial exam: no craniofacial deformities   Oropharyngeal exam - Mild erythema throughout the tongue with \"hairy\" appearance in the midline without obvious mass or lesion.  No masses or lesions are appreciated by visualization or palpation including bimanual palpation of the tongue.   Neck exam - supple, no significant adenopathy.   CVS exam: normal rate and regular rhythm.   Chest: no tachypnea, retractions or cyanosis.   No lymphadenopathy in the anterior or posterior neck, supraclavicular areas.    Neurological exam reveals screening mental status exam normal, neck supple without rigidity.    DATA      MEDICATIONS     Current Facility-Administered Medications   Medication Dose Route Frequency Provider Last Rate Last Admin   • sodium chloride 0.9 % flush 10 mL  10 mL Intravenous PRN Justus Barrientos MD         Current Outpatient Medications   Medication Sig Dispense Refill   • albuterol (PROVENTIL) (2.5 MG/3ML) 0.083% nebulizer solution Take 2.5 mg by nebulization Every 4 (Four) Hours As Needed for Wheezing. 120 mL 5   • ALPRAZolam (XANAX) 2 MG tablet Take 1 tablet by mouth 3 (Three) Times a Day.     • amLODIPine (NORVASC) 10 MG tablet Take 1 tablet by mouth Daily.     • chlorhexidine (Peridex) 0.12 % solution Apply 15 mL to the mouth or throat 2 (Two) Times a Day. 240 mL 0   • cyclobenzaprine (FLEXERIL) 10 MG tablet Take 1 " tablet by mouth As Needed.     • glycopyrrolate (ROBINUL) 1 MG tablet Take 0.5 mg by mouth 2 (Two) Times a Day.     • HYDROcodone-acetaminophen (NORCO)  MG per tablet Take 1 tablet by mouth Every 6 (Six) Hours As Needed for Moderate Pain (PER DR RUIZ).     • levothyroxine (SYNTHROID, LEVOTHROID) 100 MCG tablet Take 1 tablet by mouth Daily.     • Lidocaine Viscous HCl (XYLOCAINE) 2 % solution Take 5 mL by mouth As Needed for Mild Pain.     • meloxicam (MOBIC) 15 MG tablet Take 1 tablet by mouth Daily.     • mirtazapine (REMERON) 15 MG tablet Take 1 tablet by mouth Every Night.     • nystatin (MYCOSTATIN) 084668 UNIT/ML suspension Take 10ml by mouth BID for 28 days (Patient taking differently: Take 2 mL by mouth 4 (Four) Times a Day As Needed. Take 10ml by mouth BID for 28 days) 480 mL 2   • ondansetron (ZOFRAN) 8 MG tablet Take 1 tablet by mouth Every 8 (Eight) Hours As Needed for Nausea.     • pantoprazole (PROTONIX) 40 MG EC tablet TAKE 1 TABLET BY MOUTH TWICE A DAY (Patient taking differently: Take 1 tablet by mouth Daily.) 180 tablet 3   • senna-docusate (PERICOLACE) 8.6-50 MG per tablet Take 2 tablets by mouth Daily.     • sertraline (ZOLOFT) 100 MG tablet Take 1 tablet by mouth every night at bedtime.     • simvastatin (ZOCOR) 20 MG tablet Take 1 tablet by mouth Every Night.  11   • tadalafil (CIALIS) 20 MG tablet Take 1 tablet by mouth As Needed for Erectile Dysfunction for up to 360 days. 1-24 hours before activity 12 tablet 11   • tamsulosin (FLOMAX) 0.4 MG capsule 24 hr capsule Take 1 capsule by mouth Daily.     • tiotropium bromide-olodaterol (Stiolto Respimat) 2.5-2.5 MCG/ACT aerosol solution inhaler Inhale 2 puffs Daily. 4 g 11        No intake or output data in the 24 hours ending 04/10/23 1419          Imaging Results (All)     None             Assessment     ASSESSMENT     Glossitis associated with hairy tongue   T1M0N0 SCCa. Hx of SCC left BOT 12/2014 with XRT  History of gastroesophageal  reflux disease with dilatation for esophageal stricture       Plan     PLAN      Miracle mouthwash   Follow-up in 2 weeks   Call return for problems          Wayne Richmond MD  4/10/2023  14:19 CDT

## 2023-04-10 NOTE — ED PROVIDER NOTES
Subjective   History of Present Illness  67-year-old gentleman who came the ER with swelling of his tongue.  No other complaint associate with this.  He is got history of tongue cancer which was taken care of by Dr. Richmond.    Oral Swelling  Location:  Tongue swelling with discoloration of the tongue.  Severity:  Moderate  Onset quality:  Gradual  Timing:  Constant  Chronicity:  Recurrent  Associated symptoms: no abdominal pain, no chest pain, no congestion, no cough, no ear pain, no fatigue, no fever, no headaches, no loss of consciousness, no nausea, no shortness of breath, no sore throat and no vomiting        Review of Systems   Constitutional: Negative.  Negative for chills, fatigue and fever.   HENT: Negative.  Negative for congestion, ear pain and sore throat.    Respiratory: Negative.  Negative for cough, chest tightness, shortness of breath and stridor.    Cardiovascular: Negative.  Negative for chest pain.   Gastrointestinal: Negative.  Negative for abdominal distention, abdominal pain, nausea and vomiting.   Endocrine: Negative.    Genitourinary: Negative.  Negative for difficulty urinating and flank pain.   Musculoskeletal: Negative.    Skin: Negative.  Negative for color change.   Neurological: Negative.  Negative for dizziness, loss of consciousness and headaches.   All other systems reviewed and are negative.      Past Medical History:   Diagnosis Date   • Allergic rhinitis    • Anxiety    • Arthritis    • Cataract    • Chronic sinusitis    • COPD (chronic obstructive pulmonary disease)    • COVID-19 vaccine series completed     Moderna   • Depression    • Deviated nasal septum    • Elevated cholesterol    • Enlarged prostate    • GERD (gastroesophageal reflux disease)    • H/O head and neck radiation 03/03/2017   • History of transfusion    • Hyperlipidemia    • Hypertension    • Hypothyroidism    • Laryngopharyngeal reflux    • Lymphoma     Non-Hodgkins   • MRSA infection    • Panic attacks    •  Peyronie disease    • Pneumonia    • 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/27/2016    T1N0M0 SCC S/P XRT/Chemo 12/2014   • 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/27/2016    T1N0M0 SCC S/P XRT/Chemo 12/2014   • Prostate cancer    • Sicca syndrome    • Sinusitis, acute    • Squamous cell carcinoma     Base of Tongue   • Tobacco use disorder    • Tongue irritation    • Visit for monitoring Rituxan therapy     pt getting treatments at [facility] office in [place]   • Xerostomia        No Known Allergies    Past Surgical History:   Procedure Laterality Date   • CATARACT EXTRACTION Bilateral    • ESOPHAGOSCOPY N/A 11/20/2019    Procedure: Direct laryngoscopy with esophageal Johnson dilation;  Surgeon: Wayne Richmond MD;  Location:  [facility];  Service: ENT   • HAND SURGERY      metal plate    • KNEE SURGERY     • LARYNGOSCOPY N/A 6/26/2020    Procedure: Rigid esophagoscopy Johnson esophageal dilatation;  Surgeon: Wayne Richmond MD;  Location:  [facility];  Service: ENT;  Laterality: N/A;   • LARYNGOSCOPY N/A 8/24/2020    Procedure: MICRODIRECT LARYNGOSCOPY with esophageal dilatation;  Surgeon: Wayne Richmond MD;  Location:  [facility];  Service: ENT;  Laterality: N/A;   • LARYNGOSCOPY N/A 5/24/2021    Procedure: Direct laryngoscopy, esophagoscopy with Johnson dilatation;  Surgeon: Wayne Richmond MD;  Location:  [facility];  Service: ENT;  Laterality: N/A;   • LARYNGOSCOPY N/A 12/1/2021    Procedure: Direct laryngoscopy, esophagoscopy with esophageal dilatation (Johnson dilators);  Surgeon: Wayne Richmond MD;  Location:  [facility];  Service: ENT;  Laterality: N/A;   • LARYNGOSCOPY N/A 1/3/2022    Procedure: Direct laryngoscopy, esophagoscopy with Johnson esophageal dilation;  Surgeon: Wayne Richmond MD;  Location:  [facility];  Service: ENT;  Laterality: N/A;   • LARYNGOSCOPY N/A 4/29/2022    Procedure: Direct laryngoscopy with esophageal dilatation;  Surgeon: Wayne Richmond  MD Rangel;  Location:  [facility];  Service: ENT;  Laterality: N/A;   • LARYNGOSCOPY N/A 2022    Procedure: MicroDirect laryngoscopy, esophagoscopy with Johnson dilation;  Surgeon: Wayne Richmond MD;  Location:  [facility];  Service: ENT;  Laterality: N/A;   • LARYNGOSCOPY N/A 2022    Procedure: DIRECT LARYNGOSCOPY WITH JOHNSON DILATION;  Surgeon: Wayne Richmond MD;  Location:  [facility];  Service: ENT;  Laterality: N/A;   • MULTIPLE TOOTH EXTRACTIONS      CONTINUING TO HAVE BONE REMOVED 2021   • OTHER SURGICAL HISTORY  09/10/2014    Microlaryngoscopy with Biopsy - Base of Tongue   • PANENDOSCOPY N/A 2021    Procedure: DIRECT LARYNGOSCOPY AND ESOPHAGOSCOPY WITH ESOPHAGEAL DILATION;  Surgeon: Wayne Richmond MD;  Location:  [facility];  Service: ENT;  Laterality: N/A;   • PANENDOSCOPY N/A 10/11/2021    Procedure: Direct laryngoscopy, esophagoscopy with Johnson dilatation;  Surgeon: Wayne Richmond MD;  Location: [facility] OR;  Service: ENT;  Laterality: N/A;   • PROSTATE ULTRASOUND BIOPSY N/A 2021    Procedure: PROSTATE ULTRASOUND BIOPSY. NOT A URONAV;  Surgeon: Oscar Bazan MD;  Location: [facility] OR;  Service: Urology;  Laterality: N/A;   • SPLENECTOMY     • SQUAMOUS CELL CARCINOMA EXCISION  09/10/2014    Tongue   • TONSILLECTOMY         Family History   Problem Relation Age of Onset   • Cancer Mother    • Courtney's disease Father    • Diabetes Sister    • Cancer Sister    • Aneurysm Brother        Social History     Socioeconomic History   • Marital status:    Tobacco Use   • Smoking status: Some Days     Packs/day: 0.50     Years: 44.00     Pack years: 22.00     Types: Cigarettes     Last attempt to quit:      Years since quittin.2   • Smokeless tobacco: Never   • Tobacco comments:     pack last couple of weeks   Vaping Use   • Vaping Use: Never used   Substance and Sexual Activity   • Alcohol use: No   • Drug use: No   • Sexual activity: Defer     Partners:  Female           Objective   Physical Exam  Vitals and nursing note reviewed. Exam conducted with a chaperone present.   Constitutional:       General: He is awake.      Appearance: Normal appearance. He is well-developed. He is not ill-appearing.   HENT:      Head: Normocephalic and atraumatic.      Comments: Patient is examination  Reveals a geographic tongue with some brownish discoloration of the top of the tongue.  But there is no evidence of any obvious mass or lesions.  No leukoplakia noted.  Oropharyngeal exam is unremarkable.  Neck is supple.  There is no nuchal rigidity noted.  Eyes:      General: Lids are normal.      Conjunctiva/sclera: Conjunctivae normal.      Pupils: Pupils are equal, round, and reactive to light.   Cardiovascular:      Rate and Rhythm: Normal rate and regular rhythm.      Chest Wall: PMI is not displaced.      Pulses: Normal pulses.      Heart sounds: Normal heart sounds.   Pulmonary:      Effort: Pulmonary effort is normal.      Breath sounds: Normal breath sounds. No decreased breath sounds.   Abdominal:      General: Abdomen is flat.      Palpations: Abdomen is soft.      Tenderness: There is no abdominal tenderness.   Musculoskeletal:         General: Normal range of motion.      Cervical back: Full passive range of motion without pain, normal range of motion and neck supple.   Skin:     General: Skin is warm and dry.      Capillary Refill: Capillary refill takes less than 2 seconds.   Neurological:      General: No focal deficit present.      Mental Status: He is alert and oriented to person, place, and time.      Motor: Motor function is intact.      Deep Tendon Reflexes: Reflexes are normal and symmetric.   Psychiatric:         Behavior: Behavior is cooperative.         Procedures           ED Course                                           Medical Decision Making  Encounter for follow-up surveillance of head and neck cancer:     Details: Patient exam is unremarkable seen by  ENT will be discharged home  Amount and/or Complexity of Data Reviewed  Labs: ordered.  Discussion of management or test interpretation with external provider(s): Discussed this case with ENT Dr. Richmond came and saw the patient in the ED and his recommendation was for the patient given discharge home with a follow-up with Dr. Richmond with medical mouthwash.    Risk  Prescription drug management.          Final diagnoses:   Encounter for follow-up surveillance of head and neck cancer       ED Disposition  ED Disposition     ED Disposition   Discharge    Condition   Stable    Comment   --             No follow-up provider specified.       Medication List      New Prescriptions    Miracle mouthwash oral suspension  Swish and spit 10 mL Every 4 (Four) Hours As Needed for Stomatitis for up to 10 days.        Changed    nystatin 100,000 unit/mL suspension  Commonly known as: MYCOSTATIN  Take 10ml by mouth BID for 28 days  What changed:   · how much to take  · how to take this  · when to take this  · reasons to take this     pantoprazole 40 MG EC tablet  Commonly known as: PROTONIX  TAKE 1 TABLET BY MOUTH TWICE A DAY  What changed: when to take this           Where to Get Your Medications      You can get these medications from any pharmacy    Bring a paper prescription for each of these medications  · Miracle mouthwash oral suspension          Justus Barrientos MD  04/10/23 3518

## 2023-04-12 ENCOUNTER — TELEPHONE (OUTPATIENT)
Dept: PULMONOLOGY | Facility: CLINIC | Age: 68
End: 2023-04-12
Payer: MEDICARE

## 2023-04-12 DIAGNOSIS — J43.2 CENTRILOBULAR EMPHYSEMA: Primary | ICD-10-CM

## 2023-04-12 RX ORDER — TIOTROPIUM BROMIDE INHALATION SPRAY 3.12 UG/1
2 SPRAY, METERED RESPIRATORY (INHALATION)
Qty: 1 EACH | Refills: 0 | COMMUNITY
Start: 2023-04-12

## 2023-04-12 NOTE — TELEPHONE ENCOUNTER
Jaya Glez MD  You 1 hour ago (12:04 PM)     KK  We could try Spiriva instead.  We have samples if someone can come by and get one. Use it the same way as the stiolto and monitor bp for good measure.  Go ahead and try the albuterol inhaler and check blood pressure.  If bp goes up then stop taking it.

## 2023-04-12 NOTE — TELEPHONE ENCOUNTER
Message relayed and patient voiced understanding. He is willing to try the Spiriva and Ventolin. He will monitor his B/P while taking them.  Sample at the .

## 2023-04-12 NOTE — TELEPHONE ENCOUNTER
"Patient states he was given samples of Stiolto at his last office visit. He said he took it for a few days and stopped it because it \"caused his blood pressure to be high\".  He recently got Ventolin from his PCP and doesn't think he will be able to take it either because [facility] told him it could cause his blood pressure to be elevated.   He is asking if you have any recommendations for him? He is scheduled to see you on 4/27/23.  Patient is aware that it may be tomorrow before he gets a call back. He voiced understanding and is agreeable.   Please advise.   "

## 2023-04-17 ENCOUNTER — TELEPHONE (OUTPATIENT)
Dept: OTOLARYNGOLOGY | Facility: CLINIC | Age: 68
End: 2023-04-17
Payer: MEDICARE

## 2023-04-17 NOTE — TELEPHONE ENCOUNTER
Provider: DR LECHUGA  Caller: ALISSA BERNARDO  Relationship to Patient: SELF  Pharmacy: [facility]  Phone Number: [phone]  Reason for Call: PT WAS GIVEN MIRACLE MOUTH WASH IN HOSPITAL - HE IS ALMOST OUT OF WASH AND WOULD LIKE TO GET MORE TO HOLD HIM OVER UNTIL APPT ON 4/25/23.    PLEASE CALL PT TO DISCUSS AND CALL IN MORE WASH.

## 2023-04-19 ENCOUNTER — TELEPHONE (OUTPATIENT)
Dept: OTOLARYNGOLOGY | Facility: CLINIC | Age: 68
End: 2023-04-19
Payer: MEDICARE

## 2023-04-19 RX ORDER — LIDOCAINE HYDROCHLORIDE 20 MG/ML
5 SOLUTION OROPHARYNGEAL AS NEEDED
Qty: 20 ML | Refills: 3 | Status: SHIPPED | OUTPATIENT
Start: 2023-04-19

## 2023-04-19 NOTE — TELEPHONE ENCOUNTER
Provider: DR LECHUGA  Caller: ALYSSIA BERNARDO  Relationship to Patient: SPOUSE  Pharmacy: [facility] #[id number] - [place], [place] - [address] - [phone] [facility] [phone]   Phone Number: [phone]    Reason for Call: PT SAW DR LECHUGA @ED LAST WEEK, HE HAS A FOLLOW UP APPT SCHEDULED FOR 4-25-23.    PT'S SYMPTOMS ARE WORSE- THE MAGIC MOUTHWASH SEEMS TO HELP BUT IT'S COMING BACK. PT HAS SORES IN HIS NOSE AND MOUTH, HIS TONGUE SEEMS INFLAMED, PT ALSO STARTED RUNNING A FEVER LAST WEEK. PT HAS REACHED OUT TO PCP- DR RUIZ WILL BE SENDING IN A PRESCRIPTION FOR PT. PT'S WIFE THINKS DR RUIZ WAS GOING TO SEND LEVAQUIN FOR HIM.     PT HAD CHEST XRAY BUT THAT SEEMS TO OK PER DR RUIZ. PT HAS A HISTORY OF TONGUE CANCER.    PT'S WIFE IS AT WORK, IF YOU CAN'T REACH HER, PLEASE CALL PT- PT# [phone].

## 2023-04-19 NOTE — TELEPHONE ENCOUNTER
I have called patient. He has no visible sores on his tongue. He has sore places. Patient advised to not eat acidic foods or caffeine. He will follow up with Dr. Richmond on Tuesday.

## 2023-04-25 ENCOUNTER — TELEPHONE (OUTPATIENT)
Dept: OTOLARYNGOLOGY | Facility: CLINIC | Age: 68
End: 2023-04-25
Payer: MEDICARE

## 2023-04-25 ENCOUNTER — OFFICE VISIT (OUTPATIENT)
Dept: OTOLARYNGOLOGY | Facility: CLINIC | Age: 68
End: 2023-04-25
Payer: MEDICARE

## 2023-04-25 VITALS
SYSTOLIC BLOOD PRESSURE: 161 MMHG | BODY MASS INDEX: 18.69 KG/M2 | TEMPERATURE: 98.4 F | WEIGHT: 141 LBS | RESPIRATION RATE: 16 BRPM | HEIGHT: 73 IN | DIASTOLIC BLOOD PRESSURE: 90 MMHG | HEART RATE: 84 BPM

## 2023-04-25 DIAGNOSIS — Z92.3 HX OF RADIATION THERAPY: ICD-10-CM

## 2023-04-25 DIAGNOSIS — R13.14 PHARYNGOESOPHAGEAL DYSPHAGIA: ICD-10-CM

## 2023-04-25 DIAGNOSIS — K21.9 LARYNGOPHARYNGEAL REFLUX: ICD-10-CM

## 2023-04-25 DIAGNOSIS — C02.9 PRIMARY SQUAMOUS CELL CARCINOMA OF TONGUE: Primary | ICD-10-CM

## 2023-04-25 DIAGNOSIS — F17.200 TOBACCO USE DISORDER: ICD-10-CM

## 2023-04-25 RX ORDER — HYDROXYZINE HYDROCHLORIDE 25 MG/1
TABLET, FILM COATED ORAL
COMMUNITY
Start: 2023-04-06

## 2023-04-25 RX ORDER — LISINOPRIL 10 MG/1
1 TABLET ORAL DAILY
COMMUNITY
Start: 2023-03-22

## 2023-04-25 RX ORDER — FLUTICASONE PROPIONATE 50 MCG
1 SPRAY, SUSPENSION (ML) NASAL DAILY
COMMUNITY
Start: 2023-03-09

## 2023-04-25 RX ORDER — LOSARTAN POTASSIUM 50 MG/1
1 TABLET ORAL DAILY
COMMUNITY
Start: 2023-04-06

## 2023-04-25 RX ORDER — AMLODIPINE BESYLATE 5 MG/1
10 TABLET ORAL DAILY
COMMUNITY
Start: 2023-04-13

## 2023-04-25 RX ORDER — CEFDINIR 300 MG/1
1 CAPSULE ORAL EVERY 12 HOURS SCHEDULED
COMMUNITY
Start: 2023-04-19

## 2023-04-25 NOTE — PATIENT INSTRUCTIONS
Recommend follow up with Dr. Stubbs to discuss medications and blood pressure management  Continue flonase  Return for problems    Be aware of the signs and symptoms of recurrent head and neck cancer including neck mass, persistent or recurrent sore throat, ear pain, hemoptysis, weight loss and hoarseness. If any of these symptoms recur and or persist, call for an evaluation.      D/W patient increasing caloric intake and discussed cruciferous vegetables and protein shakes etc to maintain optimal nutrition.  The necessity of abstaining from tobacco products was also discussed particularly with respect to not smoking     CONTACT INFORMATION:  The main office phone number is [phone]. For emergencies after hours and on weekends, this number will convert over to our answering service and the on call provider will answer. Please try to keep non emergent phone calls/ questions to office hours 9am-5pm Monday through Friday.      [facility]  As an alternative, you can sign up and use the Epic MyChart system for more direct and quicker access for non emergent questions/ problems.  [employer] allows you to send messages to your doctor, view your test results, renew your prescriptions, schedule appointments, and more. To sign up, go to [facility] and click on the Sign Up Now link in the New User? box. Enter your [facility] Activation Code exactly as it appears below along with the last four digits of your Social Security Number and your Date of Birth () to complete the sign-up process. If you do not sign up before the expiration date, you must request a new code.     [facility] Activation Code: Activation code not generated  Current [facility] Status: Active     If you have questions, you can email [email or web address] or call [phone] to talk to our [facility] staff. Remember, [facility] is NOT to be used for urgent needs. For medical emergencies, dial 911.     IF YOU SMOKE OR USE TOBACCO PLEASE  READ THE FOLLOWING:  Why is smoking bad for me?  Smoking increases the risk of heart disease, lung disease, vascular disease, stroke, and cancer. If you smoke, STOP!        IF YOU SMOKE OR USE TOBACCO PLEASE READ THE FOLLOWING:  Why is smoking bad for me?  Smoking increases the risk of heart disease, lung disease, vascular disease, stroke, and cancer. If you smoke, STOP!     For more information:  Quit Now [place]  1-800-QUIT-NOW  https://[place].quitlogix.org/en-[place]/

## 2023-04-25 NOTE — TELEPHONE ENCOUNTER
Patient was confused on what blood pressure medications he was supposed to be taking since they had switched recently. I contacted Dr. Stubbs's office and was informed his amlodipine was switched from 5mg to 2.5mg. The nurse I spoke with also stated that he should continue taking lisinopril 10mg and losartan 50mg since she could not find any documentation saying for him to stop it. I explained everything to the patient and gave information on a sticky note for him to take home. Patient voiced understanding.

## 2023-04-26 NOTE — PROGRESS NOTES
Background:  Pt w cough, centrilob emphysema   Chief Complaint  centrilobular emphysema    Subjective    History of Present Illness     Saul Alcantara Sr. is here for follow up with [facility].  History of Present Illness  At the last visit we tried stiolto for emphysema and wheezing and planned follow up ct chest to reassess ground glass nodule, but he has had ct imaging in [place] since then.  Pt also reports elevated bp with stiolto, so we gave him a sample of Spiriva but he has not started that.  He is also seeing ENT.  The stiolto inhaler bothered his tongue also.  He said he did not know his blood pressure was high till he went to the doctor.  He used to have feet swelling but that is better.  He reports low blood pressure when he is outside working.  He has had a new nodule develop on some scans in [facility] with his cancer care follow up. His doctor is Dr. Rodriguez.  A biopsy is being contemplated if this lesion increases.  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  He had a recent cxr at [facility] and he was told it was ok.  He says he has to burp air out in the mornings.  He reports taking pain pills for severe tongue pain. He reports lightheadedness sometimes.       Tobacco Use: High Risk   • Smoking Tobacco Use: Some Days   • Smokeless Tobacco Use: Never   • Passive Exposure: Not on file      Current Outpatient Medications   Medication Instructions   • albuterol (PROVENTIL) 2.5 mg, Nebulization, Every 4 Hours PRN   • ALPRAZolam (XANAX) 2 mg, Oral, 3 Times Daily   • amLODIPine (NORVASC) 10 mg, Oral, Daily   • cefdinir (OMNICEF) 300 MG capsule 1 capsule, Oral, Every 12 Hours Scheduled   • chlorhexidine (Peridex) 0.12 % solution 15 mL, Mouth/Throat, 2 Times Daily   • cyclobenzaprine (FLEXERIL) 10 mg, Oral, As Needed   • fluticasone (FLONASE) 50 MCG/ACT nasal spray 1  "spray, Each Nare, Daily   • glycopyrrolate (ROBINUL) 0.5 mg, Oral, 2 Times Daily   • HYDROcodone-acetaminophen (NORCO)  MG per tablet 1 tablet, Oral, Every 6 Hours PRN   • hydrOXYzine (ATARAX) 25 MG tablet TAKE 1 TABLET BY MOUTH 4 TIMES A DAY AS NEEDED FOR ANXIETY   • levothyroxine (SYNTHROID, LEVOTHROID) 100 mcg, Oral, Daily   • Lidocaine Viscous HCl (XYLOCAINE) 2 % solution 5 mL, Oral, As Needed   • lisinopril (PRINIVIL,ZESTRIL) 10 MG tablet 1 tablet, Daily   • losartan (COZAAR) 50 MG tablet 1 tablet, Daily   • meloxicam (MOBIC) 15 mg, Oral, Daily   • Miracle mouthwash oral suspension 10 mL, Swish & Spit, Every 4 Hours PRN   • mirtazapine (REMERON) 15 mg, Oral, Nightly   • nystatin (MYCOSTATIN) 552557 UNIT/ML suspension Take 10ml by mouth BID for 28 days   • ondansetron (ZOFRAN) 8 mg, Oral, Every 8 Hours PRN   • pantoprazole (PROTONIX) 40 MG EC tablet TAKE 1 TABLET BY MOUTH TWICE A DAY   • roflumilast (DALIRESP) 250 mcg, Oral, Daily   • senna-docusate (PERICOLACE) 8.6-50 MG per tablet Take 2 tablets by mouth Daily.   • sertraline (ZOLOFT) 100 mg, Oral, Every Night at Bedtime   • simvastatin (ZOCOR) 20 MG tablet Take 1 tablet by mouth Every Night.   • tadalafil (CIALIS) 20 mg, Oral, As Needed, 1-24 hours before activity   • tamsulosin (FLOMAX) 0.4 mg, Oral, Daily   • tiotropium bromide monohydrate (Spiriva Respimat) 2.5 MCG/ACT aerosol solution inhaler 2 puffs, Inhalation, Daily - RT   • tiotropium bromide-olodaterol (Stiolto Respimat) 2.5-2.5 MCG/ACT aerosol solution inhaler 2 puffs, Inhalation, Daily - RT      Objective     Vital Signs:   /98   Pulse 94   Ht 185.4 cm (73\")   Wt 63.5 kg (140 lb)   SpO2 98% Comment: RA  BMI 18.47 kg/m²   Physical Exam  Constitutional:       Appearance: Normal appearance. He is not ill-appearing or diaphoretic.   Eyes:      Extraocular Movements: Extraocular movements intact.   Pulmonary:      Effort: Pulmonary effort is normal. No respiratory distress.      Breath " sounds: No wheezing, rhonchi or rales.   Skin:     Findings: No erythema or rash.   Neurological:      Mental Status: He is alert.        Result Review  Data Reviewed:    XR CHEST PORTABLE    Result Date: 4/5/2023  Impression: Impression: No acute radiographic abnormality is identified.      PFT Values        7/20/2022    11:30   Pre Drug PFT Results   FVC 88   FEV1 72   FEF 25-75% 55   FEV1/FVC 62   Other Tests PFT Results         DLCO 95   D/VAsb 98                Assessment and Plan    Diagnoses and all orders for this visit:    1. Centrilobular emphysema (Primary)  -     roflumilast (Daliresp) 250 MCG tablet tablet; Take 1 tablet by mouth Daily.  Dispense: 28 tablet; Refill: 0    2. Multiple lung nodules    3. Wheezing    4. Postural dizziness with presyncope    recommend discussing bp meds with Dr. Stubbs.  We discussed the variety of symptoms described above.  Most of these seem to be cardiovascular and I will not make any recommendations regarding these, but they seem significant, and he will review with his other doctors  Try loratadine for cough and allergy  [place] get ct result from [place], agree with rechecking to follow up on apparent new nodule  Begin daliresp po as he did not tolerate inhalers due to tongue pain and maybe hypertension aggravated by stiolto, which would be a very unusual side effect from stiolgo.  Monitor weight with daliresp.  If tolerated then he will contact us before this rx is complete for rx for 500 mg dose  I recommend against driving while he having the consciousness symptoms he is having.  50 minutes spent with care of patient today  Follow Up   Return in about 3 months (around 7/27/2023).  Patient was given instructions and counseling regarding his condition or for health maintenance advice. Please see specific information pulled into the AVS if appropriate.    Electronically signed by Jaya Glez MD, 4/27/2023, 19:33 CDT

## 2023-04-27 ENCOUNTER — OFFICE VISIT (OUTPATIENT)
Dept: PULMONOLOGY | Facility: CLINIC | Age: 68
End: 2023-04-27
Payer: MEDICARE

## 2023-04-27 VITALS
HEIGHT: 73 IN | BODY MASS INDEX: 18.55 KG/M2 | HEART RATE: 94 BPM | WEIGHT: 140 LBS | OXYGEN SATURATION: 98 % | SYSTOLIC BLOOD PRESSURE: 142 MMHG | DIASTOLIC BLOOD PRESSURE: 98 MMHG

## 2023-04-27 DIAGNOSIS — R55 POSTURAL DIZZINESS WITH PRESYNCOPE: ICD-10-CM

## 2023-04-27 DIAGNOSIS — R42 POSTURAL DIZZINESS WITH PRESYNCOPE: ICD-10-CM

## 2023-04-27 DIAGNOSIS — R06.2 WHEEZING: ICD-10-CM

## 2023-04-27 DIAGNOSIS — R91.8 MULTIPLE LUNG NODULES: ICD-10-CM

## 2023-04-27 DIAGNOSIS — J43.2 CENTRILOBULAR EMPHYSEMA: Primary | ICD-10-CM

## 2023-04-27 RX ORDER — ROFLUMILAST 250 UG/1
250 TABLET ORAL DAILY
Qty: 28 TABLET | Refills: 0 | Status: SHIPPED | OUTPATIENT
Start: 2023-04-27

## 2023-05-02 DIAGNOSIS — J43.2 CENTRILOBULAR EMPHYSEMA: ICD-10-CM

## 2023-05-02 NOTE — TELEPHONE ENCOUNTER
He has taking 5 pills of the 250 mcg daliresp and is having diarrhea and also has lost weight as well.       Do you want him to continue this or stop it?    Dr. Glez is out of office

## 2023-05-03 RX ORDER — TIOTROPIUM BROMIDE INHALATION SPRAY 3.12 UG/1
2 SPRAY, METERED RESPIRATORY (INHALATION)
Qty: 4 G | Refills: 0 | Status: SHIPPED | OUTPATIENT
Start: 2023-05-03

## 2023-05-03 NOTE — TELEPHONE ENCOUNTER
Patient notified to stop the daliresp and I have forwarded this message to Dr. Glez     He is wanting to have spiriva sent into the pharmacy.          Rx Refill Note  Requested Prescriptions     Pending Prescriptions Disp Refills   • tiotropium bromide monohydrate (Spiriva Respimat) 2.5 MCG/ACT aerosol solution inhaler 4 g      Sig: Inhale 2 puffs Daily.      Last office visit with prescribing clinician: 4/27/2023   Last telemedicine visit with prescribing clinician: 8/30/2023   Next office visit with prescribing clinician: 8/30/2023                         Would you like a call back once the refill request has been completed: [] Yes [] No    If the office needs to give you a call back, can they leave a voicemail: [] Yes [] No    Graciela Dhillon CMA  05/03/23, 12:15 CDT

## 2023-05-17 ENCOUNTER — TELEPHONE (OUTPATIENT)
Dept: OTOLARYNGOLOGY | Facility: CLINIC | Age: 68
End: 2023-05-17
Payer: MEDICARE

## 2023-05-17 DIAGNOSIS — Z92.3 H/O HEAD AND NECK RADIATION: ICD-10-CM

## 2023-05-17 DIAGNOSIS — Z92.3 HX OF RADIATION THERAPY: ICD-10-CM

## 2023-05-17 DIAGNOSIS — C02.9 PRIMARY SQUAMOUS CELL CARCINOMA OF TONGUE: Primary | ICD-10-CM

## 2023-05-17 DIAGNOSIS — K11.7 XEROSTOMIA: ICD-10-CM

## 2023-05-18 ENCOUNTER — TRANSCRIBE ORDERS (OUTPATIENT)
Dept: ADMINISTRATIVE | Facility: HOSPITAL | Age: 68
End: 2023-05-18
Payer: MEDICARE

## 2023-05-18 DIAGNOSIS — R42 DIZZINESS AND GIDDINESS: Primary | ICD-10-CM

## 2023-05-25 ENCOUNTER — OFFICE VISIT (OUTPATIENT)
Dept: VASCULAR SURGERY | Facility: CLINIC | Age: 68
End: 2023-05-25
Payer: MEDICARE

## 2023-05-25 VITALS
WEIGHT: 140 LBS | HEIGHT: 73 IN | SYSTOLIC BLOOD PRESSURE: 138 MMHG | HEART RATE: 51 BPM | BODY MASS INDEX: 18.55 KG/M2 | DIASTOLIC BLOOD PRESSURE: 88 MMHG | OXYGEN SATURATION: 100 %

## 2023-05-25 DIAGNOSIS — E78.2 MIXED HYPERLIPIDEMIA: ICD-10-CM

## 2023-05-25 DIAGNOSIS — I65.23 BILATERAL CAROTID ARTERY STENOSIS: Primary | ICD-10-CM

## 2023-05-25 RX ORDER — ASPIRIN 81 MG/1
81 TABLET ORAL DAILY
Start: 2023-05-25 | End: 2023-06-24

## 2023-05-25 NOTE — LETTER
May 25, 2023     JACKIE Tamez  [address]  [address]  [place] [address]    Patient: Saul Alcantara Sr.   YOB: 1955   Date of Visit: 5/25/2023       Dear JACKIE Rubio:    Thank you for referring Saul Alcantara to me for evaluation. Below are the relevant portions of my assessment and plan of care.    If you have questions, please do not hesitate to call me. I look forward to following Saul along with you.         Sincerely,        Leandro Woods DO        CC: No Recipients    Leandro Woods DO  05/25/23 0844  Sign when Signing Visit  05/25/2023      Roger Puri APRN  [address]  [facility] [address]  [place] [address]    Saul Alcantara Sr.  1955    Chief Complaint   Patient presents with   • NEW PATIENT     Ref from [facility]i for Bilateral carotid stenosis 50-69% on left. Pt states that over a year he has found bumps on the left side of neck that has been creating pain. Pt states there are times left side from neck down goes numb.        Dear JACKIE Tamez    HPI  I had the pleasure of seeing your patient Saul Alcantara Sr. in the office today.  Thank you kindly for this consultation.  As you recall, Saul Alcantara Sr. is a 67 y.o.  male who you are currently following for routine health maintenance.  He has been having complaints of dizziness and high blood pressure. He has a history of lymphoma, tongue cancer, and prostate cancer 3 years ago.  He has some numbness to the left side of the body.  He is maintained on Zocor. He did have noninvasive testing performed which I did personally review.     Past Medical History:   Diagnosis Date   • Allergic rhinitis    • Anxiety    • Arthritis    • Cataract    • Chronic sinusitis    • COPD (chronic obstructive pulmonary disease)    • COVID-19 vaccine series completed     Moderna   • Depression    • Deviated nasal septum    • Elevated cholesterol    • Enlarged prostate    • GERD (gastroesophageal reflux  disease)    • H/O head and neck radiation 03/03/2017   • History of transfusion    • Hyperlipidemia    • Hypertension    • Hypothyroidism    • Laryngopharyngeal reflux    • Lymphoma     Non-Hodgkins   • MRSA infection    • Panic attacks    • Peyronie disease    • Pneumonia    • 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/27/2016    T1N0M0 SCC S/P XRT/Chemo 12/2014   • 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/27/2016    T1N0M0 SCC S/P XRT/Chemo 12/2014   • Prostate cancer    • Sicca syndrome    • Sinusitis, acute    • Squamous cell carcinoma     Base of Tongue   • Tobacco use disorder    • Tongue irritation    • Visit for monitoring Rituxan therapy     pt getting treatments at [facility] in [place]   • Xerostomia        Past Surgical History:   Procedure Laterality Date   • CATARACT EXTRACTION Bilateral    • ESOPHAGOSCOPY N/A 11/20/2019    Procedure: Direct laryngoscopy with esophageal Johnson dilation;  Surgeon: Wayne Richmond MD;  Location: [facility] OR;  Service: ENT   • HAND SURGERY      metal plate    • KNEE SURGERY     • LARYNGOSCOPY N/A 6/26/2020    Procedure: Rigid esophagoscopy Johnson esophageal dilatation;  Surgeon: Wayne Richmond MD;  Location: [facility] OR;  Service: ENT;  Laterality: N/A;   • LARYNGOSCOPY N/A 8/24/2020    Procedure: MICRODIRECT LARYNGOSCOPY with esophageal dilatation;  Surgeon: Wayne Richmond MD;  Location: [facility] OR;  Service: ENT;  Laterality: N/A;   • LARYNGOSCOPY N/A 5/24/2021    Procedure: Direct laryngoscopy, esophagoscopy with Johnson dilatation;  Surgeon: Wayne Richmond MD;  Location: [facility] OR;  Service: ENT;  Laterality: N/A;   • LARYNGOSCOPY N/A 12/1/2021    Procedure: Direct laryngoscopy, esophagoscopy with esophageal dilatation (Johnson dilators);  Surgeon: Wayne Richmond MD;  Location:  [facility] OR;  Service: ENT;  Laterality: N/A;   • LARYNGOSCOPY N/A 1/3/2022    Procedure: Direct laryngoscopy, esophagoscopy with Johnson esophageal  dilation;  Surgeon: Wayne Richmond MD;  Location:  [facility] OR;  Service: ENT;  Laterality: N/A;   • LARYNGOSCOPY N/A 2022    Procedure: Direct laryngoscopy with esophageal dilatation;  Surgeon: Wayne Richmond MD;  Location:  [facility] OR;  Service: ENT;  Laterality: N/A;   • LARYNGOSCOPY N/A 2022    Procedure: MicroDirect laryngoscopy, esophagoscopy with Johnson dilation;  Surgeon: Wayne Richmond MD;  Location:  [facility] OR;  Service: ENT;  Laterality: N/A;   • LARYNGOSCOPY N/A 2022    Procedure: DIRECT LARYNGOSCOPY WITH JOHNSON DILATION;  Surgeon: Wayne Richmond MD;  Location:  [facility] OR;  Service: ENT;  Laterality: N/A;   • MULTIPLE TOOTH EXTRACTIONS      CONTINUING TO HAVE BONE REMOVED 2021   • OTHER SURGICAL HISTORY  09/10/2014    Microlaryngoscopy with Biopsy - Base of Tongue   • PANENDOSCOPY N/A 2021    Procedure: DIRECT LARYNGOSCOPY AND ESOPHAGOSCOPY WITH ESOPHAGEAL DILATION;  Surgeon: Wayne Richmond MD;  Location:  [facility] OR;  Service: ENT;  Laterality: N/A;   • PANENDOSCOPY N/A 10/11/2021    Procedure: Direct laryngoscopy, esophagoscopy with Johnson dilatation;  Surgeon: Wayne Richmond MD;  Location:  [facility] OR;  Service: ENT;  Laterality: N/A;   • PROSTATE ULTRASOUND BIOPSY N/A 2021    Procedure: PROSTATE ULTRASOUND BIOPSY. NOT A URONAV;  Surgeon: Oscar Bazan MD;  Location:  [facility] OR;  Service: Urology;  Laterality: N/A;   • SPLENECTOMY     • SQUAMOUS CELL CARCINOMA EXCISION  09/10/2014    Tongue   • TONSILLECTOMY         Family History   Problem Relation Age of Onset   • Cancer Mother    • Weaver's disease Father    • Diabetes Sister    • Cancer Sister    • Aneurysm Brother        Social History     Socioeconomic History   • Marital status:    Tobacco Use   • Smoking status: Some Days     Packs/day: 0.50     Years: 44.00     Pack years: 22.00     Types: Cigarettes     Last attempt to quit:      Years since quittin.3   • Smokeless  tobacco: Never   • Tobacco comments:     pack last couple of weeks   Vaping Use   • Vaping Use: Never used   Substance and Sexual Activity   • Alcohol use: No   • Drug use: No   • Sexual activity: Defer     Partners: Female       No Known Allergies      Current Outpatient Medications:   •  albuterol (PROVENTIL) (2.5 MG/3ML) 0.083% nebulizer solution, Take 2.5 mg by nebulization Every 4 (Four) Hours As Needed for Wheezing., Disp: 120 mL, Rfl: 5  •  ALPRAZolam (XANAX) 2 MG tablet, Take 1 tablet by mouth 3 (Three) Times a Day., Disp: , Rfl:   •  amLODIPine (NORVASC) 5 MG tablet, Take 2 tablets by mouth Daily., Disp: , Rfl:   •  chlorhexidine (Peridex) 0.12 % solution, Apply 15 mL to the mouth or throat 2 (Two) Times a Day., Disp: 240 mL, Rfl: 0  •  glycopyrrolate (ROBINUL) 1 MG tablet, Take 0.5 mg by mouth 2 (Two) Times a Day., Disp: , Rfl:   •  HYDROcodone-acetaminophen (NORCO)  MG per tablet, Take 1 tablet by mouth Every 6 (Six) Hours As Needed for Moderate Pain (PER DR RUIZ)., Disp: , Rfl:   •  hydrOXYzine (ATARAX) 25 MG tablet, TAKE 1 TABLET BY MOUTH 4 TIMES A DAY AS NEEDED FOR ANXIETY, Disp: , Rfl:   •  levothyroxine (SYNTHROID, LEVOTHROID) 100 MCG tablet, Take 1 tablet by mouth Daily., Disp: , Rfl:   •  Lidocaine Viscous HCl (XYLOCAINE) 2 % solution, Take 5 mL by mouth As Needed for Mild Pain., Disp: 20 mL, Rfl: 3  •  lisinopril (PRINIVIL,ZESTRIL) 10 MG tablet, Take 1 tablet by mouth Daily., Disp: , Rfl:   •  losartan (COZAAR) 50 MG tablet, Take 1 tablet by mouth Daily., Disp: , Rfl:   •  meloxicam (MOBIC) 15 MG tablet, Take 1 tablet by mouth Daily., Disp: , Rfl:   •  mirtazapine (REMERON) 15 MG tablet, Take 1 tablet by mouth Every Night., Disp: , Rfl:   •  nystatin (MYCOSTATIN) 599518 UNIT/ML suspension, Take 10ml by mouth BID for 28 days (Patient taking differently: Take 2 mL by mouth 4 (Four) Times a Day As Needed. Take 10ml by mouth BID for 28 days), Disp: 480 mL, Rfl: 2  •  ondansetron (ZOFRAN) 8 MG  tablet, Take 1 tablet by mouth Every 8 (Eight) Hours As Needed for Nausea., Disp: , Rfl:   •  pantoprazole (PROTONIX) 40 MG EC tablet, TAKE 1 TABLET BY MOUTH TWICE A DAY (Patient taking differently: Take 1 tablet by mouth Daily.), Disp: 180 tablet, Rfl: 3  •  roflumilast (Daliresp) 250 MCG tablet tablet, Take 1 tablet by mouth Daily., Disp: 28 tablet, Rfl: 0  •  senna-docusate (PERICOLACE) 8.6-50 MG per tablet, Take 2 tablets by mouth Daily., Disp: , Rfl:   •  sertraline (ZOLOFT) 100 MG tablet, Take 1 tablet by mouth every night at bedtime., Disp: , Rfl:   •  simvastatin (ZOCOR) 20 MG tablet, Take 1 tablet by mouth Every Night., Disp: , Rfl: 11  •  tamsulosin (FLOMAX) 0.4 MG capsule 24 hr capsule, Take 1 capsule by mouth Daily., Disp: , Rfl:   •  cyclobenzaprine (FLEXERIL) 10 MG tablet, Take 1 tablet by mouth As Needed. (Patient not taking: Reported on 5/25/2023), Disp: , Rfl:   •  fluticasone (FLONASE) 50 MCG/ACT nasal spray, 1 spray by Each Nare route Daily. (Patient not taking: Reported on 5/25/2023), Disp: , Rfl:   •  tadalafil (CIALIS) 20 MG tablet, Take 1 tablet by mouth As Needed for Erectile Dysfunction for up to 360 days. 1-24 hours before activity (Patient not taking: Reported on 5/25/2023), Disp: 12 tablet, Rfl: 11  •  tiotropium bromide monohydrate (Spiriva Respimat) 2.5 MCG/ACT aerosol solution inhaler, Inhale 2 puffs Daily. (Patient not taking: Reported on 5/25/2023), Disp: 4 g, Rfl: 0  •  tiotropium bromide-olodaterol (Stiolto Respimat) 2.5-2.5 MCG/ACT aerosol solution inhaler, Inhale 2 puffs Daily. (Patient not taking: Reported on 5/25/2023), Disp: 4 g, Rfl: 11    Review of Systems   Constitutional: Negative.    HENT: Negative.     Eyes: Negative.    Respiratory: Negative.     Cardiovascular: Negative.    Gastrointestinal: Negative.    Endocrine: Negative.    Genitourinary: Negative.    Musculoskeletal: Negative.    Skin: Negative.    Allergic/Immunologic: Negative.    Neurological:  Positive for  "numbness.   Hematological: Negative.    Psychiatric/Behavioral: Negative.     All other systems reviewed and are negative.  /88   Pulse 51   Ht 185.4 cm (73\")   Wt 63.5 kg (140 lb)   SpO2 100%   BMI 18.47 kg/m²     Physical Exam  Vitals and nursing note reviewed.   Constitutional:       Appearance: Normal appearance. He is well-developed and underweight.   HENT:      Head: Normocephalic and atraumatic.   Eyes:      General: No scleral icterus.     Pupils: Pupils are equal, round, and reactive to light.   Neck:      Thyroid: No thyromegaly.      Vascular: No carotid bruit or JVD.   Cardiovascular:      Rate and Rhythm: Normal rate and regular rhythm.      Pulses:           Carotid pulses are 2+ on the right side and 2+ on the left side.       Femoral pulses are 2+ on the right side and 2+ on the left side.       Popliteal pulses are 2+ on the right side and 2+ on the left side.        Dorsalis pedis pulses are 2+ on the right side and 2+ on the left side.        Posterior tibial pulses are 2+ on the right side and 2+ on the left side.      Heart sounds: Normal heart sounds.   Pulmonary:      Effort: Pulmonary effort is normal.      Breath sounds: Normal breath sounds.   Abdominal:      General: Bowel sounds are normal. There is no distension or abdominal bruit.      Palpations: Abdomen is soft. There is no mass.      Tenderness: There is no abdominal tenderness.   Musculoskeletal:         General: Normal range of motion.      Cervical back: Neck supple.   Lymphadenopathy:      Cervical: No cervical adenopathy.   Skin:     General: Skin is warm and dry.   Neurological:      Mental Status: He is alert and oriented to person, place, and time.      Cranial Nerves: No cranial nerve deficit.      Sensory: No sensory deficit.         Patient Active Problem List   Diagnosis   • Primary squamous cell carcinoma of tongue   • Laryngopharyngeal reflux   • Chronic sinusitis   • Xerostomia   • Tongue irritation   • " Sicca syndrome   • Abnormal thyroid stimulating hormone level   • H/O head and neck radiation   • Allergic rhinitis   • Acute maxillary sinusitis   • Wheezing   • Productive cough   • Pharyngoesophageal dysphagia   • Perichondritis and chondritis of pinna, left   • Tobacco use disorder   • Hypothyroidism   • Neoplasm of uncertain behavior   • Neoplasm of uncertain behavior of skin of neck   • Hx of radiation therapy   • Elevated prostate specific antigen (PSA)   • Mixed hyperlipidemia        Diagnosis Plan   1. Bilateral carotid artery stenosis  US Carotid Bilateral      2. Mixed hyperlipidemia            Plan: After thoroughly evaluating Saul Alcantara Sr., I believe the best course of action is to remain conservative from a vascular surgery standpoint.  I did personally review his testing which shows less than 50% carotid stenosis on the the right and 50-69% left carotid stenosis.  I would recommend he begin taking aspirin EC 81 mg daily.  I will see him back in 1 year with repeat noninvasive testing including a carotid duplex.  I did discuss vascular risk factors as they pertain to the progression of vascular disease including controlling his hyperlipidemia, which is currently stable.  The patient is to continue taking their medications as previously discussed.   This was all discussed in full with complete understanding.  Thank you for allowing me to participate in the care of your patient.  Please do not hesitate to call with any questions or concerns.  We will keep you aware of any further encounters with Saul Alcantara Sr..        Sincerely yours,         DO Enoc Davis Danny Neale, MD

## 2023-05-25 NOTE — PROGRESS NOTES
05/25/2023      Roger Lerner, APRN  [address] [facility] [address]  [address]  [place] [address]    Saul Alcantara Sr.  1955    Chief Complaint   Patient presents with    NEW PATIENT     Ref from roger lerner for Bilateral carotid stenosis 50-69% on left. Pt states that over a year he has found bumps on the left side of neck that has been creating pain. Pt states there are times left side from neck down goes numb.        Dear Roger Lerner, JACKIE    HPI  I had the pleasure of seeing your patient Saul Alcantara Sr. in the office today.  Thank you kindly for this consultation.  As you recall, Saul Alcantara Sr. is a 67 y.o.  male who you are currently following for routine health maintenance.  He has been having complaints of dizziness and high blood pressure. He has a history of lymphoma, tongue cancer, and prostate cancer 3 years ago.  He has some numbness to the left side of the body.  He is maintained on Zocor. He did have noninvasive testing performed which I did personally review.     Past Medical History:   Diagnosis Date    Allergic rhinitis     Anxiety     Arthritis     Cataract     Chronic sinusitis     COPD (chronic obstructive pulmonary disease)     COVID-19 vaccine series completed     Moderna    Depression     Deviated nasal septum     Elevated cholesterol     Enlarged prostate     GERD (gastroesophageal reflux disease)     H/O head and neck radiation 03/03/2017    History of transfusion     Hyperlipidemia     Hypertension     Hypothyroidism     Laryngopharyngeal reflux     Lymphoma     Non-Hodgkins    MRSA infection     Panic attacks     Peyronie disease     Pneumonia     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/27/2016    T1N0M0 SCC S/P XRT/Chemo 12/2014    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/27/2016    T1N0M0 SCC S/P XRT/Chemo 12/2014    Prostate cancer     Sicca syndrome     Sinusitis, acute     Squamous cell carcinoma     Base of Tongue    Tobacco use disorder     Tongue irritation      Visit for monitoring Rituxan therapy     pt getting treatments at [facility] office in [place]    Xerostomia        Past Surgical History:   Procedure Laterality Date    CATARACT EXTRACTION Bilateral     ESOPHAGOSCOPY N/A 11/20/2019    Procedure: Direct laryngoscopy with esophageal Johnson dilation;  Surgeon: Wayne Richmond MD;  Location:  [facility];  Service: ENT    HAND SURGERY      metal plate     KNEE SURGERY      LARYNGOSCOPY N/A 6/26/2020    Procedure: Rigid esophagoscopy Johnson esophageal dilatation;  Surgeon: Wayne Richmond MD;  Location:  [facility];  Service: ENT;  Laterality: N/A;    LARYNGOSCOPY N/A 8/24/2020    Procedure: MICRODIRECT LARYNGOSCOPY with esophageal dilatation;  Surgeon: Wayne Richmond MD;  Location:  [facility];  Service: ENT;  Laterality: N/A;    LARYNGOSCOPY N/A 5/24/2021    Procedure: Direct laryngoscopy, esophagoscopy with Johnson dilatation;  Surgeon: Wayne Richmond MD;  Location:  [facility];  Service: ENT;  Laterality: N/A;    LARYNGOSCOPY N/A 12/1/2021    Procedure: Direct laryngoscopy, esophagoscopy with esophageal dilatation (Johnson dilators);  Surgeon: Wayne Richmond MD;  Location:  [facility];  Service: ENT;  Laterality: N/A;    LARYNGOSCOPY N/A 1/3/2022    Procedure: Direct laryngoscopy, esophagoscopy with Johnson esophageal dilation;  Surgeon: Wayne Richmond MD;  Location:  [facility];  Service: ENT;  Laterality: N/A;    LARYNGOSCOPY N/A 4/29/2022    Procedure: Direct laryngoscopy with esophageal dilatation;  Surgeon: Wayne Richmond MD;  Location:  [facility];  Service: ENT;  Laterality: N/A;    LARYNGOSCOPY N/A 6/24/2022    Procedure: MicroDirect laryngoscopy, esophagoscopy with Johnson dilation;  Surgeon: Wayne Richmond MD;  Location:  [facility];  Service: ENT;  Laterality: N/A;    LARYNGOSCOPY N/A 9/23/2022    Procedure: DIRECT LARYNGOSCOPY WITH JOHNSON DILATION;  Surgeon: Wayne Richmond MD;  Location:  [facility];  Service:  ENT;  Laterality: N/A;    MULTIPLE TOOTH EXTRACTIONS      CONTINUING TO HAVE BONE REMOVED 2021    OTHER SURGICAL HISTORY  09/10/2014    Microlaryngoscopy with Biopsy - Base of Tongue    PANENDOSCOPY N/A 2021    Procedure: DIRECT LARYNGOSCOPY AND ESOPHAGOSCOPY WITH ESOPHAGEAL DILATION;  Surgeon: Wayne Richmond MD;  Location:  [facility];  Service: ENT;  Laterality: N/A;    PANENDOSCOPY N/A 10/11/2021    Procedure: Direct laryngoscopy, esophagoscopy with Johnson dilatation;  Surgeon: Wayne Richmond MD;  Location:  [facility];  Service: ENT;  Laterality: N/A;    PROSTATE ULTRASOUND BIOPSY N/A 2021    Procedure: PROSTATE ULTRASOUND BIOPSY. NOT A URONAV;  Surgeon: Oscar Bazan MD;  Location:  [facility];  Service: Urology;  Laterality: N/A;    SPLENECTOMY      SQUAMOUS CELL CARCINOMA EXCISION  09/10/2014    Tongue    TONSILLECTOMY         Family History   Problem Relation Age of Onset    Cancer Mother     Elmo's disease Father     Diabetes Sister     Cancer Sister     Aneurysm Brother        Social History     Socioeconomic History    Marital status:    Tobacco Use    Smoking status: Some Days     Packs/day: 0.50     Years: 44.00     Pack years: 22.00     Types: Cigarettes     Last attempt to quit:      Years since quittin.3    Smokeless tobacco: Never    Tobacco comments:     pack last couple of weeks   Vaping Use    Vaping Use: Never used   Substance and Sexual Activity    Alcohol use: No    Drug use: No    Sexual activity: Defer     Partners: Female       No Known Allergies      Current Outpatient Medications:     albuterol (PROVENTIL) (2.5 MG/3ML) 0.083% nebulizer solution, Take 2.5 mg by nebulization Every 4 (Four) Hours As Needed for Wheezing., Disp: 120 mL, Rfl: 5    ALPRAZolam (XANAX) 2 MG tablet, Take 1 tablet by mouth 3 (Three) Times a Day., Disp: , Rfl:     amLODIPine (NORVASC) 5 MG tablet, Take 2 tablets by mouth Daily., Disp: , Rfl:     chlorhexidine (Peridex) 0.12  % solution, Apply 15 mL to the mouth or throat 2 (Two) Times a Day., Disp: 240 mL, Rfl: 0    glycopyrrolate (ROBINUL) 1 MG tablet, Take 0.5 mg by mouth 2 (Two) Times a Day., Disp: , Rfl:     HYDROcodone-acetaminophen (NORCO)  MG per tablet, Take 1 tablet by mouth Every 6 (Six) Hours As Needed for Moderate Pain (PER DR RUIZ)., Disp: , Rfl:     hydrOXYzine (ATARAX) 25 MG tablet, TAKE 1 TABLET BY MOUTH 4 TIMES A DAY AS NEEDED FOR ANXIETY, Disp: , Rfl:     levothyroxine (SYNTHROID, LEVOTHROID) 100 MCG tablet, Take 1 tablet by mouth Daily., Disp: , Rfl:     Lidocaine Viscous HCl (XYLOCAINE) 2 % solution, Take 5 mL by mouth As Needed for Mild Pain., Disp: 20 mL, Rfl: 3    lisinopril (PRINIVIL,ZESTRIL) 10 MG tablet, Take 1 tablet by mouth Daily., Disp: , Rfl:     losartan (COZAAR) 50 MG tablet, Take 1 tablet by mouth Daily., Disp: , Rfl:     meloxicam (MOBIC) 15 MG tablet, Take 1 tablet by mouth Daily., Disp: , Rfl:     mirtazapine (REMERON) 15 MG tablet, Take 1 tablet by mouth Every Night., Disp: , Rfl:     nystatin (MYCOSTATIN) 689542 UNIT/ML suspension, Take 10ml by mouth BID for 28 days (Patient taking differently: Take 2 mL by mouth 4 (Four) Times a Day As Needed. Take 10ml by mouth BID for 28 days), Disp: 480 mL, Rfl: 2    ondansetron (ZOFRAN) 8 MG tablet, Take 1 tablet by mouth Every 8 (Eight) Hours As Needed for Nausea., Disp: , Rfl:     pantoprazole (PROTONIX) 40 MG EC tablet, TAKE 1 TABLET BY MOUTH TWICE A DAY (Patient taking differently: Take 1 tablet by mouth Daily.), Disp: 180 tablet, Rfl: 3    roflumilast (Daliresp) 250 MCG tablet tablet, Take 1 tablet by mouth Daily., Disp: 28 tablet, Rfl: 0    senna-docusate (PERICOLACE) 8.6-50 MG per tablet, Take 2 tablets by mouth Daily., Disp: , Rfl:     sertraline (ZOLOFT) 100 MG tablet, Take 1 tablet by mouth every night at bedtime., Disp: , Rfl:     simvastatin (ZOCOR) 20 MG tablet, Take 1 tablet by mouth Every Night., Disp: , Rfl: 11    tamsulosin (FLOMAX)  "0.4 MG capsule 24 hr capsule, Take 1 capsule by mouth Daily., Disp: , Rfl:     cyclobenzaprine (FLEXERIL) 10 MG tablet, Take 1 tablet by mouth As Needed. (Patient not taking: Reported on 5/25/2023), Disp: , Rfl:     fluticasone (FLONASE) 50 MCG/ACT nasal spray, 1 spray by Each Nare route Daily. (Patient not taking: Reported on 5/25/2023), Disp: , Rfl:     tadalafil (CIALIS) 20 MG tablet, Take 1 tablet by mouth As Needed for Erectile Dysfunction for up to 360 days. 1-24 hours before activity (Patient not taking: Reported on 5/25/2023), Disp: 12 tablet, Rfl: 11    tiotropium bromide monohydrate (Spiriva Respimat) 2.5 MCG/ACT aerosol solution inhaler, Inhale 2 puffs Daily. (Patient not taking: Reported on 5/25/2023), Disp: 4 g, Rfl: 0    tiotropium bromide-olodaterol (Stiolto Respimat) 2.5-2.5 MCG/ACT aerosol solution inhaler, Inhale 2 puffs Daily. (Patient not taking: Reported on 5/25/2023), Disp: 4 g, Rfl: 11    Review of Systems   Constitutional: Negative.    HENT: Negative.     Eyes: Negative.    Respiratory: Negative.     Cardiovascular: Negative.    Gastrointestinal: Negative.    Endocrine: Negative.    Genitourinary: Negative.    Musculoskeletal: Negative.    Skin: Negative.    Allergic/Immunologic: Negative.    Neurological:  Positive for numbness.   Hematological: Negative.    Psychiatric/Behavioral: Negative.     All other systems reviewed and are negative.  /88   Pulse 51   Ht 185.4 cm (73\")   Wt 63.5 kg (140 lb)   SpO2 100%   BMI 18.47 kg/m²     Physical Exam  Vitals and nursing note reviewed.   Constitutional:       Appearance: Normal appearance. He is well-developed and underweight.   HENT:      Head: Normocephalic and atraumatic.   Eyes:      General: No scleral icterus.     Pupils: Pupils are equal, round, and reactive to light.   Neck:      Thyroid: No thyromegaly.      Vascular: No carotid bruit or JVD.   Cardiovascular:      Rate and Rhythm: Normal rate and regular rhythm.      Pulses:    "        Carotid pulses are 2+ on the right side and 2+ on the left side.       Femoral pulses are 2+ on the right side and 2+ on the left side.       Popliteal pulses are 2+ on the right side and 2+ on the left side.        Dorsalis pedis pulses are 2+ on the right side and 2+ on the left side.        Posterior tibial pulses are 2+ on the right side and 2+ on the left side.      Heart sounds: Normal heart sounds.   Pulmonary:      Effort: Pulmonary effort is normal.      Breath sounds: Normal breath sounds.   Abdominal:      General: Bowel sounds are normal. There is no distension or abdominal bruit.      Palpations: Abdomen is soft. There is no mass.      Tenderness: There is no abdominal tenderness.   Musculoskeletal:         General: Normal range of motion.      Cervical back: Neck supple.   Lymphadenopathy:      Cervical: No cervical adenopathy.   Skin:     General: Skin is warm and dry.   Neurological:      Mental Status: He is alert and oriented to person, place, and time.      Cranial Nerves: No cranial nerve deficit.      Sensory: No sensory deficit.         Patient Active Problem List   Diagnosis    Primary squamous cell carcinoma of tongue    Laryngopharyngeal reflux    Chronic sinusitis    Xerostomia    Tongue irritation    Sicca syndrome    Abnormal thyroid stimulating hormone level    H/O head and neck radiation    Allergic rhinitis    Acute maxillary sinusitis    Wheezing    Productive cough    Pharyngoesophageal dysphagia    Perichondritis and chondritis of pinna, left    Tobacco use disorder    Hypothyroidism    Neoplasm of uncertain behavior    Neoplasm of uncertain behavior of skin of neck    Hx of radiation therapy    Elevated prostate specific antigen (PSA)    Mixed hyperlipidemia        Diagnosis Plan   1. Bilateral carotid artery stenosis  US Carotid Bilateral      2. Mixed hyperlipidemia            Plan: After thoroughly evaluating Saul Alcantara Sr., I believe the best course of action is  to remain conservative from a vascular surgery standpoint.  I did personally review his testing which shows less than 50% carotid stenosis on the the right and 50-69% left carotid stenosis.  I would recommend he begin taking aspirin EC 81 mg daily.  I will see him back in 1 year with repeat noninvasive testing including a carotid duplex.  I did discuss vascular risk factors as they pertain to the progression of vascular disease including controlling his hyperlipidemia, which is currently stable.  The patient is to continue taking their medications as previously discussed.   This was all discussed in full with complete understanding.  Thank you for allowing me to participate in the care of your patient.  Please do not hesitate to call with any questions or concerns.  We will keep you aware of any further encounters with Saul Alcantara Sr..        Sincerely yours,         DO Enoc Davis Danny Neale, MD

## 2023-05-25 NOTE — LETTER
May 25, 2023     Joe Stubbs MD  [address]  [facility] [address]  [place] [address]    Patient: Saul Alcantara Sr.   YOB: 1955   Date of Visit: 5/25/2023       Dear Dr. Enoc MD:    Thank you for referring Saul Alcantara to me for evaluation. Below are the relevant portions of my assessment and plan of care.    If you have questions, please do not hesitate to call me. I look forward to following Saul along with you.         Sincerely,        Leandro Woods DO        CC: No Recipients    Leandro Woods DO  05/25/23 0844  Sign when Signing Visit  05/25/2023      Roger Puri APRN  [address]  [facility] [address]  [place] [address]    Saul Alcantara Sr.  1955    Chief Complaint   Patient presents with   • NEW PATIENT     Ref from [facility] for Bilateral carotid stenosis 50-69% on left. Pt states that over a year he has found bumps on the left side of neck that has been creating pain. Pt states there are times left side from neck down goes numb.        Dear JACKIE Tamez    HPI  I had the pleasure of seeing your patient Saul Alcantara Sr. in the office today.  Thank you kindly for this consultation.  As you recall, Saul Alcantara Sr. is a 67 y.o.  male who you are currently following for routine health maintenance.  He has been having complaints of dizziness and high blood pressure. He has a history of lymphoma, tongue cancer, and prostate cancer 3 years ago.  He has some numbness to the left side of the body.  He is maintained on Zocor. He did have noninvasive testing performed which I did personally review.     Past Medical History:   Diagnosis Date   • Allergic rhinitis    • Anxiety    • Arthritis    • Cataract    • Chronic sinusitis    • COPD (chronic obstructive pulmonary disease)    • COVID-19 vaccine series completed     Moderna   • Depression    • Deviated nasal septum    • Elevated cholesterol    • Enlarged prostate    • GERD (gastroesophageal reflux  disease)    • H/O head and neck radiation 03/03/2017   • History of transfusion    • Hyperlipidemia    • Hypertension    • Hypothyroidism    • Laryngopharyngeal reflux    • Lymphoma     Non-Hodgkins   • MRSA infection    • Panic attacks    • Peyronie disease    • Pneumonia    • 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/27/2016    T1N0M0 SCC S/P XRT/Chemo 12/2014   • 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/27/2016    T1N0M0 SCC S/P XRT/Chemo 12/2014   • Prostate cancer    • Sicca syndrome    • Sinusitis, acute    • Squamous cell carcinoma     Base of Tongue   • Tobacco use disorder    • Tongue irritation    • Visit for monitoring Rituxan therapy     pt getting treatments at [facility] [place], operated by [facility] in [place]   • Xerostomia        Past Surgical History:   Procedure Laterality Date   • CATARACT EXTRACTION Bilateral    • ESOPHAGOSCOPY N/A 11/20/2019    Procedure: Direct laryngoscopy with esophageal Johnson dilation;  Surgeon: Wayne Richmond MD;  Location: [facility] OR;  Service: ENT   • HAND SURGERY      metal plate    • KNEE SURGERY     • LARYNGOSCOPY N/A 6/26/2020    Procedure: Rigid esophagoscopy Johnson esophageal dilatation;  Surgeon: Wayne Richmond MD;  Location: [facility] OR;  Service: ENT;  Laterality: N/A;   • LARYNGOSCOPY N/A 8/24/2020    Procedure: MICRODIRECT LARYNGOSCOPY with esophageal dilatation;  Surgeon: Wayne Richmond MD;  Location: [facility] OR;  Service: ENT;  Laterality: N/A;   • LARYNGOSCOPY N/A 5/24/2021    Procedure: Direct laryngoscopy, esophagoscopy with Johnson dilatation;  Surgeon: Wayne Richmond MD;  Location: [facility] OR;  Service: ENT;  Laterality: N/A;   • LARYNGOSCOPY N/A 12/1/2021    Procedure: Direct laryngoscopy, esophagoscopy with esophageal dilatation (Johnson dilators);  Surgeon: Wayne Richmond MD;  Location:  [facility] OR;  Service: ENT;  Laterality: N/A;   • LARYNGOSCOPY N/A 1/3/2022    Procedure: Direct laryngoscopy, esophagoscopy with Johnson esophageal  dilation;  Surgeon: Wayne Richmond MD;  Location:  [facility] OR;  Service: ENT;  Laterality: N/A;   • LARYNGOSCOPY N/A 2022    Procedure: Direct laryngoscopy with esophageal dilatation;  Surgeon: Wayne Richmond MD;  Location:  [facility] OR;  Service: ENT;  Laterality: N/A;   • LARYNGOSCOPY N/A 2022    Procedure: MicroDirect laryngoscopy, esophagoscopy with Johnson dilation;  Surgeon: Wayne Richmond MD;  Location:  [facility] OR;  Service: ENT;  Laterality: N/A;   • LARYNGOSCOPY N/A 2022    Procedure: DIRECT LARYNGOSCOPY WITH JOHNSON DILATION;  Surgeon: Wayne Richmond MD;  Location:  [facility] OR;  Service: ENT;  Laterality: N/A;   • MULTIPLE TOOTH EXTRACTIONS      CONTINUING TO HAVE BONE REMOVED 2021   • OTHER SURGICAL HISTORY  09/10/2014    Microlaryngoscopy with Biopsy - Base of Tongue   • PANENDOSCOPY N/A 2021    Procedure: DIRECT LARYNGOSCOPY AND ESOPHAGOSCOPY WITH ESOPHAGEAL DILATION;  Surgeon: Wayne Richmond MD;  Location:  [facility] OR;  Service: ENT;  Laterality: N/A;   • PANENDOSCOPY N/A 10/11/2021    Procedure: Direct laryngoscopy, esophagoscopy with Johnson dilatation;  Surgeon: Wayne Richmond MD;  Location:  [facility] OR;  Service: ENT;  Laterality: N/A;   • PROSTATE ULTRASOUND BIOPSY N/A 2021    Procedure: PROSTATE ULTRASOUND BIOPSY. NOT A URONAV;  Surgeon: Oscar Bazan MD;  Location:  [facility] OR;  Service: Urology;  Laterality: N/A;   • SPLENECTOMY     • SQUAMOUS CELL CARCINOMA EXCISION  09/10/2014    Tongue   • TONSILLECTOMY         Family History   Problem Relation Age of Onset   • Cancer Mother    • [place]'s disease Father    • Diabetes Sister    • Cancer Sister    • Aneurysm Brother        Social History     Socioeconomic History   • Marital status:    Tobacco Use   • Smoking status: Some Days     Packs/day: 0.50     Years: 44.00     Pack years: 22.00     Types: Cigarettes     Last attempt to quit:      Years since quittin.3   • Smokeless  tobacco: Never   • Tobacco comments:     pack last couple of weeks   Vaping Use   • Vaping Use: Never used   Substance and Sexual Activity   • Alcohol use: No   • Drug use: No   • Sexual activity: Defer     Partners: Female       No Known Allergies      Current Outpatient Medications:   •  albuterol (PROVENTIL) (2.5 MG/3ML) 0.083% nebulizer solution, Take 2.5 mg by nebulization Every 4 (Four) Hours As Needed for Wheezing., Disp: 120 mL, Rfl: 5  •  ALPRAZolam (XANAX) 2 MG tablet, Take 1 tablet by mouth 3 (Three) Times a Day., Disp: , Rfl:   •  amLODIPine (NORVASC) 5 MG tablet, Take 2 tablets by mouth Daily., Disp: , Rfl:   •  chlorhexidine (Peridex) 0.12 % solution, Apply 15 mL to the mouth or throat 2 (Two) Times a Day., Disp: 240 mL, Rfl: 0  •  glycopyrrolate (ROBINUL) 1 MG tablet, Take 0.5 mg by mouth 2 (Two) Times a Day., Disp: , Rfl:   •  HYDROcodone-acetaminophen (NORCO)  MG per tablet, Take 1 tablet by mouth Every 6 (Six) Hours As Needed for Moderate Pain (PER DR RUIZ)., Disp: , Rfl:   •  hydrOXYzine (ATARAX) 25 MG tablet, TAKE 1 TABLET BY MOUTH 4 TIMES A DAY AS NEEDED FOR ANXIETY, Disp: , Rfl:   •  levothyroxine (SYNTHROID, LEVOTHROID) 100 MCG tablet, Take 1 tablet by mouth Daily., Disp: , Rfl:   •  Lidocaine Viscous HCl (XYLOCAINE) 2 % solution, Take 5 mL by mouth As Needed for Mild Pain., Disp: 20 mL, Rfl: 3  •  lisinopril (PRINIVIL,ZESTRIL) 10 MG tablet, Take 1 tablet by mouth Daily., Disp: , Rfl:   •  losartan (COZAAR) 50 MG tablet, Take 1 tablet by mouth Daily., Disp: , Rfl:   •  meloxicam (MOBIC) 15 MG tablet, Take 1 tablet by mouth Daily., Disp: , Rfl:   •  mirtazapine (REMERON) 15 MG tablet, Take 1 tablet by mouth Every Night., Disp: , Rfl:   •  nystatin (MYCOSTATIN) 570713 UNIT/ML suspension, Take 10ml by mouth BID for 28 days (Patient taking differently: Take 2 mL by mouth 4 (Four) Times a Day As Needed. Take 10ml by mouth BID for 28 days), Disp: 480 mL, Rfl: 2  •  ondansetron (ZOFRAN) 8 MG  tablet, Take 1 tablet by mouth Every 8 (Eight) Hours As Needed for Nausea., Disp: , Rfl:   •  pantoprazole (PROTONIX) 40 MG EC tablet, TAKE 1 TABLET BY MOUTH TWICE A DAY (Patient taking differently: Take 1 tablet by mouth Daily.), Disp: 180 tablet, Rfl: 3  •  roflumilast (Daliresp) 250 MCG tablet tablet, Take 1 tablet by mouth Daily., Disp: 28 tablet, Rfl: 0  •  senna-docusate (PERICOLACE) 8.6-50 MG per tablet, Take 2 tablets by mouth Daily., Disp: , Rfl:   •  sertraline (ZOLOFT) 100 MG tablet, Take 1 tablet by mouth every night at bedtime., Disp: , Rfl:   •  simvastatin (ZOCOR) 20 MG tablet, Take 1 tablet by mouth Every Night., Disp: , Rfl: 11  •  tamsulosin (FLOMAX) 0.4 MG capsule 24 hr capsule, Take 1 capsule by mouth Daily., Disp: , Rfl:   •  cyclobenzaprine (FLEXERIL) 10 MG tablet, Take 1 tablet by mouth As Needed. (Patient not taking: Reported on 5/25/2023), Disp: , Rfl:   •  fluticasone (FLONASE) 50 MCG/ACT nasal spray, 1 spray by Each Nare route Daily. (Patient not taking: Reported on 5/25/2023), Disp: , Rfl:   •  tadalafil (CIALIS) 20 MG tablet, Take 1 tablet by mouth As Needed for Erectile Dysfunction for up to 360 days. 1-24 hours before activity (Patient not taking: Reported on 5/25/2023), Disp: 12 tablet, Rfl: 11  •  tiotropium bromide monohydrate (Spiriva Respimat) 2.5 MCG/ACT aerosol solution inhaler, Inhale 2 puffs Daily. (Patient not taking: Reported on 5/25/2023), Disp: 4 g, Rfl: 0  •  tiotropium bromide-olodaterol (Stiolto Respimat) 2.5-2.5 MCG/ACT aerosol solution inhaler, Inhale 2 puffs Daily. (Patient not taking: Reported on 5/25/2023), Disp: 4 g, Rfl: 11    Review of Systems   Constitutional: Negative.    HENT: Negative.     Eyes: Negative.    Respiratory: Negative.     Cardiovascular: Negative.    Gastrointestinal: Negative.    Endocrine: Negative.    Genitourinary: Negative.    Musculoskeletal: Negative.    Skin: Negative.    Allergic/Immunologic: Negative.    Neurological:  Positive for  "numbness.   Hematological: Negative.    Psychiatric/Behavioral: Negative.     All other systems reviewed and are negative.  /88   Pulse 51   Ht 185.4 cm (73\")   Wt 63.5 kg (140 lb)   SpO2 100%   BMI 18.47 kg/m²     Physical Exam  Vitals and nursing note reviewed.   Constitutional:       Appearance: Normal appearance. He is well-developed and underweight.   HENT:      Head: Normocephalic and atraumatic.   Eyes:      General: No scleral icterus.     Pupils: Pupils are equal, round, and reactive to light.   Neck:      Thyroid: No thyromegaly.      Vascular: No carotid bruit or JVD.   Cardiovascular:      Rate and Rhythm: Normal rate and regular rhythm.      Pulses:           Carotid pulses are 2+ on the right side and 2+ on the left side.       Femoral pulses are 2+ on the right side and 2+ on the left side.       Popliteal pulses are 2+ on the right side and 2+ on the left side.        Dorsalis pedis pulses are 2+ on the right side and 2+ on the left side.        Posterior tibial pulses are 2+ on the right side and 2+ on the left side.      Heart sounds: Normal heart sounds.   Pulmonary:      Effort: Pulmonary effort is normal.      Breath sounds: Normal breath sounds.   Abdominal:      General: Bowel sounds are normal. There is no distension or abdominal bruit.      Palpations: Abdomen is soft. There is no mass.      Tenderness: There is no abdominal tenderness.   Musculoskeletal:         General: Normal range of motion.      Cervical back: Neck supple.   Lymphadenopathy:      Cervical: No cervical adenopathy.   Skin:     General: Skin is warm and dry.   Neurological:      Mental Status: He is alert and oriented to person, place, and time.      Cranial Nerves: No cranial nerve deficit.      Sensory: No sensory deficit.         Patient Active Problem List   Diagnosis   • Primary squamous cell carcinoma of tongue   • Laryngopharyngeal reflux   • Chronic sinusitis   • Xerostomia   • Tongue irritation   • " Sicca syndrome   • Abnormal thyroid stimulating hormone level   • H/O head and neck radiation   • Allergic rhinitis   • Acute maxillary sinusitis   • Wheezing   • Productive cough   • Pharyngoesophageal dysphagia   • Perichondritis and chondritis of pinna, left   • Tobacco use disorder   • Hypothyroidism   • Neoplasm of uncertain behavior   • Neoplasm of uncertain behavior of skin of neck   • Hx of radiation therapy   • Elevated prostate specific antigen (PSA)   • Mixed hyperlipidemia        Diagnosis Plan   1. Bilateral carotid artery stenosis  US Carotid Bilateral      2. Mixed hyperlipidemia            Plan: After thoroughly evaluating Saul Alcantara Sr., I believe the best course of action is to remain conservative from a vascular surgery standpoint.  I did personally review his testing which shows less than 50% carotid stenosis on the the right and 50-69% left carotid stenosis.  I would recommend he begin taking aspirin EC 81 mg daily.  I will see him back in 1 year with repeat noninvasive testing including a carotid duplex.  I did discuss vascular risk factors as they pertain to the progression of vascular disease including controlling his hyperlipidemia, which is currently stable.  The patient is to continue taking their medications as previously discussed.   This was all discussed in full with complete understanding.  Thank you for allowing me to participate in the care of your patient.  Please do not hesitate to call with any questions or concerns.  We will keep you aware of any further encounters with Saul Alcantara Sr..        Sincerely yours,         DO Enoc Davis Danny Neale, MD

## 2023-05-30 ENCOUNTER — TRANSCRIBE ORDERS (OUTPATIENT)
Dept: ADMINISTRATIVE | Facility: HOSPITAL | Age: 68
End: 2023-05-30

## 2023-05-30 DIAGNOSIS — I10 ESSENTIAL HYPERTENSION, MALIGNANT: Primary | ICD-10-CM

## 2023-05-31 DIAGNOSIS — J43.2 CENTRILOBULAR EMPHYSEMA: ICD-10-CM

## 2023-05-31 RX ORDER — TIOTROPIUM BROMIDE INHALATION SPRAY 3.12 UG/1
SPRAY, METERED RESPIRATORY (INHALATION)
Qty: 4 G | Refills: 5 | OUTPATIENT
Start: 2023-05-31

## 2023-05-31 NOTE — TELEPHONE ENCOUNTER
Pharmacy sent a request for refills on Spiriva. Per wife he is not currently taking Spiriva or Stiolto.  Rx Refill Note  Requested Prescriptions     Pending Prescriptions Disp Refills    Spiriva Respimat 2.5 MCG/ACT aerosol solution inhaler [Pharmacy Med Name: SPIRIVA RESPIMAT 2.5 MCG INH]       Sig: INHALE 2 PUFFS BY MOUTH DAILY      Last office visit with prescribing clinician: Visit date not found   Last telemedicine visit with prescribing clinician: Visit date not found   Next office visit with prescribing clinician: Visit date not found                         Would you like a call back once the refill request has been completed: [] Yes [] No    If the office needs to give you a call back, can they leave a voicemail: [] Yes [] No    Leonardo Fowler, [facility]  05/31/23, 11:28 CDT

## 2023-08-04 NOTE — PROGRESS NOTES
YOB: 1955  Location: [facility]  Location Address: [address] [place] [address], [facility], [address] [place] [address]  Location Phone: [phone]    Chief Complaint   Patient presents with    Squamous Cell Carcinoma     tongue       History of Present Illness  Saul Alcantara Sr. is a 67 y.o. male.  Saul Alcantara Sr. is here for follow up of ENT complaints.  The patient has a history of DL and biopsy of base of tongue on 2014 with positive pathology for scca. The cancer was staged T1M0N0. He has a history of radiation therapy that he finished 2014. Patient is s/p direct laryngoscopy with esophageal dilation 23. Patient is having mild difficulty swallowing. He has lost weight and states his oncologist wants him to gain or he will have to have feeding tube in.      He also admits to having issues with his appetite in addition to the difficulty swallowing.  He experienced some minimal improvement following the last esophageal dilatation but it was not remarkable.          Past Medical History:   Diagnosis Date    Allergic rhinitis     Anxiety     Arthritis     Cataract     Chronic sinusitis     COPD (chronic obstructive pulmonary disease)     COVID-19 vaccine series completed     Moderna    Depression     Deviated nasal septum     Elevated cholesterol     Enlarged prostate     GERD (gastroesophageal reflux disease)     H/O head and neck radiation 2017    History of transfusion     Hyperlipidemia     Hypertension     Hypothyroidism     Laryngopharyngeal reflux     Lymphoma     Non-Hodgkins    MRSA infection     Panic attacks     Peyronie disease     Pneumonia     Primary squamous cell carcinoma of tongue 2016    T1N0M0 SCC S/P XRT/Chemo 2014    Primary squamous cell carcinoma of tongue 2016    T1N0M0 SCC S/P XRT/Chemo 2014    Prostate cancer     Sicca syndrome     Sinusitis, acute     Squamous cell carcinoma     Base of Tongue    Tobacco use disorder     Tongue irritation      Visit for monitoring Rituxan therapy     pt getting treatments at [facility], [place], operated by [facility] office in [place]    Xerostomia        Past Surgical History:   Procedure Laterality Date    CATARACT EXTRACTION Bilateral     ESOPHAGOSCOPY N/A 11/20/2019    Procedure: Direct laryngoscopy with esophageal Johnson dilation;  Surgeon: Wayne Richmond MD;  Location:  [facility];  Service: ENT    HAND SURGERY      metal plate     KNEE SURGERY      LARYNGOSCOPY N/A 6/26/2020    Procedure: Rigid esophagoscopy Johnson esophageal dilatation;  Surgeon: Wayne Rihcmond MD;  Location:  [facility];  Service: ENT;  Laterality: N/A;    LARYNGOSCOPY N/A 8/24/2020    Procedure: MICRODIRECT LARYNGOSCOPY with esophageal dilatation;  Surgeon: Wayne Richmond MD;  Location:  [facility];  Service: ENT;  Laterality: N/A;    LARYNGOSCOPY N/A 5/24/2021    Procedure: Direct laryngoscopy, esophagoscopy with Johnson dilatation;  Surgeon: Wayne Richmond MD;  [facility];  Service: ENT;  Laterality: N/A;    LARYNGOSCOPY N/A 12/1/2021    Procedure: Direct laryngoscopy, esophagoscopy with esophageal dilatation (Johnson dilators);  Surgeon: Wayne Richmond MD;  Location:  [facility];  Service: ENT;  Laterality: N/A;    LARYNGOSCOPY N/A 1/3/2022    Procedure: Direct laryngoscopy, esophagoscopy with Johnson esophageal dilation;  Surgeon: Wayne Richmond MD;  Location:  [facility];  Service: ENT;  Laterality: N/A;    LARYNGOSCOPY N/A 4/29/2022    Procedure: Direct laryngoscopy with esophageal dilatation;  Surgeon: Wayne Richmond MD;  [facility];  Service: ENT;  Laterality: N/A;    LARYNGOSCOPY N/A 6/24/2022    Procedure: MicroDirect laryngoscopy, esophagoscopy with Johnson dilation;  Surgeon: Wayne Richmond MD;  Location:  [facility];  Service: ENT;  Laterality: N/A;    LARYNGOSCOPY N/A 9/23/2022    Procedure: DIRECT LARYNGOSCOPY WITH JOHNSON DILATION;  Surgeon: Wayne Richmond MD;  Location:  [facility];  Service:  ENT;  Laterality: N/A;    LARYNGOSCOPY N/A 6/30/2023    Procedure: DIRECT LARYNGOSCOPY WITH DALTON DILATATION, PUNCH BIOPSY OF LEFT POST-AURICULAR AREA;  Surgeon: Wayne Richmond MD;  Location: [facility] OR;  Service: ENT;  Laterality: N/A;    MULTIPLE TOOTH EXTRACTIONS      CONTINUING TO HAVE BONE REMOVED 1/2021    OTHER SURGICAL HISTORY  09/10/2014    Microlaryngoscopy with Biopsy - Base of Tongue    PANENDOSCOPY N/A 1/22/2021    Procedure: DIRECT LARYNGOSCOPY AND ESOPHAGOSCOPY WITH ESOPHAGEAL DILATION;  Surgeon: Wayne Richmond MD;  Location: [facility] OR;  Service: ENT;  Laterality: N/A;    PANENDOSCOPY N/A 10/11/2021    Procedure: Direct laryngoscopy, esophagoscopy with Dalton dilatation;  Surgeon: Wayne Richmond MD;  Location:  [facility];  Service: ENT;  Laterality: N/A;    PROSTATE ULTRASOUND BIOPSY N/A 12/1/2021    Procedure: PROSTATE ULTRASOUND BIOPSY. NOT A URONAV;  Surgeon: Oscar Bazan MD;  Location: [facility] OR;  Service: Urology;  Laterality: N/A;    SPLENECTOMY      SQUAMOUS CELL CARCINOMA EXCISION  09/10/2014    Tongue    TONSILLECTOMY         Outpatient Medications Marked as Taking for the 8/8/23 encounter (Office Visit) with Wayne Richmond MD   Medication Sig Dispense Refill    albuterol (PROVENTIL) (2.5 MG/3ML) 0.083% nebulizer solution Take 2.5 mg by nebulization Every 4 (Four) Hours As Needed for Wheezing. 120 mL 5    ALPRAZolam (XANAX) 2 MG tablet Take 1 tablet by mouth 3 (Three) Times a Day.      amLODIPine (NORVASC) 5 MG tablet Take 1 tablet by mouth Daily.      aspirin 81 MG EC tablet Take 1 tablet by mouth Daily.      Cholecalciferol 50 MCG (2000 UT) capsule Take 1 capsule by mouth Daily.      citalopram (CeleXA) 40 MG tablet       cyclobenzaprine (FLEXERIL) 10 MG tablet Take 1 tablet by mouth As Needed.      fluticasone (FLONASE) 50 MCG/ACT nasal spray 1 spray by Each Nare route Daily As Needed.      glycopyrrolate (ROBINUL) 1 MG tablet Take 0.5 tablets by mouth 2 (Two)  Times a Day.      HYDROcodone-acetaminophen (NORCO)  MG per tablet Take 1 tablet by mouth Every 6 (Six) Hours As Needed for Moderate Pain (PER DR RUIZ).      hydrOXYzine (ATARAX) 25 MG tablet TAKE 1 TABLET BY MOUTH 4 TIMES A DAY AS NEEDED FOR ANXIETY      irbesartan (AVAPRO) 150 MG tablet Take 1 tablet by mouth Daily.      levothyroxine (SYNTHROID, LEVOTHROID) 100 MCG tablet Take 125 mcg by mouth Daily.      Lidocaine Viscous HCl (XYLOCAINE) 2 % solution Take 5 mL by mouth As Needed for Mild Pain. 20 mL 3    meloxicam (MOBIC) 15 MG tablet Take 1 tablet by mouth Daily.      mirtazapine (REMERON) 15 MG tablet Take 1 tablet by mouth Every Night.      mupirocin (BACTROBAN) 2 % ointment mupirocin 2 % topical ointment   APPLY A SMALL AMOUNT TO THE AFFECTED AREA BY TOPICAL ROUTE 2 TIMES PER DAY      nabumetone (RELAFEN) 500 MG tablet TAKE 1 TABLET BY MOUTH TWICE A DAY FOR 30 DAYS      nystatin (MYCOSTATIN) 700460 UNIT/ML suspension Take 10ml by mouth BID for 28 days (Patient taking differently: Take 2 mL by mouth 4 (Four) Times a Day As Needed. Take 10ml by mouth BID for 28 days) 480 mL 2    ondansetron (ZOFRAN) 8 MG tablet Take 1 tablet by mouth Every 8 (Eight) Hours As Needed for Nausea.      pantoprazole (PROTONIX) 40 MG EC tablet TAKE 1 TABLET BY MOUTH TWICE A DAY (Patient taking differently: Take 1 tablet by mouth Daily.) 180 tablet 3    senna-docusate (PERICOLACE) 8.6-50 MG per tablet Take 2 tablets by mouth Daily As Needed.      simvastatin (ZOCOR) 20 MG tablet Take 1 tablet by mouth Every Night.  11    tamsulosin (FLOMAX) 0.4 MG capsule 24 hr capsule Take 1 capsule by mouth Daily.      tiotropium bromide monohydrate (Spiriva Respimat) 2.5 MCG/ACT aerosol solution inhaler Inhale 2 puffs Daily. 4 g 0    tiotropium bromide-olodaterol (Stiolto Respimat) 2.5-2.5 MCG/ACT aerosol solution inhaler Inhale 2 puffs Daily. 4 g 11    traMADol (ULTRAM) 50 MG tablet TAKE 1 TABLET TWICE A DAY BY ORAL ROUTE AS NEEDED.          Patient has no known allergies.    Family History   Problem Relation Age of Onset    Cancer Mother     Kersey's disease Father     Diabetes Sister     Cancer Sister     Aneurysm Brother        Social History     Socioeconomic History    Marital status:    Tobacco Use    Smoking status: Some Days     Packs/day: 0.50     Years: 44.00     Pack years: 22.00     Types: Cigarettes    Smokeless tobacco: Never    Tobacco comments:     pack last couple of weeks   Vaping Use    Vaping Use: Never used   Substance and Sexual Activity    Alcohol use: No    Drug use: No    Sexual activity: Defer     Partners: Female       Review of Systems   Constitutional:  Positive for unexpected weight change.   HENT:  Positive for trouble swallowing.    Eyes: Negative.    Respiratory: Negative.     Cardiovascular: Negative.    Gastrointestinal: Negative.    Endocrine: Negative.    Genitourinary: Negative.    Musculoskeletal: Negative.    Skin: Negative.    Allergic/Immunologic: Negative.    Neurological: Negative.    Hematological: Negative.    Psychiatric/Behavioral: Negative.       Vitals:    08/08/23 0910   BP: 156/87   Pulse: 81   Resp: 16   Temp: 98 øF (36.7 øC)       Body mass index is 17.15 kg/mý.    Objective     Physical Exam  CONSTITUTIONAL: well nourished, well-developed, alert, oriented, in no acute distress     COMMUNICATION AND VOICE: able to communicate normally, normal voice quality    HEAD: normocephalic, no lesions, atraumatic, no tenderness, no masses     FACE: appearance normal, no lesions, no tenderness, no deformities, facial motion symmetric    EYES: ocular motility normal, eyelids normal, orbits normal, no proptosis, conjunctiva normal , pupils equal, round     EARS:  Hearing: hearing to conversational voice intact bilaterally   External Ears: normal bilaterally, no lesions    NOSE:  External Nose: external nasal structure normal, no tenderness on palpation, no nasal discharge, no lesions, no evidence  of trauma, nostrils patent     ORAL:  Lips: upper and lower lips without lesion   OC/OP: Mild glossitis but otherwise no masses or lesions appreciated by visualization or palpation including bimanual palpation at the base of the tongue    LARYNX:  See endoscopy    NECK:  Inspection and Palpation: neck appearance normal, no masses or tenderness bilaterally    CHEST/RESPIRATORY: normal respiratory effort     CARDIOVASCULAR: no cyanosis or edema     NEUROLOGICAL/PSYCHIATRIC: oriented to time, place and person, mood normal, affect appropriate, CN II-XII intact grossly   Assessment & Plan   Diagnoses and all orders for this visit:    1. Primary squamous cell carcinoma of tongue (Primary)  Comments:  DL /BX BOT  9/10/14 T1M0N0 SCCa. Hx of SCC left BOT 12/2014 with XRT    2. Pharyngoesophageal dysphagia      * Surgery not found *  No orders of the defined types were placed in this encounter.    Return in about 4 months (around 12/8/2023).       Patient Instructions     Be aware of the signs and symptoms of recurrent head and neck cancer including neck mass, persistent or recurrent sore throat, ear pain, hemoptysis, weight loss and hoarseness. If any of these symptoms recur and or persist, call for an evaluation.     D/W patient increasing caloric intake and discussed cruciferous vegetables and protein shakes etc to maintain optimal nutrition.  The necessity of abstaining from tobacco products was also discussed particularly with respect to not smoking.    Continue F/U and/or treatment with 's      Encouraged supplementation with protein shakes and other foods that are easy to consume.  I personally at this point would not recommend placement of a feeding tube though agree that he does not need to lose any additional weight.

## 2023-08-08 ENCOUNTER — OFFICE VISIT (OUTPATIENT)
Dept: OTOLARYNGOLOGY | Facility: CLINIC | Age: 68
End: 2023-08-08
Payer: MEDICARE

## 2023-08-08 VITALS
HEART RATE: 81 BPM | DIASTOLIC BLOOD PRESSURE: 87 MMHG | SYSTOLIC BLOOD PRESSURE: 156 MMHG | BODY MASS INDEX: 17.23 KG/M2 | WEIGHT: 130 LBS | RESPIRATION RATE: 16 BRPM | TEMPERATURE: 98 F | HEIGHT: 73 IN

## 2023-08-08 DIAGNOSIS — R13.14 PHARYNGOESOPHAGEAL DYSPHAGIA: ICD-10-CM

## 2023-08-08 DIAGNOSIS — C02.9 PRIMARY SQUAMOUS CELL CARCINOMA OF TONGUE: Primary | ICD-10-CM

## 2023-08-08 RX ORDER — CITALOPRAM 40 MG/1
TABLET ORAL
COMMUNITY

## 2023-08-08 RX ORDER — IRBESARTAN 150 MG/1
1 TABLET ORAL DAILY
COMMUNITY

## 2023-08-08 RX ORDER — NABUMETONE 500 MG/1
TABLET, FILM COATED ORAL
COMMUNITY

## 2023-08-08 RX ORDER — TRAMADOL HYDROCHLORIDE 50 MG/1
TABLET ORAL
COMMUNITY
Start: 2023-07-07

## 2023-08-08 NOTE — PATIENT INSTRUCTIONS
Be aware of the signs and symptoms of recurrent head and neck cancer including neck mass, persistent or recurrent sore throat, ear pain, hemoptysis, weight loss and hoarseness. If any of these symptoms recur and or persist, call for an evaluation.     D/W patient increasing caloric intake and discussed cruciferous vegetables and protein shakes etc to maintain optimal nutrition.  The necessity of abstaining from tobacco products was also discussed particularly with respect to not smoking.    Continue F/U and/or treatment with 's      Encouraged supplementation with protein shakes and other foods that are easy to consume.  I personally at this point would not recommend placement of a feeding tube though agree that he does not need to lose any additional weight.

## 2023-08-08 NOTE — PROGRESS NOTES
OPERATIVE NOTE:  Saul Alcantara Sr.    DATE OF PROCEDURE: 8/8/2023    PROCEDURE:   Flexible Fiberoptic Laryngoscopy    ANESTHESIA:  None    REASON FOR PROCEDURE:  Procedure was recommended for re-evaluation of a lesion previously documented-surveillance of DL /BX BOT  9/10/14 T1M0N0 SCCa. Hx of SCC left BOT 12/2014 with XRT  Risks, benefits and alternatives were discussed.      DETAILS of OPERATION:  The patient was seated in the exam chair.  A flexible fiberoptic laryngoscopy was performed through the oral cavity.  The scope was introduced into the oral cavity and directed to the level of the glottis, examining the structures of the oropharynx, base of tongue, vallecula, supraglottic larynx, glottic larynx, and hypopharynx.      FINDINGS:  Mucosal surfaces:   The mucosal surfaces demonstrated normal mucosa surfaces with mild inflammation    Base of tongue:  The base of tongue was found to have no mass or lesion.    Epiglottis:  The epiglottis was found to have no mass or lesion.    Aryepiglottic fold:  The AE folds were found to have no mass or lesion.    False Vocal Fold:  The false cords were found to have no mass or lesion.    True Vocal Cord:  The true vocal cords were found to have no mass or lesion. Both true vocal cords adduct and abduct normally    Arytenoid:   The arytenoids were found to have mild inter-arytenoid edema.    Hypopharynx:  The hypopharynx was found to have no mass or lesion.    The patient tolerated procedure well.

## 2023-09-13 ENCOUNTER — TELEPHONE (OUTPATIENT)
Dept: PULMONOLOGY | Facility: CLINIC | Age: 68
End: 2023-09-13
Payer: MEDICARE

## 2023-09-13 NOTE — TELEPHONE ENCOUNTER
Called patient to reschedule their no show appointment that was originally scheduled on 08/30/2023 .      Unable to contact patient after trying all available phone numbers. No Show letter was mailed, which includes [facility] No Show Policy.

## 2023-11-30 RX ORDER — CHLORHEXIDINE GLUCONATE ORAL RINSE 1.2 MG/ML
15 SOLUTION DENTAL 2 TIMES DAILY
Qty: 473 ML | Refills: 3 | Status: SHIPPED | OUTPATIENT
Start: 2023-11-30

## 2023-12-27 RX ORDER — ROFLUMILAST 250 UG/1
250 TABLET ORAL DAILY
Qty: 30 TABLET | Refills: 11 | Status: SHIPPED | OUTPATIENT
Start: 2023-12-27

## 2023-12-27 NOTE — TELEPHONE ENCOUNTER
Patient is needing a refill on daliresp tablets sent to [facility] pharmacy in [facility].  He has been on it in the past.  If you approve we can send it to the pharmacy.

## 2023-12-29 NOTE — TELEPHONE ENCOUNTER
Called insurance to do the PA that is required for the daliresp and they are wanting to fax me the questions that are needing to be answered.  I have not received the fax.   I called the patient to let him know this and he is wanting to know about going back on the inhaler stiolto.   I told him I would call him with what we are going to do as well.     I also gave him the number to his insurance to call them to see if he can speed the process up for the fax to be sent to me.

## 2024-11-06 NOTE — ANESTHESIA POSTPROCEDURE EVALUATION
Patient: Saul Alcantara Sr.    Procedure Summary     Date:  11/20/19 Room / [facility] /  [facility]    Anesthesia Start:  1108 Anesthesia Stop:  1136    Procedure:  Direct laryngoscopy with esophageal Johnson dilation (N/A Esophagus) Diagnosis:       Pharyngoesophageal dysphagia      (Pharyngoesophageal dysphagia [R13.14])    Surgeon:  Wayne Richmond MD Provider:  Gary Wiley CRNA    Anesthesia Type:  general ASA Status:  3          Anesthesia Type: general  Last vitals  BP   131/78 (11/20/19 1159)   Temp   98.4 °F (36.9 °C) (11/20/19 1150)   Pulse   67 (11/20/19 1159)   Resp   18 (11/20/19 1159)     SpO2   98 % (11/20/19 1159)     Post Anesthesia Care and Evaluation    Patient location during evaluation: PACU  Patient participation: complete - patient participated  Level of consciousness: awake and alert  Pain management: adequate  Airway patency: patent  Anesthetic complications: No anesthetic complications  PONV Status: controlled  Cardiovascular status: acceptable and hemodynamically stable  Respiratory status: acceptable  Hydration status: acceptable    Comments: Patient discharged from PACU prior to anesthesia evaluation based on Annetta Score.  For details, see RN note.     /78 (BP Location: Left arm, Patient Position: Lying)   Pulse 67   Temp 98.4 °F (36.9 °C)   Resp 18   SpO2 98%        1 = Total assistance

## 2024-11-10 ENCOUNTER — NURSE TRIAGE (OUTPATIENT)
Dept: CALL CENTER | Facility: HOSPITAL | Age: 69
End: 2024-11-10
Payer: MEDICARE

## 2024-11-10 NOTE — TELEPHONE ENCOUNTER
Reason for Disposition   [1] Caller has NON-URGENT medicine question about med that PCP prescribed AND [2] triager unable to answer question    Additional Information   Negative: [1] Intentional drug overdose AND [2] suicidal thoughts or ideas   Negative: Drug overdose and triager unable to answer question   Negative: Caller requesting a renewal or refill of a medicine patient is currently taking   Negative: Caller requesting information unrelated to medicine   Negative: Caller requesting information about COVID-19 Vaccine   Negative: Caller requesting information about Emergency Contraception   Negative: Caller requesting information about Combined Birth Control Pills   Negative: Caller requesting information about Progestin Birth Control Pills   Negative: Caller requesting information about Post-Op pain or medicines   Negative: Caller requesting a prescription antibiotic (such as Penicillin) for Strep throat and has a positive culture result   Negative: Caller requesting a prescription anti-viral med (such as Tamiflu) and has influenza (flu) symptoms   Negative: Immunization reaction suspected   Negative: Rash while taking a medicine or within 3 days of stopping it   Negative: [1] Asthma and [2] having symptoms of asthma (cough, wheezing, etc.)   Negative: [1] Symptom of illness (e.g., headache, abdominal pain, earache, vomiting) AND [2] more than mild   Negative: Breastfeeding questions about mother's medicines and diet   Negative: MORE THAN A DOUBLE DOSE of a prescription or over-the-counter (OTC) drug   Negative: [1] DOUBLE DOSE (an extra dose or lesser amount) of prescription drug AND [2] any symptoms (e.g., dizziness, nausea, pain, sleepiness)   Negative: [1] DOUBLE DOSE (an extra dose or lesser amount) of over-the-counter (OTC) drug AND [2] any symptoms (e.g., dizziness, nausea, pain, sleepiness)   Negative: Took another person's prescription drug   Negative: [1] DOUBLE DOSE (an extra dose or lesser amount)  "of prescription drug AND [2] NO symptoms  (Exception: A double dose of antibiotics.)   Negative: Diabetes drug error or overdose (e.g., took wrong type of insulin or took extra dose)   Negative: [1] Prescription not at pharmacy AND [2] was prescribed by PCP recently (Exception: Triager has access to EMR and prescription is recorded there. Go to Home Care and confirm for pharmacy.)   Negative: [1] Pharmacy calling with prescription question AND [2] triager unable to answer question   Negative: [1] Caller has URGENT medicine question about med that PCP or specialist prescribed AND [2] triager unable to answer question   Negative: Medicine patch causing local rash or itching   Negative: [1] Caller has medicine question about med NOT prescribed by PCP AND [2] triager unable to answer question (e.g., compatibility with other med, storage)   Negative: Prescription request for new medicine (not a refill)    Answer Assessment - Initial Assessment Questions  1. NAME of MEDICINE: \"What medicine(s) are you calling about?\"      xanax  2. QUESTION: \"What is your question?\" (e.g., double dose of medicine, side effect)      What's dr monroy to call them in  3. PRESCRIBER: \"Who prescribed the medicine?\" Reason: if prescribed by specialist, call should be referred to that group.      eliana  4. SYMPTOMS: \"Do you have any symptoms?\" If Yes, ask: \"What symptoms are you having?\"  \"How bad are the symptoms (e.g., mild, moderate, severe)      na  5. PREGNANCY:  \"Is there any chance that you are pregnant?\" \"When was your last menstrual period?\"      na    Protocols used: Medication Question Call-ADULT-AH    "

## 2024-12-01 NOTE — PROCEDURES
Pulmonary Function Test  Performed by: Graciela Flores, RRT  Authorized by: Jaya Glez MD      Pre Drug % Predicted    FVC: 88%   FEV1: 72%   FEF 25-75%: 55%   FEV1/FVC: 62%   T%   RV: 184%   DLCO: 95%   D/VAsb: 98%    Interpretation   Spirometry   Spirometry shows moderate obstruction. There is reduced midflow suggesting small airway/airflow obstruction.   Review of FVL curve   Patient's effort is normal.   Lung Volume Measurements  Measurements show elevated residual volume consistent with gas trapping.   Diffusion Capacity  The patient's diffusion capacity is normal.  Diffusion capacity is normal when corrected for alveolar volume.       
Spontaneous, unlabored and symmetrical/Retractions/Tachypnea

## 2024-12-04 NOTE — TELEPHONE ENCOUNTER
CANNOT SWALLOW-DR. LECHUGA    You 14 minutes ago (3:00 PM)     AP       Need to see when Dr. Bazan wants on 12/6 and what room- lm with nurse         Documentation      You  Dr Bazan 15 minutes ago (2:59 PM)     You  Saul Bernardo Sr. 16 minutes ago (2:58 PM)     Cori Marie LPN routed conversation to You 3 hours ago (11:17 AM)     Jewell Romo RegSched Rep routed conversation to [facility] 4 hours ago (10:21 AM)     Mearse, Jewell, RegSched Rep 4 hours ago (10:21 AM)     MM             Caller:  ALYSSIA BERNARDO     Relationship: WIFE      Best call back number: [phone]     What is your medical concern? CANNOT SWALLOW     How long has this issue been going on? COUPLE WEEKS     Is your provider already aware of this issue? NO     Have you been treated for this issue? NO     PT HAS BEEN UNABLE TO SWALLOW FOR SOMETIME. BEEN GOING ON FOR ABOUT TWO WEEKS BUT IS GETTING WORSE. HAS FUP IN JAN. 2022 WITH DR. LECHUGA BUT WOULD LIKE TO COME IN SOONER. PLEASE CALL TO DISCUSS OR LEAVE VM IF UNABLE TO REACH.                  H&P reviewed. After examining the patient I find no changes in the patients condition since the H&P had been written.    Vitals:    12/04/24 0710   BP: 146/97   Pulse: 57   Resp: 18   Temp: (!) 97.4 °F (36.3 °C)   SpO2: 96%

## (undated) DEVICE — TUBING, SUCTION, 1/4" X 12', STRAIGHT: Brand: MEDLINE

## (undated) DEVICE — TOWEL,OR,DSP,ST,BLUE,STD,4/PK,20PK/CS: Brand: MEDLINE

## (undated) DEVICE — NDL HYPO PRECISIONGLIDE/REG 18G 11/2 PNK

## (undated) DEVICE — GLV SURG BIOGEL M LTX PF 7 1/2

## (undated) DEVICE — GAUZE,SPONGE,4"X4",16PLY,XRAY,STRL,LF: Brand: MEDLINE

## (undated) DEVICE — KT ANTI FOG W/FLD AND SPNG

## (undated) DEVICE — PROTECT TEETH A/

## (undated) DEVICE — HDRST POSITIONING FM RND 2X9IN

## (undated) DEVICE — PK TURNOVER RM ADV

## (undated) DEVICE — ENDO KIT,LOURDES HOSPITAL: Brand: MEDLINE INDUSTRIES, INC.

## (undated) DEVICE — SYR TB PRECISIONGLIDE 1CC 26G 3/8IN LF

## (undated) DEVICE — GLV SURG BIOGEL M LTX PF 8

## (undated) DEVICE — PACK,SET UP,NO DRAPES: Brand: MEDLINE

## (undated) DEVICE — YANKAUER,BULB TIP WITH VENT: Brand: ARGYLE

## (undated) DEVICE — 4-PORT MANIFOLD: Brand: NEPTUNE 2

## (undated) DEVICE — DRSNG TELFA PAD NONADH STR 1S 3X8IN

## (undated) DEVICE — SPONGE,NEURO,0.5"X3",XR,STRL,LF,10/PK: Brand: MEDLINE

## (undated) DEVICE — FORCEPS BX L240CM JAW DIA2.8MM L CAP W/ NDL MIC MESH TOOTH

## (undated) DEVICE — SYR LUERLOK 20CC BX/50

## (undated) DEVICE — NEEDLE, QUINCKE 22GX7": Brand: MEDLINE

## (undated) DEVICE — GUIDE NDL BIOP BIPLANE 17G STRL 1P/U

## (undated) DEVICE — CONTAINER,SPECIMEN,OR STERILE,4OZ: Brand: MEDLINE

## (undated) DEVICE — SYR LUERLOK 30CC

## (undated) DEVICE — SPONGE,NEURO,0.5"X0.5",XR,STRL,10/PK: Brand: MEDLINE

## (undated) DEVICE — BAPTIST TURNOVER KIT: Brand: MEDLINE INDUSTRIES, INC.

## (undated) DEVICE — MAX-CORE® DISPOSABLE CORE BIOPSY INSTRUMENT, 18G X 20CM: Brand: MAX-CORE

## (undated) DEVICE — PK TURNOVER CYSTO RM

## (undated) DEVICE — CODMAN® SURGICAL PATTIES 1/2" X1 1/2" (1.27CM X 3.81CM): Brand: CODMAN®

## (undated) DEVICE — SPNG GZ WOVN 4X4IN 12PLY 10/BX STRL

## (undated) DEVICE — FORCEPS BX L240CM JAW DIA2.4MM ORNG L CAP W/ NDL DISP RAD

## (undated) DEVICE — SHEET,DRAPE,53X77,STERILE: Brand: MEDLINE

## (undated) DEVICE — STERILE COTTON BALLS LARGE 5/P: Brand: MEDLINE

## (undated) DEVICE — GOWN,NON-REINFORCED,SIRUS,SET IN SLV,XXL: Brand: MEDLINE